# Patient Record
Sex: FEMALE | Race: WHITE | NOT HISPANIC OR LATINO | Employment: OTHER | URBAN - METROPOLITAN AREA
[De-identification: names, ages, dates, MRNs, and addresses within clinical notes are randomized per-mention and may not be internally consistent; named-entity substitution may affect disease eponyms.]

---

## 2017-01-17 ENCOUNTER — TELEPHONE (OUTPATIENT)
Dept: FAMILY MEDICINE | Facility: CLINIC | Age: 72
End: 2017-01-17

## 2017-01-17 NOTE — TELEPHONE ENCOUNTER
Pt called back and states that she does not have time to come in for a Clinic RN BP check - She will however call back in a week or 2 with several readings because she has been checking her BP @ home.

## 2017-01-17 NOTE — TELEPHONE ENCOUNTER
Called patient's home phone and left a voicemail. Patient is to schedule a nursing appointment for a blood pressure check only. Will postpone for 1 week.    BP Readings from Last 3 Encounters:   10/24/16 161/66   09/23/15 127/77   08/20/14 158/82     Norma Donato CMA

## 2017-06-26 ENCOUNTER — TELEPHONE (OUTPATIENT)
Dept: FAMILY MEDICINE | Facility: CLINIC | Age: 72
End: 2017-06-26

## 2017-06-26 NOTE — TELEPHONE ENCOUNTER
Panel Management Review      Patient has the following on her problem list:     Depression / Dysthymia review  PHQ-9 SCORE 8/14/2014 9/23/2015 10/24/2016   Total Score 1 - -   Total Score - 1 2      Patient is due for:  PHQ9    Asthma review     ACT Total Scores 10/24/2016   ACT TOTAL SCORE -   ASTHMA ER VISITS -   ASTHMA HOSPITALIZATIONS -   ACT TOTAL SCORE (Goal Greater than or Equal to 20) 25   In the past 12 months, how many times did you visit the emergency room for your asthma without being admitted to the hospital? 0   In the past 12 months, how many times were you hospitalized overnight because of your asthma? 0      1. Is Asthma diagnosis on the Problem List? Yes    2. Is Asthma listed on Health Maintenance? Yes    3. Patient is due for:  ACT and AAP    Hypertension   Last three blood pressure readings:  BP Readings from Last 3 Encounters:   10/24/16 161/66   09/23/15 127/77   08/20/14 158/82     Blood pressure: FAILED    HTN Guidelines:  Age 18-59 BP range:  Less than 140/90  Age 60-85 with Diabetes:  Less than 140/90  Age 60-85 without Diabetes:  less than 150/90      Composite cancer screening  Chart review shows that this patient is due/due soon for the following None  Summary:    Patient is due/failing the following:   BP CHECK    Action needed:   Patient needs to do ACT., Patient needs to do PHQ9. and Patient needs nurse only appointment.    Type of outreach:    Sent letter.    Questions for provider review:    None                                                                                                                                    Jane THOMPSON CMA       Chart routed to Care Team .

## 2017-06-26 NOTE — LETTER
[facility]  [address] [place] [id number] [phone]      June 26, 2017      Nerissa Valentin  [address] [place] [id number]        Dear Nerissa,        During a recent visit to our clinic, your blood pressure was elevated above the target range for your health.   BP Readings from Last 3 Encounters:   10/24/16 161/66   09/23/15 127/77   08/20/14 158/82     Please schedule a free appointment for a blood pressure check with nurse in the near future.  You can call [phone] to do this. We can also complete the depression and asthma questionnaires that you are due for during this call.    Why is high blood pressure a big deal?    If blood pressure is elevated above target levels you have an increased risk of experiencing a heart attack or stroke, developing heart failure, kidney disease or other chronic diseases.    With appropriate early recognition and treatment, these health problems can be avoided in most cases.  Your physician can help you determine the need for treatment and discuss the non-medication and medication routes to treating high blood pressure as well as evaluate you for possible causes and effects of high blood pressure.    The target range for ideal blood pressure control is less than 140/90 for most individuals.  For those who have been diagnosed with heart disease, diabetes, stroke or peripheral vascular disease this target range is less than 130/80.    Sincerely,  Gwendolyn Sadler MD/Jane THOMPSON CMA

## 2017-06-26 NOTE — LETTER
My Depression Action Plan  Name: Nerissa Valentin   Date of Birth 1945  Date: 6/26/2017    My doctor: Gwendolyn Sadler   My clinic: [facility]  [address] [facility] [address]  [phone]          GREEN    ZONE   Good Control    What it looks like:     Things are going generally well. You have normal up s and down s. You may even feel depressed from time to time, but bad moods usually last less than a day.   What you need to do:  1. Continue to care for yourself (see self care plan)  2. Check your depression survival kit and update it as needed  3. Follow your physician s recommendations including any medication.  4. Do not stop taking medication unless you consult with your physician first.           YELLOW         ZONE Getting Worse    What it looks like:     Depression is starting to interfere with your life.     It may be hard to get out of bed; you may be starting to isolate yourself from others.    Symptoms of depression are starting to last most all day and this has happened for several days.     You may have suicidal thoughts but they are not constant.   What you need to do:     1. Call your care team, your response to treatment will improve if you keep your care team informed of your progress. Yellow periods are signs an adjustment may need to be made.     2. Continue your self-care, even if you have to fake it!    3. Talk to someone in your support network    4. Open up your depression survival kit           RED    ZONE Medical Alert - Get Help    What it looks like:     Depression is seriously interfering with your life.     You may experience these or other symptoms: You can t get out of bed most days, can t work or engage in other necessary activities, you have trouble taking care of basic hygiene, or basic responsibilities, thoughts of suicide or death that will not go away, self-injurious behavior.     What you need to do:  1. Call your care  team and request a same-day appointment. If they are not available (weekends or after hours) call your local crisis line, emergency room or 911.      Electronically signed by: Jane Griffiths, June 26, 2017    Depression Self Care Plan / Survival Kit    Self-Care for Depression  Here s the deal. Your body and mind are really not as separate as most people think.  What you do and think affects how you feel and how you feel influences what you do and think. This means if you do things that people who feel good do, it will help you feel better.  Sometimes this is all it takes.  There is also a place for medication and therapy depending on how severe your depression is, so be sure to consult with your medical provider and/ or Behavioral Health Consultant if your symptoms are worsening or not improving.     In order to better manage my stress, I will:    Exercise  Get some form of exercise, every day. This will help reduce pain and release endorphins, the  feel good  chemicals in your brain. This is almost as good as taking antidepressants!  This is not the same as joining a gym and then never going! (they count on that by the way ) It can be as simple as just going for a walk or doing some gardening, anything that will get you moving.      Hygiene   Maintain good hygiene (Get out of bed in the morning, Make your bed, Brush your teeth, Take a shower, and Get dressed like you were going to work, even if you are unemployed).  If your clothes don't fit try to get ones that do.    Diet  I will strive to eat foods that are good for me, drink plenty of water, and avoid excessive sugar, caffeine, alcohol, and other mood-altering substances.  Some foods that are helpful in depression are: complex carbohydrates, B vitamins, flaxseed, fish or fish oil, fresh fruits and vegetables.    Psychotherapy  I agree to participate in Individual Therapy (if recommended).    Medication  If prescribed medications, I agree to take them.  Missing  doses can result in serious side effects.  I understand that drinking alcohol, or other illicit drug use, may cause potential side effects.  I will not stop my medication abruptly without first discussing it with my provider.    Staying Connected With Others  I will stay in touch with my friends, family members, and my primary care provider/team.    Use your imagination  Be creative.  We all have a creative side; it doesn t matter if it s oil painting, sand castles, or mud pies! This will also kick up the endorphins.    Witness Beauty  (AKA stop and smell the roses) Take a look outside, even in mid-winter. Notice colors, textures. Watch the squirrels and birds.     Service to others  Be of service to others.  There is always someone else in need.  By helping others we can  get out of ourselves  and remember the really important things.  This also provides opportunities for practicing all the other parts of the program.    Humor  Laugh and be silly!  Adjust your TV habits for less news and crime-drama and more comedy.    Control your stress  Try breathing deep, massage therapy, biofeedback, and meditation. Find time to relax each day.     My support system    Clinic Contact:  Phone number:    Contact 1:  Phone number:    Contact 2:  Phone number:    Christianity/:  Phone number:    Therapist:  Phone number:    Local crisis center:    Phone number:    Other community support:  Phone number:

## 2017-09-13 ENCOUNTER — OFFICE VISIT (OUTPATIENT)
Dept: FAMILY MEDICINE | Facility: CLINIC | Age: 72
End: 2017-09-13
Payer: COMMERCIAL

## 2017-09-13 VITALS — HEIGHT: 58 IN | HEART RATE: 80 BPM | SYSTOLIC BLOOD PRESSURE: 134 MMHG | DIASTOLIC BLOOD PRESSURE: 80 MMHG

## 2017-09-13 DIAGNOSIS — M17.11 PRIMARY OSTEOARTHRITIS OF RIGHT KNEE: Primary | ICD-10-CM

## 2017-09-13 PROCEDURE — 20610 DRAIN/INJ JOINT/BURSA W/O US: CPT | Mod: RT | Performed by: FAMILY MEDICINE

## 2017-09-13 NOTE — MR AVS SNAPSHOT
"              After Visit Summary   9/13/2017    Nerissa Valentin    MRN: [id number]           Patient Information     Date Of Birth          1945        Visit Information        Provider Department      9/13/2017 9:00 AM Susana Granados MD [facility]        Today's Diagnoses     Primary osteoarthritis of right knee    -  1       Follow-ups after your visit        Who to contact     Normal or non-critical lab and imaging results will be communicated to you by [employer], letter or phone within 4 business days after the clinic has received the results. If you do not hear from us within 7 days, please contact the clinic through [employer] or phone. If you have a critical or abnormal lab result, we will notify you by phone as soon as possible.  Submit refill requests through [employer] or call your pharmacy and they will forward the refill request to us. Please allow 3 business days for your refill to be completed.          If you need to speak with a  for additional information , please call: [phone]           Additional Information About Your Visit        [email or web address] Information     [employer] gives you secure access to your electronic health record. If you see a primary care provider, you can also send messages to your care team and make appointments. If you have questions, please call your primary care clinic.  If you do not have a primary care provider, please call [phone] and they will assist you.        Care EveryWhere ID     This is your Care EveryWhere ID. This could be used by other organizations to access your [facility] medical records  [id number]        Your Vitals Were     Pulse Height                80 4' 10\" (1.473 m)           Blood Pressure from Last 3 Encounters:   09/13/17 134/80   10/24/16 161/66   09/23/15 127/77    Weight from Last 3 Encounters:   10/24/16 164 lb (74.4 kg)   09/23/15 157 lb (71.2 kg)   08/14/14 162 lb (73.5 kg)              We Performed the " Following     DEPO MEDROL 40 MG     DRAIN/INJECT LARGE JOINT/BURSA     VACCINE ADMINISTRATION, INITIAL          Today's Medication Changes          These changes are accurate as of: 9/13/17 11:59 PM.  If you have any questions, ask your nurse or doctor.               Start taking these medicines.        Dose/Directions    methylPREDNISolone acetate 40 MG/ML injection   Commonly known as:  DEPO-MEDROL   Used for:  Primary osteoarthritis of right knee   Started by:  Susana Granados MD        Dose:  40 mg   1 mL (40 mg) by INTRA-ARTICULAR route once for 1 dose   Quantity:  1 mL   Refills:  0            Where to get your medicines      Some of these will need a paper prescription and others can be bought over the counter.  Ask your nurse if you have questions.     You don't need a prescription for these medications     methylPREDNISolone acetate 40 MG/ML injection                Primary Care Provider Office Phone # Fax #    Gwendolyn Brenda Sadler [phone] [address] [facility] [address]        Equal Access to Services     ROHIT LAMB AH: Hadii ole ahumada hadasho Soomaali, waaxda luqadaha, qaybta kaalmada adeegyada, austin hamilton . So [facility] [phone].    ATENCIÓN: Si habla español, tiene a wang disposición servicios gratuitos de asistencia lingüística. Llame al [phone].    We comply with applicable federal civil rights laws and [place] laws. We do not discriminate on the basis of race, color, national origin, age, disability sex, sexual orientation or gender identity.            Thank you!     Thank you for choosing [facility]  for your care. Our goal is always to provide you with excellent care. Hearing back from our patients is one way we can continue to improve our services. Please take a few minutes to complete the written survey that you may receive in the mail after your visit with us. Thank you!             Your Updated Medication List -  Protect others around you: Learn how to safely use, store and throw away your medicines at www.disposemymeds.org.          This list is accurate as of: 9/13/17 11:59 PM.  Always use your most recent med list.                   Brand Name Dispense Instructions for use Diagnosis    ADVIL PO      None Entered        atorvastatin 40 MG tablet    LIPITOR    90 tablet    Take 1 tablet (40 mg) by mouth daily    Hyperlipidemia LDL goal <130       lisinopril 10 MG tablet    PRINIVIL/ZESTRIL    180 tablet    Take 2 tablets (20 mg) by mouth daily    Hyperlipidemia LDL goal <130       methylPREDNISolone acetate 40 MG/ML injection    DEPO-MEDROL    1 mL    1 mL (40 mg) by INTRA-ARTICULAR route once for 1 dose    Primary osteoarthritis of right knee       sertraline 50 MG tablet    ZOLOFT    90 tablet    Take 1 tablet (50 mg) by mouth daily    Dysthymic disorder

## 2017-09-13 NOTE — NURSING NOTE
"Chief Complaint   Patient presents with     Knee Pain       Initial /80  Pulse 80  Ht 4' 10\" (1.473 m) Estimated body mass index is 34.28 kg/(m^2) as calculated from the following:    Height as of 10/24/16: 4' 10\" (1.473 m).    Weight as of 10/24/16: 164 lb (74.4 kg).  Medication Reconciliation: complete  "

## 2017-09-13 NOTE — PROGRESS NOTES
"  SUBJECTIVE:   Nerissa Valentin is a 71 year old female who presents to clinic today for the following health issues:    Patient requesting repeat injection for right knee, last injection was 5/2016 at [facility]    Knee pain    Onset: years    Description:   Location: right knee  Character: Dull ache    Intensity: severe    Progression of Symptoms: same    Accompanying Signs & Symptoms:  Other symptoms: \"bone on bone\" feeling    History:   Previous similar pain: YES      Precipitating factors:   Trauma or overuse: no     Alleviating factors:  Improved by: injection, orthotics     Therapies Tried and outcome: None          ROS:  Constitutional, HEENT, cardiovascular, pulmonary, gi and gu systems are negative, except as otherwise noted.    This document serves as a record of the services and decisions personally performed and made by Susana Granados MD. It was created on his behalf by Woody Serrano, a trained medical scribe. The creation of this document is based the provider's statements to the medical scribe.  Woody Serrano 9:10 AM September 13, 2017  OBJECTIVE:   /80  Pulse 80  Ht 4' 10\" (1.473 m)  There is no height or weight on file to calculate BMI.       GENERAL: healthy, alert and no distress  EYES: Eyes grossly normal to inspection, conjunctivae and sclerae normal  MS: Right knee: no deformity seen, FROM, patella nontender, no instability, medial and lateral joint line tenderness to palpation, brisk cap refill, sensation intact to light touch below the knee.  SKIN: no suspicious lesions or rashes  NEURO: Normal strength and tone, mentation intact and speech normal  PSYCH: mentation appears normal, affect normal/bright      ASSESSMENT/PLAN:     (M17.11) Primary osteoarthritis of right knee  (primary encounter diagnosis)  Comment: With verbal consent, 40mg of Depo Medrol mixed with 3 mls 1% lidocaine, 25 ga, 1 and 1/2 inch needle, with the knee in a flexed position, the joint space was " idenified just below the patella, using a lateral approach and aseptic technique, the needle was passed easily into the knee joint space, injection done.  No immediate complications.  Watch for bleeding, or signs of infection.  Plan: Follow up PRN      Patient will follow up if symptoms worsen or do not improve. Patient instructed to call with any questions or concerns.      There are no Patient Instructions on file for this visit.      The information in this document, created by a scribe for me, accurately reflects the services I personally performed and the decisions made by me. I have reviewed and approved this document for accuracy. 9:11 AM 9/13/2017    Susana Granados MD  [facility]

## 2017-09-14 RX ORDER — METHYLPREDNISOLONE ACETATE 40 MG/ML
40 INJECTION, SUSPENSION INTRA-ARTICULAR; INTRALESIONAL; INTRAMUSCULAR; SOFT TISSUE ONCE
Qty: 1 ML | Refills: 0 | OUTPATIENT
Start: 2017-09-14 | End: 2017-09-14

## 2017-10-30 ENCOUNTER — MYC REFILL (OUTPATIENT)
Dept: FAMILY MEDICINE | Facility: CLINIC | Age: 72
End: 2017-10-30

## 2017-10-30 DIAGNOSIS — E78.5 HYPERLIPIDEMIA LDL GOAL <130: ICD-10-CM

## 2017-10-30 DIAGNOSIS — E78.5 HYPERLIPEMIA: Primary | ICD-10-CM

## 2017-10-30 RX ORDER — ATORVASTATIN CALCIUM 40 MG/1
40 TABLET, FILM COATED ORAL DAILY
Qty: 30 TABLET | Refills: 0 | Status: SHIPPED | OUTPATIENT
Start: 2017-10-30 | End: 2017-11-07

## 2017-10-30 NOTE — TELEPHONE ENCOUNTER
Message from [employer]:  Original authorizing provider: MD Nerissa Eckert would like a refill of the following medications:  atorvastatin (LIPITOR) 40 MG tablet [Gwendolyn Sadler MD]    Preferred pharmacy: [facility], [place] - [address] [facility]    Comment:

## 2017-10-30 NOTE — TELEPHONE ENCOUNTER
Medication is being filled for 1 time refill only due to:  Patient needs labs lipids. Future labs ordered yes. Patient needs to be seen because it has been more than one year since last visit.      atorvastatin     Last Written Prescription Date: 10-26-16  Last Fill Quantity: 90, # refills: 3  Last Office Visit with [facility] or [facility] prescribing provider: 9-13-17 - Dr. Granados  Next 5 appointments (look out 90 days)     Nov 07, 2017  9:20 AM CST   MyChart Long with Gwendolyn Sadler MD   [facility]    [address] [facility] [facility] [address]   [phone]                   Lab Results   Component Value Date    CHOL 98 10/24/2016     Lab Results   Component Value Date    HDL 29 10/24/2016     Lab Results   Component Value Date    LDL 45 10/24/2016     Lab Results   Component Value Date    TRIG 122 10/24/2016     Lab Results   Component Value Date    CHOLHDLRATIO 3.6 09/23/2015

## 2017-11-07 ENCOUNTER — OFFICE VISIT (OUTPATIENT)
Dept: FAMILY MEDICINE | Facility: CLINIC | Age: 72
End: 2017-11-07
Payer: COMMERCIAL

## 2017-11-07 VITALS
SYSTOLIC BLOOD PRESSURE: 151 MMHG | WEIGHT: 151.4 LBS | HEART RATE: 64 BPM | BODY MASS INDEX: 31.78 KG/M2 | DIASTOLIC BLOOD PRESSURE: 78 MMHG | TEMPERATURE: 98.3 F | HEIGHT: 58 IN

## 2017-11-07 DIAGNOSIS — I10 ESSENTIAL HYPERTENSION: ICD-10-CM

## 2017-11-07 DIAGNOSIS — Z00.00 ROUTINE GENERAL MEDICAL EXAMINATION AT A HEALTH CARE FACILITY: Primary | ICD-10-CM

## 2017-11-07 DIAGNOSIS — Z23 NEED FOR PROPHYLACTIC VACCINATION AND INOCULATION AGAINST INFLUENZA: ICD-10-CM

## 2017-11-07 DIAGNOSIS — Z12.11 SPECIAL SCREENING FOR MALIGNANT NEOPLASMS, COLON: ICD-10-CM

## 2017-11-07 DIAGNOSIS — F34.1 DYSTHYMIC DISORDER: ICD-10-CM

## 2017-11-07 DIAGNOSIS — E78.5 HYPERLIPIDEMIA LDL GOAL <130: ICD-10-CM

## 2017-11-07 DIAGNOSIS — J45.30 MILD PERSISTENT ASTHMA WITHOUT COMPLICATION: ICD-10-CM

## 2017-11-07 LAB
ANION GAP SERPL CALCULATED.3IONS-SCNC: 5 MMOL/L (ref 3–14)
BUN SERPL-MCNC: 16 MG/DL (ref 7–30)
CALCIUM SERPL-MCNC: 8.5 MG/DL (ref 8.5–10.1)
CHLORIDE SERPL-SCNC: 106 MMOL/L (ref 94–109)
CHOLEST SERPL-MCNC: 98 MG/DL
CO2 SERPL-SCNC: 27 MMOL/L (ref 20–32)
CREAT SERPL-MCNC: 0.84 MG/DL (ref 0.52–1.04)
GFR SERPL CREATININE-BSD FRML MDRD: 67 ML/MIN/1.7M2
GLUCOSE SERPL-MCNC: 90 MG/DL (ref 70–99)
HDLC SERPL-MCNC: 32 MG/DL
LDLC SERPL CALC-MCNC: 46 MG/DL
NONHDLC SERPL-MCNC: 66 MG/DL
POTASSIUM SERPL-SCNC: 3.4 MMOL/L (ref 3.4–5.3)
SODIUM SERPL-SCNC: 138 MMOL/L (ref 133–144)
TRIGL SERPL-MCNC: 98 MG/DL

## 2017-11-07 PROCEDURE — 36415 COLL VENOUS BLD VENIPUNCTURE: CPT | Performed by: FAMILY MEDICINE

## 2017-11-07 PROCEDURE — 90732 PPSV23 VACC 2 YRS+ SUBQ/IM: CPT | Performed by: FAMILY MEDICINE

## 2017-11-07 PROCEDURE — 80061 LIPID PANEL: CPT | Performed by: FAMILY MEDICINE

## 2017-11-07 PROCEDURE — 99397 PER PM REEVAL EST PAT 65+ YR: CPT | Mod: 25 | Performed by: FAMILY MEDICINE

## 2017-11-07 PROCEDURE — 80048 BASIC METABOLIC PNL TOTAL CA: CPT | Performed by: FAMILY MEDICINE

## 2017-11-07 PROCEDURE — G0008 ADMIN INFLUENZA VIRUS VAC: HCPCS | Performed by: FAMILY MEDICINE

## 2017-11-07 PROCEDURE — G0009 ADMIN PNEUMOCOCCAL VACCINE: HCPCS | Performed by: FAMILY MEDICINE

## 2017-11-07 PROCEDURE — 90662 IIV NO PRSV INCREASED AG IM: CPT | Performed by: FAMILY MEDICINE

## 2017-11-07 RX ORDER — LISINOPRIL 10 MG/1
20 TABLET ORAL DAILY
Qty: 180 TABLET | Refills: 3 | Status: CANCELLED | OUTPATIENT
Start: 2017-11-07

## 2017-11-07 RX ORDER — ATORVASTATIN CALCIUM 40 MG/1
40 TABLET, FILM COATED ORAL DAILY
Qty: 90 TABLET | Refills: 3 | Status: SHIPPED | OUTPATIENT
Start: 2017-11-07 | End: 2018-11-15

## 2017-11-07 RX ORDER — LISINOPRIL 40 MG/1
40 TABLET ORAL DAILY
Qty: 90 TABLET | Refills: 3 | Status: SHIPPED | OUTPATIENT
Start: 2017-11-07 | End: 2018-11-15

## 2017-11-07 ASSESSMENT — ANXIETY QUESTIONNAIRES
3. WORRYING TOO MUCH ABOUT DIFFERENT THINGS: NOT AT ALL
IF YOU CHECKED OFF ANY PROBLEMS ON THIS QUESTIONNAIRE, HOW DIFFICULT HAVE THESE PROBLEMS MADE IT FOR YOU TO DO YOUR WORK, TAKE CARE OF THINGS AT HOME, OR GET ALONG WITH OTHER PEOPLE: NOT DIFFICULT AT ALL
6. BECOMING EASILY ANNOYED OR IRRITABLE: NOT AT ALL
GAD7 TOTAL SCORE: 0
2. NOT BEING ABLE TO STOP OR CONTROL WORRYING: NOT AT ALL
5. BEING SO RESTLESS THAT IT IS HARD TO SIT STILL: NOT AT ALL
1. FEELING NERVOUS, ANXIOUS, OR ON EDGE: NOT AT ALL
7. FEELING AFRAID AS IF SOMETHING AWFUL MIGHT HAPPEN: NOT AT ALL

## 2017-11-07 ASSESSMENT — PATIENT HEALTH QUESTIONNAIRE - PHQ9
SUM OF ALL RESPONSES TO PHQ QUESTIONS 1-9: 2
5. POOR APPETITE OR OVEREATING: NOT AT ALL

## 2017-11-07 NOTE — PROGRESS NOTES
SUBJECTIVE:   Nerissa Valentin is a 71 year old female who presents for Preventive Visit.      Healthy Habits:    Do you get at least three servings of calcium containing foods daily (dairy, green leafy vegetables, etc.)? 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 no    Do you see a dentist twice per year? no    Do you have sleep apnea, excessive snoring or daytime drowsiness?no    COGNITIVE SCREEN  1) Repeat 3 items (Banana, Sunrise, Chair)   2) Clock draw: NORMAL  3) 3 item recall: Recalls 3 objects  Results: 3 items recalled: COGNITIVE IMPAIRMENT LESS LIKELY    Mini-CogTM Copyright S Jesus. Licensed by the author for use in [facility]; reprinted with permission (nilo@[facility]). All rights reserved.          Reviewed and updated as needed this visit by clinical staffTobacco  Allergies  Meds  Med Hx  Surg Hx  Fam Hx  Soc Hx        Reviewed and updated as needed this visit by Provider        Social History   Substance Use Topics     Smoking status: Never Smoker     Smokeless tobacco: Never Used     Alcohol use Yes      Comment: wine 2-3 a year, if that       The patient does not drink >3 drinks per day nor >7 drinks per week.    Today's PHQ-2 Score:   PHQ-2 ( 1999 Pfizer) 11/7/2017 10/24/2016   Q1: Little interest or pleasure in doing things 0 0   Q2: Feeling down, depressed or hopeless 0 0   PHQ-2 Score 0 0   Q1: Little interest or pleasure in doing things - -   Q2: Feeling down, depressed or hopeless - -   PHQ-2 Score - -         Do you feel safe in your environment - Yes    Do you have a Health Care Directive?: Yes: Advance Directive has been received and scanned.    Current providers sharing in care for this patient include: Patient Care Team:  Gwendolyn Sadler MD as PCP - General      [facility]  impairment: No    Ability to successfully perform activities of daily living: Yes, no assistance needed     Fall risk:  Fallen 2 or more times in the past year?: No  Any fall with injury in the past year?: No      Home safety:  none identified      The following health maintenance items are reviewed in Epic and correct as of today:  Health Maintenance   Topic Date Due     ASTHMA ACTION PLAN Q1 YR  09/23/2016     DEPRESSION ACTION PLAN Q1 YR  09/23/2016     ADVANCE DIRECTIVE PLANNING Q5 YRS  02/06/2017     ASTHMA CONTROL TEST Q6 MOS  04/24/2017     PHQ-9 Q6 MONTHS  04/24/2017     INFLUENZA VACCINE (SYSTEM ASSIGNED)  09/01/2017     PNEUMOCOCCAL (2 of 2 - PPSV23) 10/24/2017     FALL RISK ASSESSMENT  10/24/2017     FIT Q1 YR  11/30/2017     MAMMO SCREEN Q2 YR (SYSTEM ASSIGNED)  12/29/2018     TETANUS IMMUNIZATION (SYSTEM ASSIGNED)  02/01/2021     LIPID SCREEN Q5 YR FEMALE (SYSTEM ASSIGNED)  10/24/2021     DEXA SCAN SCREENING (SYSTEM ASSIGNED)  Completed     HEPATITIS C SCREENING  Completed     BP Readings from Last 3 Encounters:   11/07/17 151/78   09/13/17 134/80   10/24/16 161/66    Wt Readings from Last 3 Encounters:   11/07/17 151 lb 6.4 oz (68.7 kg)   10/24/16 164 lb (74.4 kg)   09/23/15 157 lb (71.2 kg)                  Patient Active Problem List   Diagnosis     Leucocytosis     HTN (hypertension)     Hyperlipidemia with target LDL less than 130     Asthma, mild persistent     Dysthymic disorder     Osteoarthritis     Vitamin B12 deficiency anemia     Postmenopausal     Advanced directives, counseling/discussion     Health Care Home     Family history of diabetes mellitus     Hypokalemia     Obesity     BMI 31.0-31.9,adult     Fall due to ice or snow     Past Surgical History:   Procedure Laterality Date     BREAST BIOPSY, RT/LT  1999    Breat Biopsy RT     C REMOVAL OF KIDNEY STONE  1971     FLEXIBLE SIGMOIDOSCOPY  2000 ?    [place]. [place] at [place]     HYSTERECTOMY, PAP NO LONGER INDICATED       HYSTERECTOMY,  VAGINAL  1971     LITHOTRIPSY  2005     ROTATOR CUFF REPAIR RT/LT  2007    left     ROTATOR CUFF REPAIR RT/LT  2009    right twice?     SALPINGO OOPHORECTOMY,R/L/HARLEY  1969 and 1970    Salpingo Oophorectomy, RT/LT/HARLEY     SURGICAL HISTORY OF -   1969, 1970    ovary cystectomy       Social History   Substance Use Topics     Smoking status: Never Smoker     Smokeless tobacco: Never Used     Alcohol use Yes      Comment: wine 2-3 a year, if that     Family History   Problem Relation Age of Onset     Arthritis Mother      C.A.D. Mother      CEREBROVASCULAR DISEASE Maternal Grandfather      Hypertension Father      DIABETES Father      C.A.D. Father      Cancer - colorectal Paternal Grandfather      Breast Cancer Sister      Breast Cancer Sister          Current Outpatient Prescriptions   Medication Sig Dispense Refill     atorvastatin (LIPITOR) 40 MG tablet Take 1 tablet (40 mg) by mouth daily 90 tablet 3     sertraline (ZOLOFT) 50 MG tablet Take 1 tablet (50 mg) by mouth daily 90 tablet 3     lisinopril (PRINIVIL/ZESTRIL) 40 MG tablet Take 1 tablet (40 mg) by mouth daily 90 tablet 3     lisinopril (PRINIVIL,ZESTRIL) 10 MG tablet Take 2 tablets (20 mg) by mouth daily 180 tablet 3     ADVIL OR None Entered       [DISCONTINUED] atorvastatin (LIPITOR) 40 MG tablet Take 1 tablet (40 mg) by mouth daily 30 tablet 0     [DISCONTINUED] sertraline (ZOLOFT) 50 MG tablet Take 1 tablet (50 mg) by mouth daily 90 tablet 3     Allergies   Allergen Reactions     Chromic Sulfate      Chromic SUTURES, not sulfate     Penicillins Hives     Recent Labs   Lab Test  10/24/16   1047  09/23/15   1146  02/26/14   1105   02/06/12   1137   LDL  45  58  62   < >  147*   HDL  29*  33*  45*   < >  39*   TRIG  122  139  124   < >  203*   ALT   --    --    --    --   12   CR   --   0.86  0.87   < >  0.93   GFRESTIMATED   --   66  65   < >  61   GFRESTBLACK   --   80  79   < >  73   POTASSIUM   --   3.6  3.9   < >  3.2*    < > = values in this interval  "not displayed.              Pneumonia Vaccine:Adults age 65+ who received Pneumovax (PPSV23) at 65 years or older: Should be given PCV13 > 1 year after their most recent PPSV23  Mammogram Screening: Patient over age 50, mutual decision to screen reflected in health maintenance.    History of abnormal Pap smear: Last 3 Pap Results: No results found for: PAP  ROS:  Constitutional, HEENT, cardiovascular, pulmonary, gi and gu systems are negative, except as otherwise noted.      OBJECTIVE:   /78  Pulse 64  Temp 98.3  F (36.8  C) (Tympanic)  Ht 4' 10\" (1.473 m)  Wt 151 lb 6.4 oz (68.7 kg)  BMI 31.64 kg/m2 Estimated body mass index is 31.64 kg/(m^2) as calculated from the following:    Height as of this encounter: 4' 10\" (1.473 m).    Weight as of this encounter: 151 lb 6.4 oz (68.7 kg).  EXAM:   GENERAL APPEARANCE: healthy, alert and no distress  EYES: Eyes grossly normal to inspection, PERRL and conjunctivae and sclerae normal  HENT: ear canals and TM's normal, nose and mouth without ulcers or lesions, oropharynx clear and oral mucous membranes moist  NECK: no adenopathy, no asymmetry, masses, or scars and thyroid normal to palpation  RESP: lungs clear to auscultation - no rales, rhonchi or wheezes  BREAST: normal without masses, tenderness or nipple discharge and no palpable axillary masses or adenopathy  CV: regular rate and rhythm, normal S1 S2, no S3 or S4, no murmur, click or rub, no peripheral edema and peripheral pulses strong  ABDOMEN: soft, nontender, no hepatosplenomegaly, no masses and bowel sounds normal  MS: no musculoskeletal defects are noted and gait is age appropriate without ataxia  SKIN: no suspicious lesions or rashes  NEURO: Normal strength and tone, sensory exam grossly normal, mentation intact and speech normal  PSYCH: mentation appears normal and affect normal/bright    ASSESSMENT / PLAN:   1. Routine general medical examination at a health care facility  Low risk cervical cancer, low " "risk breast cancer.    2. Hyperlipidemia LDL goal <130  due for labs and refill, taking medication without difficulty    - atorvastatin (LIPITOR) 40 MG tablet; Take 1 tablet (40 mg) by mouth daily  Dispense: 90 tablet; Refill: 3  - Lipid panel reflex to direct LDL Fasting    3. Dysthymic disorder  due for review and refill, taking medication without difficulty    - sertraline (ZOLOFT) 50 MG tablet; Take 1 tablet (50 mg) by mouth daily  Dispense: 90 tablet; Refill: 3  - DEPRESSION ACTION PLAN (DAP)    4. Need for prophylactic vaccination and inoculation against influenza  - FLU VACCINE, INCREASED ANTIGEN, PRESV FREE, AGE 65+ [33614]  - Pneumococcal vaccine 23 valent PPSV23  (Pneumovax) [87718]  - Vaccine Administration, Initial [07419]  - Vaccine Administration, Each Additional [15184]    5. Special screening for malignant neoplasms, colon  - Fecal colorectal cancer screen FIT; Future    6. Mild persistent asthma without complication  - Asthma Action Plan (AAP)    7. Essential hypertension  due for labs and refill, taking medication without difficulty  - lisinopril (PRINIVIL/ZESTRIL) 40 MG tablet; Take 1 tablet (40 mg) by mouth daily  Dispense: 90 tablet; Refill: 3  - Basic metabolic panel    End of Life Planning:  Patient currently has an advanced directive: Yes.  Practitioner is supportive of decision.    COUNSELING:  Reviewed preventive health counseling, as reflected in patient instructions       Regular exercise       Healthy diet/nutrition       Vision screening       Hearing screening       Dental care        Estimated body mass index is 31.64 kg/(m^2) as calculated from the following:    Height as of this encounter: 4' 10\" (1.473 m).    Weight as of this encounter: 151 lb 6.4 oz (68.7 kg).  Weight management plan: Discussed healthy diet and exercise guidelines and patient will follow up in 3 months in clinic to re-evaluate.   reports that she has never smoked. She has never used smokeless " tobacco.        Appropriate preventive services were discussed with this patient, including applicable screening as appropriate for cardiovascular disease, diabetes, osteopenia/osteoporosis, and glaucoma.  As appropriate for age/gender, discussed screening for colorectal cancer, prostate cancer, breast cancer, and cervical cancer. Checklist reviewing preventive services available has been given to the patient.    Reviewed patients plan of care and provided an AVS. The Intermediate Care Plan ( asthma action plan, low back pain action plan, and migraine action plan) for Nerissa meets the Care Plan requirement. This Care Plan has been established and reviewed with the Patient.    Counseling Resources:  ATP IV Guidelines  Pooled Cohorts Equation Calculator  Breast Cancer Risk Calculator  FRAX Risk Assessment  ICSI Preventive Guidelines  Dietary Guidelines for Americans, 2010  [employer] MyPlate  ASA Prophylaxis  Lung CA Screening    Gwendolyn Sadler MD  [facility]  Injectable Influenza Immunization Documentation    1.  Is the person to be vaccinated sick today?   No    2. Does the person to be vaccinated have an allergy to a component   of the vaccine?   No  Egg Allergy Algorithm Link    3. Has the person to be vaccinated ever had a serious reaction   to influenza vaccine in the past?   No    4. Has the person to be vaccinated ever had Guillain-Barré syndrome?   No    Form completed by Jane THOMPSON CMA

## 2017-11-07 NOTE — MR AVS SNAPSHOT
After Visit Summary   11/7/2017    Nerissa Valentin    MRN: [id number]           Patient Information     Date Of Birth          1945        Visit Information        Provider Department      11/7/2017 9:20 AM Gwendolyn Sadler MD [facility]        Today's Diagnoses     Routine general medical examination at a health care facility    -  1    Hyperlipidemia LDL goal <130        Dysthymic disorder        Need for prophylactic vaccination and inoculation against influenza        Special screening for malignant neoplasms, colon        Mild persistent asthma without complication        Essential hypertension          Care Instructions      Preventive Health Recommendations    Female Ages 65 +    Yearly exam:     See your health care provider every year in order to  o Review health changes.   o Discuss preventive care.    o Review your medicines if your doctor has prescribed any.      You no longer need a yearly Pap test unless you've had an abnormal Pap test in the past 10 years. If you have vaginal symptoms, such as bleeding or discharge, be sure to talk with your provider about a Pap test.      Every 1 to 2 years, have a mammogram.  If you are over 69, talk with your health care provider about whether or not you want to continue having screening mammograms.      Every 10 years, have a colonoscopy. Or, have a yearly FIT test (stool test). These exams will check for colon cancer.       Have a cholesterol test every 5 years, or more often if your doctor advises it.       Have a diabetes test (fasting glucose) every three years. If you are at risk for diabetes, you should have this test more often.       At age 65, have a bone density scan (DEXA) to check for osteoporosis (brittle bone disease).    Shots:    Get a flu shot each year.    Get a tetanus shot every 10 years.    Talk to your doctor about your pneumonia vaccines. There are now two you should receive - Pneumovax (PPSV 23) and  Prevnar (PCV 13).    Talk to your doctor about the shingles vaccine.    Talk to your doctor about the hepatitis B vaccine.    Nutrition:     Eat at least 5 servings of fruits and vegetables each day.      Eat whole-grain bread, whole-wheat pasta and brown rice instead of white grains and rice.      Talk to your provider about Calcium and Vitamin D.     Lifestyle    Exercise at least 150 minutes a week (30 minutes a day, 5 days a week). This will help you control your weight and prevent disease.      Limit alcohol to one drink per day.      No smoking.       Wear sunscreen to prevent skin cancer.       See your dentist twice a year for an exam and cleaning.      See your eye doctor every 1 to 2 years to screen for conditions such as glaucoma, macular degeneration and cataracts.          Follow-ups after your visit        Future tests that were ordered for you today     Open Future Orders        Priority Expected Expires Ordered    Fecal colorectal cancer screen FIT Routine 11/28/2017 1/30/2018 11/7/2017            Who to contact     If you have questions or need follow up information about today's clinic visit or your schedule please contact [facility] directly at [phone].  Normal or non-critical lab and imaging results will be communicated to you by Faveeohart, letter or phone within 4 business days after the clinic has received the results. If you do not hear from us within 7 days, please contact the clinic through B[employer] Gam[employer] or phone. If you have a critical or abnormal lab result, we will notify you by phone as soon as possible.  Submit refill requests through Geomagic or call your pharmacy and they will forward the refill request to us. Please allow 3 business days for your refill to be completed.          Additional Information About Your Visit        Geomagic Information     Geom[employer] gives you secure access to your electronic health record. If you see a primary care provider, you can also send  "messages to your care team and make appointments. If you have questions, please call your primary care clinic.  If you do not have a primary care provider, please call [phone] and they will assist you.        Care EveryWhere ID     This is your Care EveryWhere ID. This could be used by other organizations to access your [facility] medical records  [id number]        Your Vitals Were     Pulse Temperature Height BMI (Body Mass Index)          64 98.3  F (36.8  C) (Tympanic) 4' 10\" (1.473 m) 31.64 kg/m2         Blood Pressure from Last 3 Encounters:   11/07/17 151/78   09/13/17 134/80   10/24/16 161/66    Weight from Last 3 Encounters:   11/07/17 151 lb 6.4 oz (68.7 kg)   10/24/16 164 lb (74.4 kg)   09/23/15 157 lb (71.2 kg)              We Performed the Following     Asthma Action Plan (AAP)     DEPRESSION ACTION PLAN (DAP)     FLU VACCINE, INCREASED ANTIGEN, PRESV FREE, AGE 65+ [40985]     Pneumococcal vaccine 23 valent PPSV23  (Pneumovax) [22076]     Vaccine Administration, Each Additional [07499]     Vaccine Administration, Initial [91306]          Today's Medication Changes          These changes are accurate as of: 11/7/17 10:00 AM.  If you have any questions, ask your nurse or doctor.               These medicines have changed or have updated prescriptions.        Dose/Directions    * lisinopril 10 MG tablet   Commonly known as:  PRINIVIL/ZESTRIL   This may have changed:  Another medication with the same name was added. Make sure you understand how and when to take each.   Used for:  Hyperlipidemia LDL goal <130   Changed by:  Gwendolyn Sadler MD        Dose:  20 mg   Take 2 tablets (20 mg) by mouth daily   Quantity:  180 tablet   Refills:  3       * lisinopril 40 MG tablet   Commonly known as:  PRINIVIL/ZESTRIL   This may have changed:  You were already taking a medication with the same name, and this prescription was added. Make sure you understand how and when to take each.   Used for:  Essential " hypertension   Changed by:  Gwendolyn Sadler MD        Dose:  40 mg   Take 1 tablet (40 mg) by mouth daily   Quantity:  90 tablet   Refills:  3       * Notice:  This list has 2 medication(s) that are the same as other medications prescribed for you. Read the directions carefully, and ask your doctor or other care provider to review them with you.         Where to get your medicines      These medications were sent to [facility] [address]  [address] [address]     Phone:  [phone]     atorvastatin 40 MG tablet    lisinopril 40 MG tablet    sertraline 50 MG tablet                Primary Care Provider Office Phone # Fax #    Gwendolyn Sadler [phone]       [address] [address]        Equal Access to Services     [facility] : Hadii ole ahumada hadasho Soomaali, waaxda luqadaha, qaybta kaalmada adeegyada, [place] . So [facility] [phone].    ATENCIÓN: Si habla español, tiene a wang disposición servicios gratuitos de asistencia lingüística. [facility] [phone].    We comply with applicable federal civil rights laws and [place] laws. We do not discriminate on the basis of race, color, national origin, age, disability, sex, sexual orientation, or gender identity.            Thank you!     Thank you for choosing [facility]  for your care. Our goal is always to provide you with excellent care. Hearing back from our patients is one way we can continue to improve our services. Please take a few minutes to complete the written survey that you may receive in the mail after your visit with us. Thank you!             Your Updated Medication List - Protect others around you: Learn how to safely use, store and throw away your medicines at www.disposemymeds.org.          This list is accurate as of: 11/7/17 10:00 AM.  Always use your most recent med list.                   Brand Name  Dispense Instructions for use Diagnosis    ADVIL PO      None Entered        atorvastatin 40 MG tablet    LIPITOR    90 tablet    Take 1 tablet (40 mg) by mouth daily    Hyperlipidemia LDL goal <130       * lisinopril 10 MG tablet    PRINIVIL/ZESTRIL    180 tablet    Take 2 tablets (20 mg) by mouth daily    Hyperlipidemia LDL goal <130       * lisinopril 40 MG tablet    PRINIVIL/ZESTRIL    90 tablet    Take 1 tablet (40 mg) by mouth daily    Essential hypertension       sertraline 50 MG tablet    ZOLOFT    90 tablet    Take 1 tablet (50 mg) by mouth daily    Dysthymic disorder       * Notice:  This list has 2 medication(s) that are the same as other medications prescribed for you. Read the directions carefully, and ask your doctor or other care provider to review them with you.

## 2017-11-07 NOTE — NURSING NOTE
"Initial /78  Pulse 64  Temp 98.3  F (36.8  C) (Tympanic)  Ht 4' 10\" (1.473 m)  Wt 151 lb 6.4 oz (68.7 kg)  BMI 31.64 kg/m2 Estimated body mass index is 31.64 kg/(m^2) as calculated from the following:    Height as of this encounter: 4' 10\" (1.473 m).    Weight as of this encounter: 151 lb 6.4 oz (68.7 kg). .      "

## 2017-11-08 ASSESSMENT — ANXIETY QUESTIONNAIRES: GAD7 TOTAL SCORE: 0

## 2017-11-08 ASSESSMENT — ASTHMA QUESTIONNAIRES: ACT_TOTALSCORE: 25

## 2017-11-09 ENCOUNTER — OFFICE VISIT (OUTPATIENT)
Dept: FAMILY MEDICINE | Facility: CLINIC | Age: 72
End: 2017-11-09
Payer: COMMERCIAL

## 2017-11-09 VITALS
BODY MASS INDEX: 32.32 KG/M2 | HEART RATE: 88 BPM | TEMPERATURE: 101.1 F | WEIGHT: 154 LBS | HEIGHT: 58 IN | SYSTOLIC BLOOD PRESSURE: 106 MMHG | DIASTOLIC BLOOD PRESSURE: 62 MMHG

## 2017-11-09 DIAGNOSIS — L03.114 CELLULITIS OF LEFT UPPER EXTREMITY: Primary | ICD-10-CM

## 2017-11-09 PROCEDURE — 99213 OFFICE O/P EST LOW 20 MIN: CPT | Performed by: NURSE PRACTITIONER

## 2017-11-09 RX ORDER — CEPHALEXIN 500 MG/1
500 CAPSULE ORAL 3 TIMES DAILY
Qty: 30 CAPSULE | Refills: 0 | Status: SHIPPED | OUTPATIENT
Start: 2017-11-09 | End: 2018-04-06

## 2017-11-09 NOTE — PROGRESS NOTES
SUBJECTIVE:   Nerissa Valentin is a 71 year old female who presents to clinic today for the following health issues:      Chief Complaint   Patient presents with     Cellulitis     Upper left arm hot, red, sore, 101-103 temp since tuesday. Receieved the Flu shot and pneumoccoccal last thrusday 11/7/17.            Problem list and histories reviewed & adjusted, as indicated.  Additional history: as documented    Patient Active Problem List   Diagnosis     Leucocytosis     HTN (hypertension)     Hyperlipidemia with target LDL less than 130     Asthma, mild persistent     Dysthymic disorder     Osteoarthritis     Vitamin B12 deficiency anemia     Postmenopausal     Advanced directives, counseling/discussion     Health Care Home     Family history of diabetes mellitus     Hypokalemia     Obesity     BMI 31.0-31.9,adult     Fall due to ice or snow     Past Surgical History:   Procedure Laterality Date     ABDOMEN SURGERY  1969, 1970, 1971    ovary removed x2 and abd. hyst.     APPENDECTOMY  1971     BREAST BIOPSY, RT/LT  1999    Breat Biopsy RT     C REMOVAL OF KIDNEY STONE  1971     FLEXIBLE SIGMOIDOSCOPY  2000 ?    Dr. Cline [facility]     HYSTERECTOMY, PAP NO LONGER INDICATED       HYSTERECTOMY, VAGINAL  1971     LITHOTRIPSY  2005     ROTATOR CUFF REPAIR RT/LT  2007    left     ROTATOR CUFF REPAIR RT/LT  2009    right twice?     SALPINGO OOPHORECTOMY,R/L/HARLEY  1969 and 1970    Salpingo Oophorectomy, RT/LT/HARLEY     SURGICAL HISTORY OF -   1969, 1970    ovary cystectomy       Social History   Substance Use Topics     Smoking status: Never Smoker     Smokeless tobacco: Never Used     Alcohol use Yes      Comment: rare     Family History   Problem Relation Age of Onset     Arthritis Mother      C.A.D. Mother      Hypertension Father      DIABETES Father      C.A.D. Father      CEREBROVASCULAR DISEASE Maternal Grandfather      Cancer - colorectal Paternal Grandfather      Breast Cancer Sister      Breast Cancer Sister   "        Current Outpatient Prescriptions   Medication Sig Dispense Refill     atorvastatin (LIPITOR) 40 MG tablet Take 1 tablet (40 mg) by mouth daily 90 tablet 3     sertraline (ZOLOFT) 50 MG tablet Take 1 tablet (50 mg) by mouth daily 90 tablet 3     lisinopril (PRINIVIL/ZESTRIL) 40 MG tablet Take 1 tablet (40 mg) by mouth daily 90 tablet 3     ADVIL OR None Entered       [DISCONTINUED] lisinopril (PRINIVIL,ZESTRIL) 10 MG tablet Take 2 tablets (20 mg) by mouth daily 180 tablet 3     Allergies   Allergen Reactions     Chromic Sulfate      Chromic SUTURES, not sulfate     Penicillins Hives     BP Readings from Last 3 Encounters:   11/09/17 106/62   11/07/17 151/78   09/13/17 134/80    Wt Readings from Last 3 Encounters:   11/09/17 154 lb (69.9 kg)   11/07/17 151 lb 6.4 oz (68.7 kg)   10/24/16 164 lb (74.4 kg)                        Reviewed and updated as needed this visit by clinical staffTobacco  Allergies  Meds  Med Hx  Surg Hx  Fam Hx  Soc Hx      Reviewed and updated as needed this visit by Provider         ROS:  C: NEGATIVE for fever, chills, change in weight  INTEGUMENTARY/SKIN: POSITIVE for left upper arm did get the flu shot and the pneumovax in the left upper are.  That night she did get a fever 103 ,  Felt achy and the left upper arm became sore , and then the area around ;the injection became red and hot.  Now the  Area of redness has gotten bigger   E/M: NEGATIVE for ear, mouth and throat problems  R: NEGATIVE for significant cough or SOB  CV: NEGATIVE for chest pain, palpitations or peripheral edema    OBJECTIVE:     /62 (BP Location: Left arm, Patient Position: Chair, Cuff Size: Adult Regular)  Pulse 88  Temp 101.1  F (38.4  C) (Tympanic)  Ht 4' 10\" (1.473 m)  Wt 154 lb (69.9 kg)  BMI 32.19 kg/m2  Body mass index is 32.19 kg/(m^2).   GENERAL: healthy, alert and no distress  RESP: lungs clear to auscultation - no rales, rhonchi or wheezes  CV: regular rate and rhythm, normal S1 S2, no " S3 or S4, no murmur, click or rub, no peripheral edema and peripheral pulses strong  SKIN: ecchymoses -left upper arm , is erythemic , anterior left arm  10 cm x 5 cm and the  Lateral/posterior left arm  13 cm x 8 cm .   Tender and warm to the touch.   Is able to move the left arm but it is sore  With movement.      Diagnostic Test Results:  none     ASSESSMENT/PLAN:     ASSESSMENT/PLAN:      ICD-10-CM    1. Cellulitis of left upper extremity L03.114 cephALEXin (KEFLEX) 500 MG capsule       Patient Instructions   Apply a cool pack to the left upper arm ,   Start the Keflex,   Take 3 times per day for 7-10 days.     Treat they symptoms.  With  Advil / Tylenol     For the allergies get started on a non-sedating anti-histamine such as Claritin, Allegra or Zyrtec, ( get the generic - it is a lot cheaper)  Stay well hydrated -                    MEDICATIONS:        - Start taking Keflex        - Continue other medications without change  See Patient Instructions    DELMY CANELA NP, APRN [facility]

## 2017-11-09 NOTE — PATIENT INSTRUCTIONS
Apply a cool pack to the left upper arm ,   Start the Keflex,   Take 3 times per day for 7-10 days.     Treat they symptoms.  With  Advil / Tylenol     For the allergies get started on a non-sedating anti-histamine such as Claritin, Allegra or Zyrtec, ( get the generic - it is a lot cheaper)  Stay well hydrated -

## 2017-11-09 NOTE — NURSING NOTE
"Chief Complaint   Patient presents with     Cellulitis     Upper left arm hot, red, sore, 101-103 temp since tuesday. Receieved the Flu shot and pneumoccoccal last thrusday 11/7/17.        Initial /62 (BP Location: Left arm, Patient Position: Chair, Cuff Size: Adult Regular)  Pulse 88  Temp 101.1  F (38.4  C) (Tympanic)  Ht 4' 10\" (1.473 m)  Wt 154 lb (69.9 kg)  BMI 32.19 kg/m2 Estimated body mass index is 32.19 kg/(m^2) as calculated from the following:    Height as of this encounter: 4' 10\" (1.473 m).    Weight as of this encounter: 154 lb (69.9 kg).  Medication Reconciliation: complete   Emy Rice CMA    "

## 2017-11-09 NOTE — MR AVS SNAPSHOT
After Visit Summary   11/9/2017    Nerissa Valentin    MRN: [id number]           Patient Information     Date Of Birth          1945        Visit Information        Provider Department      11/9/2017 12:40 PM Reny Morales APRN [facility]        Today's Diagnoses     Cellulitis of left upper extremity    -  1      Care Instructions    Apply a cool pack to the left upper arm ,   Start the Keflex,   Take 3 times per day for 7-10 days.     Treat they symptoms.  With  Advil / Tylenol     For the allergies get started on a non-sedating anti-histamine such as Claritin, Allegra or Zyrtec, ( get the generic - it is a lot cheaper)  Stay well hydrated -             Follow-ups after your visit        Who to contact     Normal or non-critical lab and imaging results will be communicated to you by [employer], letter or phone within 4 business days after the clinic has received the results. If you do not hear from us within 7 days, please contact the clinic through [employer] or phone. If you have a critical or abnormal lab result, we will notify you by phone as soon as possible.  Submit refill requests through [employer] or call your pharmacy and they will forward the refill request to us. Please allow 3 business days for your refill to be completed.          If you need to speak with a  for additional information , please call: [phone]           Additional Information About Your Visit        [employer] [employer] Information     [employer] gives you secure access to your electronic health record. If you see a primary care provider, you can also send messages to your care team and make appointments. If you have questions, please call your primary care clinic.  If you do not have a primary care provider, please call [phone] and they will assist you.        Care EveryWhere ID     This is your Care EveryWhere ID. This could be used by other organizations to access your [place]  "medical records  [id number]        Your Vitals Were     Pulse Temperature Height BMI (Body Mass Index)          88 101.1  F (38.4  C) (Tympanic) 4' 10\" (1.473 m) 32.19 kg/m2         Blood Pressure from Last 3 Encounters:   11/09/17 106/62   11/07/17 151/78   09/13/17 134/80    Weight from Last 3 Encounters:   11/09/17 154 lb (69.9 kg)   11/07/17 151 lb 6.4 oz (68.7 kg)   10/24/16 164 lb (74.4 kg)              Today, you had the following     No orders found for display         Today's Medication Changes          These changes are accurate as of: 11/9/17  1:09 PM.  If you have any questions, ask your nurse or doctor.               Start taking these medicines.        Dose/Directions    cephALEXin 500 MG capsule   Commonly known as:  KEFLEX   Used for:  Cellulitis of left upper extremity   Started by:  Reny Morales APRN CNP        Dose:  500 mg   Take 1 capsule (500 mg) by mouth 3 times daily   Quantity:  30 capsule   Refills:  0            Where to get your medicines      These medications were sent to [facility] [address]  [address] [address]     Phone:  [phone]     cephALEXin 500 MG capsule                Primary Care Provider Office Phone # Fax #    Gwendolyn Brenda Sadler [phone] [address] [address]        Equal Access to Services     [facility] ZAINAB AH: Hadii ole ahumada hadasho Soomaali, waaxda luqadaha, qaybta kaalmada adeegyada, waxay sana hamilton . So [facility] [phone].    ATENCIÓN: Si habla español, tiene a wang disposición servicios gratuitos de asistencia lingüística. Llame al [phone].    We comply with applicable federal civil rights laws and [place] laws. We do not discriminate on the basis of race, color, national origin, age, disability, sex, sexual orientation, or gender identity.            Thank you!     Thank you for choosing [facility]  for your " care. Our goal is always to provide you with excellent care. Hearing back from our patients is one way we can continue to improve our services. Please take a few minutes to complete the written survey that you may receive in the mail after your visit with us. Thank you!             Your Updated Medication List - Protect others around you: Learn how to safely use, store and throw away your medicines at www.disposemymeds.org.          This list is accurate as of: 11/9/17  1:09 PM.  Always use your most recent med list.                   Brand Name Dispense Instructions for use Diagnosis    ADVIL PO      None Entered        atorvastatin 40 MG tablet    LIPITOR    90 tablet    Take 1 tablet (40 mg) by mouth daily    Hyperlipidemia LDL goal <130       cephALEXin 500 MG capsule    KEFLEX    30 capsule    Take 1 capsule (500 mg) by mouth 3 times daily    Cellulitis of left upper extremity       lisinopril 40 MG tablet    PRINIVIL/ZESTRIL    90 tablet    Take 1 tablet (40 mg) by mouth daily    Essential hypertension       sertraline 50 MG tablet    ZOLOFT    90 tablet    Take 1 tablet (50 mg) by mouth daily    Dysthymic disorder

## 2017-11-27 PROCEDURE — 82274 ASSAY TEST FOR BLOOD FECAL: CPT | Performed by: FAMILY MEDICINE

## 2017-12-01 DIAGNOSIS — Z12.11 SPECIAL SCREENING FOR MALIGNANT NEOPLASMS, COLON: ICD-10-CM

## 2017-12-01 LAB — HEMOCCULT STL QL IA: NEGATIVE

## 2018-04-06 ENCOUNTER — RADIANT APPOINTMENT (OUTPATIENT)
Dept: GENERAL RADIOLOGY | Facility: CLINIC | Age: 73
End: 2018-04-06
Attending: INTERNAL MEDICINE
Payer: COMMERCIAL

## 2018-04-06 ENCOUNTER — OFFICE VISIT (OUTPATIENT)
Dept: FAMILY MEDICINE | Facility: CLINIC | Age: 73
End: 2018-04-06
Payer: COMMERCIAL

## 2018-04-06 VITALS
BODY MASS INDEX: 30.86 KG/M2 | HEART RATE: 85 BPM | TEMPERATURE: 99.6 F | WEIGHT: 147 LBS | SYSTOLIC BLOOD PRESSURE: 128 MMHG | OXYGEN SATURATION: 98 % | HEIGHT: 58 IN | DIASTOLIC BLOOD PRESSURE: 68 MMHG

## 2018-04-06 DIAGNOSIS — J10.1 INFLUENZA A: Primary | ICD-10-CM

## 2018-04-06 LAB
FLUAV+FLUBV AG SPEC QL: NEGATIVE
FLUAV+FLUBV AG SPEC QL: POSITIVE
SPECIMEN SOURCE: ABNORMAL

## 2018-04-06 PROCEDURE — 99214 OFFICE O/P EST MOD 30 MIN: CPT | Performed by: INTERNAL MEDICINE

## 2018-04-06 PROCEDURE — 71046 X-RAY EXAM CHEST 2 VIEWS: CPT | Mod: FY

## 2018-04-06 PROCEDURE — 87804 INFLUENZA ASSAY W/OPTIC: CPT | Performed by: INTERNAL MEDICINE

## 2018-04-06 RX ORDER — CODEINE PHOSPHATE AND GUAIFENESIN 10; 100 MG/5ML; MG/5ML
1 SOLUTION ORAL EVERY 4 HOURS PRN
Qty: 120 ML | Refills: 0 | Status: SHIPPED | OUTPATIENT
Start: 2018-04-06 | End: 2018-04-12

## 2018-04-06 RX ORDER — ALBUTEROL SULFATE 90 UG/1
2 AEROSOL, METERED RESPIRATORY (INHALATION) EVERY 6 HOURS PRN
Qty: 1 INHALER | Refills: 0 | Status: SHIPPED | OUTPATIENT
Start: 2018-04-06 | End: 2018-11-15

## 2018-04-06 NOTE — PATIENT INSTRUCTIONS
1. Chest xray today  2. Flu swab   3. Albuterol inhaler given  4. Robitussin with Codeine, no refills.        You have bronchitis which is a virus. Antibiotics treat bacterial infections and not viral infections.  They will not cure or shorten a viral illness.  The main way to feel better is rest, hydration, good nutrition    What is bronchitis? -- Bronchitis is an infection that causes a cough. It happens when the tubes that carry air into the lungs, called the  bronchi,  get infected.  Usually, bronchitis happens when a person gets a cold or the flu. The viruses that cause the cold or flu infect the bronchi and irritate them.   What are the symptoms of bronchitis? -- The most common symptoms of bronchitis are:  ?A nagging cough that can last up to a few weeks. Average duration of cough with bronchitis is 24 days  ?Coughing up mucus that is clear, yellow, or green  ?People with bronchitis do not usually get a fever or may have a low grade fever.  Is there a test for bronchitis? -- People do not usually need a test. But we might do a test, such as a chest X-ray, if the cause of your cough isn t clear.   How is bronchitis treated? -- Doctors do not usually treat bronchitis with antibiotic medicines. That s because bronchitis is usually caused by a virus, and antibiotics kill bacteria--not viruses.   To feel better, you can treat your cold and flu symptoms. Different treatments you can try include:  ?Taking a pain-relieving medicine  ?Taking over-the-counter cough and cold medicines  --For cough - try Robitussin DM or Mucinex DM (Acitve ingredients Guaifenesin and dextromethorphan)  --For nasal congestion, try saline nasal spray (Ocean brand or similar.  NOT Afrin)  --For sore throat and cough, try Chloraseptic spray, cough drops, warm salt water gargles or tea with honey.  ?Breathing in warm, moist air, such as in the shower, over a kettle, or from a humidifier  How can I keep from getting bronchitis again? -- You  can reduce your chance of getting bronchitis again by keeping the germs that cause bronchitis out of your body. One of the best ways to do this is to wash your hands often with soap and water. If there is no sink nearby, you can use a hand gel with alcohol in it to clean your hands.   How can I keep from spreading my germs? -- In addition to washing your hands often, you should cover your mouth with your elbow when you sneeze or cough. Using your elbow keeps you from getting germs on your hands. If you use a tissue, throw the tissue away and wash your hands.   Return to clinic if:  ?A fever higher than 100.4 F (38 C)  ?Chest pain when you cough, trouble breathing, or coughing up blood  ?A barking cough that makes it hard to talk  ?A cough and weight loss that you cannot explain

## 2018-04-06 NOTE — PROGRESS NOTES
SUBJECTIVE:   Nerissa Valentin is a 72 year old female who presents to clinic today for the following health issues:      Acute Illness   Acute illness concerns: Cough  Onset: a week    Fever: YES- 102.3 F (oral) - 2 days ago    Chills/Sweats: YES    Headache (location?): YES    Sinus Pressure:YES    Conjunctivitis:  no    Ear Pain: no    Rhinorrhea: YES    Congestion: YES    Sore Throat: no      Cough: YES    Wheeze: YES    Decreased Appetite: no    Nausea: no    Vomiting: no    Diarrhea:  no    Dysuria/Freq.: no    Fatigue/Achiness: YES- aches and fatigue    Sick/Strep Exposure: no     Therapies Tried and outcome: na  --reports she gets bronchitis once per year  --up all night with coughing.  Cough was productive of gray/green sputum this AM  --reports shortness of breath with coughing, otherwise normal  --lifelong non-smoker,  smokes in house  --got the flu shot this year  --multiple sick contacts -family  --she has been given albuterol in the past for her 'annual bronchitis'  No formal diagnosis of COPD or asthma.  Also reports she has gotten steroids in the past      Current Outpatient Prescriptions   Medication Sig Dispense Refill     atorvastatin (LIPITOR) 40 MG tablet Take 1 tablet (40 mg) by mouth daily 90 tablet 3     sertraline (ZOLOFT) 50 MG tablet Take 1 tablet (50 mg) by mouth daily 90 tablet 3     lisinopril (PRINIVIL/ZESTRIL) 40 MG tablet Take 1 tablet (40 mg) by mouth daily 90 tablet 3     cephALEXin (KEFLEX) 500 MG capsule Take 1 capsule (500 mg) by mouth 3 times daily (Patient not taking: Reported on 4/6/2018) 30 capsule 0     ADVIL OR None Entered         Reviewed and updated as needed this visit by clinical staff  Tobacco  Allergies  Meds  Problems  Med Hx  Surg Hx  Fam Hx  Soc Hx        Reviewed and updated as needed this visit by Provider  Tobacco  Allergies  Meds  Problems  Surg Hx  Fam Hx  Soc Hx        ROS:  Constitutional, HEENT, cardiovascular, pulmonary, GI, ,  "musculoskeletal, neuro, skin, endocrine and psych systems are negative, except as otherwise noted.    OBJECTIVE:     /68 (BP Location: Right arm, Patient Position: Chair, Cuff Size: Adult Regular)  Pulse 85  Temp 99.6  F (37.6  C) (Tympanic)  Ht 4' 9.87\" (1.47 m)  Wt 147 lb (66.7 kg)  SpO2 98%  Breastfeeding? No  BMI 30.86 kg/m2  Body mass index is 30.86 kg/(m^2).  GENERAL APPEARANCE: alert, no distress, fatigued and mildlyill appearing  EYES: Eyes grossly normal to inspection, PERRL and conjunctivae and sclerae normal  HENT: ear canals and TM's normal and nose and mouth without ulcers or lesions  NECK: no adenopathy, no asymmetry, masses, or scars and thyroid normal to palpation  RESP: severe difficulty taking deep breathing w/out severe hacking cough.  Frequent throat clearing.  Lungs are clear  CV: regular rates and rhythm, normal S1 S2, no S3 or S4 and no murmur, click or rub    Diagnostic Test Results:  Results for orders placed or performed in visit on 04/06/18 (from the past 24 hour(s))   Influenza A/B antigen   Result Value Ref Range    Influenza A/B Agn Specimen Nasal     Influenza A Positive (A) NEG^Negative    Influenza B Negative NEG^Negative   CXR - clear by my read    ASSESSMENT/PLAN:       ICD-10-CM    1. Influenza A J10.1 XR Chest 2 Views     guaiFENesin-codeine (ROBITUSSIN AC) 100-10 MG/5ML SOLN solution     albuterol (PROAIR HFA/PROVENTIL HFA/VENTOLIN HFA) 108 (90 BASE) MCG/ACT Inhaler     Influenza A/B antigen     Lungs appear clear on exam but her cough made it difficult to say this w/certainty      Zoila Mcclure, DO  [facility]    "

## 2018-04-12 ENCOUNTER — OFFICE VISIT (OUTPATIENT)
Dept: FAMILY MEDICINE | Facility: CLINIC | Age: 73
End: 2018-04-12
Payer: COMMERCIAL

## 2018-04-12 ENCOUNTER — RADIANT APPOINTMENT (OUTPATIENT)
Dept: GENERAL RADIOLOGY | Facility: CLINIC | Age: 73
End: 2018-04-12
Attending: FAMILY MEDICINE
Payer: COMMERCIAL

## 2018-04-12 VITALS
HEIGHT: 58 IN | TEMPERATURE: 99.7 F | HEART RATE: 80 BPM | DIASTOLIC BLOOD PRESSURE: 70 MMHG | SYSTOLIC BLOOD PRESSURE: 136 MMHG | WEIGHT: 145.4 LBS | BODY MASS INDEX: 30.52 KG/M2 | OXYGEN SATURATION: 96 %

## 2018-04-12 DIAGNOSIS — N39.0 URINARY TRACT INFECTION WITH HEMATURIA, SITE UNSPECIFIED: Primary | ICD-10-CM

## 2018-04-12 DIAGNOSIS — J10.1 INFLUENZA A: ICD-10-CM

## 2018-04-12 DIAGNOSIS — R31.9 URINARY TRACT INFECTION WITH HEMATURIA, SITE UNSPECIFIED: Primary | ICD-10-CM

## 2018-04-12 LAB
ALBUMIN UR-MCNC: 100 MG/DL
APPEARANCE UR: CLEAR
BACTERIA #/AREA URNS HPF: ABNORMAL /HPF
BILIRUB UR QL STRIP: NEGATIVE
COLOR UR AUTO: YELLOW
GLUCOSE UR STRIP-MCNC: NEGATIVE MG/DL
HGB UR QL STRIP: ABNORMAL
KETONES UR STRIP-MCNC: NEGATIVE MG/DL
LEUKOCYTE ESTERASE UR QL STRIP: ABNORMAL
NITRATE UR QL: NEGATIVE
PH UR STRIP: 7 PH (ref 5–7)
RBC #/AREA URNS AUTO: ABNORMAL /HPF
SOURCE: ABNORMAL
SP GR UR STRIP: 1.02 (ref 1–1.03)
UROBILINOGEN UR STRIP-ACNC: 2 EU/DL (ref 0.2–1)
WBC #/AREA URNS AUTO: ABNORMAL /HPF

## 2018-04-12 PROCEDURE — 71046 X-RAY EXAM CHEST 2 VIEWS: CPT | Mod: FY

## 2018-04-12 PROCEDURE — 99214 OFFICE O/P EST MOD 30 MIN: CPT | Performed by: FAMILY MEDICINE

## 2018-04-12 PROCEDURE — 81001 URINALYSIS AUTO W/SCOPE: CPT | Performed by: FAMILY MEDICINE

## 2018-04-12 RX ORDER — SULFAMETHOXAZOLE/TRIMETHOPRIM 800-160 MG
1 TABLET ORAL 2 TIMES DAILY
Qty: 10 TABLET | Refills: 0 | Status: SHIPPED | OUTPATIENT
Start: 2018-04-12 | End: 2018-04-17

## 2018-04-12 RX ORDER — CODEINE PHOSPHATE AND GUAIFENESIN 10; 100 MG/5ML; MG/5ML
1 SOLUTION ORAL EVERY 4 HOURS PRN
Qty: 120 ML | Refills: 0 | Status: SHIPPED | OUTPATIENT
Start: 2018-04-12 | End: 2018-11-15

## 2018-04-12 NOTE — NURSING NOTE
"Initial /70  Pulse 80  Temp 99.7  F (37.6  C) (Tympanic)  Ht 4' 9.87\" (1.47 m)  Wt 145 lb 6.4 oz (66 kg)  SpO2 96%  BMI 30.52 kg/m2 Estimated body mass index is 30.52 kg/(m^2) as calculated from the following:    Height as of this encounter: 4' 9.87\" (1.47 m).    Weight as of this encounter: 145 lb 6.4 oz (66 kg). .      "

## 2018-04-12 NOTE — PROGRESS NOTES
SUBJECTIVE:                                                    Nerissa Valentin is 72 year old female   Chief Complaint   Patient presents with     Ent Problem     Was evaluated 4/6/18 and tested positive for influenza A. Treated with Robitussin and abluterol inhaler. States there has been no decrease in syptoms, continued cough. States she had a temp of 100.8 last night, right sided flank pain (possible due to cough, has history of kidney stones. States blood in urine and frequency). Has been using Tylenol and IBU on top of the Robitussin and Inhaler prescribed.         Problem list and histories reviewed & adjusted, as indicated.  Additional history: tested positive for influenza A last week, not taking it easy, has 6 panels coming up (Moms against drunk drivers) and family gathering this weekend.    Patient Active Problem List   Diagnosis     Leucocytosis     HTN (hypertension)     Hyperlipidemia with target LDL less than 130     Asthma, mild persistent     Dysthymic disorder     Osteoarthritis     Vitamin B12 deficiency anemia     Postmenopausal     Advanced directives, counseling/discussion     Health Care Home     Family history of diabetes mellitus     Hypokalemia     Obesity     BMI 31.0-31.9,adult     Fall due to ice or snow     Past Surgical History:   Procedure Laterality Date     ABDOMEN SURGERY  1969, 1970, 1971    ovary removed x2 and abd. hyst.     APPENDECTOMY  1971     BREAST BIOPSY, RT/LT  1999    Breat Biopsy RT     C REMOVAL OF KIDNEY STONE  1971     FLEXIBLE SIGMOIDOSCOPY  2000 ?    Dr. Cline at [facility]     HYSTERECTOMY, PAP NO LONGER INDICATED       HYSTERECTOMY, VAGINAL  1971     LITHOTRIPSY  2005     ROTATOR CUFF REPAIR RT/LT  2007    left     ROTATOR CUFF REPAIR RT/LT  2009    right twice?     SALPINGO OOPHORECTOMY,R/L/HARLEY  1969 and 1970    Salpingo Oophorectomy, RT/LT/HARLEY     SURGICAL HISTORY OF -   1969, 1970    ovary cystectomy       Social History   Substance Use Topics     Smoking  status: Never Smoker     Smokeless tobacco: Never Used     Alcohol use Yes      Comment: rare     Family History   Problem Relation Age of Onset     Arthritis Mother      C.A.D. Mother      Hypertension Father      DIABETES Father      C.A.D. Father      CEREBROVASCULAR DISEASE Maternal Grandfather      Cancer - colorectal Paternal Grandfather      Breast Cancer Sister      Breast Cancer Sister          Current Outpatient Prescriptions   Medication Sig Dispense Refill     sulfamethoxazole-trimethoprim (BACTRIM DS) 800-160 MG per tablet Take 1 tablet by mouth 2 times daily for 5 days 10 tablet 0     guaiFENesin-codeine (ROBITUSSIN AC) 100-10 MG/5ML SOLN solution Take 5 mLs by mouth every 4 hours as needed for cough 120 mL 0     albuterol (PROAIR HFA/PROVENTIL HFA/VENTOLIN HFA) 108 (90 BASE) MCG/ACT Inhaler Inhale 2 puffs into the lungs every 6 hours as needed for shortness of breath / dyspnea or wheezing 1 Inhaler 0     atorvastatin (LIPITOR) 40 MG tablet Take 1 tablet (40 mg) by mouth daily 90 tablet 3     sertraline (ZOLOFT) 50 MG tablet Take 1 tablet (50 mg) by mouth daily 90 tablet 3     lisinopril (PRINIVIL/ZESTRIL) 40 MG tablet Take 1 tablet (40 mg) by mouth daily 90 tablet 3     ADVIL OR None Entered       Allergies   Allergen Reactions     Chromic Sulfate      Chromic SUTURES, not sulfate     Flu Virus Vaccine Dermatitis and Other (See Comments)     2 years in a row after the flu shot did get  High fever,  Localized reaction .      Penicillins Hives     Recent Labs   Lab Test  11/07/17   1005  10/24/16   1047  09/23/15   1146   02/06/12   1137   LDL  46  45  58   < >  147*   HDL  32*  29*  33*   < >  39*   TRIG  98  122  139   < >  203*   ALT   --    --    --    --   12   CR  0.84   --   0.86   < >  0.93   GFRESTIMATED  67   --   66   < >  61   GFRESTBLACK  81   --   80   < >  73   POTASSIUM  3.4   --   3.6   < >  3.2*    < > = values in this interval not displayed.      BP Readings from Last 3 Encounters:  "  04/12/18 136/70   04/06/18 128/68   11/09/17 106/62    Wt Readings from Last 3 Encounters:   04/12/18 145 lb 6.4 oz (66 kg)   04/06/18 147 lb (66.7 kg)   11/09/17 154 lb (69.9 kg)         ROS:  Constitutional, HEENT, cardiovascular, pulmonary, gi and gu systems are negative, except as otherwise noted.    OBJECTIVE:                                                    /70  Pulse 80  Temp 99.7  F (37.6  C) (Tympanic)  Ht 4' 9.87\" (1.47 m)  Wt 145 lb 6.4 oz (66 kg)  SpO2 96%  BMI 30.52 kg/m2  GENERAL APPEARANCE ADULT: alert, appears older than stated age, overweight, in distress, cooperative, tired appearing, coughing uncontrolably  HENT: right TM abnormal, dull, left TM abnormal, dull, throat/mouth:mild erythema, mucous membranes moist  RESP: rhonchi bilaterally, expiratory wheezes, inspiratory wheezes  CV: normal rate, regular rhythm, no murmur or gallop  Diagnostic Test Results:  Results for orders placed or performed in visit on 04/12/18   UA reflex to Microscopic and Culture   Result Value Ref Range    Color Urine Yellow     Appearance Urine Clear     Glucose Urine Negative NEG^Negative mg/dL    Bilirubin Urine Negative NEG^Negative    Ketones Urine Negative NEG^Negative mg/dL    Specific Gravity Urine 1.020 1.003 - 1.035    Blood Urine Small (A) NEG^Negative    pH Urine 7.0 5.0 - 7.0 pH    Protein Albumin Urine 100 (A) NEG^Negative mg/dL    Urobilinogen Urine 2.0 (H) 0.2 - 1.0 EU/dL    Nitrite Urine Negative NEG^Negative    Leukocyte Esterase Urine Small (A) NEG^Negative    Source Midstream Urine    Urine Microscopic   Result Value Ref Range    WBC Urine 5-10 (A) OTO5^0 - 5 /HPF    RBC Urine 2-5 (A) OTO2^O - 2 /HPF    Bacteria Urine Few (A) NEG^Negative /HPF          ASSESSMENT/PLAN:                                                    1. Urinary tract infection with hematuria, site unspecified  - UA reflex to Microscopic and Culture  - Urine Microscopic  - sulfamethoxazole-trimethoprim (BACTRIM DS) " 800-160 MG per tablet; Take 1 tablet by mouth 2 times daily for 5 days  Dispense: 10 tablet; Refill: 0    2. Influenza A  Cough means of spreading, recommend no events for a week at least, recheck in 2 weeks.  - XR Chest 2 Views; Future  - guaiFENesin-codeine (ROBITUSSIN AC) 100-10 MG/5ML SOLN solution; Take 5 mLs by mouth every 4 hours as needed for cough  Dispense: 120 mL; Refill: 0    Gwendolyn Sadler MD  [facility]

## 2018-04-12 NOTE — MR AVS SNAPSHOT
"              After Visit Summary   4/12/2018    Nerissa Valentin    MRN: [id number]           Patient Information     Date Of Birth          1945        Visit Information        Provider Department      4/12/2018 9:40 AM Gwendolyn Sadler MD [facility]        Today's Diagnoses     Urinary tract infection with hematuria, site unspecified    -  1    Influenza A           Follow-ups after your visit        Who to contact     If you have questions or need follow up information about today's clinic visit or your schedule please contact [facility] directly at [phone].  Normal or non-critical lab and imaging results will be communicated to you by [employer], letter or phone within 4 business days after the clinic has received the results. If you do not hear from us within 7 days, please contact the clinic through [employer] or phone. If you have a critical or abnormal lab result, we will notify you by phone as soon as possible.  Submit refill requests through [employer] or call your pharmacy and they will forward the refill request to us. Please allow 3 business days for your refill to be completed.          Additional Information About Your Visit        MyChart Information     [employer] gives you secure access to your electronic health record. If you see a primary care provider, you can also send messages to your care team and make appointments. If you have questions, please call your primary care clinic.  If you do not have a primary care provider, please call [phone] and they will assist you.        Care EveryWhere ID     This is your Care EveryWhere ID. This could be used by other organizations to access your [facility] medical records  [id number]        Your Vitals Were     Pulse Temperature Height Pulse Oximetry BMI (Body Mass Index)       80 99.7  F (37.6  C) (Tympanic) 4' 9.87\" (1.47 m) 96% 30.52 kg/m2        Blood Pressure from Last 3 Encounters:   04/12/18 136/70   04/06/18 " 128/68   11/09/17 106/62    Weight from Last 3 Encounters:   04/12/18 145 lb 6.4 oz (66 kg)   04/06/18 147 lb (66.7 kg)   11/09/17 154 lb (69.9 kg)              We Performed the Following     UA reflex to Microscopic and Culture     Urine Microscopic          Today's Medication Changes          These changes are accurate as of 4/12/18 10:32 AM.  If you have any questions, ask your nurse or doctor.               Start taking these medicines.        Dose/Directions    sulfamethoxazole-trimethoprim 800-160 MG per tablet   Commonly known as:  BACTRIM DS   Used for:  Urinary tract infection with hematuria, site unspecified   Started by:  Gwendolyn Sadler MD        Dose:  1 tablet   Take 1 tablet by mouth 2 times daily for 5 days   Quantity:  10 tablet   Refills:  0            Where to get your medicines      These medications were sent to [facility] [address]  [address] [address]     Phone:  [phone]     sulfamethoxazole-trimethoprim 800-160 MG per tablet                Primary Care Provider Office Phone # Fax #    Gwendolyn Sadler [phone]       [address] [address]        Equal Access to Services     NOEMI LAMB AH: Zaki joshio Soomaali, waaxda luqadaha, qaybta kaalmada adeegyada, austin ramos. So [facility] [phone].    ATENCIÓN: Si habla español, tiene a wang disposición servicios gratuitos de asistencia lingüística. Llame al [phone].    We comply with applicable federal civil rights laws and [place] laws. We do not discriminate on the basis of race, color, national origin, age, disability, sex, sexual orientation, or gender identity.            Thank you!     Thank you for choosing [facility]  for your care. Our goal is always to provide you with excellent care. Hearing back from our patients is one way we can continue to improve our services. Please take a few  minutes to complete the written survey that you may receive in the mail after your visit with us. Thank you!             Your Updated Medication List - Protect others around you: Learn how to safely use, store and throw away your medicines at www.disposemymeds.org.          This list is accurate as of 4/12/18 10:32 AM.  Always use your most recent med list.                   Brand Name Dispense Instructions for use Diagnosis    ADVIL PO      None Entered        albuterol 108 (90 Base) MCG/ACT Inhaler    PROAIR HFA/PROVENTIL HFA/VENTOLIN HFA    1 Inhaler    Inhale 2 puffs into the lungs every 6 hours as needed for shortness of breath / dyspnea or wheezing    Influenza A       atorvastatin 40 MG tablet    LIPITOR    90 tablet    Take 1 tablet (40 mg) by mouth daily    Hyperlipidemia LDL goal <130       guaiFENesin-codeine 100-10 MG/5ML Soln solution    ROBITUSSIN AC    120 mL    Take 5 mLs by mouth every 4 hours as needed for cough    Influenza A       lisinopril 40 MG tablet    PRINIVIL/ZESTRIL    90 tablet    Take 1 tablet (40 mg) by mouth daily    Essential hypertension       sertraline 50 MG tablet    ZOLOFT    90 tablet    Take 1 tablet (50 mg) by mouth daily    Dysthymic disorder       sulfamethoxazole-trimethoprim 800-160 MG per tablet    BACTRIM DS    10 tablet    Take 1 tablet by mouth 2 times daily for 5 days    Urinary tract infection with hematuria, site unspecified

## 2018-08-24 ENCOUNTER — APPOINTMENT (OUTPATIENT)
Dept: GENERAL RADIOLOGY | Facility: CLINIC | Age: 73
End: 2018-08-24
Attending: PHYSICIAN ASSISTANT
Payer: COMMERCIAL

## 2018-08-24 ENCOUNTER — HOSPITAL ENCOUNTER (EMERGENCY)
Facility: CLINIC | Age: 73
Discharge: HOME | End: 2018-08-24
Attending: PHYSICIAN ASSISTANT | Admitting: PHYSICIAN ASSISTANT
Payer: COMMERCIAL

## 2018-08-24 VITALS
BODY MASS INDEX: 28.07 KG/M2 | OXYGEN SATURATION: 98 % | TEMPERATURE: 98.1 F | WEIGHT: 143 LBS | RESPIRATION RATE: 16 BRPM | HEIGHT: 60 IN | HEART RATE: 95 BPM | DIASTOLIC BLOOD PRESSURE: 73 MMHG | SYSTOLIC BLOOD PRESSURE: 133 MMHG

## 2018-08-24 DIAGNOSIS — J20.9 ACUTE BRONCHITIS, UNSPECIFIED ORGANISM: ICD-10-CM

## 2018-08-24 PROCEDURE — 94640 AIRWAY INHALATION TREATMENT: CPT

## 2018-08-24 PROCEDURE — G0463 HOSPITAL OUTPT CLINIC VISIT: HCPCS | Mod: 25 | Performed by: PHYSICIAN ASSISTANT

## 2018-08-24 PROCEDURE — 71046 X-RAY EXAM CHEST 2 VIEWS: CPT

## 2018-08-24 PROCEDURE — 25000125 ZZHC RX 250: Performed by: PHYSICIAN ASSISTANT

## 2018-08-24 PROCEDURE — 99213 OFFICE O/P EST LOW 20 MIN: CPT | Mod: Z6 | Performed by: PHYSICIAN ASSISTANT

## 2018-08-24 RX ORDER — CODEINE PHOSPHATE AND GUAIFENESIN 10; 100 MG/5ML; MG/5ML
1-2 SOLUTION ORAL EVERY 4 HOURS PRN
Qty: 180 ML | Refills: 0 | Status: SHIPPED | OUTPATIENT
Start: 2018-08-24 | End: 2018-11-15

## 2018-08-24 RX ORDER — ALBUTEROL SULFATE 0.83 MG/ML
2.5 SOLUTION RESPIRATORY (INHALATION) ONCE
Status: COMPLETED | OUTPATIENT
Start: 2018-08-24 | End: 2018-08-24

## 2018-08-24 RX ORDER — PREDNISONE 20 MG/1
TABLET ORAL
Qty: 10 TABLET | Refills: 0 | Status: SHIPPED | OUTPATIENT
Start: 2018-08-24 | End: 2018-11-15

## 2018-08-24 RX ADMIN — ALBUTEROL SULFATE 2.5 MG: 2.5 SOLUTION RESPIRATORY (INHALATION) at 15:13

## 2018-08-24 NOTE — ED TRIAGE NOTES
Patient here for URI, symptoms started 7 days ago.  Patient presents ambulatory to the urgent care.

## 2018-08-24 NOTE — ED AVS SNAPSHOT
[facility] Emergency Department    [address] [address]    Phone:  [phone]    Fax:  [phone]                                       Nerissa Valentin   MRN: [id number]    Department:  [facility] Emergency Department   Date of Visit:  8/24/2018           After Visit Summary Signature Page     I have received my discharge instructions, and my questions have been answered. I have discussed any challenges I see with this plan with the nurse or doctor.    ..........................................................................................................................................  Patient/Patient Representative Signature      ..........................................................................................................................................  Patient Representative Print Name and Relationship to Patient    ..................................................               ................................................  Date                                            Time    ..........................................................................................................................................  Reviewed by Signature/Title    ...................................................              ..............................................  Date                                                            Time          22EPIC Rev 08/18

## 2018-08-24 NOTE — ED AVS SNAPSHOT
[facility] Emergency Department    [address] [facility] [address]    Phone:  [phone]    Fax:  [phone]                                       Nerissa Valentin   MRN: [id number]    Department:  [facility] Emergency Department   Date of Visit:  8/24/2018           Patient Information     Date Of Birth          1945        Your diagnoses for this visit were:     Acute bronchitis, unspecified organism        You were seen by Cynthia Green PA-C.      Follow-up Information     Follow up with Gwendolyn Sadler MD In 1 week.    Specialty:  Family Practice    Why:  if no improvement or sooner if new or worsening symptoms     Contact information:    [address] [id number] [phone]        24 Hour Appointment Hotline       To make an appointment at any [facility] clinic, call [phone] ([phone]). If you don't have a family doctor or clinic, we will help you find one. [facility] clinics are conveniently located to serve the needs of you and your family.             Review of your medicines      START taking        Dose / Directions Last dose taken    predniSONE 20 MG tablet   Commonly known as:  DELTASONE   Quantity:  10 tablet        Take two tablets (= 40mg) each day for 5 (five) days   Refills:  0          CONTINUE these medicines which may have CHANGED, or have new prescriptions. If we are uncertain of the size of tablets/capsules you have at home, strength may be listed as something that might have changed.        Dose / Directions Last dose taken    * guaiFENesin-codeine 100-10 MG/5ML Soln solution   Commonly known as:  ROBITUSSIN AC   Dose:  1 tsp.   What changed:  Another medication with the same name was added. Make sure you understand how and when to take each.   Quantity:  120 mL        Take 5 mLs by mouth every 4 hours as needed for cough   Refills:  0        * guaiFENesin-codeine 100-10 MG/5ML Soln solution   Commonly known as:  ROBITUSSIN AC    Dose:  1-2 tsp.   What changed:  You were already taking a medication with the same name, and this prescription was added. Make sure you understand how and when to take each.   Quantity:  180 mL        Take 5-10 mLs by mouth every 4 hours as needed for cough   Refills:  0        * Notice:  This list has 2 medication(s) that are the same as other medications prescribed for you. Read the directions carefully, and ask your doctor or other care provider to review them with you.      Our records show that you are taking the medicines listed below. If these are incorrect, please call your family doctor or clinic.        Dose / Directions Last dose taken    ADVIL PO        None Entered   Refills:  0        albuterol 108 (90 Base) MCG/ACT inhaler   Commonly known as:  PROAIR HFA/PROVENTIL HFA/VENTOLIN HFA   Dose:  2 puff   Quantity:  1 Inhaler        Inhale 2 puffs into the lungs every 6 hours as needed for shortness of breath / dyspnea or wheezing   Refills:  0        atorvastatin 40 MG tablet   Commonly known as:  LIPITOR   Dose:  40 mg   Quantity:  90 tablet        Take 1 tablet (40 mg) by mouth daily   Refills:  3        lisinopril 40 MG tablet   Commonly known as:  PRINIVIL/ZESTRIL   Dose:  40 mg   Quantity:  90 tablet        Take 1 tablet (40 mg) by mouth daily   Refills:  3        sertraline 50 MG tablet   Commonly known as:  ZOLOFT   Dose:  50 mg   Quantity:  90 tablet        Take 1 tablet (50 mg) by mouth daily   Refills:  3                Prescriptions were sent or printed at these locations (2 Prescriptions)                   [address] [address]    Telephone:  [phone]   Fax:  [phone]   Hours:                  E-Prescribed (1 of 2)         predniSONE (DELTASONE) 20 MG tablet                 Printed at Department/Unit printer (1 of 2)         guaiFENesin-codeine (ROBITUSSIN AC) 100-10 MG/5ML SOLN solution                 Procedures and tests performed during your visit     Chest XR,  PA & LAT      Orders Needing Specimen Collection     None      Pending Results     No orders found from 8/22/2018 to 8/25/2018.            Pending Culture Results     No orders found from 8/22/2018 to 8/25/2018.            Pending Results Instructions     If you had any lab results that were not finalized at the time of your Discharge, you can call the ED Lab Result RN at [phone]. You will be contacted by this team for any positive Lab results or changes in treatment. The nurses are available 7 days a week from 10A to 6:30P.  You can leave a message 24 hours per day and they will return your call.        Test Results From Your Hospital Stay        8/24/2018  3:03 PM      Narrative     XR CHEST 2 VW 8/24/2018 2:54 PM    HISTORY: Cough. Fever.    COMPARISON: 4/12/2018.        Impression     IMPRESSION: 2 views of the chest show no acute cardiopulmonary disease  and no significant change.     VITALIY LANG MD                Thank you for choosing [facility]       Thank you for choosing [facility] for your care. Our goal is always to provide you with excellent care. Hearing back from our patients is one way we can continue to improve our services. Please take a few minutes to complete the written survey that you may receive in the mail after you visit with us. Thank you!        [employer] Information     [employer] gives you secure access to your electronic health record. If you see a primary care provider, you can also send messages to your care team and make appointments. If you have questions, please call your primary care clinic.  If you do not have a primary care provider, please call [phone] and they will assist you.        Care EveryWhere ID     This is your Care EveryWhere ID. This could be used by other organizations to access your [facility] medical records  [id number]        Equal Access to Services     NOEMI LAMB AH: Zaki scott  yousif Simmons, tres pruettmaaustin lepe. So [facility] [phone].    ATENCIÓN: Si habla español, tiene a wang disposición servicios gratuitos de asistencia lingüística. Llame al [phone].    We comply with applicable federal civil rights laws and [place] laws. We do not discriminate on the basis of race, color, national origin, age, disability, sex, sexual orientation, or gender identity.            After Visit Summary       This is your record. Keep this with you and show to your community pharmacist(s) and doctor(s) at your next visit.

## 2018-11-12 ASSESSMENT — ENCOUNTER SYMPTOMS
PALPITATIONS: 0
SHORTNESS OF BREATH: 0
DIZZINESS: 0
ARTHRALGIAS: 0
DIARRHEA: 0
EYE PAIN: 0
MYALGIAS: 0
CONSTIPATION: 0
SORE THROAT: 0
HEADACHES: 0
ABDOMINAL PAIN: 0
NAUSEA: 0
HEMATOCHEZIA: 0
HEARTBURN: 0
COUGH: 1
FEVER: 0
CHILLS: 0
WEAKNESS: 0
PARESTHESIAS: 0
BREAST MASS: 0
JOINT SWELLING: 0
HEMATURIA: 1
FREQUENCY: 0
NERVOUS/ANXIOUS: 0
DYSURIA: 0

## 2018-11-12 ASSESSMENT — ACTIVITIES OF DAILY LIVING (ADL): CURRENT_FUNCTION: NO ASSISTANCE NEEDED

## 2018-11-15 ENCOUNTER — OFFICE VISIT (OUTPATIENT)
Dept: FAMILY MEDICINE | Facility: CLINIC | Age: 73
End: 2018-11-15
Payer: COMMERCIAL

## 2018-11-15 VITALS
WEIGHT: 144 LBS | HEIGHT: 58 IN | HEART RATE: 77 BPM | RESPIRATION RATE: 12 BRPM | TEMPERATURE: 99.1 F | DIASTOLIC BLOOD PRESSURE: 66 MMHG | BODY MASS INDEX: 30.23 KG/M2 | OXYGEN SATURATION: 98 % | SYSTOLIC BLOOD PRESSURE: 138 MMHG

## 2018-11-15 DIAGNOSIS — R31.9 HEMATURIA, UNSPECIFIED TYPE: ICD-10-CM

## 2018-11-15 DIAGNOSIS — Z12.11 SPECIAL SCREENING FOR MALIGNANT NEOPLASMS, COLON: ICD-10-CM

## 2018-11-15 DIAGNOSIS — R82.90 NONSPECIFIC FINDING ON EXAMINATION OF URINE: ICD-10-CM

## 2018-11-15 DIAGNOSIS — R10.9 FLANK PAIN: ICD-10-CM

## 2018-11-15 DIAGNOSIS — I10 ESSENTIAL HYPERTENSION: ICD-10-CM

## 2018-11-15 DIAGNOSIS — Z00.00 ROUTINE GENERAL MEDICAL EXAMINATION AT A HEALTH CARE FACILITY: Primary | ICD-10-CM

## 2018-11-15 DIAGNOSIS — J01.90 ACUTE SINUSITIS WITH SYMPTOMS > 10 DAYS: ICD-10-CM

## 2018-11-15 DIAGNOSIS — F34.1 DYSTHYMIC DISORDER: ICD-10-CM

## 2018-11-15 DIAGNOSIS — J45.30 MILD PERSISTENT ASTHMA WITHOUT COMPLICATION: ICD-10-CM

## 2018-11-15 DIAGNOSIS — E78.5 HYPERLIPIDEMIA LDL GOAL <130: ICD-10-CM

## 2018-11-15 LAB
ALBUMIN UR-MCNC: 100 MG/DL
ANION GAP SERPL CALCULATED.3IONS-SCNC: 7 MMOL/L (ref 3–14)
APPEARANCE UR: CLEAR
BACTERIA #/AREA URNS HPF: ABNORMAL /HPF
BILIRUB UR QL STRIP: NEGATIVE
BUN SERPL-MCNC: 13 MG/DL (ref 7–30)
CALCIUM SERPL-MCNC: 8.2 MG/DL (ref 8.5–10.1)
CHLORIDE SERPL-SCNC: 108 MMOL/L (ref 94–109)
CHOLEST SERPL-MCNC: 91 MG/DL
CO2 SERPL-SCNC: 26 MMOL/L (ref 20–32)
COLOR UR AUTO: YELLOW
CREAT SERPL-MCNC: 0.74 MG/DL (ref 0.52–1.04)
GFR SERPL CREATININE-BSD FRML MDRD: 77 ML/MIN/1.7M2
GLUCOSE SERPL-MCNC: 96 MG/DL (ref 70–99)
GLUCOSE UR STRIP-MCNC: NEGATIVE MG/DL
HDLC SERPL-MCNC: 30 MG/DL
HGB UR QL STRIP: ABNORMAL
KETONES UR STRIP-MCNC: NEGATIVE MG/DL
LDLC SERPL CALC-MCNC: 38 MG/DL
LEUKOCYTE ESTERASE UR QL STRIP: ABNORMAL
MUCOUS THREADS #/AREA URNS LPF: PRESENT /LPF
NITRATE UR QL: NEGATIVE
NONHDLC SERPL-MCNC: 61 MG/DL
PH UR STRIP: 6 PH (ref 5–7)
POTASSIUM SERPL-SCNC: 3.6 MMOL/L (ref 3.4–5.3)
RBC #/AREA URNS AUTO: ABNORMAL /HPF
SODIUM SERPL-SCNC: 141 MMOL/L (ref 133–144)
SOURCE: ABNORMAL
SP GR UR STRIP: >1.03 (ref 1–1.03)
TRIGL SERPL-MCNC: 117 MG/DL
UROBILINOGEN UR STRIP-ACNC: 0.2 EU/DL (ref 0.2–1)
WBC #/AREA URNS AUTO: ABNORMAL /HPF

## 2018-11-15 PROCEDURE — 80061 LIPID PANEL: CPT | Performed by: FAMILY MEDICINE

## 2018-11-15 PROCEDURE — 81001 URINALYSIS AUTO W/SCOPE: CPT | Performed by: FAMILY MEDICINE

## 2018-11-15 PROCEDURE — 99397 PER PM REEVAL EST PAT 65+ YR: CPT | Performed by: FAMILY MEDICINE

## 2018-11-15 PROCEDURE — 36415 COLL VENOUS BLD VENIPUNCTURE: CPT | Performed by: FAMILY MEDICINE

## 2018-11-15 PROCEDURE — 87086 URINE CULTURE/COLONY COUNT: CPT | Performed by: FAMILY MEDICINE

## 2018-11-15 PROCEDURE — 80048 BASIC METABOLIC PNL TOTAL CA: CPT | Performed by: FAMILY MEDICINE

## 2018-11-15 PROCEDURE — 99213 OFFICE O/P EST LOW 20 MIN: CPT | Mod: 25 | Performed by: FAMILY MEDICINE

## 2018-11-15 RX ORDER — AZITHROMYCIN 250 MG/1
TABLET, FILM COATED ORAL
Qty: 6 TABLET | Refills: 1 | Status: SHIPPED | OUTPATIENT
Start: 2018-11-15 | End: 2019-11-07

## 2018-11-15 RX ORDER — LISINOPRIL 40 MG/1
40 TABLET ORAL DAILY
Qty: 90 TABLET | Refills: 3 | Status: SHIPPED | OUTPATIENT
Start: 2018-11-15 | End: 2019-11-17

## 2018-11-15 RX ORDER — ALBUTEROL SULFATE 90 UG/1
2 AEROSOL, METERED RESPIRATORY (INHALATION) EVERY 6 HOURS PRN
Qty: 1 INHALER | Refills: 11 | Status: SHIPPED | OUTPATIENT
Start: 2018-11-15 | End: 2019-11-29

## 2018-11-15 RX ORDER — ATORVASTATIN CALCIUM 40 MG/1
40 TABLET, FILM COATED ORAL DAILY
Qty: 90 TABLET | Refills: 3 | Status: SHIPPED | OUTPATIENT
Start: 2018-11-15 | End: 2019-11-29

## 2018-11-15 ASSESSMENT — ENCOUNTER SYMPTOMS
HEMATURIA: 1
FREQUENCY: 0
NAUSEA: 0
CHILLS: 0
SHORTNESS OF BREATH: 0
JOINT SWELLING: 0
COUGH: 1
WEAKNESS: 0
EYE PAIN: 0
ARTHRALGIAS: 0
DYSURIA: 0
PALPITATIONS: 0
DIZZINESS: 0
BREAST MASS: 0
FEVER: 0
DIARRHEA: 0
HEMATOCHEZIA: 0
SORE THROAT: 0
NERVOUS/ANXIOUS: 0
HEARTBURN: 0
PARESTHESIAS: 0
ABDOMINAL PAIN: 0
MYALGIAS: 0
CONSTIPATION: 0
HEADACHES: 0

## 2018-11-15 ASSESSMENT — ANXIETY QUESTIONNAIRES
GAD7 TOTAL SCORE: 1
7. FEELING AFRAID AS IF SOMETHING AWFUL MIGHT HAPPEN: NOT AT ALL
5. BEING SO RESTLESS THAT IT IS HARD TO SIT STILL: NOT AT ALL
6. BECOMING EASILY ANNOYED OR IRRITABLE: SEVERAL DAYS
1. FEELING NERVOUS, ANXIOUS, OR ON EDGE: NOT AT ALL
IF YOU CHECKED OFF ANY PROBLEMS ON THIS QUESTIONNAIRE, HOW DIFFICULT HAVE THESE PROBLEMS MADE IT FOR YOU TO DO YOUR WORK, TAKE CARE OF THINGS AT HOME, OR GET ALONG WITH OTHER PEOPLE: NOT DIFFICULT AT ALL
2. NOT BEING ABLE TO STOP OR CONTROL WORRYING: NOT AT ALL
3. WORRYING TOO MUCH ABOUT DIFFERENT THINGS: NOT AT ALL

## 2018-11-15 ASSESSMENT — ACTIVITIES OF DAILY LIVING (ADL): CURRENT_FUNCTION: NO ASSISTANCE NEEDED

## 2018-11-15 ASSESSMENT — PATIENT HEALTH QUESTIONNAIRE - PHQ9
5. POOR APPETITE OR OVEREATING: NOT AT ALL
SUM OF ALL RESPONSES TO PHQ QUESTIONS 1-9: 2

## 2018-11-15 NOTE — NURSING NOTE
"Initial /66  Pulse 77  Temp 99.1  F (37.3  C) (Tympanic)  Resp 12  Ht 4' 10\" (1.473 m)  Wt 144 lb (65.3 kg)  SpO2 98%  BMI 30.1 kg/m2 Estimated body mass index is 30.1 kg/(m^2) as calculated from the following:    Height as of this encounter: 4' 10\" (1.473 m).    Weight as of this encounter: 144 lb (65.3 kg). .      "

## 2018-11-15 NOTE — PATIENT INSTRUCTIONS
Preventive Health Recommendations    See your health care provider every year to    Review health changes.     Discuss preventive care.      Review your medicines if your doctor has prescribed any.      You no longer need a yearly Pap test unless you've had an abnormal Pap test in the past 10 years. If you have vaginal symptoms, such as bleeding or discharge, be sure to talk with your provider about a Pap test.      Every 1 to 2 years, have a mammogram.  If you are over 69, talk with your health care provider about whether or not you want to continue having screening mammograms.      Every 10 years, have a colonoscopy. Or, have a yearly FIT test (stool test). These exams will check for colon cancer.       Have a cholesterol test every 5 years, or more often if your doctor advises it.       Have a diabetes test (fasting glucose) every three years. If you are at risk for diabetes, you should have this test more often.       At age 65, have a bone density scan (DEXA) to check for osteoporosis (brittle bone disease).    Shots:    Get a flu shot each year.    Get a tetanus shot every 10 years.    Talk to your doctor about your pneumonia vaccines. There are now two you should receive - Pneumovax (PPSV 23) and Prevnar (PCV 13).    Talk to your pharmacist about the shingles vaccine.    Talk to your doctor about the hepatitis B vaccine.    Nutrition:     Eat at least 5 servings of fruits and vegetables each day.      Eat whole-grain bread, whole-wheat pasta and brown rice instead of white grains and rice.      Get adequate Calcium and Vitamin D.     Lifestyle    Exercise at least 150 minutes a week (30 minutes a day, 5 days a week). This will help you control your weight and prevent disease.      Limit alcohol to one drink per day.      No smoking.       Wear sunscreen to prevent skin cancer.       See your dentist twice a year for an exam and cleaning.      See your eye doctor every 1 to 2 years to screen for conditions  such as glaucoma, macular degeneration and cataracts.    Personalized Prevention Plan  You are due for the preventive services outlined below.  Your care team is available to assist you in scheduling these services.  If you have already completed any of these items, please share that information with your care team to update in your medical record.  Health Maintenance Due   Topic Date Due     Discuss Advance Directive Planning  02/06/2017     Asthma Control Test - every 6 months  05/07/2018     Depression Assessment - every 6 months  05/07/2018     Flu Vaccine (1) 09/01/2018     Asthma Action Plan - yearly  11/07/2018     FALL RISK ASSESSMENT  11/07/2018     Depression Action Plan Review - yearly  11/07/2018     Colon Cancer Screening - FIT Test - yearly  11/27/2018

## 2018-11-15 NOTE — MR AVS SNAPSHOT
After Visit Summary   11/15/2018    Nerissa Valentin    MRN: [id number]           Patient Information     Date Of Birth          1945        Visit Information        Provider Department      11/15/2018 8:40 AM Gwendolyn Sadler MD [facility]        Today's Diagnoses     Routine general medical examination at a health care facility    -  1    Hyperlipidemia LDL goal <130        Essential hypertension        Dysthymic disorder        Acute sinusitis with symptoms > 10 days        Mild persistent asthma without complication        Special screening for malignant neoplasms, colon        Flank pain        Hematuria, unspecified type          Care Instructions      Preventive Health Recommendations    See your health care provider every year to    Review health changes.     Discuss preventive care.      Review your medicines if your doctor has prescribed any.      You no longer need a yearly Pap test unless you've had an abnormal Pap test in the past 10 years. If you have vaginal symptoms, such as bleeding or discharge, be sure to talk with your provider about a Pap test.      Every 1 to 2 years, have a mammogram.  If you are over 69, talk with your health care provider about whether or not you want to continue having screening mammograms.      Every 10 years, have a colonoscopy. Or, have a yearly FIT test (stool test). These exams will check for colon cancer.       Have a cholesterol test every 5 years, or more often if your doctor advises it.       Have a diabetes test (fasting glucose) every three years. If you are at risk for diabetes, you should have this test more often.       At age 65, have a bone density scan (DEXA) to check for osteoporosis (brittle bone disease).    Shots:    Get a flu shot each year.    Get a tetanus shot every 10 years.    Talk to your doctor about your pneumonia vaccines. There are now two you should receive - Pneumovax (PPSV 23) and Prevnar (PCV  13).    Talk to your pharmacist about the shingles vaccine.    Talk to your doctor about the hepatitis B vaccine.    Nutrition:     Eat at least 5 servings of fruits and vegetables each day.      Eat whole-grain bread, whole-wheat pasta and brown rice instead of white grains and rice.      Get adequate Calcium and Vitamin D.     Lifestyle    Exercise at least 150 minutes a week (30 minutes a day, 5 days a week). This will help you control your weight and prevent disease.      Limit alcohol to one drink per day.      No smoking.       Wear sunscreen to prevent skin cancer.       See your dentist twice a year for an exam and cleaning.      See your eye doctor every 1 to 2 years to screen for conditions such as glaucoma, macular degeneration and cataracts.    Personalized Prevention Plan  You are due for the preventive services outlined below.  Your care team is available to assist you in scheduling these services.  If you have already completed any of these items, please share that information with your care team to update in your medical record.  Health Maintenance Due   Topic Date Due     Discuss Advance Directive Planning  02/06/2017     Asthma Control Test - every 6 months  05/07/2018     Depression Assessment - every 6 months  05/07/2018     Flu Vaccine (1) 09/01/2018     Asthma Action Plan - yearly  11/07/2018     FALL RISK ASSESSMENT  11/07/2018     Depression Action Plan Review - yearly  11/07/2018     Colon Cancer Screening - FIT Test - yearly  11/27/2018             Follow-ups after your visit        Future tests that were ordered for you today     Open Future Orders        Priority Expected Expires Ordered    US Renal Limited Routine  11/15/2019 11/15/2018    Fecal colorectal cancer screen FIT Routine 12/6/2018 2/7/2019 11/15/2018            Who to contact     If you have questions or need follow up information about today's clinic visit or your schedule please contact [facility] directly at  "[phone].  Normal or non-critical lab and imaging results will be communicated to you by MyChart, letter or phone within 4 business days after the clinic has received the results. If you do not hear from us within 7 days, please contact the clinic through siXishart or phone. If you have a critical or abnormal lab result, we will notify you by phone as soon as possible.  Submit refill requests through [employer]isle[employer]er or call your pharmacy and they will forward the refill request to us. Please allow 3 business days for your refill to be completed.          Additional Information About Your Visit        siXishart Information     [employer] gives you secure access to your electronic health record. If you see a primary care provider, you can also send messages to your care team and make appointments. If you have questions, please call your primary care clinic.  If you do not have a primary care provider, please call [phone] and they will assist you.        Care EveryWhere ID     This is your Care EveryWhere ID. This could be used by other organizations to access your [place] medical records  [id number]        Your Vitals Were     Pulse Temperature Respirations Height Pulse Oximetry BMI (Body Mass Index)    77 99.1  F (37.3  C) (Tympanic) 12 4' 10\" (1.473 m) 98% 30.1 kg/m2       Blood Pressure from Last 3 Encounters:   11/15/18 138/66   08/24/18 133/73   04/12/18 136/70    Weight from Last 3 Encounters:   11/15/18 144 lb (65.3 kg)   08/24/18 143 lb (64.9 kg)   04/12/18 145 lb 6.4 oz (66 kg)              We Performed the Following     Asthma Action Plan (AAP)     Basic metabolic panel     DEPRESSION ACTION PLAN (DAP)     Lipid panel reflex to direct LDL Fasting     UA reflex to Microscopic and Culture          Today's Medication Changes          These changes are accurate as of 11/15/18  9:16 AM.  If you have any questions, ask your nurse or doctor.               Start taking these medicines.        Dose/Directions    " azithromycin 250 MG tablet   Commonly known as:  ZITHROMAX   Used for:  Acute sinusitis with symptoms > 10 days   Started by:  Gwendolyn Sadler MD        Two tablets first day, then one tablet daily for four days.   Quantity:  6 tablet   Refills:  1         Stop taking these medicines if you haven't already. Please contact your care team if you have questions.     guaiFENesin-codeine 100-10 MG/5ML Soln solution   Commonly known as:  ROBITUSSIN AC   Stopped by:  Gwendolyn Sadler MD           predniSONE 20 MG tablet   Commonly known as:  DELTASONE   Stopped by:  Gwendolyn Sadler MD                Where to get your medicines      [place] [facility] [place] [address] [facility]  [address] [place] [address]     Phone:  [phone]     albuterol 108 (90 Base) MCG/ACT inhaler    atorvastatin 40 MG tablet    azithromycin 250 MG tablet    lisinopril 40 MG tablet    sertraline 50 MG tablet                Primary Care Provider Office Phone # Fax #    Gwendolyn Sadler [phone] [address] [facility] [address]        Equal Access to Services     [facility] : Hadii aad ku hadasho Sochristyali, waaxda luqadaha, qaybta kaalmada adeegyada, [place] hood hayfilomenan goyo hamilton . So [facility] [phone].    ATENCIÓN: Si habla español, tiene a wang disposición servicios gratuitos de asistencia lingüística. Llame al [phone].    We comply with applicable federal civil rights laws and [place] laws. We do not discriminate on the basis of race, color, national origin, age, disability, sex, sexual orientation, or gender identity.            Thank you!     Thank you for choosing [facility]  for your care. Our goal is always to provide you with excellent care. Hearing back from our patients is one way we can continue to improve our services. Please take a few minutes to complete the written survey that you may receive in the  mail after your visit with us. Thank you!             Your Updated Medication List - Protect others around you: Learn how to safely use, store and throw away your medicines at www.disposemymeds.org.          This list is accurate as of 11/15/18  9:16 AM.  Always use your most recent med list.                   Brand Name Dispense Instructions for use Diagnosis    ADVIL PO      None Entered        albuterol 108 (90 Base) MCG/ACT inhaler    PROAIR HFA/PROVENTIL HFA/VENTOLIN HFA    1 Inhaler    Inhale 2 puffs into the lungs every 6 hours as needed for shortness of breath / dyspnea or wheezing    Acute sinusitis with symptoms > 10 days, Mild persistent asthma without complication       atorvastatin 40 MG tablet    LIPITOR    90 tablet    Take 1 tablet (40 mg) by mouth daily    Hyperlipidemia LDL goal <130       azithromycin 250 MG tablet    ZITHROMAX    6 tablet    Two tablets first day, then one tablet daily for four days.    Acute sinusitis with symptoms > 10 days       lisinopril 40 MG tablet    PRINIVIL/ZESTRIL    90 tablet    Take 1 tablet (40 mg) by mouth daily    Essential hypertension       sertraline 50 MG tablet    ZOLOFT    90 tablet    Take 1 tablet (50 mg) by mouth daily    Dysthymic disorder

## 2018-11-15 NOTE — PROGRESS NOTES
"SUBJECTIVE:   Nerissa Valentin is a 72 year old female who presents for Preventive Visit.    Are you in the first 12 months of your Medicare coverage?  No    Annual Wellness Visit     In general, how would you rate your overall health?  Good    Frequency of exercise:  1 day/week    Duration of exercise:  Less than 15 minutes    Do you usually eat at least 4 servings of fruit and vegetables a day, include whole grains    & fiber and avoid regularly eating high fat or \"junk\" foods?  Yes    Taking medications regularly:  Yes    Medication side effects:  None    Ability to successfully perform activities of daily living:  No assistance needed    Home Safety:  No safety concerns identified    Hearing Impairment:  No hearing concerns    In the past 6 months, have you been bothered by leaking of urine? Yes    In general, how would you rate your overall mental or emotional health?  Good    PHQ-2 Total Score: 0    Additional concerns today:  No      Fall risk:  Fallen 2 or more times in the past year?: No  Any fall with injury in the past year?: No    COGNITIVE SCREEN  1) Repeat 3 items (Leader, Season, Table)    2) Clock draw: NORMAL  3) 3 item recall: Recalls 1 object   Results: NORMAL clock, 1-2 items recalled: COGNITIVE IMPAIRMENT LESS LIKELY    Mini-CogTM Copyright S Jesus. Licensed by the author for use in [facility]; reprinted with permission (nilo@.[facility]). All rights reserved.        Reviewed and updated as needed this visit by clinical staff  Tobacco  Allergies  Meds  Med Hx  Surg Hx  Fam Hx  Soc Hx           Acute sinusitis with symptoms > 10 days, nasal congestion, loss of taste, intermittent improvement but had since summer  Flank pain and Hematuria, unspecified type will have blood when wipe, tinges of blood in toilet many months, history of kidney stones      Reviewed and updated as needed this visit by Provider        Social History   Substance Use Topics     Smoking status: Never Smoker "     Smokeless tobacco: Never Used     Alcohol use Yes      Comment: rare       Alcohol Use 11/12/2018   If you drink alcohol do you typically have greater than 3 drinks per day OR greater than 7 drinks per week? No           Do you feel safe in your environment - Yes    Do you have a Health Care Directive?: No: Advance care planning reviewed with patient; information given to patient to review.    Current providers sharing in care for this patient include:   Patient Care Team:  Gwendolyn Sadler MD as PCP - General    The following health maintenance items are reviewed in Epic and correct as of today:  Health Maintenance   Topic Date Due     ADVANCE DIRECTIVE PLANNING Q5 YRS  02/06/2017     ASTHMA CONTROL TEST Q6 MOS  05/07/2018     PHQ-9 Q6 MONTHS  05/07/2018     ASTHMA ACTION PLAN Q1 YR  11/07/2018     FALL RISK ASSESSMENT  11/07/2018     DEPRESSION ACTION PLAN Q1 YR  11/07/2018     FIT Q1 YR  11/27/2018     MAMMO SCREEN Q2 YR (SYSTEM ASSIGNED)  12/29/2018     TETANUS IMMUNIZATION (SYSTEM ASSIGNED)  02/01/2021     LIPID SCREEN Q5 YR FEMALE (SYSTEM ASSIGNED)  11/07/2022     DEXA SCAN SCREENING (SYSTEM ASSIGNED)  Completed     PNEUMOCOCCAL  Completed     INFLUENZA VACCINE  Addressed     HEPATITIS C SCREENING  Completed     BP Readings from Last 3 Encounters:   11/15/18 138/66   08/24/18 133/73   04/12/18 136/70    Wt Readings from Last 3 Encounters:   11/15/18 144 lb (65.3 kg)   08/24/18 143 lb (64.9 kg)   04/12/18 145 lb 6.4 oz (66 kg)                  Patient Active Problem List   Diagnosis     Leucocytosis     HTN (hypertension)     Hyperlipidemia with target LDL less than 130     Asthma, mild persistent     Dysthymic disorder     Osteoarthritis     Vitamin B12 deficiency anemia     Postmenopausal     Advanced directives, counseling/discussion     Health Care Home     Family history of diabetes mellitus     Hypokalemia     Obesity     BMI 31.0-31.9,adult     Fall due to ice or snow     Past Surgical History:    Procedure Laterality Date     ABDOMEN SURGERY  1969, 1970, 1971    ovary removed x2 and abd. hyst.     APPENDECTOMY  1971     BREAST BIOPSY, RT/LT  1999    Breat Biopsy RT     C REMOVAL OF KIDNEY STONE  1971     FLEXIBLE SIGMOIDOSCOPY  2000 ?    Dr. Cline at [facility]     HYSTERECTOMY, PAP NO LONGER INDICATED       HYSTERECTOMY, VAGINAL  1971     LITHOTRIPSY  2005     ROTATOR CUFF REPAIR RT/LT  2007    left     ROTATOR CUFF REPAIR RT/LT  2009    right twice?     SALPINGO OOPHORECTOMY,R/L/HARLEY  1969 and 1970    Salpingo Oophorectomy, RT/LT/HARLEY     SURGICAL HISTORY OF -   1969, 1970    ovary cystectomy       Social History   Substance Use Topics     Smoking status: Never Smoker     Smokeless tobacco: Never Used     Alcohol use Yes      Comment: rare     Family History   Problem Relation Age of Onset     Arthritis Mother      C.A.D. Mother      Hypertension Father      Diabetes Father      C.A.D. Father      Cerebrovascular Disease Maternal Grandfather      Cancer - colorectal Paternal Grandfather      Breast Cancer Sister      Breast Cancer Sister          Current Outpatient Prescriptions   Medication Sig Dispense Refill     ADVIL OR None Entered       albuterol (PROAIR HFA/PROVENTIL HFA/VENTOLIN HFA) 108 (90 Base) MCG/ACT inhaler Inhale 2 puffs into the lungs every 6 hours as needed for shortness of breath / dyspnea or wheezing 1 Inhaler 11     atorvastatin (LIPITOR) 40 MG tablet Take 1 tablet (40 mg) by mouth daily 90 tablet 3     azithromycin (ZITHROMAX) 250 MG tablet Two tablets first day, then one tablet daily for four days. 6 tablet 1     lisinopril (PRINIVIL/ZESTRIL) 40 MG tablet Take 1 tablet (40 mg) by mouth daily 90 tablet 3     sertraline (ZOLOFT) 50 MG tablet Take 1 tablet (50 mg) by mouth daily 90 tablet 3     [DISCONTINUED] albuterol (PROAIR HFA/PROVENTIL HFA/VENTOLIN HFA) 108 (90 BASE) MCG/ACT Inhaler Inhale 2 puffs into the lungs every 6 hours as needed for shortness of breath / dyspnea or wheezing 1  "Inhaler 0     [DISCONTINUED] atorvastatin (LIPITOR) 40 MG tablet Take 1 tablet (40 mg) by mouth daily 90 tablet 3     [DISCONTINUED] lisinopril (PRINIVIL/ZESTRIL) 40 MG tablet Take 1 tablet (40 mg) by mouth daily 90 tablet 3     [DISCONTINUED] sertraline (ZOLOFT) 50 MG tablet Take 1 tablet (50 mg) by mouth daily 90 tablet 3     Allergies   Allergen Reactions     Flu Virus Vaccine Dermatitis and Other (See Comments)     2 years in a row after the flu shot did get  High fever,  Localized reaction .      No Clinical Screening - See Comments      CHROMIC GUT SUTURES CAUSE ABCESS     Penicillins Hives       Pneumonia Vaccine:completed  Mammogram Screening: Patient over age 50, mutual decision to screen reflected in health maintenance.  Last 3 Pap and HPV Results:      Review of Systems   Constitutional: Negative for chills and fever.   HENT: Negative for congestion, ear pain, hearing loss and sore throat.    Eyes: Negative for pain and visual disturbance.   Respiratory: Positive for cough. Negative for shortness of breath.    Cardiovascular: Negative for chest pain, palpitations and peripheral edema.   Gastrointestinal: Negative for abdominal pain, constipation, diarrhea, heartburn, hematochezia and nausea.   Breasts:  Negative for tenderness and breast mass.   Genitourinary: Positive for hematuria and urgency. Negative for dysuria, frequency, genital sores, pelvic pain, vaginal bleeding and vaginal discharge.   Musculoskeletal: Negative for arthralgias, joint swelling and myalgias.   Skin: Negative for rash.   Neurological: Negative for dizziness, weakness, headaches and paresthesias.   Psychiatric/Behavioral: Negative for mood changes. The patient is not nervous/anxious.      Constitutional, HEENT, cardiovascular, pulmonary, gi and gu systems are negative, except as otherwise noted.    OBJECTIVE:   /66  Pulse 77  Temp 99.1  F (37.3  C) (Tympanic)  Resp 12  Ht 4' 10\" (1.473 m)  Wt 144 lb (65.3 kg)  SpO2 98% " " BMI 30.1 kg/m2 Estimated body mass index is 30.1 kg/(m^2) as calculated from the following:    Height as of this encounter: 4' 10\" (1.473 m).    Weight as of this encounter: 144 lb (65.3 kg).  Physical Exam  GENERAL APPEARANCE: healthy, alert and no distress  EYES: Eyes grossly normal to inspection, PERRL and conjunctivae and sclerae normal  HENT: ear canals and TM's normal, nose and mouth without ulcers or lesions, oropharynx clear and oral mucous membranes moist  NECK: no adenopathy, no asymmetry, masses, or scars and thyroid normal to palpation  RESP: lungs clear to auscultation - no rales, rhonchi or wheezes  BREAST: normal without masses, tenderness or nipple discharge and no palpable axillary masses or adenopathy  CV: regular rate and rhythm, normal S1 S2, no S3 or S4, no murmur, click or rub, no peripheral edema and peripheral pulses strong  ABDOMEN: soft, nontender, no hepatosplenomegaly, no masses and bowel sounds normal  MS: no musculoskeletal defects are noted and gait is age appropriate without ataxia  SKIN: no suspicious lesions or rashes  NEURO: Normal strength and tone, sensory exam grossly normal, mentation intact and speech normal  PSYCH: mentation appears normal and affect normal/bright    Diagnostic Test Results:  Results for orders placed or performed in visit on 11/15/18   UA reflex to Microscopic and Culture   Result Value Ref Range    Color Urine Yellow     Appearance Urine Clear     Glucose Urine Negative NEG^Negative mg/dL    Bilirubin Urine Negative NEG^Negative    Ketones Urine Negative NEG^Negative mg/dL    Specific Gravity Urine >1.030 1.003 - 1.035    Blood Urine Trace (A) NEG^Negative    pH Urine 6.0 5.0 - 7.0 pH    Protein Albumin Urine 100 (A) NEG^Negative mg/dL    Urobilinogen Urine 0.2 0.2 - 1.0 EU/dL    Nitrite Urine Negative NEG^Negative    Leukocyte Esterase Urine Small (A) NEG^Negative    Source Midstream Urine    Urine Microscopic   Result Value Ref Range    WBC Urine  " (A) OTO5^0 - 5 /HPF    RBC Urine 2-5 (A) OTO2^O - 2 /HPF    Bacteria Urine Many (A) NEG^Negative /HPF    Mucous Urine Present (A) NEG^Negative /LPF         ASSESSMENT / PLAN:   1. Routine general medical examination at a health care facility  Low risk cervical cancer, low risk breast cancer.  - Urine Microscopic    2. Hyperlipidemia LDL goal <130  due for labs and refill, taking medication without difficulty  - atorvastatin (LIPITOR) 40 MG tablet; Take 1 tablet (40 mg) by mouth daily  Dispense: 90 tablet; Refill: 3  - Lipid panel reflex to direct LDL Fasting    3. Essential hypertension  due for labs and refill, taking medication without difficulty  - lisinopril (PRINIVIL/ZESTRIL) 40 MG tablet; Take 1 tablet (40 mg) by mouth daily  Dispense: 90 tablet; Refill: 3  - Basic metabolic panel    4. Dysthymic disorder  due for review and refill, taking medication without difficulty  - sertraline (ZOLOFT) 50 MG tablet; Take 1 tablet (50 mg) by mouth daily  Dispense: 90 tablet; Refill: 3    5. Acute sinusitis with symptoms > 10 days  due for review and refill, taking medication without difficulty  - albuterol (PROAIR HFA/PROVENTIL HFA/VENTOLIN HFA) 108 (90 Base) MCG/ACT inhaler; Inhale 2 puffs into the lungs every 6 hours as needed for shortness of breath / dyspnea or wheezing  Dispense: 1 Inhaler; Refill: 11  - azithromycin (ZITHROMAX) 250 MG tablet; Two tablets first day, then one tablet daily for four days.  Dispense: 6 tablet; Refill: 1    6. Mild persistent asthma without complication  due for review and refill, taking medication without difficulty  - albuterol (PROAIR HFA/PROVENTIL HFA/VENTOLIN HFA) 108 (90 Base) MCG/ACT inhaler; Inhale 2 puffs into the lungs every 6 hours as needed for shortness of breath / dyspnea or wheezing  Dispense: 1 Inhaler; Refill: 11    7. Special screening for malignant neoplasms, colon  - Fecal colorectal cancer screen FIT; Future    8. Flank pain  Long time, needs workup  - US Renal  "Limited; Future  - Urine Culture Aerobic Bacterial    9. Hematuria, unspecified type  - US Renal Limited; Future  - UA reflex to Microscopic and Culture  - Urine Culture Aerobic Bacterial    10. Nonspecific finding on examination of urine  - Urine Culture Aerobic Bacterial    End of Life Planning:  Patient currently has an advanced directive: Yes.  Practitioner is supportive of decision.    COUNSELING:  Reviewed preventive health counseling, as reflected in patient instructions       Regular exercise       Healthy diet/nutrition       Vision screening       Hearing screening       Dental care    BP Readings from Last 1 Encounters:   11/15/18 138/66     Estimated body mass index is 30.1 kg/(m^2) as calculated from the following:    Height as of this encounter: 4' 10\" (1.473 m).    Weight as of this encounter: 144 lb (65.3 kg).      Weight management plan: Discussed healthy diet and exercise guidelines and patient will follow up in 3 months in clinic to re-evaluate.     reports that she has never smoked. She has never used smokeless tobacco.      Appropriate preventive services were discussed with this patient, including applicable screening as appropriate for cardiovascular disease, diabetes, osteopenia/osteoporosis, and glaucoma.  As appropriate for age/gender, discussed screening for colorectal cancer, prostate cancer, breast cancer, and cervical cancer. Checklist reviewing preventive services available has been given to the patient.    Reviewed patients plan of care and provided an AVS. The Basic Care Plan (routine screening as documented in Health Maintenance) for Nerissa meets the Care Plan requirement. This Care Plan has been established and reviewed with the Patient.    Counseling Resources:  ATP IV Guidelines  Pooled Cohorts Equation Calculator  Breast Cancer Risk Calculator  FRAX Risk Assessment  ICSI Preventive Guidelines  Dietary Guidelines for Americans, 2010  USDA's MyPlate  ASA Prophylaxis  Lung CA " Screening    Gwendolyn Sadler MD  [facility]

## 2018-11-16 LAB
BACTERIA SPEC CULT: NO GROWTH
Lab: NORMAL
SPECIMEN SOURCE: NORMAL

## 2018-11-16 ASSESSMENT — ANXIETY QUESTIONNAIRES: GAD7 TOTAL SCORE: 1

## 2018-11-16 ASSESSMENT — ASTHMA QUESTIONNAIRES: ACT_TOTALSCORE: 20

## 2018-11-23 ENCOUNTER — HOSPITAL ENCOUNTER (OUTPATIENT)
Dept: ULTRASOUND IMAGING | Facility: CLINIC | Age: 73
Discharge: HOME | End: 2018-11-23
Attending: FAMILY MEDICINE | Admitting: FAMILY MEDICINE
Payer: COMMERCIAL

## 2018-11-23 DIAGNOSIS — R10.9 FLANK PAIN: ICD-10-CM

## 2018-11-23 DIAGNOSIS — R31.9 HEMATURIA, UNSPECIFIED TYPE: ICD-10-CM

## 2018-11-23 PROCEDURE — 76770 US EXAM ABDO BACK WALL COMP: CPT

## 2018-12-27 PROCEDURE — 82274 ASSAY TEST FOR BLOOD FECAL: CPT | Performed by: FAMILY MEDICINE

## 2018-12-28 DIAGNOSIS — Z12.11 SPECIAL SCREENING FOR MALIGNANT NEOPLASMS, COLON: ICD-10-CM

## 2018-12-28 LAB — HEMOCCULT STL QL IA: NEGATIVE

## 2019-09-14 NOTE — ED PROVIDER NOTES
History     Chief Complaint   Patient presents with     Cough     Pt c/o cough for one week     HPI  Nerissa Valentin is a 72 year old female who sent to the urgent care with concern of a cough which been present for the last week.  She did have sore throat onset which has since resolved.  She does however continue to complain of persistent cough with episodes of posttussive emesis, shortness of breath, wheezing and low grade fever up to 100.3 orally, chills, myalgias.  She has not had any nausea, diarrhea, abdominal pain, significant nasal congestion.  She does have a history of mild persistent asthma and has increased her albuterol used approximately once daily without significant improvement.  She is not a smoker.  She denies any close contacts with similar URI symptoms.      Problem List:    Patient Active Problem List    Diagnosis Date Noted     Fall due to ice or snow 05/16/2013     Priority: Medium     Obesity 04/01/2013     Priority: Medium     ideal weight 100, goal weight to lose 10% or 15 lbs in 15 weeks, 139 by July, 2013       BMI 31.0-31.9,adult 04/01/2013     Priority: Medium     Hypokalemia 03/15/2013     Priority: Medium     Family history of diabetes mellitus 02/11/2013     Priority: Medium     Health Care Home 12/18/2012     Priority: Medium     Uzma Madison RN-PHN  A / [facility]   [phone]    DX V65.8 REPLACED WITH [id number] HEALTH CARE HOME (04/08/2013)       Advanced directives, counseling/discussion 02/06/2012     Priority: Medium     Asthma, mild persistent      Priority: Medium     Dysthymic disorder      Priority: Medium     Osteoarthritis      Priority: Medium     Vitamin B12 deficiency anemia      Priority: Medium     Postmenopausal      Priority: Medium     HRT 10 years       Leucocytosis 01/29/2010     Priority: Medium     white count runs from 13-15       HTN (hypertension) 01/29/2010     Priority: Medium     Hyperlipidemia with target LDL less than  130 01/29/2010     Priority: Medium     Diagnosis updated by automated process. Provider to review and confirm.       Past Medical History:    Past Medical History:   Diagnosis Date     Asthma, mild persistent      Depressive disorder 1992     Dysthymic disorder      Hyperlipidemia LDL goal < 130      Leucocytosis      Leukocytosis, unspecified      Osteoarthritis      Postmenopausal      Unspecified essential hypertension      Past Surgical History:    Past Surgical History:   Procedure Laterality Date     ABDOMEN SURGERY  1969, 1970, 1971    ovary removed x2 and abd. hyst.     APPENDECTOMY  1971     BREAST BIOPSY, RT/LT  1999    Breat Biopsy RT     C REMOVAL OF KIDNEY STONE  1971     FLEXIBLE SIGMOIDOSCOPY  2000 ?    Dr. Cline at [facility]     HYSTERECTOMY, PAP NO LONGER INDICATED       HYSTERECTOMY, VAGINAL  1971     LITHOTRIPSY  2005     ROTATOR CUFF REPAIR RT/LT  2007    left     ROTATOR CUFF REPAIR RT/LT  2009    right twice?     SALPINGO OOPHORECTOMY,R/L/HARLEY  1969 and 1970    Salpingo Oophorectomy, RT/LT/HARLEY     SURGICAL HISTORY OF -   1969, 1970    ovary cystectomy     Family History:    Family History   Problem Relation Age of Onset     Arthritis Mother      C.A.D. Mother      Hypertension Father      Diabetes Father      C.A.D. Father      Cerebrovascular Disease Maternal Grandfather      Cancer - colorectal Paternal Grandfather      Breast Cancer Sister      Breast Cancer Sister      Social History:  Marital Status:   [2]  Social History   Substance Use Topics     Smoking status: Never Smoker     Smokeless tobacco: Never Used     Alcohol use Yes      Comment: rare      Medications:      ADVIL OR   albuterol (PROAIR HFA/PROVENTIL HFA/VENTOLIN HFA) 108 (90 BASE) MCG/ACT Inhaler   atorvastatin (LIPITOR) 40 MG tablet   guaiFENesin-codeine (ROBITUSSIN AC) 100-10 MG/5ML SOLN solution   lisinopril (PRINIVIL/ZESTRIL) 40 MG tablet   sertraline (ZOLOFT) 50 MG tablet     Review of  88 year old female admitted s/p fall episode of bradycardia ; sinemet ; rapid response respiratory failure ; episode of temp     # Respiratory Failure - aspiration  secretions  s/p bronch    - Vancomycin  - continue vent parameters per Pulm / Critical care   - on IV sedation Fentanyl /midazolam neurologic status   -on bronchoscopy: The Right tracheobronchial tree was inspected closely to the level of the subsegmental bronchi. there was significant mucopurulent secretion and was suctioned.  The Left tracheobronchial tree also significant mucopurulent secretion, BAL was performed LLL   -f/u Gram stain and cx from BAL and BA wash  - blood, urine cultures: no growth  -deep tracheal aspirate: Few Squamous epithelial cells per low power field, Few polymorphonuclear leukocytes per low power field, Few Gram variable coccobacilli per oil power field   - wean as tolerated   - SLP evaluation when extubated        # Cardiac / CHF  / HTN      - pulse better   - continue lasix   - reviewed echo  - monitor weight and pulse        # Parkinson's     - continue Sinemet     # Hypothyroidism     - levothyroxine     # GI and DVT prophylaxsis     - pantoprazxole   - enoxaparin    advance care  noted  cpr full code    dispostion - Systems  CONSTITUTIONAL:POSITIVE  for fever up to 100.3, chills, myalgias   INTEGUMENTARY/SKIN: NEGATIVE for worrisome rashes, moles or lesions  EYES: NEGATIVE for vision changes or irritation  ENT/MOUTH: POSITIVE for resolved sore throat and NEGATIVE for nasal congestion, ear pain   RESP:POSITIVE for cough, shortness of breath, wheezing   GI: POSITIVE for NEGATIVE for abdominal pain, diarrhea, nausea  Physical Exam   BP: 133/73  Pulse: 95  Heart Rate: 97  Temp: 98.1  F (36.7  C)  Resp: 16  Height: 152.4 cm (5')  Weight: 64.9 kg (143 lb)  SpO2: 98 %  Physical Exam  GENERAL APPEARANCE: healthy, alert and no distress  EYES: EOMI,  PERRL, conjunctiva clear  HENT: ear canals and TM's normal.  Nose and mouth without ulcers, erythema or lesions  NECK: supple, nontender, no lymphadenopathy  RESP: lungs clear to auscultation - no rales, rhonchi or wheezes, however frequent spastic cough on exhalation   CV: regular rates and rhythm, normal S1 S2, no murmur noted  SKIN: no suspicious lesions or rashes  ED Course     ED Course     Procedures        Critical Care time:  none        Results for orders placed or performed during the hospital encounter of 08/24/18 (from the past 24 hour(s))   Chest XR,  PA & LAT    Narrative    XR CHEST 2 VW 8/24/2018 2:54 PM    HISTORY: Cough. Fever.    COMPARISON: 4/12/2018.      Impression    IMPRESSION: 2 views of the chest show no acute cardiopulmonary disease  and no significant change.     VITALIY LANG MD       Medications   albuterol neb solution 2.5 mg (2.5 mg Nebulization Given 8/24/18 1513)     Administration of albuterol neb without significant change in her symptoms.  She continues to have frequent barky cough, no wheezing    Assessments & Plan (with Medical Decision Making)     I have reviewed the nursing notes.    I have reviewed the findings, diagnosis, plan and need for follow up with the patient.       Discharge Medication List as of 8/24/2018  3:33 PM      START taking these  medications    Details   !! guaiFENesin-codeine (ROBITUSSIN AC) 100-10 MG/5ML SOLN solution Take 5-10 mLs by mouth every 4 hours as needed for cough, Disp-180 mL, R-0, Local Print      predniSONE (DELTASONE) 20 MG tablet Take two tablets (= 40mg) each day for 5 (five) days, Disp-10 tablet, R-0, E-Prescribe       !! - Potential duplicate medications found. Please discuss with provider.        Final diagnoses:   Acute bronchitis, unspecified organism     72-year-old female presents to urgent care with concern of a one-week history of cough.  She had stable vital signs upon arrival.  Physical exam findings as described above.  As part of evaluation she did have chest x-ray which is negative for acute infiltrate, pneumothorax or change in cardiopulmonary vasculature.  She did do a trial of albuterol neb in the department without significant change her symptoms.  Her symptoms are most consistent with acute bronchitis.  Differential would also include viral URI.  I do not suspect pertussis, PE at this time and will defer further evaluation.  She was discharged home stable with prescription for prednisone, Robitussin with codeine to be used as needed for cough.  Follow-up with primary care provider if no improvement within the next 3-5 days.  Worrisome reasons to return to the ER/UC sooner discussed.    Disclaimer: This note consists of symbols derived from keyboarding, dictation, and/or voice recognition software. As a result, there may be errors in the script that have gone undetected.  Please consider this when interpreting information found in the chart.    8/24/2018   [facility] EMERGENCY DEPARTMENT     Cynthia Green PA-C  08/24/18 [id number]

## 2019-11-03 ENCOUNTER — HEALTH MAINTENANCE LETTER (OUTPATIENT)
Age: 74
End: 2019-11-03

## 2019-11-17 DIAGNOSIS — I10 ESSENTIAL HYPERTENSION: ICD-10-CM

## 2019-11-17 NOTE — TELEPHONE ENCOUNTER
"Requested Prescriptions   Pending Prescriptions Disp Refills     lisinopril (PRINIVIL/ZESTRIL) 40 MG tablet [Pharmacy Med Name: LISINOPRIL 40MG TABS]  Last Written Prescription Date:  08/18/19  Last Fill Quantity: 90,  # refills: 3   Last office visit: 11/15/2018 with prescribing provider:  CAREY Sadler   Future Office Visit:     90 tablet 3     Sig: TAKE ONE TABLET BY MOUTH ONCE DAILY       ACE Inhibitors (Including Combos) Protocol Failed - 11/17/2019 10:54 AM        Failed - Blood pressure under 140/90 in past 12 months     BP Readings from Last 3 Encounters:   11/15/18 138/66   08/24/18 133/73   04/12/18 136/70                 Failed - Normal serum creatinine on file in past 12 months     Recent Labs   Lab Test 11/15/18  0928   CR 0.74             Failed - Normal serum potassium on file in past 12 months     Recent Labs   Lab Test 11/15/18  0928   POTASSIUM 3.6             Passed - Recent (12 mo) or future (30 days) visit within the authorizing provider's specialty     Patient has had an office visit with the authorizing provider or a provider within the authorizing providers department within the previous 12 mos or has a future within next 30 days. See \"Patient Info\" tab in inbasket, or \"Choose Columns\" in Meds & Orders section of the refill encounter.              Passed - Medication is active on med list        Passed - Patient is age 18 or older        Passed - No active pregnancy on record        Passed - No positive pregnancy test within past 12 months          "

## 2019-11-20 RX ORDER — LISINOPRIL 40 MG/1
TABLET ORAL
Qty: 14 TABLET | Refills: 0 | Status: SHIPPED | OUTPATIENT
Start: 2019-11-20 | End: 2019-11-29

## 2019-11-20 NOTE — TELEPHONE ENCOUNTER
Patient has upcoming appt with PCP but not enough medication to last until appt. Vivien refill given. Jillian GASTON RN

## 2019-11-26 NOTE — PATIENT INSTRUCTIONS
Patient Education   Personalized Prevention Plan  You are due for the preventive services outlined below.  Your care team is available to assist you in scheduling these services.  If you have already completed any of these items, please share that information with your care team to update in your medical record.  Health Maintenance Due   Topic Date Due     Zoster (Shingles) Vaccine (1 of 2) 12/28/1996     Discuss Advance Care Planning  02/06/2017     Mammogram  12/29/2018     Asthma Control Test  05/15/2019     Depression Assessment  05/15/2019     Flu Vaccine (1) 09/01/2019     FALL RISK ASSESSMENT  11/15/2019     Annual Wellness Visit  11/15/2019     Asthma Action Plan - yearly  11/15/2019       Urinary Incontinence, Female (Adult)  Urinary incontinence means loss of control of the bladder. This problem affects many women, especially as they get older. If you have incontinence, you may be embarrassed to ask for help. But know that this problem can be treated.  Types of Incontinence  There are different types of incontinence. Two of the main types are described here. You can have more than one type.    Stress incontinence. With this type, urine leaks when pressure (stress) is put on the bladder. This may happen when you cough, sneeze, or laugh. Stress incontinence most often occurs because the pelvic floor muscles that support the bladder and urethra are weak. This can happen after pregnancy and vaginal childbirth or a hysterectomy. It can also be due to excess body weight or hormone changes.    Urge incontinence (also called overactive bladder). With this type, a sudden urge to urinate is felt often. This may happen even though there may not be much urine in the bladder. The need to urinate often during the night is common. Urge incontinence most often occurs because of bladder spasms. This may be due to bladder irritation or infection. Damage to bladder nerves or pelvic muscles, constipation, and certain  medicines can also lead to urge incontinence.  Treatment of urinary incontinence depends on the cause. Further evaluation is needed to find the type you have. This will likely include an exam and certain tests. Based on the results, you and your healthcare provider can then plan treatment. Until a diagnosis is made, the home care tips below can help relieve symptoms.  Home care    Do pelvic floor muscle exercises, if they are prescribed. The pelvic floor muscles help support the bladder and urethra. Many women find that their symptoms improve when doing special exercises that strengthen these muscles. To do the exercises contract the muscles you would use to stop your stream of urine, but do this when you re not urinating. Hold for 10 seconds, then relax. Repeat 10 to 20 times in a row, at least 3 times a day. Your provider may give you other instructions for how to do the exercises and how often.    Keep a bladder diary. This helps track how often and how much you urinate over a set period of time. Bring this diary with you to your next visit with the provider. The information can help your provider learn more about your bladder problem.    Lose weight, if advised to by your provider. Excess weight puts pressure on the bladder. Your provider can help you create a weight-loss plan that s right for you. This may include exercising more and making certain diet changes.    Don't consume foods and drinks that may irritate the bladder. These can include alcohol and caffeinated drinks.    Quit smoking. Smoking and other tobacco use can lead to chronic cough that strains the pelvic floor muscles. Smoking may also damage the bladder and urethra. Talk with your provider about treatments or methods you can use to quit smoking.    If drinking large amounts of fluid causes you to have symptoms, you may be advised to limit your fluid intake. You may also be advised to drink most of your fluids during the day and to limit fluids  at night.    If you re worried about urine leakage or accidents, you may wear absorbent pads to catch urine. Change the pads often. This helps reduce discomfort. It may also reduce the risk of skin or bladder infections.  Follow-up care  Follow up with your healthcare provider, or as directed. It may take some to find the right treatment for your problem. Your treatment plan may include special therapies or medicines. Certain procedures or surgery may also be options. Be sure to discuss any questions you have with your provider.  When to seek medical advice  Call the healthcare provider right away if any of these occur:    Fever of 100.4 F (38 C) or higher, or as directed by your provider    Bladder pain or fullness    Abdominal swelling    Nausea or vomiting    Back pain    Weakness, dizziness or fainting  Date Last Reviewed: 10/1/2017    [id number] [employer]. [address] [place] [address], [place] [address]. All rights reserved. This information is not intended as a substitute for professional medical care. Always follow your healthcare professional's instructions.

## 2019-11-26 NOTE — PROGRESS NOTES
"  SUBJECTIVE:   Nerissa Valentin is a 73 year old female who presents for Preventive Visit.    Are you in the first 12 months of your Medicare Part B coverage?  No    Physical Health:    In general, how would you rate your overall physical health? { :022672}    Outside of work, how many days during the week do you exercise? { :733184}    Outside of work, approximately how many minutes a day do you exercise?{ :821139}    If you drink alcohol do you typically have >3 drinks per day or >7 drinks per week? { :819384}    Do you usually eat at least 4 servings of fruit and vegetables a day, include whole grains & fiber and avoid regularly eating high fat or \"junk\" foods? { :914014::\"Yes\"}    Do you have any problems taking medications regularly?  { :592819::\"No\"}    Do you have any side effects from medications? { :294212}    Needs assistance for the following daily activities: { :422781}    Which of the following safety concerns are present in your home?  { :820261::\"none identified\"}     Hearing impairment: { :779245}    In the past 6 months, have you been bothered by leaking of urine? { :290205}    Mental Health:    In general, how would you rate your overall mental or emotional health? { :802886}  PHQ-2 Score:    PHQ-2 Score:     PHQ-2 ( 1999 Pf[employer]) 11/6/2019 11/12/2018   Q1: Little interest or pleasure in doing things 0 0   Q2: Feeling down, depressed or hopeless 0 0   PHQ-2 Score 0 0   Q1: Little interest or pleasure in doing things Not at all Not at all   Q2: Feeling down, depressed or hopeless Not at all Not at all   PHQ-2 Score 0 0       Do you feel safe in your environment? { :980230}    Have you ever done Advance Care Planning? (For example, a Health Directive, POLST, or a discussion with a medical provider or your loved ones about your wishes): { :428822}    Additional concerns to address?  YES  Symptoms . States blood in urine. Has tried  , states not helpful. History of kidney stones.  Joint and foot " "pain.    Fall risk:  { :131275}  {If any of the above assessments are answered yes, consider ordering appropriate referrals (Optional):514854::\"click delete button to remove this line now\"}  Cognitive Screenin) Repeat 3 items (Leader, Season, Table)  {Get patient's attention, then say, \"I am going to say three words that I want you to remember now and later.  The words are Leader, Season, and Table.  Please say them for me now.\"  If pt. unable after 3 tries, go to next item}  2) Clock draw: {Say the following phrases in order: \"Please draw a clock.  Start by drawing a large [place].  Put all the numbers in the [place] and set the hands to show 11:10 (10 past 11).\"  If pt cannot complete in 3 minutes, stop and ask for recall items.  \"NORMAL\" test = all twelve numbers in correct location, and clock hands correctly designating 11:10}{ :70709::\"NORMAL\"}  3) 3 item recall: {Say: \"What were the three words I asked you to remember?\"}{ :720688}  Results: {MINICO RESULTS:878207}    Mini-CogTM Copyright S Jesus. Licensed by the author for use in [employer] [facility]; reprinted with permission (sodu@.[email or web address]). All rights reserved.      {Do you have sleep apnea, excessive snoring or daytime drowsiness? (Optional):891308}        Hyperlipidemia Follow-Up      Are you regularly taking any medication or supplement to lower your cholesterol?   { :512834}    Are you having muscle aches or other side effects that you think could be caused by your cholesterol lowering medication?  { :282694}    Hypertension Follow-up      Do you check your blood pressure regularly outside of the clinic? { :622427}     Are you following a low salt diet? { :555912}    Are your blood pressures ever more than 140 on the top number (systolic) OR more   than 90 on the bottom number (diastolic), for example 140/90? { :676110}    Pt returns to clinic to follow up on depression/anxiety symptoms and medications (Zoloft).    Pt returns to clinic " "requesting refill of Albuterol medication. States doing well on this medication without issue or side effect.      Reviewed and updated as needed this visit by clinical staff         Reviewed and updated as needed this visit by Provider        Social History     Tobacco Use     Smoking status: Never Smoker     Smokeless tobacco: Never Used   Substance Use Topics     Alcohol use: Yes     Comment: rare                           Current providers sharing in care for this patient include:   Patient Care Team:  Gwendolyn Sadler MD as PCP - General  Gwendolyn Sadler MD as Assigned PCP    The following health maintenance items are reviewed in Epic and correct as of today:  Health Maintenance   Topic Date Due     ZOSTER IMMUNIZATION (1 of 2) 1996     ADVANCE CARE PLANNING  2017     MAMMO SCREENING  2018     ASTHMA CONTROL TEST  05/15/2019     PHQ-9  05/15/2019     INFLUENZA VACCINE (1) 2019     FALL RISK ASSESSMENT  11/15/2019     MEDICARE ANNUAL WELLNESS VISIT  11/15/2019     ASTHMA ACTION PLAN  11/15/2019     FIT  2019     DTAP/TDAP/TD IMMUNIZATION (3 - Td) 2021     LIPID  11/15/2023     DEXA  Completed     HEPATITIS C SCREENING  Completed     DEPRESSION ACTION PLAN  Completed     PNEUMOCOCCAL IMMUNIZATION 65+ LOW/MEDIUM RISK  Completed     IPV IMMUNIZATION  Aged Out     MENINGITIS IMMUNIZATION  Aged Out     {Chronicprobdata (Optional):351945}  {Decision Support (Optional):467793}    ROS:  {ROS COMP:304789}    OBJECTIVE:   There were no vitals taken for this visit. Estimated body mass index is 30.1 kg/m  as calculated from the following:    Height as of 11/15/18: 1.473 m (4' 10\").    Weight as of 11/15/18: 65.3 kg (144 lb).  EXAM:   {Exam :396334}    {Diagnostic Test Results (Optional):343409::\"Diagnostic Test Results:\",\"Labs reviewed in Epic\"}    ASSESSMENT / PLAN:   {Diag Picklist:013548}    COUNSELING:  {Medicare Counselin}    Estimated body mass index is 30.1 kg/m  " "as calculated from the following:    Height as of 11/15/18: 1.473 m (4' 10\").    Weight as of 11/15/18: 65.3 kg (144 lb).    {Weight Management Plan (ACO) Complete if BMI is abnormal-  Ages 18-64  BMI >24.9.  Age 65+ with BMI <23 or >30 (Optional):063243}     reports that she has never smoked. She has never used smokeless tobacco.  {Tobacco Cessation -- Complete if patient is a smoker (Optional):265037}    Appropriate preventive services were discussed with this patient, including applicable screening as appropriate for cardiovascular disease, diabetes, osteopenia/osteoporosis, and glaucoma.  As appropriate for age/gender, discussed screening for colorectal cancer, prostate cancer, breast cancer, and cervical cancer. Checklist reviewing preventive services available has been given to the patient.    Reviewed patients plan of care and provided an AVS. The {CarePlan:314505} for eNrissa meets the Care Plan requirement. This Care Plan has been established and reviewed with the {PATIENT, FAMILY MEMBER, CAREGIVER:773755}.    Counseling Resources:  ATP IV Guidelines  Pooled Cohorts Equation Calculator  Breast Cancer Risk Calculator  FRAX Risk Assessment  ICSI Preventive Guidelines  Dietary Guidelines for Americans, 2010  [employer]'s MyPlate  ASA Prophylaxis  Lung CA Screening    Gwendolyn Sadler MD  [facility]    Information on urinary incontinence and treatment options given to patient.  "

## 2019-11-28 ASSESSMENT — ENCOUNTER SYMPTOMS
ARTHRALGIAS: 1
DYSURIA: 0
PALPITATIONS: 0
EYE PAIN: 0
MYALGIAS: 0
CONSTIPATION: 0
NERVOUS/ANXIOUS: 0
HEADACHES: 0
DIARRHEA: 0
FEVER: 0
SORE THROAT: 0
WEAKNESS: 0
HEARTBURN: 0
FREQUENCY: 1
ABDOMINAL PAIN: 0
BREAST MASS: 0
PARESTHESIAS: 0
NAUSEA: 0
CHILLS: 0
DIZZINESS: 0
JOINT SWELLING: 1
HEMATURIA: 1
HEMATOCHEZIA: 0
SHORTNESS OF BREATH: 1
COUGH: 1

## 2019-11-28 ASSESSMENT — ACTIVITIES OF DAILY LIVING (ADL): CURRENT_FUNCTION: NO ASSISTANCE NEEDED

## 2019-11-29 ENCOUNTER — OFFICE VISIT (OUTPATIENT)
Dept: FAMILY MEDICINE | Facility: CLINIC | Age: 74
End: 2019-11-29
Payer: COMMERCIAL

## 2019-11-29 ENCOUNTER — ANCILLARY PROCEDURE (OUTPATIENT)
Dept: GENERAL RADIOLOGY | Facility: CLINIC | Age: 74
End: 2019-11-29
Attending: FAMILY MEDICINE
Payer: COMMERCIAL

## 2019-11-29 VITALS
WEIGHT: 141.2 LBS | DIASTOLIC BLOOD PRESSURE: 80 MMHG | SYSTOLIC BLOOD PRESSURE: 132 MMHG | RESPIRATION RATE: 12 BRPM | HEART RATE: 92 BPM | OXYGEN SATURATION: 99 % | TEMPERATURE: 99.8 F | HEIGHT: 58 IN | BODY MASS INDEX: 29.64 KG/M2

## 2019-11-29 DIAGNOSIS — E78.5 HYPERLIPIDEMIA LDL GOAL <130: ICD-10-CM

## 2019-11-29 DIAGNOSIS — Z00.00 ENCOUNTER FOR MEDICARE ANNUAL WELLNESS EXAM: Primary | ICD-10-CM

## 2019-11-29 DIAGNOSIS — R31.9 HEMATURIA, UNSPECIFIED TYPE: ICD-10-CM

## 2019-11-29 DIAGNOSIS — Z12.31 ENCOUNTER FOR SCREENING MAMMOGRAM FOR MALIGNANT NEOPLASM OF BREAST: ICD-10-CM

## 2019-11-29 DIAGNOSIS — J45.30 MILD PERSISTENT ASTHMA WITHOUT COMPLICATION: ICD-10-CM

## 2019-11-29 DIAGNOSIS — I10 ESSENTIAL HYPERTENSION: ICD-10-CM

## 2019-11-29 DIAGNOSIS — M79.672 LEFT FOOT PAIN: ICD-10-CM

## 2019-11-29 DIAGNOSIS — F34.1 DYSTHYMIC DISORDER: ICD-10-CM

## 2019-11-29 LAB
ALBUMIN UR-MCNC: 100 MG/DL
ANION GAP SERPL CALCULATED.3IONS-SCNC: 3 MMOL/L (ref 3–14)
APPEARANCE UR: ABNORMAL
BACTERIA #/AREA URNS HPF: ABNORMAL /HPF
BILIRUB UR QL STRIP: NEGATIVE
BUN SERPL-MCNC: 11 MG/DL (ref 7–30)
CALCIUM SERPL-MCNC: 8.7 MG/DL (ref 8.5–10.1)
CHLORIDE SERPL-SCNC: 107 MMOL/L (ref 94–109)
CO2 SERPL-SCNC: 28 MMOL/L (ref 20–32)
COLOR UR AUTO: YELLOW
CREAT SERPL-MCNC: 0.74 MG/DL (ref 0.52–1.04)
GFR SERPL CREATININE-BSD FRML MDRD: 81 ML/MIN/{1.73_M2}
GLUCOSE SERPL-MCNC: 93 MG/DL (ref 70–99)
GLUCOSE UR STRIP-MCNC: NEGATIVE MG/DL
HGB UR QL STRIP: ABNORMAL
KETONES UR STRIP-MCNC: NEGATIVE MG/DL
LEUKOCYTE ESTERASE UR QL STRIP: ABNORMAL
NITRATE UR QL: NEGATIVE
NON-SQ EPI CELLS #/AREA URNS LPF: ABNORMAL /LPF
PH UR STRIP: 6.5 PH (ref 5–7)
POTASSIUM SERPL-SCNC: 3.5 MMOL/L (ref 3.4–5.3)
RBC #/AREA URNS AUTO: ABNORMAL /HPF
SODIUM SERPL-SCNC: 138 MMOL/L (ref 133–144)
SOURCE: ABNORMAL
SP GR UR STRIP: 1.02 (ref 1–1.03)
UROBILINOGEN UR STRIP-ACNC: 0.2 EU/DL (ref 0.2–1)
WBC #/AREA URNS AUTO: ABNORMAL /HPF

## 2019-11-29 PROCEDURE — 80048 BASIC METABOLIC PNL TOTAL CA: CPT | Performed by: FAMILY MEDICINE

## 2019-11-29 PROCEDURE — 81001 URINALYSIS AUTO W/SCOPE: CPT | Performed by: FAMILY MEDICINE

## 2019-11-29 PROCEDURE — 99397 PER PM REEVAL EST PAT 65+ YR: CPT | Performed by: FAMILY MEDICINE

## 2019-11-29 PROCEDURE — 73630 X-RAY EXAM OF FOOT: CPT | Mod: LT

## 2019-11-29 PROCEDURE — 99214 OFFICE O/P EST MOD 30 MIN: CPT | Mod: 25 | Performed by: FAMILY MEDICINE

## 2019-11-29 PROCEDURE — 36415 COLL VENOUS BLD VENIPUNCTURE: CPT | Performed by: FAMILY MEDICINE

## 2019-11-29 RX ORDER — ATORVASTATIN CALCIUM 40 MG/1
40 TABLET, FILM COATED ORAL DAILY
Qty: 90 TABLET | Refills: 3 | Status: SHIPPED | OUTPATIENT
Start: 2019-11-29 | End: 2020-12-10

## 2019-11-29 RX ORDER — LISINOPRIL 40 MG/1
40 TABLET ORAL DAILY
Qty: 90 TABLET | Refills: 3 | Status: SHIPPED | OUTPATIENT
Start: 2019-11-29 | End: 2020-12-10

## 2019-11-29 RX ORDER — ALBUTEROL SULFATE 90 UG/1
2 AEROSOL, METERED RESPIRATORY (INHALATION) EVERY 6 HOURS PRN
Qty: 1 INHALER | Refills: 11 | Status: SHIPPED | OUTPATIENT
Start: 2019-11-29 | End: 2020-12-10

## 2019-11-29 ASSESSMENT — ENCOUNTER SYMPTOMS
CHILLS: 0
BREAST MASS: 0
COUGH: 1
HEMATOCHEZIA: 0
DYSURIA: 0
NAUSEA: 0
FEVER: 0
JOINT SWELLING: 1
FREQUENCY: 1
PARESTHESIAS: 0
SORE THROAT: 0
NERVOUS/ANXIOUS: 0
ABDOMINAL PAIN: 0
EYE PAIN: 0
PALPITATIONS: 0
HEMATURIA: 1
DIZZINESS: 0
WEAKNESS: 0
ARTHRALGIAS: 1
HEADACHES: 0
HEARTBURN: 0
MYALGIAS: 0
SHORTNESS OF BREATH: 1
DIARRHEA: 0
CONSTIPATION: 0

## 2019-11-29 ASSESSMENT — ANXIETY QUESTIONNAIRES
7. FEELING AFRAID AS IF SOMETHING AWFUL MIGHT HAPPEN: NOT AT ALL
5. BEING SO RESTLESS THAT IT IS HARD TO SIT STILL: NOT AT ALL
6. BECOMING EASILY ANNOYED OR IRRITABLE: SEVERAL DAYS
1. FEELING NERVOUS, ANXIOUS, OR ON EDGE: NOT AT ALL
3. WORRYING TOO MUCH ABOUT DIFFERENT THINGS: NOT AT ALL
IF YOU CHECKED OFF ANY PROBLEMS ON THIS QUESTIONNAIRE, HOW DIFFICULT HAVE THESE PROBLEMS MADE IT FOR YOU TO DO YOUR WORK, TAKE CARE OF THINGS AT HOME, OR GET ALONG WITH OTHER PEOPLE: NOT DIFFICULT AT ALL

## 2019-11-29 ASSESSMENT — PATIENT HEALTH QUESTIONNAIRE - PHQ9
5. POOR APPETITE OR OVEREATING: NOT AT ALL
SUM OF ALL RESPONSES TO PHQ QUESTIONS 1-9: 2

## 2019-11-29 ASSESSMENT — ACTIVITIES OF DAILY LIVING (ADL): CURRENT_FUNCTION: NO ASSISTANCE NEEDED

## 2019-11-29 ASSESSMENT — MIFFLIN-ST. JEOR: SCORE: 1036.26

## 2019-11-29 NOTE — PROGRESS NOTES
"SUBJECTIVE:   Nerissa Valentin is a 73 year old female who presents for Preventive Visit.    Are you in the first 12 months of your Medicare coverage?  No    Healthy Habits:     In general, how would you rate your overall health?  Good    Frequency of exercise:  2-3 days/week    Duration of exercise:  Other    Do you usually eat at least 4 servings of fruit and vegetables a day, include whole grains    & fiber and avoid regularly eating high fat or \"junk\" foods?  Yes    Taking medications regularly:  Yes    Medication side effects:  Not applicable    Ability to successfully perform activities of daily living:  No assistance needed    Home Safety:  No safety concerns identified    Hearing Impairment:  No hearing concerns    In the past 6 months, have you been bothered by leaking of urine? Yes    In general, how would you rate your overall mental or emotional health?  Good      PHQ-2 Total Score: 0    Additional concerns today:  No    Do you feel safe in your environment? Yes    Have you ever done Advance Care Planning? (For example, a Health Directive, POLST, or a discussion with a medical provider or your loved ones about your wishes): No, advance care planning information given to patient to review.  Patient declined advance care planning discussion at this time.    Fall risk  Fallen 2 or more times in the past year?: No  Any fall with injury in the past year?: No    Cognitive Screening   1) Repeat 3 items (Leader, Season, Table)  2) Clock draw: NORMAL  3) 3 item recall: Recalls 3 objects  Results: 3 items recalled: COGNITIVE IMPAIRMENT LESS LIKELY    Mini-CogTM Copyright ELI Lee. Licensed by the author for use in [facility]; reprinted with permission (nilo@.[facility]). All rights reserved.       Do you have sleep apnea, excessive snoring or daytime drowsiness?: no    Reviewed and updated as needed this visit by clinical staff  Tobacco  Meds  Med Hx  Surg Hx  Fam Hx  Soc Hx        Reviewed and " updated as needed this visit by Provider        Social History     Tobacco Use     Smoking status: Never Smoker     Smokeless tobacco: Never Used   Substance Use Topics     Alcohol use: Yes     Comment: rare         Alcohol Use 11/28/2019   Prescreen: >3 drinks/day or >7 drinks/week? No   Prescreen: >3 drinks/day or >7 drinks/week? -     Hyperlipidemia Follow-Up      Are you regularly taking any medication or supplement to lower your cholesterol?   No    Are you having muscle aches or other side effects that you think could be caused by your cholesterol lowering medication?  No    Hypertension Follow-up      Do you check your blood pressure regularly outside of the clinic? Not often. But states that she was having heart palpitations last week. States that bp was 190/80 when checked.    Are you following a low salt diet? No    Are your blood pressures ever more than 140 on the top number (systolic) OR more   than 90 on the bottom number (diastolic), for example 140/90? Yes    Pt returns to clinic to follow up on depression/anxiety symptoms and medications (Zoloft).    Pt returns to clinic requesting refill of Albuterol medication. States doing well on this medication without issue or side effect.      Symptoms ongoing according to Pt. States blood in urine. States history of kidney stones.    States joint and foot pain (left foot is worst).States tylenol/IBU not helpful.            Current providers sharing in care for this patient include:   Patient Care Team:  Gwendolyn Sadler MD as PCP - General  Gwendolyn Sadler MD as Assigned PCP    The following health maintenance items are reviewed in Epic and correct as of today:  Health Maintenance   Topic Date Due     ZOSTER IMMUNIZATION (1 of 2) 12/28/1996     ADVANCE CARE PLANNING  02/06/2017     MAMMO SCREENING  12/29/2018     ASTHMA CONTROL TEST  05/15/2019     PHQ-9  05/15/2019     INFLUENZA VACCINE (1) 09/01/2019     FALL RISK ASSESSMENT  11/15/2019      MEDICARE ANNUAL WELLNESS VISIT  11/15/2019     ASTHMA ACTION PLAN  11/15/2019     FIT  12/27/2019     DTAP/TDAP/TD IMMUNIZATION (3 - Td) 02/01/2021     LIPID  11/15/2023     DEXA  Completed     HEPATITIS C SCREENING  Completed     DEPRESSION ACTION PLAN  Completed     PNEUMOCOCCAL IMMUNIZATION 65+ LOW/MEDIUM RISK  Completed     IPV IMMUNIZATION  Aged Out     MENINGITIS IMMUNIZATION  Aged Out     BP Readings from Last 3 Encounters:   11/29/19 132/80   11/15/18 138/66   08/24/18 133/73    Wt Readings from Last 3 Encounters:   11/29/19 64 kg (141 lb 3.2 oz)   11/15/18 65.3 kg (144 lb)   08/24/18 64.9 kg (143 lb)                  Patient Active Problem List   Diagnosis     Leucocytosis     HTN (hypertension)     Hyperlipidemia with target LDL less than 130     Asthma, mild persistent     Dysthymic disorder     Osteoarthritis     Vitamin B12 deficiency anemia     Postmenopausal     Advanced directives, counseling/discussion     Health Care Home     Family history of diabetes mellitus     Hypokalemia     Obesity     BMI 31.0-31.9,adult     Fall due to ice or snow     Past Surgical History:   Procedure Laterality Date     ABDOMEN SURGERY  1969, 1970, 1971    ovary removed x2 and abd. hyst.     APPENDECTOMY  1971     BREAST BIOPSY, RT/LT  1999    Breat Biopsy RT     C REMOVAL OF KIDNEY STONE  1971     FLEXIBLE SIGMOIDOSCOPY  2000 ?    Dr. Cline at [facility]     HYSTERECTOMY, PAP NO LONGER INDICATED       HYSTERECTOMY, VAGINAL  1971     LITHOTRIPSY  2005     ROTATOR CUFF REPAIR RT/LT  2007    left     ROTATOR CUFF REPAIR RT/LT  2009    right twice?     SALPINGO OOPHORECTOMY,R/L/HARLEY  1969 and 1970    Salpingo Oophorectomy, RT/LT/HARLEY     SURGICAL HISTORY OF -   1969, 1970    ovary cystectomy       Social History     Tobacco Use     Smoking status: Never Smoker     Smokeless tobacco: Never Used   Substance Use Topics     Alcohol use: Yes     Comment: rare     Family History   Problem Relation Age of Onset     Arthritis Mother       C.A.D. Mother      Hypertension Father      Diabetes Father      C.A.D. Father      Cerebrovascular Disease Maternal Grandfather      Cancer - colorectal Paternal Grandfather      Breast Cancer Sister      Breast Cancer Sister      Breast Cancer Sister      Breast Cancer Sister          Current Outpatient Medications   Medication Sig Dispense Refill     ADVIL OR None Entered       albuterol (PROAIR HFA/PROVENTIL HFA/VENTOLIN HFA) 108 (90 Base) MCG/ACT inhaler Inhale 2 puffs into the lungs every 6 hours as needed for shortness of breath / dyspnea or wheezing 1 Inhaler 11     atorvastatin (LIPITOR) 40 MG tablet Take 1 tablet (40 mg) by mouth daily 90 tablet 3     lisinopril (PRINIVIL/ZESTRIL) 40 MG tablet Take 1 tablet (40 mg) by mouth daily 90 tablet 3     sertraline (ZOLOFT) 50 MG tablet Take 1 tablet (50 mg) by mouth daily 90 tablet 3     Allergies   Allergen Reactions     Flu Virus Vaccine Dermatitis and Other (See Comments)     2 years in a row after the flu shot did get  High fever,  Localized reaction .      No Clinical Screening - See Comments      CHROMIC GUT SUTURES CAUSE ABCESS     Penicillins Hives     Pneumonia Vaccine:complete  Mammogram Screening: Mammogram Screening: Patient over age 50, mutual decision to screen reflected in health maintenance.  Last 3 Pap and HPV Results:      Review of Systems   Constitutional: Negative for chills and fever.   HENT: Negative for congestion, ear pain, hearing loss and sore throat.    Eyes: Negative for pain and visual disturbance.   Respiratory: Positive for cough and shortness of breath.    Cardiovascular: Positive for peripheral edema. Negative for chest pain and palpitations.   Gastrointestinal: Negative for abdominal pain, constipation, diarrhea, heartburn, hematochezia and nausea.   Breasts:  Negative for tenderness, breast mass and discharge.   Genitourinary: Positive for frequency, hematuria and urgency. Negative for dysuria, genital sores, pelvic pain,  "vaginal bleeding and vaginal discharge.   Musculoskeletal: Positive for arthralgias and joint swelling. Negative for myalgias.   Skin: Positive for rash.   Neurological: Negative for dizziness, weakness, headaches and paresthesias.   Psychiatric/Behavioral: Negative for mood changes. The patient is not nervous/anxious.          OBJECTIVE:   /80   Pulse 92   Temp 99.8  F (37.7  C) (Tympanic)   Resp 12   Ht 1.467 m (4' 9.75\")   Wt 64 kg (141 lb 3.2 oz)   SpO2 99%   BMI 29.77 kg/m   Estimated body mass index is 29.77 kg/m  as calculated from the following:    Height as of this encounter: 1.467 m (4' 9.75\").    Weight as of this encounter: 64 kg (141 lb 3.2 oz).  Physical Exam  GENERAL APPEARANCE: healthy, alert and no distress  EYES: Eyes grossly normal to inspection, PERRL and conjunctivae and sclerae normal  HENT: ear canals and TM's normal, nose and mouth without ulcers or lesions, oropharynx clear and oral mucous membranes moist  NECK: no adenopathy, no asymmetry, masses, or scars and thyroid normal to palpation  RESP: lungs clear to auscultation - no rales, rhonchi or wheezes  BREAST: normal without masses, tenderness or nipple discharge and no palpable axillary masses or adenopathy  CV: regular rate and rhythm, normal S1 S2, no S3 or S4, no murmur, click or rub, no peripheral edema and peripheral pulses strong  ABDOMEN: soft, nontender, no hepatosplenomegaly, no masses and bowel sounds normal  MS: no musculoskeletal defects are noted and gait is age appropriate without ataxia  SKIN: no suspicious lesions or rashes  NEURO: Normal strength and tone, sensory exam grossly normal, mentation intact and speech normal  PSYCH: mentation appears normal and affect normal/bright    Diagnostic Test Results:  Xray left foot looks normal.    ASSESSMENT / PLAN:   1. Encounter for Medicare annual wellness exam  - UA reflex to Microscopic and Culture  - MA Screening Digital Bilateral; Future  - MA Screening Digital " "Bilateral; Future    2. Hyperlipidemia LDL goal <130  due for review and refill, taking medication without difficulty    - atorvastatin (LIPITOR) 40 MG tablet; Take 1 tablet (40 mg) by mouth daily  Dispense: 90 tablet; Refill: 3    3. Essential hypertension  due for labs and refill, taking medication without difficulty  - lisinopril (PRINIVIL/ZESTRIL) 40 MG tablet; Take 1 tablet (40 mg) by mouth daily  Dispense: 90 tablet; Refill: 3  - Basic metabolic panel    4. Dysthymic disorder  due for review and refill, taking medication without difficulty  - sertraline (ZOLOFT) 50 MG tablet; Take 1 tablet (50 mg) by mouth daily  Dispense: 90 tablet; Refill: 3    5. Encounter for screening mammogram for malignant neoplasm of breast   - MA Screening Digital Bilateral; Future  - MA Screening Digital Bilateral; Future    6. Mild persistent asthma without complication  due for review and refill, taking medication without difficulty  - albuterol (PROAIR HFA/PROVENTIL HFA/VENTOLIN HFA) 108 (90 Base) MCG/ACT inhaler; Inhale 2 puffs into the lungs every 6 hours as needed for shortness of breath / dyspnea or wheezing  Dispense: 1 Inhaler; Refill: 11    7. Hematuria, unspecified type  - UROLOGY ADULT REFERRAL    8. Left foot pain  Left fifth metacarpal pain  - ORTHO  REFERRAL  - XR Foot Left G/E 3 Views; Future    COUNSELING:  Reviewed preventive health counseling, as reflected in patient instructions       Regular exercise       Healthy diet/nutrition       Vision screening       Hearing screening       Dental care       Bladder control       Fall risk prevention    Estimated body mass index is 29.77 kg/m  as calculated from the following:    Height as of this encounter: 1.467 m (4' 9.75\").    Weight as of this encounter: 64 kg (141 lb 3.2 oz).         reports that she has never smoked. She has never used smokeless tobacco.      Appropriate preventive services were discussed with this patient, including applicable screening as " appropriate for cardiovascular disease, diabetes, osteopenia/osteoporosis, and glaucoma.  As appropriate for age/gender, discussed screening for colorectal cancer, prostate cancer, breast cancer, and cervical cancer. Checklist reviewing preventive services available has been given to the patient.    Reviewed patients plan of care and provided an AVS. The Basic Care Plan (routine screening as documented in Health Maintenance) for Nerissa meets the Care Plan requirement. This Care Plan has been established and reviewed with the Patient.    Counseling Resources:  ATP IV Guidelines  Pooled Cohorts Equation Calculator  Breast Cancer Risk Calculator  FRAX Risk Assessment  ICSI Preventive Guidelines  Dietary Guidelines for Americans, 2010  USDA's MyPlate  ASA Prophylaxis  Lung CA Screening    Gwendolyn Sadler MD  [facility]    Identified Health Risks:

## 2019-11-29 NOTE — NURSING NOTE
"Initial /80   Pulse 92   Temp 99.8  F (37.7  C) (Tympanic)   Resp 12   Ht 1.467 m (4' 9.75\")   Wt 64 kg (141 lb 3.2 oz)   SpO2 99%   BMI 29.77 kg/m   Estimated body mass index is 29.77 kg/m  as calculated from the following:    Height as of this encounter: 1.467 m (4' 9.75\").    Weight as of this encounter: 64 kg (141 lb 3.2 oz). .      "

## 2019-11-30 ASSESSMENT — ASTHMA QUESTIONNAIRES: ACT_TOTALSCORE: 21

## 2019-12-02 ENCOUNTER — TELEPHONE (OUTPATIENT)
Dept: UROLOGY | Facility: CLINIC | Age: 74
End: 2019-12-02

## 2019-12-02 DIAGNOSIS — R31.9 HEMATURIA: Primary | ICD-10-CM

## 2019-12-02 NOTE — TELEPHONE ENCOUNTER
Discussed with patient.  WIll keep appt as of now and placed on wait list.  Sadia VELA   [facility]

## 2019-12-02 NOTE — TELEPHONE ENCOUNTER
Per PCP result note- Urine will be cultured.  Left msg stating should await final on Urine test and  if any treatment helps.  Can also be placed on wait list or discuss options for possible appt elsewhere etc.    Needs ext appt because of hematuria and will need cysto. (infection free for procedure)    Left message on answering machine for patient to call back.    Sadia VELA   [facility]

## 2019-12-02 NOTE — TELEPHONE ENCOUNTER
Reason for Call:  Other     Detailed comments: Pt has an appt to be seen on 01/14/2020 for blood in the urine. Has had blood in the urine for the past 6 months. History of kidney stones. Wants to know if she can be seen sooner - Please advise    Phone Number Patient can be reached at: Home number on file [phone] (home)    Best Time: Any    Can we leave a detailed message on this number? YES    Call taken on 12/2/2019 at 11:31 AM by Denise Behrendt

## 2019-12-03 NOTE — TELEPHONE ENCOUNTER
Pt reviewed with Dr. Gallego.  Per Dr. Gallego, pt to have CT Abd/pelvis with and without contrast due to the hematuria and Renal US results from last year that showed probable large stone.  Pt notified and number given for scheduling, no further questions at this time.      Rossi SMALLWOOD, CMA

## 2019-12-06 ENCOUNTER — HOSPITAL ENCOUNTER (OUTPATIENT)
Dept: CT IMAGING | Facility: CLINIC | Age: 74
Discharge: HOME | End: 2019-12-06
Attending: UROLOGY | Admitting: UROLOGY
Payer: COMMERCIAL

## 2019-12-06 DIAGNOSIS — R31.9 HEMATURIA: ICD-10-CM

## 2019-12-06 LAB — RADIOLOGIST FLAGS: ABNORMAL

## 2019-12-06 PROCEDURE — 74178 CT ABD&PLV WO CNTR FLWD CNTR: CPT

## 2019-12-06 PROCEDURE — 25000128 H RX IP 250 OP 636: Performed by: RADIOLOGY

## 2019-12-06 PROCEDURE — 25000125 ZZHC RX 250: Performed by: RADIOLOGY

## 2019-12-06 RX ORDER — IOPAMIDOL 755 MG/ML
100 INJECTION, SOLUTION INTRAVASCULAR ONCE
Status: COMPLETED | OUTPATIENT
Start: 2019-12-06 | End: 2019-12-06

## 2019-12-06 RX ADMIN — IOPAMIDOL 100 ML: 755 INJECTION, SOLUTION INTRAVENOUS at 11:23

## 2019-12-06 RX ADMIN — SODIUM CHLORIDE 80 ML: 9 INJECTION, SOLUTION INTRAVENOUS at 11:23

## 2019-12-09 ENCOUNTER — OFFICE VISIT (OUTPATIENT)
Dept: UROLOGY | Facility: CLINIC | Age: 74
End: 2019-12-09
Payer: COMMERCIAL

## 2019-12-09 VITALS
DIASTOLIC BLOOD PRESSURE: 88 MMHG | RESPIRATION RATE: 16 BRPM | HEART RATE: 90 BPM | TEMPERATURE: 99.6 F | SYSTOLIC BLOOD PRESSURE: 159 MMHG

## 2019-12-09 DIAGNOSIS — R39.15 URGENCY OF URINATION: Primary | ICD-10-CM

## 2019-12-09 DIAGNOSIS — N30.00 ACUTE CYSTITIS WITHOUT HEMATURIA: ICD-10-CM

## 2019-12-09 LAB
ALBUMIN UR-MCNC: ABNORMAL MG/DL
APPEARANCE UR: ABNORMAL
BILIRUB UR QL STRIP: NEGATIVE
COLOR UR AUTO: YELLOW
GLUCOSE UR STRIP-MCNC: NEGATIVE MG/DL
HGB UR QL STRIP: ABNORMAL
KETONES UR STRIP-MCNC: NEGATIVE MG/DL
LEUKOCYTE ESTERASE UR QL STRIP: ABNORMAL
NITRATE UR QL: NEGATIVE
PH UR STRIP: 8 PH (ref 5–7)
RBC #/AREA URNS AUTO: ABNORMAL /HPF
SOURCE: ABNORMAL
SP GR UR STRIP: 1.01 (ref 1–1.03)
UROBILINOGEN UR STRIP-ACNC: 0.2 EU/DL (ref 0.2–1)
WBC #/AREA URNS AUTO: ABNORMAL /HPF

## 2019-12-09 PROCEDURE — 81001 URINALYSIS AUTO W/SCOPE: CPT | Performed by: UROLOGY

## 2019-12-09 PROCEDURE — 87086 URINE CULTURE/COLONY COUNT: CPT | Performed by: UROLOGY

## 2019-12-09 PROCEDURE — 99202 OFFICE O/P NEW SF 15 MIN: CPT | Performed by: UROLOGY

## 2019-12-09 NOTE — PROGRESS NOTES
Appointment source: New Patient  Patient name: Nerissa Campoverde Valentin  Urology Staff: Bonifacio Gallego MD    Subjective: This is a 73 year old year old female with a history of recurrent urinary tract stones.    First stone removed from the right kidney by open lithotomy in late 70s.    Also had what I interpreted to be a holmium laser lithotripsy in [place] several years ago.    Current stone is only mildly uncomfortable and noted on recent CT ordered for both hematuria and flank pain.    Assessment:  Left renal stone.    Plan:  She will be contacted by Dr. Cisneros's office for management of this stone.    Total time 20 minutes, counseling 15 minutes discussing stone management.

## 2019-12-09 NOTE — PATIENT INSTRUCTIONS
Per physician instructions.    If you have questions or concerns on any instructions given to you by your provider today or if you need to schedule an appointment, you can reach us at [phone].    Thank you!

## 2019-12-09 NOTE — NURSING NOTE
"Chief Complaint   Patient presents with     Consult     Kidney Stones       Initial BP (!) 159/88 (BP Location: Left arm, Patient Position: Chair, Cuff Size: Adult Regular)   Pulse 90   Temp 99.6  F (37.6  C) (Tympanic)   Resp 16  Estimated body mass index is 29.77 kg/m  as calculated from the following:    Height as of 11/29/19: 1.467 m (4' 9.75\").    Weight as of 11/29/19: 64 kg (141 lb 3.2 oz).  BP completed using cuff size: regular   Medications and allergies reviewed.      Rossi SMALLWOOD CMA     "

## 2019-12-10 LAB
BACTERIA SPEC CULT: NO GROWTH
Lab: NORMAL
SPECIMEN SOURCE: NORMAL

## 2019-12-17 ENCOUNTER — AMBULATORY - HEALTHEAST (OUTPATIENT)
Dept: UROLOGY | Facility: CLINIC | Age: 74
End: 2019-12-17

## 2019-12-18 ENCOUNTER — OFFICE VISIT - HEALTHEAST (OUTPATIENT)
Dept: UROLOGY | Facility: CLINIC | Age: 74
End: 2019-12-18

## 2019-12-18 DIAGNOSIS — Z87.442 HISTORY OF KIDNEY STONES: ICD-10-CM

## 2019-12-18 DIAGNOSIS — N20.0 CALCULUS OF KIDNEY: ICD-10-CM

## 2019-12-18 LAB
ALBUMIN UR-MCNC: ABNORMAL MG/DL
APPEARANCE UR: ABNORMAL
BILIRUB UR QL STRIP: NEGATIVE
COLOR UR AUTO: YELLOW
GLUCOSE UR STRIP-MCNC: NEGATIVE MG/DL
HGB UR QL STRIP: ABNORMAL
KETONES UR STRIP-MCNC: NEGATIVE MG/DL
LEUKOCYTE ESTERASE UR QL STRIP: ABNORMAL
NITRATE UR QL: NEGATIVE
PH UR STRIP: 6.5 [PH] (ref 5–8)
SP GR UR STRIP: 1.02 (ref 1–1.03)
UROBILINOGEN UR STRIP-ACNC: ABNORMAL

## 2019-12-18 ASSESSMENT — MIFFLIN-ST. JEOR: SCORE: 1029.89

## 2019-12-23 ENCOUNTER — HOSPITAL ENCOUNTER (OUTPATIENT)
Dept: MAMMOGRAPHY | Facility: CLINIC | Age: 74
Discharge: HOME | End: 2019-12-23
Attending: FAMILY MEDICINE | Admitting: FAMILY MEDICINE
Payer: COMMERCIAL

## 2019-12-23 DIAGNOSIS — Z00.00 ENCOUNTER FOR MEDICARE ANNUAL WELLNESS EXAM: ICD-10-CM

## 2019-12-23 DIAGNOSIS — Z12.31 ENCOUNTER FOR SCREENING MAMMOGRAM FOR MALIGNANT NEOPLASM OF BREAST: ICD-10-CM

## 2019-12-23 PROCEDURE — 77067 SCR MAMMO BI INCL CAD: CPT

## 2019-12-26 ENCOUNTER — OFFICE VISIT (OUTPATIENT)
Dept: FAMILY MEDICINE | Facility: CLINIC | Age: 74
End: 2019-12-26
Payer: COMMERCIAL

## 2019-12-26 VITALS
TEMPERATURE: 97.5 F | HEART RATE: 78 BPM | SYSTOLIC BLOOD PRESSURE: 158 MMHG | WEIGHT: 141.8 LBS | BODY MASS INDEX: 29.89 KG/M2 | DIASTOLIC BLOOD PRESSURE: 80 MMHG | OXYGEN SATURATION: 99 %

## 2019-12-26 DIAGNOSIS — R31.9 HEMATURIA, UNSPECIFIED TYPE: ICD-10-CM

## 2019-12-26 DIAGNOSIS — Z01.818 PREOP GENERAL PHYSICAL EXAM: Primary | ICD-10-CM

## 2019-12-26 DIAGNOSIS — N20.0 CALCULUS OF KIDNEY: ICD-10-CM

## 2019-12-26 PROCEDURE — 93000 ELECTROCARDIOGRAM COMPLETE: CPT | Performed by: FAMILY MEDICINE

## 2019-12-26 PROCEDURE — 99214 OFFICE O/P EST MOD 30 MIN: CPT | Performed by: FAMILY MEDICINE

## 2019-12-26 RX ORDER — TAMSULOSIN HYDROCHLORIDE 0.4 MG/1
0.4 CAPSULE ORAL
COMMUNITY
Start: 2019-12-18 | End: 2020-12-10

## 2019-12-26 NOTE — PROGRESS NOTES
[facility]  [address] [address]  [phone]  Dept: [phone]    PRE-OP EVALUATION:  Today's date: 2019    Nerissa Valentin (: 1945) presents for pre-operative evaluation assessment as requested by Dr. Cisneros.  She requires evaluation and anesthesia risk assessment prior to undergoing surgery/procedure for treatment of kidney stones .    Fax number for surgical facility: [phone]  Primary Physician: Gwendolyn Sadler  Type of Anesthesia Anticipated:     Patient has a Health Care Directive or Living Will:  NO    Preop Questions 2019   Who is doing your surgery? Dr Israel Cisneros   What are you having done? Ureterscopy for large stone with placement of stent   Date of Surgery/Procedure: 1/3/2020   Facility or Hospital where procedure/surgery will be performed: [place], [place]   1.  Do you have a history of Heart attack, stroke, stent, coronary bypass surgery, or other heart surgery? No   2.  Do you ever have any pain or discomfort in your chest? No   3.  Do you have a history of  Heart Failure? No   4.   Are you troubled by shortness of breath when:  walking on a level surface, or up a slight hill, or at night? No   5.  Do you currently have a cold, bronchitis or other respiratory infection? No   6.  Do you have a cough, shortness of breath, or wheezing? YES - some sinus/cough issues currently, mostly in the morning   7.  Do you sometimes get pains in the calves of your legs when you walk? No   8. Do you or anyone in your family have previous history of blood clots? No   9.  Do you or does anyone in your family have a serious bleeding problem such as prolonged bleeding following surgeries or cuts? No   10. Have you ever had problems with anemia or been told to take iron pills? No   11. Have you had any abnormal blood loss such as black, tarry or bloody stools, or abnormal vaginal bleeding? No   12. Have you ever had a  blood transfusion? No   13. Have you or any of your relatives ever had problems with anesthesia? No   14. Do you have sleep apnea, excessive snoring or daytime drowsiness? No   15. Do you have any prosthetic heart valves? No   16. Do you have prosthetic joints? No   17. Is there any chance that you may be pregnant? No         HPI:     HPI related to upcoming procedure: Nerissa Valentin is 73 year old white female with asthma, hypertension and kidney stones who is here to get clearance to have general anesthesia.        See problem list for active medical problems.  Problems all longstanding and stable, except as noted/documented.  See ROS for pertinent symptoms related to these conditions.      MEDICAL HISTORY:     Patient Active Problem List    Diagnosis Date Noted     Calculus of kidney 12/19/2019     Priority: Medium     Added automatically from request for surgery [id number]       Fall due to ice or snow 05/16/2013     Priority: Medium     Obesity 04/01/2013     Priority: Medium     ideal weight 100, goal weight to lose 10% or 15 lbs in 15 weeks, 139 by July, 2013       BMI 31.0-31.9,adult 04/01/2013     Priority: Medium     Hypokalemia 03/15/2013     Priority: Medium     Family history of diabetes mellitus 02/11/2013     Priority: Medium     Health Care Home 12/18/2012     Priority: Medium     Uzma Madison RN-PHN  A / [facility]   [phone]    DX V65.8 REPLACED WITH 46152 HEALTH CARE HOME (04/08/2013)       Advanced directives, counseling/discussion 02/06/2012     Priority: Medium     Asthma, mild persistent      Priority: Medium     Dysthymic disorder      Priority: Medium     Osteoarthritis      Priority: Medium     Vitamin B12 deficiency anemia      Priority: Medium     Postmenopausal      Priority: Medium     HRT 10 years       Leucocytosis 01/29/2010     Priority: Medium     white count runs from 13-15       HTN (hypertension) 01/29/2010     Priority: Medium     Hyperlipidemia  with target LDL less than 130 01/29/2010     Priority: Medium     Diagnosis updated by automated process. Provider to review and confirm.        Past Medical History:   Diagnosis Date     Asthma, mild persistent      Depressive disorder 1992    only child killed by drunk      Dysthymic disorder      Hyperlipidemia LDL goal < 130      Leucocytosis      Leucocytosis      Leukocytosis, unspecified     white count runs from 13-15     Osteoarthritis      Postmenopausal     HRT 10 years     Unspecified essential hypertension      Past Surgical History:   Procedure Laterality Date     ABDOMEN SURGERY  1969, 1970, 1971    ovary removed x2 and abd. hyst.     APPENDECTOMY  1971     BREAST BIOPSY, RT/LT  1999    Breat Biopsy RT     C REMOVAL OF KIDNEY STONE  1971     FLEXIBLE SIGMOIDOSCOPY  2000 ?    Dr. Cline at [facility]     HYSTERECTOMY, PAP NO LONGER INDICATED       HYSTERECTOMY, VAGINAL  1971     LITHOTRIPSY  2005     ROTATOR CUFF REPAIR RT/LT  2007    left     ROTATOR CUFF REPAIR RT/LT  2009    right twice?     SALPINGO OOPHORECTOMY,R/L/HARLEY  1969 and 1970    Salpingo Oophorectomy, RT/LT/HARLEY     SURGICAL HISTORY OF -   1969, 1970    ovary cystectomy     Current Outpatient Medications   Medication Sig Dispense Refill     albuterol (PROAIR HFA/PROVENTIL HFA/VENTOLIN HFA) 108 (90 Base) MCG/ACT inhaler Inhale 2 puffs into the lungs every 6 hours as needed for shortness of breath / dyspnea or wheezing 1 Inhaler 11     atorvastatin (LIPITOR) 40 MG tablet Take 1 tablet (40 mg) by mouth daily 90 tablet 3     lisinopril (PRINIVIL/ZESTRIL) 40 MG tablet Take 1 tablet (40 mg) by mouth daily 90 tablet 3     sertraline (ZOLOFT) 50 MG tablet Take 1 tablet (50 mg) by mouth daily 90 tablet 3     tamsulosin (FLOMAX) 0.4 MG capsule Take 0.4 mg by mouth       ADVIL OR None Entered       OTC products: None, except as noted above    Allergies   Allergen Reactions     Flu Virus Vaccine Dermatitis and Other (See Comments)     2 years in a  row after the flu shot did get  High fever,  Localized reaction .      No Clinical Screening - See Comments      CHROMIC GUT SUTURES CAUSE ABCESS     Penicillins Hives      Latex Allergy: NO    Social History     Tobacco Use     Smoking status: Never Smoker     Smokeless tobacco: Never Used   Substance Use Topics     Alcohol use: Yes     Comment: rare     History   Drug Use No       REVIEW OF SYSTEMS:   Constitutional, HEENT, cardiovascular, pulmonary, gi and gu systems are negative, except as otherwise noted.    EXAM:   BP (!) 158/80 (BP Location: Left arm, Patient Position: Sitting, Cuff Size: Adult Regular)   Pulse 78   Temp 97.5  F (36.4  C) (Tympanic)   Wt 64.3 kg (141 lb 12.8 oz)   SpO2 99%   BMI 29.89 kg/m      GENERAL APPEARANCE: healthy, alert and no distress     EYES: EOMI, PERRL     HENT: ear canals and TM's normal and nose and mouth without ulcers or lesions     NECK: no adenopathy, no asymmetry, masses, or scars and thyroid normal to palpation     RESP: lungs clear to auscultation - no rales, rhonchi or wheezes     CV: regular rates and rhythm, normal S1 S2, no S3 or S4 and no murmur, click or rub     ABDOMEN:  soft, nontender, no HSM or masses and bowel sounds normal     MS: extremities normal- no gross deformities noted, no evidence of inflammation in joints, FROM in all extremities.     SKIN: no suspicious lesions or rashes     NEURO: Normal strength and tone, sensory exam grossly normal, mentation intact and speech normal     PSYCH: mentation appears normal. and affect normal/bright     LYMPHATICS: No cervical adenopathy    DIAGNOSTICS:   EKG:  Sinus  Rhythm  -First degree A-V block  - occasional ectopic ventricular beat   rate 73, there are no prior tracings available  Component      Latest Ref Rng & Units 11/29/2019 12/9/2019   Color Urine       Yellow Yellow   Appearance Urine       Slightly Cloudy Slightly Cloudy   Glucose Urine      NEG:Negative mg/dL Negative Negative   Bilirubin Urine       NEG:Negative Negative Negative   Ketones Urine      NEG:Negative mg/dL Negative Negative   Specific Gravity Urine      1.003 - 1.035 1.020 1.010   pH Urine      5.0 - 7.0 pH 6.5 8.0 (H)   Protein Albumin Urine      NEG:Negative mg/dL 100 (A) Trace (A)   Urobilinogen Urine      0.2 - 1.0 EU/dL 0.2 0.2   Nitrite Urine      NEG:Negative Negative Negative   Blood Urine      NEG:Negative Large (A) Trace (A)   Leukocyte Esterase Urine      NEG:Negative Trace (A) Small (A)   Source       Midstream Urine Midstream Urine   WBC Urine      OTO5:0 - 5 /HPF 5-10 (A) 5-10 (A)   RBC Urine      OTO2:O - 2 /HPF 25-50 (A) 2-5 (A)   Sodium      133 - 144 mmol/L 138    Potassium      3.4 - 5.3 mmol/L 3.5    Chloride      94 - 109 mmol/L 107    Carbon Dioxide      20 - 32 mmol/L 28    Anion Gap      3 - 14 mmol/L 3    Glucose      70 - 99 mg/dL 93    Urea Nitrogen      7 - 30 mg/dL 11    Creatinine      0.52 - 1.04 mg/dL 0.74    GFR Estimate      >60 mL/min/1.73:m2 81    GFR Estimate If Black      >60 mL/min/1.73:m2 >90    Calcium      8.5 - 10.1 mg/dL 8.7    Squamous Epithelial /LPF Urine      FEW:Few /LPF Few    Bacteria Urine      NEG:Negative /HPF Few (A)    Specimen Description        Midstream Urine   Special Requests        Specimen received in preservative   Culture Micro        No growth     Recent Labs   Lab Test 11/29/19  1055 11/15/18  0928  02/06/12  1137   HGB  --   --   --  13.3   PLT  --   --   --  257    141   < > 140   POTASSIUM 3.5 3.6   < > 3.2*   CR 0.74 0.74   < > 0.93    < > = values in this interval not displayed.        IMPRESSION:   Reason for surgery/procedure:   1. Preop general physical exam  - EKG 12-lead complete w/read - Clinics    2. Hematuria, unspecified type  3. Calculus of kidney   cleared for general anesthesia      The proposed surgical procedure is considered LOW risk.    REVISED CARDIAC RISK INDEX  The patient has the following serious cardiovascular risks for perioperative complications  such as (MI, PE, VFib and 3  AV Block):  No serious cardiac risks  INTERPRETATION: The ASCVD Risk score (Leitchfieldbhavani GONG Jr., et al., 2013) failed to calculate for the following reasons:    The valid total cholesterol range is 130 to 320 mg/dL      The patient has the following additional risks for perioperative complications:  No identified additional risks      ICD-10-CM    1. Preop general physical exam Z01.818 EKG 12-lead complete w/read - Clinics   2. Hematuria, unspecified type R31.9    3. Calculus of kidney N20.0        RECOMMENDATIONS:     --Consult hospital rounder / IM to assist post-op medical management    --Patient is to take all scheduled medications on the day of surgery EXCEPT for modifications listed below.    APPROVAL GIVEN to proceed with proposed procedure, without further diagnostic evaluation       Signed Electronically by: Gwendolyn Sadler MD    Copy of this evaluation report is provided to requesting physician.    [facility] Preop Guidelines    Revised Cardiac Risk Index

## 2020-01-02 ENCOUNTER — ANESTHESIA - HEALTHEAST (OUTPATIENT)
Dept: SURGERY | Facility: CLINIC | Age: 75
End: 2020-01-02

## 2020-01-03 ENCOUNTER — TRANSFERRED RECORDS (OUTPATIENT)
Dept: HEALTH INFORMATION MANAGEMENT | Facility: CLINIC | Age: 75
End: 2020-01-03

## 2020-01-03 ENCOUNTER — SURGERY - HEALTHEAST (OUTPATIENT)
Dept: SURGERY | Facility: CLINIC | Age: 75
End: 2020-01-03

## 2020-01-03 ASSESSMENT — MIFFLIN-ST. JEOR: SCORE: 1014.29

## 2020-01-10 ENCOUNTER — AMBULATORY - HEALTHEAST (OUTPATIENT)
Dept: UROLOGY | Facility: CLINIC | Age: 75
End: 2020-01-10

## 2020-01-10 DIAGNOSIS — N20.0 CALCULUS OF KIDNEY: ICD-10-CM

## 2020-01-10 DIAGNOSIS — N20.1 CALCULUS OF URETER: ICD-10-CM

## 2020-01-10 LAB
ALBUMIN UR-MCNC: ABNORMAL MG/DL
APPEARANCE UR: ABNORMAL
BILIRUB UR QL STRIP: NEGATIVE
COLOR UR AUTO: YELLOW
GLUCOSE UR STRIP-MCNC: NEGATIVE MG/DL
HGB UR QL STRIP: ABNORMAL
KETONES UR STRIP-MCNC: NEGATIVE MG/DL
LEUKOCYTE ESTERASE UR QL STRIP: ABNORMAL
NITRATE UR QL: NEGATIVE
PH UR STRIP: 7 [PH] (ref 5–8)
SP GR UR STRIP: 1.02 (ref 1–1.03)
UROBILINOGEN UR STRIP-ACNC: ABNORMAL

## 2020-01-11 LAB — BACTERIA SPEC CULT: NO GROWTH

## 2020-02-04 ENCOUNTER — OFFICE VISIT (OUTPATIENT)
Dept: PODIATRY | Facility: CLINIC | Age: 75
End: 2020-02-04
Payer: COMMERCIAL

## 2020-02-04 VITALS
HEART RATE: 82 BPM | BODY MASS INDEX: 29.64 KG/M2 | DIASTOLIC BLOOD PRESSURE: 72 MMHG | HEIGHT: 58 IN | SYSTOLIC BLOOD PRESSURE: 158 MMHG

## 2020-02-04 DIAGNOSIS — M77.8 CAPSULITIS OF FOOT, LEFT: Primary | ICD-10-CM

## 2020-02-04 PROCEDURE — 99203 OFFICE O/P NEW LOW 30 MIN: CPT | Performed by: PODIATRIST

## 2020-02-04 NOTE — PATIENT INSTRUCTIONS
Weight management plan: Patient was referred to their PCP to discuss a diet and exercise plan.  We wish you continued good healing. If you have any questions or concerns, please do not hesitate to contact us at [phone]    Please remember to call and schedule a follow up appointment if one was recommended at your earliest convenience.   PODIATRY CLINIC HOURS  TELEPHONE NUMBER    Dr. Dante Alberto D.P.M [facility]    Clinics:  [facility]    [facility]as[facility] Morrissey [facility]   Tuesday 1PM-6PM  [place]  Wednesday 7AM-2PM  [facility]  Thursday 10AM-6PM  [place]  Friday 7AM-3PM  [place]  Specialty schedulers:   [phone] to make an appointment with any Specialty Provider.        Urgent Care locations:    [facility] Monday-Friday 5 pm - 9 pm. Saturday-Sunday 9 am -5pm    Monday-Friday 11 am - 9 pm Saturday 9 am - 5 pm     Monday-Sunday 12 noon-8PM [phone]     If you need a medication refill, please contact us you may need lab work and/or a follow up visit prior to your refill (i.e. Antifungal medications).    [facility] (secure e-mail communication and access to your chart) to send a message or to make an appointment.    If MRI needed please call [facility] at [phone]        Nerissa to follow up with Primary Care provider regarding elevated blood pressure.

## 2020-02-04 NOTE — PROGRESS NOTES
S:  Patient seen today in consult from Dr. Sadler and complains of left foot pain.  Points to plantar 1/2/3 metatarsal heads.  Describes as a burning pain.  aggravated by activity and relieved by rest.  Has had this for 6 months.  No pain anywhere else on feet.   Denies erythema, edema, weakness, numbness.  Denies arthralgias anywhere else.  Retired.  Slippers around house.   Volunteers and on her feet at times.  Wears orthotics for flat feet.         ROS:  A 10-point review of systems was performed and is positive for that noted in the HPI and as seen above.  All other areas are negative.        Allergies   Allergen Reactions     Flu Virus Vaccine Dermatitis and Other (See Comments)     2 years in a row after the flu shot did get  High fever,  Localized reaction .      No Clinical Screening - See Comments      CHROMIC GUT SUTURES CAUSE ABCESS     Penicillins Hives       Current Outpatient Medications   Medication Sig Dispense Refill     ADVIL OR None Entered       albuterol (PROAIR HFA/PROVENTIL HFA/VENTOLIN HFA) 108 (90 Base) MCG/ACT inhaler Inhale 2 puffs into the lungs every 6 hours as needed for shortness of breath / dyspnea or wheezing 1 Inhaler 11     atorvastatin (LIPITOR) 40 MG tablet Take 1 tablet (40 mg) by mouth daily 90 tablet 3     lisinopril (PRINIVIL/ZESTRIL) 40 MG tablet Take 1 tablet (40 mg) by mouth daily 90 tablet 3     sertraline (ZOLOFT) 50 MG tablet Take 1 tablet (50 mg) by mouth daily 90 tablet 3     tamsulosin (FLOMAX) 0.4 MG capsule Take 0.4 mg by mouth         Patient Active Problem List   Diagnosis     Leucocytosis     HTN (hypertension)     Hyperlipidemia with target LDL less than 130     Asthma, mild persistent     Dysthymic disorder     Osteoarthritis     Vitamin B12 deficiency anemia     Postmenopausal     Advanced directives, counseling/discussion     Health Care Home     Family history of diabetes mellitus     Hypokalemia     Obesity     BMI 31.0-31.9,adult     Fall due to ice or  "snow     Calculus of kidney       Past Medical History:   Diagnosis Date     Asthma, mild persistent      Depressive disorder 1992    only child killed by drunk      Dysthymic disorder      Hyperlipidemia LDL goal < 130      Leucocytosis      Leucocytosis      Leukocytosis, unspecified     white count runs from 13-15     Osteoarthritis      Postmenopausal     HRT 10 years     Unspecified essential hypertension        Past Surgical History:   Procedure Laterality Date     ABDOMEN SURGERY  1969, 1970, 1971    ovary removed x2 and abd. hyst.     APPENDECTOMY  1971     BREAST BIOPSY, RT/LT  1999    Breat Biopsy RT     C REMOVAL OF KIDNEY STONE  1971     FLEXIBLE SIGMOIDOSCOPY  2000 ?    Dr. Cline at [facility]     HYSTERECTOMY, PAP NO LONGER INDICATED       HYSTERECTOMY, VAGINAL  1971     LITHOTRIPSY  2005     ROTATOR CUFF REPAIR RT/LT  2007    left     ROTATOR CUFF REPAIR RT/LT  2009    right twice?     SALPINGO OOPHORECTOMY,R/L/HARLEY  1969 and 1970    Salpingo Oophorectomy, RT/LT/HARLEY     SURGICAL HISTORY OF -   1969, 1970    ovary cystectomy       Family History   Problem Relation Age of Onset     Arthritis Mother      C.A.D. Mother      Hypertension Father      Diabetes Father      C.A.D. Father      Cerebrovascular Disease Maternal Grandfather      Cancer - colorectal Paternal Grandfather      Breast Cancer Sister      Breast Cancer Sister      Breast Cancer Sister      Breast Cancer Sister        Social History     Tobacco Use     Smoking status: Never Smoker     Smokeless tobacco: Never Used   Substance Use Topics     Alcohol use: Yes     Comment: rare         O:   BP (!) 158/72 (BP Location: Right arm)   Pulse 82   Ht 1.473 m (4' 10\")   BMI 29.64 kg/m  .      Constitutional/ general:  Pt is in no apparent distress, appears well-nourished.  Cooperative with history and physical exam.     Psych:  The patient answered questions appropriately.  Normal affect.  Seems to have reasonable expectations, in terms of " treatment.     Eyes:  Visual scanning/ tracking without deficit.     Ears:  Response to auditory stimuli is normal.  No hearing aid devices.  Auricles in proper alignment.     Lymphatic:  Popliteal lymph nodes not enlarged.     Lungs:  Non labored breathing, non labored speech. No cough.  No audible wheezing. Even, quiet breathing.       Vascular:  Pedal pulses are palpable bilaterally for both the DP and PT arteries.  CFT < 3 sec.  Bilateral ankle edema.  Pedal hair growth noted.     Neuro:  Alert and oriented x 3. Coordinated gait.  Light touch sensation is intact to the L4, L5, S1 distributions. No obvious deficits.  No evidence of neurological-based weakness, spasticity, or contracture in the lower extremities.     Derm: Normal texture and turgor.  No erythema, ecchymosis, or cyanosis.  No open lesions.     Musculoskeletal:    Lower extremity muscle strength is normal.  Patient is ambulatory without an assistive device or brace.  Pronated arch with weightbearing.  No forefoot or rear foot deformities noted.  MS 5/5 all compartments.  Normal ROM all fore foot and rearfoot joints.  No equinus.  Pain under left 2/3/4 metatarsal heads plantar with fourth being most painful.  Atrophic fat pad.  Some pain in third interspace.  Negative Lachmans test.  Negative Mulders click.  No pain anywhere else.  No erythema, edema, ecchymosis, or subcutaneous masses noted.      Radiographic Exam:   Long 2/3/4 metatarsals, diffuse osteopenia    Wearing shoes with heel and very malleable    A:  Sub 2/3/4 capsulitis left foot     P:  X rays personally reviewed.  Encouraged patient to see primary care regarding osteopenia on x-ray to be tested for osteoporosis.  Discussed cause of capsulitis with patient.  Ice bid.  Instructed to wear stiff soles shoes at all times and I made suggestions for both inside and outside.  Continue orthotics.  Avoid activities that bother this and discussed which activities will aggravate.  RETURN TO CLINIC  SENA[facility].  Thank you for allowing me participate in the care of this patient.        Dante Alberto, KVNG CALDERON, FACFAS

## 2020-02-04 NOTE — LETTER
2/4/2020         RE: Nerissa Valentin  [address] [place] [id number]        Dear Colleague,    Thank you for referring your patient, Nerissa Valentin, to the [facility]. Please see a copy of my visit note below.    S:  Patient seen today in consult from Dr. Sadler and complains of left foot pain.  Points to plantar 1/2/3 metatarsal heads.  Describes as a burning pain.  aggravated by activity and relieved by rest.  Has had this for 6 months.  No pain anywhere else on feet.   Denies erythema, edema, weakness, numbness.  Denies arthralgias anywhere else.  Retired.  Slippers around house.   Volunteers and on her feet at times.  Wears orthotics for flat feet.         ROS:  A 10-point review of systems was performed and is positive for that noted in the HPI and as seen above.  All other areas are negative.        Allergies   Allergen Reactions     Flu Virus Vaccine Dermatitis and Other (See Comments)     2 years in a row after the flu shot did get  High fever,  Localized reaction .      No Clinical Screening - See Comments      CHROMIC GUT SUTURES CAUSE ABCESS     Penicillins Hives       Current Outpatient Medications   Medication Sig Dispense Refill     ADVIL OR None Entered       albuterol (PROAIR HFA/PROVENTIL HFA/VENTOLIN HFA) 108 (90 Base) MCG/ACT inhaler Inhale 2 puffs into the lungs every 6 hours as needed for shortness of breath / dyspnea or wheezing 1 Inhaler 11     atorvastatin (LIPITOR) 40 MG tablet Take 1 tablet (40 mg) by mouth daily 90 tablet 3     lisinopril (PRINIVIL/ZESTRIL) 40 MG tablet Take 1 tablet (40 mg) by mouth daily 90 tablet 3     sertraline (ZOLOFT) 50 MG tablet Take 1 tablet (50 mg) by mouth daily 90 tablet 3     tamsulosin (FLOMAX) 0.4 MG capsule Take 0.4 mg by mouth         Patient Active Problem List   Diagnosis     Leucocytosis     HTN (hypertension)     Hyperlipidemia with target LDL less than 130     Asthma, mild persistent      "Dysthymic disorder     Osteoarthritis     Vitamin B12 deficiency anemia     Postmenopausal     Advanced directives, counseling/discussion     Health Care Home     Family history of diabetes mellitus     Hypokalemia     Obesity     BMI 31.0-31.9,adult     Fall due to ice or snow     Calculus of kidney       Past Medical History:   Diagnosis Date     Asthma, mild persistent      Depressive disorder 1992    only child killed by drunk      Dysthymic disorder      Hyperlipidemia LDL goal < 130      Leucocytosis      Leucocytosis      Leukocytosis, unspecified     white count runs from 13-15     Osteoarthritis      Postmenopausal     HRT 10 years     Unspecified essential hypertension        Past Surgical History:   Procedure Laterality Date     ABDOMEN SURGERY  1969, 1970, 1971    ovary removed x2 and abd. hyst.     APPENDECTOMY  1971     BREAST BIOPSY, RT/LT  1999    Breat Biopsy RT     C REMOVAL OF KIDNEY STONE  1971     FLEXIBLE SIGMOIDOSCOPY  2000 ?    Dr. Cline at [facility]     HYSTERECTOMY, PAP NO LONGER INDICATED       HYSTERECTOMY, VAGINAL  1971     LITHOTRIPSY  2005     ROTATOR CUFF REPAIR RT/LT  2007    left     ROTATOR CUFF REPAIR RT/LT  2009    right twice?     SALPINGO OOPHORECTOMY,R/L/HARLEY  1969 and 1970    Salpingo Oophorectomy, RT/LT/HARLEY     SURGICAL HISTORY OF -   1969, 1970    ovary cystectomy       Family History   Problem Relation Age of Onset     Arthritis Mother      C.A.D. Mother      Hypertension Father      Diabetes Father      C.A.D. Father      Cerebrovascular Disease Maternal Grandfather      Cancer - colorectal Paternal Grandfather      Breast Cancer Sister      Breast Cancer Sister      Breast Cancer Sister      Breast Cancer Sister        Social History     Tobacco Use     Smoking status: Never Smoker     Smokeless tobacco: Never Used   Substance Use Topics     Alcohol use: Yes     Comment: rare         O:   BP (!) 158/72 (BP Location: Right arm)   Pulse 82   Ht 1.473 m (4' 10\")   " BMI 29.64 kg/m   .      Constitutional/ general:  Pt is in no apparent distress, appears well-nourished.  Cooperative with history and physical exam.     Psych:  The patient answered questions appropriately.  Normal affect.  Seems to have reasonable expectations, in terms of treatment.     Eyes:  Visual scanning/ tracking without deficit.     Ears:  Response to auditory stimuli is normal.  No hearing aid devices.  Auricles in proper alignment.     Lymphatic:  Popliteal lymph nodes not enlarged.     Lungs:  Non labored breathing, non labored speech. No cough.  No audible wheezing. Even, quiet breathing.       Vascular:  Pedal pulses are palpable bilaterally for both the DP and PT arteries.  CFT < 3 sec.  Bilateral ankle edema.  Pedal hair growth noted.     Neuro:  Alert and oriented x 3. Coordinated gait.  Light touch sensation is intact to the L4, L5, S1 distributions. No obvious deficits.  No evidence of neurological-based weakness, spasticity, or contracture in the lower extremities.     Derm: Normal texture and turgor.  No erythema, ecchymosis, or cyanosis.  No open lesions.     Musculoskeletal:    Lower extremity muscle strength is normal.  Patient is ambulatory without an assistive device or brace.  Pronated arch with weightbearing.  No forefoot or rear foot deformities noted.  MS 5/5 all compartments.  Normal ROM all fore foot and rearfoot joints.  No equinus.  Pain under left 2/3/4 metatarsal heads plantar with fourth being most painful.  Atrophic fat pad.  Some pain in third interspace.  Negative Lachmans test.  Negative Mulders click.  No pain anywhere else.  No erythema, edema, ecchymosis, or subcutaneous masses noted.      Radiographic Exam:   Long 2/3/4 metatarsals, diffuse osteopenia    Wearing shoes with heel and very malleable    A:  Sub 2/3/4 capsulitis left foot     P:  X rays personally reviewed.  Encouraged patient to see primary care regarding osteopenia on x-ray to be tested for osteoporosis.   Discussed cause of capsulitis with patient.  Ice bid.  Instructed to wear stiff soles shoes at all times and I made suggestions for both inside and outside.  Continue orthotics.  Avoid activities that bother this and discussed which activities will aggravate.  RETURN TO CLINIC PRN.  Thank you for allowing me participate in the care of this patient.        Dante Alberto DPM DPM, FACFAS      Again, thank you for allowing me to participate in the care of your patient.        Sincerely,        Dante Alberto DPM

## 2020-02-05 ENCOUNTER — OFFICE VISIT - HEALTHEAST (OUTPATIENT)
Dept: UROLOGY | Facility: CLINIC | Age: 75
End: 2020-02-05

## 2020-02-05 ENCOUNTER — AMBULATORY - HEALTHEAST (OUTPATIENT)
Dept: LAB | Facility: CLINIC | Age: 75
End: 2020-02-05

## 2020-02-05 ENCOUNTER — HOSPITAL ENCOUNTER (OUTPATIENT)
Dept: CT IMAGING | Facility: CLINIC | Age: 75
Discharge: HOME | End: 2020-02-05
Attending: SPECIALIST

## 2020-02-05 DIAGNOSIS — N20.1 CALCULUS OF URETER: ICD-10-CM

## 2020-02-05 DIAGNOSIS — N20.0 CALCULUS OF KIDNEY: ICD-10-CM

## 2020-02-05 LAB
CALCIUM SERPL-MCNC: 8.9 MG/DL (ref 8.5–10.5)
CALCIUM, IONIZED MEASURED: 1.16 MMOL/L (ref 1.11–1.3)
CREAT SERPL-MCNC: 0.71 MG/DL (ref 0.6–1.1)
GFR SERPL CREATININE-BSD FRML MDRD: >60 ML/MIN/1.73M2
ION CA PH 7.4: 1.14 MMOL/L (ref 1.11–1.3)
PH: 7.36 (ref 7.35–7.45)
PHOSPHATE SERPL-MCNC: 3 MG/DL (ref 2.5–4.5)
PTH-INTACT SERPL-MCNC: 96 PG/ML (ref 10–86)

## 2020-11-16 ENCOUNTER — HEALTH MAINTENANCE LETTER (OUTPATIENT)
Age: 75
End: 2020-11-16

## 2020-12-10 ENCOUNTER — OFFICE VISIT (OUTPATIENT)
Dept: FAMILY MEDICINE | Facility: CLINIC | Age: 75
End: 2020-12-10
Payer: COMMERCIAL

## 2020-12-10 VITALS
OXYGEN SATURATION: 98 % | RESPIRATION RATE: 14 BRPM | WEIGHT: 158.4 LBS | SYSTOLIC BLOOD PRESSURE: 136 MMHG | HEIGHT: 58 IN | HEART RATE: 83 BPM | DIASTOLIC BLOOD PRESSURE: 66 MMHG | BODY MASS INDEX: 33.25 KG/M2 | TEMPERATURE: 99.1 F

## 2020-12-10 DIAGNOSIS — E78.5 DYSLIPIDEMIA: ICD-10-CM

## 2020-12-10 DIAGNOSIS — J45.30 MILD PERSISTENT ASTHMA WITHOUT COMPLICATION: ICD-10-CM

## 2020-12-10 DIAGNOSIS — D72.829 LEUKOCYTOSIS, UNSPECIFIED TYPE: ICD-10-CM

## 2020-12-10 DIAGNOSIS — I10 ESSENTIAL HYPERTENSION: ICD-10-CM

## 2020-12-10 DIAGNOSIS — Z00.00 MEDICARE ANNUAL WELLNESS VISIT, SUBSEQUENT: Primary | ICD-10-CM

## 2020-12-10 DIAGNOSIS — D64.9 ANEMIA, UNSPECIFIED TYPE: ICD-10-CM

## 2020-12-10 DIAGNOSIS — F34.1 DYSTHYMIC DISORDER: ICD-10-CM

## 2020-12-10 DIAGNOSIS — E53.8 VITAMIN B12 DEFICIENCY (NON ANEMIC): ICD-10-CM

## 2020-12-10 LAB
ANION GAP SERPL CALCULATED.3IONS-SCNC: 3 MMOL/L (ref 3–14)
BUN SERPL-MCNC: 11 MG/DL (ref 7–30)
CALCIUM SERPL-MCNC: 9.1 MG/DL (ref 8.5–10.1)
CHLORIDE SERPL-SCNC: 107 MMOL/L (ref 94–109)
CHOLEST SERPL-MCNC: 102 MG/DL
CO2 SERPL-SCNC: 29 MMOL/L (ref 20–32)
CREAT SERPL-MCNC: 0.73 MG/DL (ref 0.52–1.04)
ERYTHROCYTE [DISTWIDTH] IN BLOOD BY AUTOMATED COUNT: 16.3 % (ref 10–15)
GFR SERPL CREATININE-BSD FRML MDRD: 82 ML/MIN/{1.73_M2}
GLUCOSE SERPL-MCNC: 101 MG/DL (ref 70–99)
HCT VFR BLD AUTO: 33.4 % (ref 35–47)
HDLC SERPL-MCNC: 35 MG/DL
HGB BLD-MCNC: 9.4 G/DL (ref 11.7–15.7)
LDLC SERPL CALC-MCNC: 43 MG/DL
MCH RBC QN AUTO: 25.4 PG (ref 26.5–33)
MCHC RBC AUTO-ENTMCNC: 28.1 G/DL (ref 31.5–36.5)
MCV RBC AUTO: 90 FL (ref 78–100)
NONHDLC SERPL-MCNC: 67 MG/DL
PLATELET # BLD AUTO: 229 10E9/L (ref 150–450)
POTASSIUM SERPL-SCNC: 3.5 MMOL/L (ref 3.4–5.3)
RBC # BLD AUTO: 3.7 10E12/L (ref 3.8–5.2)
SODIUM SERPL-SCNC: 139 MMOL/L (ref 133–144)
TRIGL SERPL-MCNC: 119 MG/DL
VIT B12 SERPL-MCNC: 94 PG/ML (ref 193–986)
WBC # BLD AUTO: 8.7 10E9/L (ref 4–11)

## 2020-12-10 PROCEDURE — 85027 COMPLETE CBC AUTOMATED: CPT | Performed by: PHYSICIAN ASSISTANT

## 2020-12-10 PROCEDURE — 80048 BASIC METABOLIC PNL TOTAL CA: CPT | Performed by: PHYSICIAN ASSISTANT

## 2020-12-10 PROCEDURE — 99214 OFFICE O/P EST MOD 30 MIN: CPT | Mod: 25 | Performed by: PHYSICIAN ASSISTANT

## 2020-12-10 PROCEDURE — 82607 VITAMIN B-12: CPT | Performed by: PHYSICIAN ASSISTANT

## 2020-12-10 PROCEDURE — 99397 PER PM REEVAL EST PAT 65+ YR: CPT | Performed by: PHYSICIAN ASSISTANT

## 2020-12-10 PROCEDURE — 36415 COLL VENOUS BLD VENIPUNCTURE: CPT | Performed by: PHYSICIAN ASSISTANT

## 2020-12-10 PROCEDURE — 80061 LIPID PANEL: CPT | Performed by: PHYSICIAN ASSISTANT

## 2020-12-10 RX ORDER — ATORVASTATIN CALCIUM 40 MG/1
40 TABLET, FILM COATED ORAL DAILY
Qty: 90 TABLET | Refills: 3 | Status: SHIPPED | OUTPATIENT
Start: 2020-12-10 | End: 2021-12-21

## 2020-12-10 RX ORDER — LISINOPRIL 40 MG/1
40 TABLET ORAL DAILY
Qty: 90 TABLET | Refills: 3 | Status: SHIPPED | OUTPATIENT
Start: 2020-12-10 | End: 2021-12-13

## 2020-12-10 RX ORDER — AMLODIPINE BESYLATE 5 MG/1
5 TABLET ORAL DAILY
Qty: 90 TABLET | Refills: 3 | Status: SHIPPED | OUTPATIENT
Start: 2020-12-10 | End: 2021-12-13

## 2020-12-10 RX ORDER — ALBUTEROL SULFATE 90 UG/1
2 AEROSOL, METERED RESPIRATORY (INHALATION) EVERY 6 HOURS PRN
Qty: 1 INHALER | Refills: 11 | Status: SHIPPED | OUTPATIENT
Start: 2020-12-10 | End: 2023-02-02

## 2020-12-10 ASSESSMENT — ENCOUNTER SYMPTOMS
CHILLS: 0
FEVER: 0
EYE PAIN: 0
COUGH: 0
DIARRHEA: 0
HEMATURIA: 0
HEMATOCHEZIA: 0
HEADACHES: 0
BREAST MASS: 0
ABDOMINAL PAIN: 0
NERVOUS/ANXIOUS: 0
DIZZINESS: 0
FREQUENCY: 0
CONSTIPATION: 0

## 2020-12-10 ASSESSMENT — PATIENT HEALTH QUESTIONNAIRE - PHQ9
SUM OF ALL RESPONSES TO PHQ QUESTIONS 1-9: 2
SUM OF ALL RESPONSES TO PHQ QUESTIONS 1-9: 2

## 2020-12-10 ASSESSMENT — ANXIETY QUESTIONNAIRES
4. TROUBLE RELAXING: NOT AT ALL
3. WORRYING TOO MUCH ABOUT DIFFERENT THINGS: NOT AT ALL
6. BECOMING EASILY ANNOYED OR IRRITABLE: NOT AT ALL
GAD7 TOTAL SCORE: 0
2. NOT BEING ABLE TO STOP OR CONTROL WORRYING: NOT AT ALL
5. BEING SO RESTLESS THAT IT IS HARD TO SIT STILL: NOT AT ALL
GAD7 TOTAL SCORE: 0
7. FEELING AFRAID AS IF SOMETHING AWFUL MIGHT HAPPEN: NOT AT ALL
1. FEELING NERVOUS, ANXIOUS, OR ON EDGE: NOT AT ALL
GAD7 TOTAL SCORE: 0
7. FEELING AFRAID AS IF SOMETHING AWFUL MIGHT HAPPEN: NOT AT ALL

## 2020-12-10 ASSESSMENT — MIFFLIN-ST. JEOR: SCORE: 1113.25

## 2020-12-10 ASSESSMENT — ACTIVITIES OF DAILY LIVING (ADL): CURRENT_FUNCTION: NO ASSISTANCE NEEDED

## 2020-12-10 NOTE — NURSING NOTE
"Chief Complaint   Patient presents with     Wellness Visit       Initial BP (!) 160/74 (BP Location: Right arm, Patient Position: Chair, Cuff Size: Adult Large)   Pulse 83   Temp 99.1  F (37.3  C) (Tympanic)   Resp 14   Ht 1.473 m (4' 10\")   Wt 71.8 kg (158 lb 6.4 oz)   SpO2 98%   BMI 33.11 kg/m   Estimated body mass index is 33.11 kg/m  as calculated from the following:    Height as of this encounter: 1.473 m (4' 10\").    Weight as of this encounter: 71.8 kg (158 lb 6.4 oz).    Patient presents to the clinic using No DME    Health Maintenance that is potentially due pending provider review:  Colonoscopy/FIT    Has one at home.    Is there anyone who you would like to be able to receive your results? No  If yes have patient fill out CACHORRO      "

## 2020-12-10 NOTE — PATIENT INSTRUCTIONS
Try decreasing atorvastatin to half tab - if not making difference in aches, resume current dose - contact me     Adding amlodipine   Goal for BP is at least under 140/90  If not at goal I can increase dose     Do advanced care directive    Decided against shingles vaccine     Do the mail the poop test you already have     Reconsider bone density test next yr    Work on undoing the 2020 wt gain     See you next yr

## 2020-12-10 NOTE — PROGRESS NOTES
"SUBJECTIVE:   Nerissa Valentin is a 74 year old female who presents for Preventive Visit.    Patient has been advised of split billing requirements and indicates understanding: Yes   Are you in the first 12 months of your Medicare coverage?  No    Healthy Habits:     In general, how would you rate your overall health?  Good    Frequency of exercise:  1 day/week    Duration of exercise:  15-30 minutes    Do you usually eat at least 4 servings of fruit and vegetables a day, include whole grains    & fiber and avoid regularly eating high fat or \"junk\" foods?  Yes    Taking medications regularly:  Yes    Medication side effects:  None    Ability to successfully perform activities of daily living:  No assistance needed    Home Safety:  No safety concerns identified    Hearing Impairment:  No hearing concerns    In the past 6 months, have you been bothered by leaking of urine? Yes    In general, how would you rate your overall mental or emotional health?  Good      PHQ-2 Total Score: 0    Additional concerns today:  No    Retired nurse.  Establish care.    Asthma - does well - just bothers with URI.  ACT 25.    Dyslipidemia.  LDL in 140s and fam history.  Atorvastatin 40.  Possible aches.    HTN.  Occasionally checks - 140s/150s/80s.  Lisinopril 20.    B 12 def - used to be on supplements, is no longer.  Last checked in .      Dysthymia.  Used to be  for [employer] until pandemic.  Really misses this, feels she is letting down her daughter who  in drink driving accident.      History leukocytosis.    Up 17 pounds this yr.    Colon cancer screen - has FIT at home.    Intolerant of flu shot.    Do you feel safe in your environment? Yes    Have you ever done Advance Care Planning? (For example, a Health Directive, POLST, or a discussion with a medical provider or your loved ones about your wishes): Yes, advance care planning is on file.      Fall risk  Fallen 2 or more times in the past year?: No  Any fall " with injury in the past year?: No    Cognitive Screening   1) Repeat 3 items (Leader, Season, Table)    2) Clock draw: NORMAL  3) 3 item recall: Recalls 1 object   Results: NORMAL clock, 1-2 items recalled: COGNITIVE IMPAIRMENT LESS LIKELY    Mini-CogTM Copyright ELI Lee. Licensed by the author for use in [facility]; reprinted with permission (nilo@[facility]). All rights reserved.      Reviewed and updated as needed this visit by clinical staff  Tobacco  Allergies  Meds  Problems  Med Hx  Surg Hx  Fam Hx  Soc Hx          Reviewed and updated as needed this visit by Provider  Tobacco  Allergies  Meds  Problems  Med Hx  Surg Hx  Fam Hx         Social History     Tobacco Use     Smoking status: Never Smoker     Smokeless tobacco: Never Used   Substance Use Topics     Alcohol use: Yes     Comment: rare         Alcohol Use 12/10/2020   Prescreen: >3 drinks/day or >7 drinks/week? No   Prescreen: >3 drinks/day or >7 drinks/week? -     Current providers sharing in care for this patient include:   Patient Care Team:  Gwendolyn Sadler MD as PCP - General  Gwendolyn Sadler MD as Assigned PCP  Dante Alberto DPM as Assigned Musculoskeletal Provider  ANDRES Gallego MD as Assigned Surgical Provider    The following health maintenance items are reviewed in Epic and correct as of today:  Health Maintenance   Topic Date Due     ZOSTER IMMUNIZATION (1 of 2) 12/28/1996     COLORECTAL CANCER SCREENING  12/27/2019     ASTHMA CONTROL TEST  05/29/2020     PHQ-9  05/29/2020     INFLUENZA VACCINE (1) 09/01/2020     FALL RISK ASSESSMENT  11/29/2020     ASTHMA ACTION PLAN  11/29/2020     DTAP/TDAP/TD IMMUNIZATION (3 - Td) 02/01/2021     MEDICARE ANNUAL WELLNESS VISIT  12/10/2021     MAMMO SCREENING  12/23/2021     DEXA  03/09/2025     LIPID  12/10/2025     ADVANCE CARE PLANNING  12/10/2025     HEPATITIS C SCREENING  Completed     DEPRESSION ACTION PLAN  Completed     Pneumococcal Vaccine: 65+ Years   "Completed     Pneumococcal Vaccine: Pediatrics (0 to 5 Years) and At-Risk Patients (6 to 64 Years)  Aged Out     IPV IMMUNIZATION  Aged Out     MENINGITIS IMMUNIZATION  Aged Out     HEPATITIS B IMMUNIZATION  Aged Out     Review of Systems   Constitutional: Negative for chills and fever.   HENT: Negative for congestion, ear pain and hearing loss.    Eyes: Negative for pain.   Respiratory: Negative for cough.    Cardiovascular: Negative for chest pain.   Gastrointestinal: Negative for abdominal pain, constipation, diarrhea and hematochezia.   Breasts:  Negative for tenderness, breast mass and discharge.   Genitourinary: Negative for frequency, genital sores, hematuria, pelvic pain, vaginal bleeding and vaginal discharge.   Neurological: Negative for dizziness and headaches.   Psychiatric/Behavioral: The patient is not nervous/anxious.      OBJECTIVE:   /66 (BP Location: Right arm, Patient Position: Chair, Cuff Size: Adult Large)   Pulse 83   Temp 99.1  F (37.3  C) (Tympanic)   Resp 14   Ht 1.473 m (4' 10\")   Wt 71.8 kg (158 lb 6.4 oz)   SpO2 98%   BMI 33.11 kg/m   Estimated body mass index is 33.11 kg/m  as calculated from the following:    Height as of this encounter: 1.473 m (4' 10\").    Weight as of this encounter: 71.8 kg (158 lb 6.4 oz).  Physical Exam  GENERAL APPEARANCE: healthy, alert and no distress  EYES: Eyes grossly normal to inspection, PERRL and conjunctivae and sclerae normal  HENT: ear canals and TM's normal, nose and mouth without ulcers or lesions, oropharynx clear and oral mucous membranes moist  NECK: no adenopathy, no asymmetry, masses, or scars and thyroid normal to palpation  RESP: lungs clear to auscultation - no rales, rhonchi or wheezes  CV: regular rate and rhythm, normal S1 S2, no S3 or S4, no murmur, click or rub, no peripheral edema and peripheral pulses strong  ABDOMEN: soft, nontender, no hepatosplenomegaly, no masses and bowel sounds normal  MS: no musculoskeletal defects " "are noted and gait is age appropriate without ataxia  SKIN: no suspicious lesions or rashes  NEURO: Normal strength and tone, sensory exam grossly normal, mentation intact and speech normal  PSYCH: mentation appears normal and affect normal/bright    Diagnostic Test Results:  Labs reviewed in Epic    ASSESSMENT / PLAN:       ICD-10-CM    1. Medicare annual wellness visit, subsequent  Z00.00    2. Anemia, unspecified type  D64.9 Ferritin     Folate     CBC with platelets differential     Iron and iron binding capacity     TSH     Vitamin B1 whole blood     Vitamin B12     Blood Morphology Pathologist Review     CBC with platelets differential     Reticulocyte Count     Hepatic panel (Albumin, ALT, AST, Bili, Alk Phos, TP)   3. Essential hypertension  I10 amLODIPine (NORVASC) 5 MG tablet     lisinopril (ZESTRIL) 40 MG tablet     Basic metabolic panel  (Ca, Cl, CO2, Creat, Gluc, K, Na, BUN)   4. Mild persistent asthma without complication  J45.30 albuterol (PROAIR HFA/PROVENTIL HFA/VENTOLIN HFA) 108 (90 Base) MCG/ACT inhaler   5. Dyslipidemia  E78.5 atorvastatin (LIPITOR) 40 MG tablet     Lipid panel reflex to direct LDL Fasting   6. Dysthymic disorder  F34.1 sertraline (ZOLOFT) 50 MG tablet   7. Vitamin B12 deficiency (non anemic)  E53.8 Vitamin B12   8. Leukocytosis, unspecified type  D72.829 CBC with platelets   new anemia found today.      Patient has been advised of split billing requirements and indicates understanding: Yes  COUNSELING:  Reviewed preventive health counseling, as reflected in patient instructions    Estimated body mass index is 33.11 kg/m  as calculated from the following:    Height as of this encounter: 1.473 m (4' 10\").    Weight as of this encounter: 71.8 kg (158 lb 6.4 oz).    Weight management plan: Discussed healthy diet and exercise guidelines    She reports that she has never smoked. She has never used smokeless tobacco.      Appropriate preventive services were discussed with this " patient, including applicable screening as appropriate for cardiovascular disease, diabetes, osteopenia/osteoporosis, and glaucoma.  As appropriate for age/gender, discussed screening for colorectal cancer, prostate cancer, breast cancer, and cervical cancer. Checklist reviewing preventive services available has been given to the patient.    Reviewed patients plan of care and provided an AVS. The Basic Care Plan (routine screening as documented in Health Maintenance) for Nerissa meets the Care Plan requirement. This Care Plan has been established and reviewed with the Patient.    Counseling Resources:  ATP IV Guidelines  Pooled Cohorts Equation Calculator  Breast Cancer Risk Calculator  Breast Cancer: Medication to Reduce Risk  FRAX Risk Assessment  ICSI Preventive Guidelines  Dietary Guidelines for Americans, 2010  [employer]'s MyPlate  ASA Prophylaxis  Lung CA Screening    DESIREE Ahn [facility]    Identified Health Risks:    Patient Instructions   Try decreasing atorvastatin to half tab - if not making difference in aches, resume current dose - contact me     Adding amlodipine   Goal for BP is at least under 140/90  If not at goal I can increase dose     Do advanced care directive    Decided against shingles vaccine     Do the mail the poop test you already have     Reconsider bone density test next yr    Work on undoing the 2020 wt gain     See you next yr

## 2020-12-11 ENCOUNTER — MYC MEDICAL ADVICE (OUTPATIENT)
Dept: FAMILY MEDICINE | Facility: CLINIC | Age: 75
End: 2020-12-11

## 2020-12-11 RX ORDER — CYANOCOBALAMIN 1000 UG/ML
1 INJECTION, SOLUTION INTRAMUSCULAR; SUBCUTANEOUS
Qty: 1 ML | Refills: 6 | Status: SHIPPED | OUTPATIENT
Start: 2020-12-11 | End: 2021-01-14

## 2020-12-11 RX ORDER — NEEDLES, DISPOSABLE 25GX5/8"
NEEDLE, DISPOSABLE MISCELLANEOUS
Qty: 1 EACH | Refills: 6 | Status: SHIPPED | OUTPATIENT
Start: 2020-12-11 | End: 2021-01-14

## 2020-12-11 ASSESSMENT — ASTHMA QUESTIONNAIRES: ACT_TOTALSCORE: 25

## 2020-12-11 ASSESSMENT — ANXIETY QUESTIONNAIRES: GAD7 TOTAL SCORE: 0

## 2020-12-11 ASSESSMENT — PATIENT HEALTH QUESTIONNAIRE - PHQ9: SUM OF ALL RESPONSES TO PHQ QUESTIONS 1-9: 2

## 2020-12-11 NOTE — RESULT ENCOUNTER NOTE
Please CALL patient -  Cholesterol looks ok.  HDL is lower than ideal but LDL is great - overall is fine.  Blood salts and kidney function are fine.    CBC shows that she is fairly anemic.  Safest would be to do a further blood draw now to do further check on other possible causes of anemia, but this definitely could be due to her B12 level being very low.  That said a reasonable alternative to immediately doing further testing would be that we start B12 vitamin as once monthly injection, and repeat lab in 4 weeks (right before next b12 injection).     Please place all future orders discussed above.  If holding off on full testing, needs b12 (note need for possible MMA add on) and cbc.  Make sure these are the 2 labeled for testing in contrast to the other more full testing option - I put those orders in yesterday.  Also order the b12 med for the 1000 mcg subcutaneous.

## 2020-12-28 NOTE — NURSING NOTE
"Chief Complaint   Patient presents with     URI       Initial /68 (BP Location: Right arm, Patient Position: Chair, Cuff Size: Adult Regular)  Pulse 85  Temp 99.6  F (37.6  C) (Tympanic)  Ht 4' 9.87\" (1.47 m)  Wt 147 lb (66.7 kg)  SpO2 98%  Breastfeeding? No  BMI 30.86 kg/m2 Estimated body mass index is 30.86 kg/(m^2) as calculated from the following:    Height as of this encounter: 4' 9.87\" (1.47 m).    Weight as of this encounter: 147 lb (66.7 kg).  Medication Reconciliation: complete   Esme Samaniego CMA (AAMA)   (aka: Inessa Samaniego)      " From: Adelaida Perdomo  To:  Raymundo Jeong PA-C  Sent: 12/27/2020 2:55 PM EST  Subject: Prescription Question    May I get metformin and vitamin refill

## 2021-01-11 DIAGNOSIS — D64.9 ANEMIA, UNSPECIFIED TYPE: ICD-10-CM

## 2021-01-11 LAB
BASOPHILS # BLD AUTO: 0.1 10E9/L (ref 0–0.2)
BASOPHILS NFR BLD AUTO: 0.6 %
DIFFERENTIAL METHOD BLD: ABNORMAL
EOSINOPHIL # BLD AUTO: 0.1 10E9/L (ref 0–0.7)
EOSINOPHIL NFR BLD AUTO: 1.1 %
ERYTHROCYTE [DISTWIDTH] IN BLOOD BY AUTOMATED COUNT: 15.3 % (ref 10–15)
HCT VFR BLD AUTO: 32.3 % (ref 35–47)
HGB BLD-MCNC: 9.2 G/DL (ref 11.7–15.7)
LYMPHOCYTES # BLD AUTO: 2.3 10E9/L (ref 0.8–5.3)
LYMPHOCYTES NFR BLD AUTO: 24.5 %
MCH RBC QN AUTO: 25.5 PG (ref 26.5–33)
MCHC RBC AUTO-ENTMCNC: 28.5 G/DL (ref 31.5–36.5)
MCV RBC AUTO: 90 FL (ref 78–100)
MONOCYTES # BLD AUTO: 0.8 10E9/L (ref 0–1.3)
MONOCYTES NFR BLD AUTO: 8.5 %
NEUTROPHILS # BLD AUTO: 6.1 10E9/L (ref 1.6–8.3)
NEUTROPHILS NFR BLD AUTO: 65.3 %
PLATELET # BLD AUTO: 224 10E9/L (ref 150–450)
RBC # BLD AUTO: 3.61 10E12/L (ref 3.8–5.2)
VIT B12 SERPL-MCNC: 326 PG/ML (ref 193–986)
WBC # BLD AUTO: 9.4 10E9/L (ref 4–11)

## 2021-01-11 PROCEDURE — 84425 ASSAY OF VITAMIN B-1: CPT | Mod: 90 | Performed by: PHYSICIAN ASSISTANT

## 2021-01-11 PROCEDURE — 99000 SPECIMEN HANDLING OFFICE-LAB: CPT | Performed by: PHYSICIAN ASSISTANT

## 2021-01-11 PROCEDURE — 83921 ORGANIC ACID SINGLE QUANT: CPT | Performed by: PHYSICIAN ASSISTANT

## 2021-01-11 PROCEDURE — 85025 COMPLETE CBC W/AUTO DIFF WBC: CPT | Performed by: PHYSICIAN ASSISTANT

## 2021-01-11 PROCEDURE — 36415 COLL VENOUS BLD VENIPUNCTURE: CPT | Performed by: PHYSICIAN ASSISTANT

## 2021-01-11 PROCEDURE — 82607 VITAMIN B-12: CPT | Performed by: PHYSICIAN ASSISTANT

## 2021-01-12 DIAGNOSIS — D51.9 ANEMIA DUE TO VITAMIN B12 DEFICIENCY, UNSPECIFIED B12 DEFICIENCY TYPE: Primary | ICD-10-CM

## 2021-01-14 DIAGNOSIS — D51.9 ANEMIA DUE TO VITAMIN B12 DEFICIENCY, UNSPECIFIED B12 DEFICIENCY TYPE: Primary | ICD-10-CM

## 2021-01-14 DIAGNOSIS — D64.9 ANEMIA, UNSPECIFIED TYPE: ICD-10-CM

## 2021-01-14 LAB — METHYLMALONATE SERPL-SCNC: 1.48 UMOL/L (ref 0–0.4)

## 2021-01-14 RX ORDER — NEEDLES, DISPOSABLE 25GX5/8"
NEEDLE, DISPOSABLE MISCELLANEOUS
Qty: 1 EACH | Refills: 6 | Status: SHIPPED | OUTPATIENT
Start: 2021-01-14 | End: 2021-12-21

## 2021-01-14 RX ORDER — CYANOCOBALAMIN 1000 UG/ML
1 INJECTION, SOLUTION INTRAMUSCULAR; SUBCUTANEOUS WEEKLY
Qty: 10 ML | Refills: 1 | Status: SHIPPED | OUTPATIENT
Start: 2021-01-14 | End: 2021-12-21

## 2021-01-14 NOTE — RESULT ENCOUNTER NOTE
Nerissa,    I left a message on your voicemail.  B12 improved but MMA lab suggests B12 is still low.  Change b12 injection from monthly to weekly until deficiency is resolved, then you can decrease to monthly injection.  Repeat lab in 4-6 wks, again timing before a B12 injection.    Please contact me if you have questions.    Sarah

## 2021-01-15 LAB — VIT B1 BLD-MCNC: 119 NMOL/L (ref 70–180)

## 2021-02-17 ENCOUNTER — DOCUMENTATION ONLY (OUTPATIENT)
Dept: LAB | Facility: CLINIC | Age: 76
End: 2021-02-17

## 2021-02-17 DIAGNOSIS — D51.9 ANEMIA DUE TO VITAMIN B12 DEFICIENCY, UNSPECIFIED B12 DEFICIENCY TYPE: Primary | ICD-10-CM

## 2021-02-21 DIAGNOSIS — D51.9 ANEMIA DUE TO VITAMIN B12 DEFICIENCY, UNSPECIFIED B12 DEFICIENCY TYPE: ICD-10-CM

## 2021-02-21 LAB
HGB BLD-MCNC: 8.7 G/DL (ref 11.7–15.7)
VIT B12 SERPL-MCNC: 1100 PG/ML (ref 193–986)

## 2021-02-21 PROCEDURE — 85018 HEMOGLOBIN: CPT | Performed by: PHYSICIAN ASSISTANT

## 2021-02-21 PROCEDURE — 83921 ORGANIC ACID SINGLE QUANT: CPT | Performed by: PHYSICIAN ASSISTANT

## 2021-02-21 PROCEDURE — 36415 COLL VENOUS BLD VENIPUNCTURE: CPT | Performed by: PHYSICIAN ASSISTANT

## 2021-02-21 PROCEDURE — 82607 VITAMIN B-12: CPT | Performed by: PHYSICIAN ASSISTANT

## 2021-02-23 ENCOUNTER — DOCUMENTATION ONLY (OUTPATIENT)
Dept: LAB | Facility: CLINIC | Age: 76
End: 2021-02-23

## 2021-02-23 DIAGNOSIS — D64.9 ANEMIA, UNSPECIFIED TYPE: Primary | ICD-10-CM

## 2021-02-23 DIAGNOSIS — Z11.59 ENCOUNTER FOR SCREENING FOR OTHER VIRAL DISEASES: ICD-10-CM

## 2021-02-23 NOTE — RESULT ENCOUNTER NOTE
CMA- I misordered labs, please change 2/23 orders to future.    Nerissa,    Anemia is worsening.  B12 level is high enough that we don't need to wait for the methylmalonic acid test to further see if you're B12 deficient.  B12 deficiency is NOT the cause of your anemia.  Further testing is needed to determine cause of anemia - please set up another lab appointment AND set up a scope of stomach and intestines (endoscopy and colonoscopy).  To set up your colonoscopy and endoscopy, call [facility] [phone] - but if they are booked out more than a month, please inquire about other locations.    Sarah

## 2021-02-25 LAB — METHYLMALONATE SERPL-SCNC: 0.33 UMOL/L (ref 0–0.4)

## 2021-02-28 DIAGNOSIS — D64.9 ANEMIA, UNSPECIFIED TYPE: ICD-10-CM

## 2021-02-28 LAB
ALBUMIN SERPL-MCNC: 3.3 G/DL (ref 3.4–5)
ALP SERPL-CCNC: 102 U/L (ref 40–150)
ALT SERPL W P-5'-P-CCNC: 25 U/L (ref 0–50)
ANION GAP SERPL CALCULATED.3IONS-SCNC: 7 MMOL/L (ref 3–14)
AST SERPL W P-5'-P-CCNC: 39 U/L (ref 0–45)
BASOPHILS # BLD AUTO: 0.1 10E9/L (ref 0–0.2)
BASOPHILS NFR BLD AUTO: 0.6 %
BILIRUB SERPL-MCNC: 0.9 MG/DL (ref 0.2–1.3)
BUN SERPL-MCNC: 11 MG/DL (ref 7–30)
CALCIUM SERPL-MCNC: 8.7 MG/DL (ref 8.5–10.1)
CHLORIDE SERPL-SCNC: 108 MMOL/L (ref 94–109)
CO2 SERPL-SCNC: 26 MMOL/L (ref 20–32)
CREAT SERPL-MCNC: 0.69 MG/DL (ref 0.52–1.04)
DIFFERENTIAL METHOD BLD: ABNORMAL
EOSINOPHIL # BLD AUTO: 0.2 10E9/L (ref 0–0.7)
EOSINOPHIL NFR BLD AUTO: 1.9 %
ERYTHROCYTE [DISTWIDTH] IN BLOOD BY AUTOMATED COUNT: 14.8 % (ref 10–15)
FERRITIN SERPL-MCNC: 10 NG/ML (ref 8–252)
FOLATE SERPL-MCNC: 9.5 NG/ML
GFR SERPL CREATININE-BSD FRML MDRD: 85 ML/MIN/{1.73_M2}
GLUCOSE SERPL-MCNC: 92 MG/DL (ref 70–99)
HCT VFR BLD AUTO: 31 % (ref 35–47)
HGB BLD-MCNC: 8.7 G/DL (ref 11.7–15.7)
IRON SATN MFR SERPL: 7 % (ref 15–46)
IRON SERPL-MCNC: 26 UG/DL (ref 35–180)
LYMPHOCYTES # BLD AUTO: 2.1 10E9/L (ref 0.8–5.3)
LYMPHOCYTES NFR BLD AUTO: 26.6 %
MCH RBC QN AUTO: 24.7 PG (ref 26.5–33)
MCHC RBC AUTO-ENTMCNC: 28.1 G/DL (ref 31.5–36.5)
MCV RBC AUTO: 88 FL (ref 78–100)
MONOCYTES # BLD AUTO: 0.6 10E9/L (ref 0–1.3)
MONOCYTES NFR BLD AUTO: 8.1 %
NEUTROPHILS # BLD AUTO: 4.9 10E9/L (ref 1.6–8.3)
NEUTROPHILS NFR BLD AUTO: 62.8 %
PLATELET # BLD AUTO: 225 10E9/L (ref 150–450)
POTASSIUM SERPL-SCNC: 3.6 MMOL/L (ref 3.4–5.3)
PROT SERPL-MCNC: 7.3 G/DL (ref 6.8–8.8)
RBC # BLD AUTO: 3.52 10E12/L (ref 3.8–5.2)
RETICS # AUTO: 67 10E9/L (ref 25–95)
RETICS/RBC NFR AUTO: 1.9 % (ref 0.5–2)
SODIUM SERPL-SCNC: 141 MMOL/L (ref 133–144)
TIBC SERPL-MCNC: 375 UG/DL (ref 240–430)
TSH SERPL DL<=0.005 MIU/L-ACNC: 2.2 MU/L (ref 0.4–4)
WBC # BLD AUTO: 7.8 10E9/L (ref 4–11)

## 2021-02-28 PROCEDURE — 36415 COLL VENOUS BLD VENIPUNCTURE: CPT | Performed by: PHYSICIAN ASSISTANT

## 2021-02-28 PROCEDURE — 82728 ASSAY OF FERRITIN: CPT | Performed by: PHYSICIAN ASSISTANT

## 2021-02-28 PROCEDURE — 83540 ASSAY OF IRON: CPT | Performed by: PHYSICIAN ASSISTANT

## 2021-02-28 PROCEDURE — 84443 ASSAY THYROID STIM HORMONE: CPT | Performed by: PHYSICIAN ASSISTANT

## 2021-02-28 PROCEDURE — 85025 COMPLETE CBC W/AUTO DIFF WBC: CPT | Performed by: PHYSICIAN ASSISTANT

## 2021-02-28 PROCEDURE — 85060 BLOOD SMEAR INTERPRETATION: CPT | Performed by: PATHOLOGY

## 2021-02-28 PROCEDURE — 82746 ASSAY OF FOLIC ACID SERUM: CPT | Performed by: PHYSICIAN ASSISTANT

## 2021-02-28 PROCEDURE — 99N1109 PR STATISTIC MORPHOLOGY W/INTERP HISTOLOGY TC 85060: Performed by: PHYSICIAN ASSISTANT

## 2021-02-28 PROCEDURE — 83550 IRON BINDING TEST: CPT | Performed by: PHYSICIAN ASSISTANT

## 2021-02-28 PROCEDURE — 85045 AUTOMATED RETICULOCYTE COUNT: CPT | Performed by: PHYSICIAN ASSISTANT

## 2021-02-28 PROCEDURE — 80053 COMPREHEN METABOLIC PANEL: CPT | Performed by: PHYSICIAN ASSISTANT

## 2021-03-02 LAB — COPATH REPORT: NORMAL

## 2021-03-05 ENCOUNTER — ANESTHESIA EVENT (OUTPATIENT)
Dept: GASTROENTEROLOGY | Facility: CLINIC | Age: 76
End: 2021-03-05
Payer: COMMERCIAL

## 2021-03-05 DIAGNOSIS — Z11.59 ENCOUNTER FOR SCREENING FOR OTHER VIRAL DISEASES: ICD-10-CM

## 2021-03-05 LAB
LABORATORY COMMENT REPORT: NORMAL
SARS-COV-2 RNA RESP QL NAA+PROBE: NEGATIVE
SARS-COV-2 RNA RESP QL NAA+PROBE: NORMAL
SPECIMEN SOURCE: NORMAL
SPECIMEN SOURCE: NORMAL

## 2021-03-05 PROCEDURE — U0003 INFECTIOUS AGENT DETECTION BY NUCLEIC ACID (DNA OR RNA); SEVERE ACUTE RESPIRATORY SYNDROME CORONAVIRUS 2 (SARS-COV-2) (CORONAVIRUS DISEASE [COVID-19]), AMPLIFIED PROBE TECHNIQUE, MAKING USE OF HIGH THROUGHPUT TECHNOLOGIES AS DESCRIBED BY CMS-2020-01-R: HCPCS | Performed by: SURGERY

## 2021-03-05 PROCEDURE — U0005 INFEC AGEN DETEC AMPLI PROBE: HCPCS | Performed by: SURGERY

## 2021-03-05 NOTE — ANESTHESIA PREPROCEDURE EVALUATION
Anesthesia Pre-Procedure Evaluation    Patient: Nerissa Valentin   MRN: [id number] : 1945        Preoperative Diagnosis: Anemia, unspecified type [D64.9]   Procedure : Procedure(s):  COLONOSCOPY  ESOPHAGOGASTRODUODENOSCOPY (EGD)     Past Medical History:   Diagnosis Date     Asthma, mild persistent      Depressive disorder     only child killed by drunk      Dysthymic disorder      Hyperlipidemia LDL goal < 130      Hypokalemia 03/15/2013    appears when she was on chlorthalidone     Leukocytosis, unspecified     white count runs from 13-15     Osteoarthritis      Postmenopausal     HRT 10 years     Unspecified essential hypertension       Past Surgical History:   Procedure Laterality Date     ABDOMEN SURGERY  , ,     ovary removed x2 and abd. hyst.     APPENDECTOMY       BREAST BIOPSY, RT/LT      Breat Biopsy RT     C REMOVAL OF KIDNEY STONE       FLEXIBLE SIGMOIDOSCOPY   ?    Dr. Cline at [facility]     HYSTERECTOMY, PAP NO LONGER INDICATED       HYSTERECTOMY, VAGINAL  1971     LITHOTRIPSY       ROTATOR CUFF REPAIR RT/LT      left     ROTATOR CUFF REPAIR RT/LT      right twice?     SALPINGO OOPHORECTOMY,R/L/HARLEY   and     Salpingo Oophorectomy, RT/LT/HARLEY     SURGICAL HISTORY OF -   ,     ovary cystectomy      Allergies   Allergen Reactions     Flu Virus Vaccine Dermatitis and Other (See Comments)     2 years in a row after the flu shot did get  High fever,  Localized reaction .      No Clinical Screening - See Comments      CHROMIC GUT SUTURES CAUSE ABCESS     Penicillins Hives      Social History     Tobacco Use     Smoking status: Never Smoker     Smokeless tobacco: Never Used   Substance Use Topics     Alcohol use: Yes     Comment: rare      Wt Readings from Last 1 Encounters:   12/10/20 71.8 kg (158 lb 6.4 oz)        Anesthesia Evaluation   Pt has had prior anesthetic. Type: General, MAC and Regional.        ROS/MED HX  ENT/Pulmonary:      (+) Mild Persistent, asthma     Neurologic:  - neg neurologic ROS     Cardiovascular:     (+) Dyslipidemia hypertension-----Previous cardiac testing   Echo: Date: Results:    Stress Test: Date: Results:    ECG Reviewed: Date: 12/26/19 Results:  Sinus Rhythm -First degree A-V block  - occasional ectopic ventricular beat     Tati = 282  BORDERLINE RHYTHM  Cath: Date: Results:      METS/Exercise Tolerance:     Hematologic:     (+) anemia,     Musculoskeletal:   (+) arthritis,     GI/Hepatic:  - neg GI/hepatic ROS     Renal/Genitourinary:     (+) Nephrolithiasis ,     Endo:     (+) Obesity,     Psychiatric/Substance Use:     (+) psychiatric history depression     Infectious Disease:  - neg infectious disease ROS     Malignancy:  - neg malignancy ROS     Other:  - neg other ROS          Physical Exam    Airway        Mallampati: II   TM distance: > 3 FB   Neck ROM: full   Mouth opening: > 3 cm    Respiratory Devices and Support         Dental       (+) chipped    B=Bridge, C=Chipped, L=Loose, M=Missing    Cardiovascular   cardiovascular exam normal          Pulmonary   pulmonary exam normal                OUTSIDE LABS:  CBC:   Lab Results   Component Value Date    WBC 7.8 02/28/2021    WBC 9.4 01/11/2021    HGB 8.7 (L) 02/28/2021    HGB 8.7 (L) 02/21/2021    HCT 31.0 (L) 02/28/2021    HCT 32.3 (L) 01/11/2021     02/28/2021     01/11/2021     BMP:   Lab Results   Component Value Date     02/28/2021     12/10/2020    POTASSIUM 3.6 02/28/2021    POTASSIUM 3.5 12/10/2020    CHLORIDE 108 02/28/2021    CHLORIDE 107 12/10/2020    CO2 26 02/28/2021    CO2 29 12/10/2020    BUN 11 02/28/2021    BUN 11 12/10/2020    CR 0.69 02/28/2021    CR 0.73 12/10/2020    GLC 92 02/28/2021     (H) 12/10/2020     COAGS: No results found for: PTT, INR, FIBR  POC: No results found for: BGM, HCG, HCGS  HEPATIC:   Lab Results   Component Value Date    ALBUMIN 3.3 (L) 02/28/2021    PROTTOTAL 7.3 02/28/2021    ALT 25  02/28/2021    AST 39 02/28/2021    ALKPHOS 102 02/28/2021    BILITOTAL 0.9 02/28/2021     OTHER:   Lab Results   Component Value Date    DAMIAN 8.7 02/28/2021    TSH 2.20 02/28/2021       Anesthesia Plan    ASA Status:  3      Anesthesia Type: MAC.   Induction: Intravenous.           Consents    Anesthesia Plan(s) and associated risks, benefits, and realistic alternatives discussed. Questions answered and patient/representative(s) expressed understanding.     - Discussed with:  Patient         Postoperative Care            Comments:                RICCI Khan CRNA

## 2021-03-08 ENCOUNTER — ANESTHESIA (OUTPATIENT)
Dept: GASTROENTEROLOGY | Facility: CLINIC | Age: 76
End: 2021-03-08
Payer: COMMERCIAL

## 2021-03-08 ENCOUNTER — HOSPITAL ENCOUNTER (OUTPATIENT)
Facility: CLINIC | Age: 76
Discharge: HOME | End: 2021-03-08
Attending: SURGERY | Admitting: SURGERY
Payer: COMMERCIAL

## 2021-03-08 VITALS
SYSTOLIC BLOOD PRESSURE: 138 MMHG | OXYGEN SATURATION: 98 % | HEIGHT: 60 IN | RESPIRATION RATE: 16 BRPM | DIASTOLIC BLOOD PRESSURE: 68 MMHG | TEMPERATURE: 98.6 F | WEIGHT: 153 LBS | BODY MASS INDEX: 30.04 KG/M2 | HEART RATE: 78 BPM

## 2021-03-08 DIAGNOSIS — K29.51 CHRONIC GASTRITIS WITH BLEEDING, UNSPECIFIED GASTRITIS TYPE: Primary | ICD-10-CM

## 2021-03-08 LAB
COLONOSCOPY: NORMAL
UPPER GI ENDOSCOPY: NORMAL

## 2021-03-08 PROCEDURE — 43239 EGD BIOPSY SINGLE/MULTIPLE: CPT | Performed by: SURGERY

## 2021-03-08 PROCEDURE — 250N000009 HC RX 250: Performed by: NURSE ANESTHETIST, CERTIFIED REGISTERED

## 2021-03-08 PROCEDURE — 43239 EGD BIOPSY SINGLE/MULTIPLE: CPT | Mod: 51 | Performed by: SURGERY

## 2021-03-08 PROCEDURE — 250N000011 HC RX IP 250 OP 636: Performed by: NURSE ANESTHETIST, CERTIFIED REGISTERED

## 2021-03-08 PROCEDURE — 258N000003 HC RX IP 258 OP 636: Performed by: SURGERY

## 2021-03-08 PROCEDURE — 88342 IMHCHEM/IMCYTCHM 1ST ANTB: CPT | Mod: TC | Performed by: SURGERY

## 2021-03-08 PROCEDURE — 45380 COLONOSCOPY AND BIOPSY: CPT | Performed by: SURGERY

## 2021-03-08 PROCEDURE — 88342 IMHCHEM/IMCYTCHM 1ST ANTB: CPT | Mod: 26 | Performed by: PATHOLOGY

## 2021-03-08 PROCEDURE — 88305 TISSUE EXAM BY PATHOLOGIST: CPT | Mod: TC | Performed by: SURGERY

## 2021-03-08 PROCEDURE — 370N000017 HC ANESTHESIA TECHNICAL FEE, PER MIN: Performed by: SURGERY

## 2021-03-08 PROCEDURE — 250N000009 HC RX 250: Performed by: SURGERY

## 2021-03-08 PROCEDURE — 88305 TISSUE EXAM BY PATHOLOGIST: CPT | Mod: 26 | Performed by: PATHOLOGY

## 2021-03-08 RX ORDER — GLYCOPYRROLATE 0.2 MG/ML
INJECTION, SOLUTION INTRAMUSCULAR; INTRAVENOUS PRN
Status: DISCONTINUED | OUTPATIENT
Start: 2021-03-08 | End: 2021-03-08

## 2021-03-08 RX ORDER — PROPOFOL 10 MG/ML
INJECTION, EMULSION INTRAVENOUS CONTINUOUS PRN
Status: DISCONTINUED | OUTPATIENT
Start: 2021-03-08 | End: 2021-03-08

## 2021-03-08 RX ORDER — LIDOCAINE 40 MG/G
CREAM TOPICAL
Status: DISCONTINUED | OUTPATIENT
Start: 2021-03-08 | End: 2021-03-08 | Stop reason: HOSPADM

## 2021-03-08 RX ORDER — SODIUM CHLORIDE, SODIUM LACTATE, POTASSIUM CHLORIDE, CALCIUM CHLORIDE 600; 310; 30; 20 MG/100ML; MG/100ML; MG/100ML; MG/100ML
INJECTION, SOLUTION INTRAVENOUS CONTINUOUS
Status: DISCONTINUED | OUTPATIENT
Start: 2021-03-08 | End: 2021-03-08 | Stop reason: HOSPADM

## 2021-03-08 RX ORDER — PROPOFOL 10 MG/ML
INJECTION, EMULSION INTRAVENOUS PRN
Status: DISCONTINUED | OUTPATIENT
Start: 2021-03-08 | End: 2021-03-08

## 2021-03-08 RX ORDER — LIDOCAINE HYDROCHLORIDE 20 MG/ML
INJECTION, SOLUTION INFILTRATION; PERINEURAL PRN
Status: DISCONTINUED | OUTPATIENT
Start: 2021-03-08 | End: 2021-03-08

## 2021-03-08 RX ORDER — OMEPRAZOLE 20 MG/1
20 TABLET, DELAYED RELEASE ORAL
Qty: 180 TABLET | Refills: 0 | Status: SHIPPED | OUTPATIENT
Start: 2021-03-08 | End: 2021-06-06

## 2021-03-08 RX ADMIN — LIDOCAINE HYDROCHLORIDE 40 MG: 20 INJECTION, SOLUTION INFILTRATION; PERINEURAL at 12:21

## 2021-03-08 RX ADMIN — SODIUM CHLORIDE, POTASSIUM CHLORIDE, SODIUM LACTATE AND CALCIUM CHLORIDE: 600; 310; 30; 20 INJECTION, SOLUTION INTRAVENOUS at 12:05

## 2021-03-08 RX ADMIN — PROPOFOL 200 MCG/KG/MIN: 10 INJECTION, EMULSION INTRAVENOUS at 12:21

## 2021-03-08 RX ADMIN — GLYCOPYRROLATE 0.2 MG: 0.2 INJECTION, SOLUTION INTRAMUSCULAR; INTRAVENOUS at 12:26

## 2021-03-08 RX ADMIN — SODIUM CHLORIDE, POTASSIUM CHLORIDE, SODIUM LACTATE AND CALCIUM CHLORIDE: 600; 310; 30; 20 INJECTION, SOLUTION INTRAVENOUS at 12:08

## 2021-03-08 RX ADMIN — LIDOCAINE HYDROCHLORIDE 0.1 ML: 10 INJECTION, SOLUTION EPIDURAL; INFILTRATION; INTRACAUDAL; PERINEURAL at 12:04

## 2021-03-08 RX ADMIN — BENZOCAINE 1 SPRAY: 200 SOLUTION TOPICAL at 12:17

## 2021-03-08 RX ADMIN — PROPOFOL 50 MG: 10 INJECTION, EMULSION INTRAVENOUS at 12:21

## 2021-03-08 ASSESSMENT — MIFFLIN-ST. JEOR: SCORE: 1115.5

## 2021-03-08 NOTE — LETTER
Nerissa Valentin  [address] [place] [id number]      March 10, 2021    Dear Nerissa,  This letter is written to inform you of the results of your recent colonoscopy.  Your examination showed polyp(s) in your sigmoid colon. All polyps were removed in their entirety and sent for review by a pathologist. As you will see on the pathology report below, the tissue(s) were tubular adenomatous polyps and actually lymphoid aggregates. Your examination was otherwise without abnormality.    D: Large intestine, sigmoid, polyp, biopsy/polypectomy:   - Tubular adenoma negative for high-grade dysplasia.     E: Large intestine, sigmoid, polyp, biopsy/polypectomy:   - Benign mucosal lymphoid aggregate.   - Negative for dysplasia or malignancy.     Lymphoid aggregates are collections of normal immune cells. This is a benign (non-cancerous) finding.    Adenomatous polyps are entirely benign (non-cancerous); however, patients who have developed these polyps are at an increased risk for developing additional polyps in the future. If these are not eventually removed, there is a risk of developing colon cancer. We will advise more frequent examinations with you because of the risk associated with this type of polyp.    Given these findings,  I recommend that you undergo a repeat colonoscopy in 7 year(s) for surveillance. We will enter you into a recall system so you receive a reminder closer to the time that you are due for repeat examination.     Please remember that this recommendation is made with the understanding that you are not experiencing persistent changes in bowel function, bleeding per rectum, and/or significant abdominal pain. If you experience these symptoms, please contact your primary care provider for a further evaluation.     If you have any questions or concerns about the results of your colonoscopy or the appropriate follow-up, please contact my assistant at [phone]    Sincerely,        [facility]  [facility],   [facility]  ___

## 2021-03-08 NOTE — ANESTHESIA POSTPROCEDURE EVALUATION
Patient: Nerissa Valentin    Procedure(s):  COLONOSCOPY, WITH POLYPECTOMY AND BIOPSY  ESOPHAGOGASTRODUODENOSCOPY, WITH BIOPSY    Diagnosis:Anemia, unspecified type [D64.9]  Diagnosis Additional Information: No value filed.    Anesthesia Type:  MAC    Note:     Postop Pain Control: Uneventful            Sign Out: Well controlled pain   PONV: No   Neuro/Psych: Uneventful            Sign Out: Acceptable/Baseline neuro status   Airway/Respiratory: Uneventful            Sign Out: Acceptable/Baseline resp. status   CV/Hemodynamics: Uneventful            Sign Out: Acceptable CV status   Other NRE: NONE   DID A NON-ROUTINE EVENT OCCUR? No         Last vitals:  Vitals:    03/08/21 1135 03/08/21 1300   BP: 134/72 111/53   Pulse: 103    Resp: 20 14   Temp: 37  C (98.6  F)    SpO2:  95%       Last vitals prior to Anesthesia Care Transfer:  CRNA VITALS  3/8/2021 1219 - 3/8/2021 1301      3/8/2021             Pulse:  91    Ht Rate:  91    SpO2:  99 %      CRNA VITALS  3/8/2021 1225 - 3/8/2021 1301      3/8/2021             Pulse:  91    Ht Rate:  91    SpO2:  99 %          Electronically Signed By: RICCI Villa CRNA  March 8, 2021  1:01 PM

## 2021-03-08 NOTE — H&P
[facility]    Pre-Endoscopy History and Physical     Nerissa Valentin MRN# [id number]   YOB: 1945 Age: 75 year old     Date of Procedure: 3/8/2021  Primary care provider: Sarah Carlos  Type of Endoscopy: Colonoscopy with possible biopsy, possible polypectomy and Esophagogastroduodenoscopy with possible biopsy, possible dilation, possible foreign body removal  Reason for Procedure: anemia  Type of Anesthesia Anticipated: MAC    HPI:    Nerissa is a 75 year old female who will be undergoing the above procedure.  1st colonscopy - will get combo for anemia; no anticogulation; famhx of colon cancer in PgFa    A history and physical has been performed. The patient's medications and allergies have been reviewed. The risks and benefits of the procedure and the sedation options and risks were discussed with the patient.  All questions were answered and informed consent was obtained.      She denies a personal or family history of anesthesia complications or bleeding disorders.     Patient Active Problem List   Diagnosis     Leucocytosis     HTN (hypertension)     Dyslipidemia     Asthma, mild persistent     Dysthymic disorder     Osteoarthritis     Vitamin B12 deficiency anemia     Postmenopausal     Advanced directives, counseling/discussion     Health Care Home     Family history of diabetes mellitus     Obesity     BMI 31.0-31.9,adult     Fall due to ice or snow     Calculus of kidney     Anemia        Past Medical History:   Diagnosis Date     Asthma, mild persistent      Depressive disorder 1992    only child killed by drunk      Dysthymic disorder      Hyperlipidemia LDL goal < 130      Hypokalemia 03/15/2013    appears when she was on chlorthalidone     Leukocytosis, unspecified     white count runs from 13-15     Osteoarthritis      Postmenopausal     HRT 10 years     Unspecified essential hypertension         Past Surgical History:   Procedure Laterality Date      ABDOMEN SURGERY  , ,     ovary removed x2 and abd. hyst.     APPENDECTOMY  1971     BREAST BIOPSY, RT/LT      Breat Biopsy RT     C REMOVAL OF KIDNEY STONE       FLEXIBLE SIGMOIDOSCOPY   ?    Dr. Cline at [facility]     HYSTERECTOMY, PAP NO LONGER INDICATED       HYSTERECTOMY, VAGINAL  1971     LITHOTRIPSY       ROTATOR CUFF REPAIR RT/LT      left     ROTATOR CUFF REPAIR RT/LT  2009    right twice?     SALPINGO OOPHORECTOMY,R/L/HARLEY   and     Salpingo Oophorectomy, RT/LT/HARLEY     SURGICAL HISTORY OF -   ,     ovary cystectomy       Social History     Tobacco Use     Smoking status: Never Smoker     Smokeless tobacco: Never Used   Substance Use Topics     Alcohol use: Yes     Comment: rare       Family History   Problem Relation Age of Onset     Arthritis Mother      C.A.D. Mother          of MI age 65     Hypertension Father      Diabetes Father      C.A.D. Father         early 70s     Cerebrovascular Disease Maternal Grandfather      Cancer - colorectal Paternal Grandfather      Breast Cancer Sister         in 30s     Breast Cancer Sister         mid 40s     Breast Cancer Maternal Cousin      Breast Cancer Maternal Cousin        Prior to Admission medications    Medication Sig Start Date End Date Taking? Authorizing Provider   ADVIL OR None Entered 2/20/10  Yes Nico Reardon MD   amLODIPine (NORVASC) 5 MG tablet Take 1 tablet (5 mg) by mouth daily 12/10/20  Yes Sarah Carlos PA-C   atorvastatin (LIPITOR) 40 MG tablet Take 1 tablet (40 mg) by mouth daily 12/10/20  Yes Sarah Carlos PA-C   cyanocobalamin (CYANOCOBALAMIN) 1000 MCG/ML injection Inject 1 mL (1,000 mcg) into the muscle once a week X 4 wks then once per month 21  Yes Sarah Carlos PA-C   lisinopril (ZESTRIL) 40 MG tablet Take 1 tablet (40 mg) by mouth daily 12/10/20  Yes Sarah Carlos PA-C   sertraline (ZOLOFT) 50 MG tablet Take 1 tablet (50 mg) by mouth  "daily 12/10/20  Yes Sarah Carlos PA-C   albuterol (PROAIR HFA/PROVENTIL HFA/VENTOLIN HFA) 108 (90 Base) MCG/ACT inhaler Inhale 2 puffs into the lungs every 6 hours as needed for shortness of breath / dyspnea or wheezing 12/10/20   Sarah Carlos PA-C   Needle, Disp, (BD DISP NEEDLES) 25G X 5/8\" MISC Use with vitamin B12 injection weekly x 1 mo then 1 per month 1/14/21   Sarah Carlos PA-C       Allergies   Allergen Reactions     Flu Virus Vaccine Dermatitis and Other (See Comments)     2 years in a row after the flu shot did get  High fever,  Localized reaction .      No Clinical Screening - See Comments      CHROMIC GUT SUTURES CAUSE ABCESS     Penicillins Hives        REVIEW OF SYSTEMS:   5 point ROS negative except as noted above in HPI, including Gen., Resp., CV, GI &  system review.    PHYSICAL EXAM:   /72 (BP Location: Right arm)   Pulse 103   Temp 98.6  F (37  C) (Oral)   Resp 20   Ht 1.524 m (5')   Wt 69.4 kg (153 lb)   BMI 29.88 kg/m   Estimated body mass index is 29.88 kg/m  as calculated from the following:    Height as of this encounter: 1.524 m (5').    Weight as of this encounter: 69.4 kg (153 lb).   Constitutional: Awake, alert, no acute distress.  Eyes: No scleral icterus.  Conjunctiva are without injection.  ENMT: Mucous membranes moist, dentition and gums are intact.   Neck: Soft, supple, trachea midline.    Endocrine: n/a   Lymphatic: There is no cervical, submandibularadenopathy.  Respiratory: Lungs are clear to auscultation and percussion bilaterally.   Cardiovascular: Regular rate and rhythm. No murmurs, rubs, or gallops.    Abdomen: Non-distended, non-tender, normoactive bowel sounds present, No masses,  Musculoskeletal: No spinal or CVA tenderness. Full range of motion in the upper and lower extremities.    Skin: No skin rashes or lesions to inspection.  No petechia.    Neurologic: Cranial nerves II through XII are grossly intact and " symmetric.  Psychiatric: The patient is alert and oriented times 3.  The patient's affect is not blunted and mood is appropriate.  DIAGNOSTICS:    Not indicated    IMPRESSION   ASA Class 2 - Mild systemic disease    PLAN:   Plan for Colonoscopy with possible biopsy, possible polypectomy and Esophagogastroduodenoscopy with possible biopsy, possible dilation, possible foreign body removal. We discussed the risks, benefits and alternatives and the patient wished to proceed.  Patient is cleared for the above procedure.    The above has been forwarded to the consulting provider.    Critical access hospital [facility], [facility]

## 2021-03-08 NOTE — LETTER
Nerissa Campoverde [facility]  [address] [place] [phone]  March 10, 2021    Dear Nerissa,   This letter is to inform you of the results of your pathology report on your upper endoscopy (EGD).    Your pathology report was:  FINAL DIAGNOSIS:   A: Stomach, antrum, biopsy:   - Reactive gastropathy.   - Negative for dysplasia or malignancy.   - Negative for Helicobacter pylori.     B: Stomach, cardia, biopsy:   - Moderate inactive chronic gastritis.   - Negative for dysplasia or malignancy.   - Negative for Helicobacter pylori.     C: Gastroesophageal junction, 35 cm, biopsy:   - Squamocolumnar junction mucosa without diagnostic abnormality.   - Negative for intestinal metaplasia.   - Negative for dysplasia or malignancy.   Showed findings consistent with a inflamed stomach. If you continue to have symptoms please follow up with your primary care doctor or with myself.  Please take your omeprazole as we discussed and repeat the upper endoscopy in 3 months.     If you have any questions or concerns please do not hesitate to call my office at [phone].      Sincerely,     [facility],   [facility]

## 2021-03-10 LAB — COPATH REPORT: NORMAL

## 2021-03-13 ENCOUNTER — OFFICE VISIT (OUTPATIENT)
Dept: URGENT CARE | Facility: URGENT CARE | Age: 76
End: 2021-03-13
Payer: COMMERCIAL

## 2021-03-13 VITALS
SYSTOLIC BLOOD PRESSURE: 128 MMHG | OXYGEN SATURATION: 98 % | TEMPERATURE: 99.8 F | RESPIRATION RATE: 16 BRPM | DIASTOLIC BLOOD PRESSURE: 60 MMHG | HEART RATE: 76 BPM

## 2021-03-13 DIAGNOSIS — R05.9 COUGH: Primary | ICD-10-CM

## 2021-03-13 LAB
SARS-COV-2 RNA RESP QL NAA+PROBE: NORMAL
SPECIMEN SOURCE: NORMAL

## 2021-03-13 PROCEDURE — U0003 INFECTIOUS AGENT DETECTION BY NUCLEIC ACID (DNA OR RNA); SEVERE ACUTE RESPIRATORY SYNDROME CORONAVIRUS 2 (SARS-COV-2) (CORONAVIRUS DISEASE [COVID-19]), AMPLIFIED PROBE TECHNIQUE, MAKING USE OF HIGH THROUGHPUT TECHNOLOGIES AS DESCRIBED BY CMS-2020-01-R: HCPCS | Performed by: EMERGENCY MEDICINE

## 2021-03-13 PROCEDURE — 99213 OFFICE O/P EST LOW 20 MIN: CPT | Performed by: EMERGENCY MEDICINE

## 2021-03-13 PROCEDURE — U0005 INFEC AGEN DETEC AMPLI PROBE: HCPCS | Performed by: EMERGENCY MEDICINE

## 2021-03-13 RX ORDER — BENZONATATE 200 MG/1
200 CAPSULE ORAL 3 TIMES DAILY PRN
Qty: 20 CAPSULE | Refills: 0 | Status: SHIPPED | OUTPATIENT
Start: 2021-03-13 | End: 2021-12-21

## 2021-03-13 RX ORDER — DOXYCYCLINE HYCLATE 100 MG
100 TABLET ORAL 2 TIMES DAILY
Qty: 20 TABLET | Refills: 0 | Status: SHIPPED | OUTPATIENT
Start: 2021-03-13 | End: 2021-03-23

## 2021-03-13 ASSESSMENT — PAIN SCALES - GENERAL: PAINLEVEL: NO PAIN (0)

## 2021-03-13 NOTE — PROGRESS NOTES
"HPI: Patient is a 75-year-old female who presents with an approximate 1 week history of productive cough.  She describes as thick white phlegm.  She does have a history of asthma with no improvement by utilizing her albuterol inhaler.  No shortness of breath.  No Covid exposure.  No rhinorrhea or sore throat.  No other chronic respiratory disease.  She also mentions she has had chronic right knee pain which is gotten worse over the last week.  There is been no new injury.      ROS: See HPI otherwise normal.    Allergies   Allergen Reactions     Flu Virus Vaccine Dermatitis and Other (See Comments)     2 years in a row after the flu shot did get  High fever,  Localized reaction .      No Clinical Screening - See Comments      CHROMIC GUT SUTURES CAUSE ABCESS     Penicillins Hives      Current Outpatient Medications   Medication Sig Dispense Refill     ADVIL OR None Entered       albuterol (PROAIR HFA/PROVENTIL HFA/VENTOLIN HFA) 108 (90 Base) MCG/ACT inhaler Inhale 2 puffs into the lungs every 6 hours as needed for shortness of breath / dyspnea or wheezing 1 Inhaler 11     amLODIPine (NORVASC) 5 MG tablet Take 1 tablet (5 mg) by mouth daily 90 tablet 3     atorvastatin (LIPITOR) 40 MG tablet Take 1 tablet (40 mg) by mouth daily 90 tablet 3     benzonatate (TESSALON) 200 MG capsule Take 1 capsule (200 mg) by mouth 3 times daily as needed for cough 20 capsule 0     cyanocobalamin (CYANOCOBALAMIN) 1000 MCG/ML injection Inject 1 mL (1,000 mcg) into the muscle once a week X 4 wks then once per month 10 mL 1     doxycycline hyclate (VIBRA-TABS) 100 MG tablet Take 1 tablet (100 mg) by mouth 2 times daily for 10 days 20 tablet 0     lisinopril (ZESTRIL) 40 MG tablet Take 1 tablet (40 mg) by mouth daily 90 tablet 3     Needle, Disp, (BD DISP NEEDLES) 25G X 5/8\" MISC Use with vitamin B12 injection weekly x 1 mo then 1 per month 1 each 6     omeprazole (PRILOSEC OTC) 20 MG EC tablet Take 1 tablet (20 mg) by mouth 2 times daily " (before meals) Take 30-60 mins before any food or calorie intake in the AM.  Evening dose should be very last medication at night and before bed. 180 tablet 0     sertraline (ZOLOFT) 50 MG tablet Take 1 tablet (50 mg) by mouth daily 90 tablet 3         PE: Physical exam reveals a 75-year-old female does not appear in acute distress.  She is alert and oriented.  She is nondyspneic appearing.  Vital signs are reviewed which are normal including a pulse ox of 98%.  HEENT reveals moist mucous membranes.  Lungs reveal a few localized rales at the left base.  She otherwise has good excursion.  No wheezes heard.  Heart is regular.  Gait is somewhat living in nature due to the right knee pain.        TREATMENT: Covid PCR ordered.    ASSESSMENT: Left basilar rales in the face of 1 week history of productive cough, will treat for pneumonia.  Left knee pain appears to be arthritic and that there is been no new injury.      DIAGNOSIS: Lower respiratory tract infection.  Left knee pain.      PLAN: Doxycycline as instructed.  Continue to use albuterol inhaler.  I mentioned her utilizing NSAIDs but should discuss with Sarah orthopedic referral for more definitive evaluation of her chronic knee pain.  She should follow-up 1 week for cough is no improvement.  Patient is on my chart and will receive results of her Covid test if negative and be called if positive.

## 2021-03-13 NOTE — PATIENT INSTRUCTIONS
Quiet activity    Plenty of fluids    Start medications today    Recheck 1 week if no better, sooner if worse   Campylobacter on cultures from transferring facility. C-diff negative here on 4/10/18.  Resolved.

## 2021-03-14 LAB
LABORATORY COMMENT REPORT: NORMAL
SARS-COV-2 RNA RESP QL NAA+PROBE: NEGATIVE
SPECIMEN SOURCE: NORMAL

## 2021-03-19 ENCOUNTER — HOSPITAL ENCOUNTER (OUTPATIENT)
Dept: MAMMOGRAPHY | Facility: CLINIC | Age: 76
Discharge: HOME | End: 2021-03-19
Attending: PHYSICIAN ASSISTANT | Admitting: PHYSICIAN ASSISTANT
Payer: COMMERCIAL

## 2021-03-19 DIAGNOSIS — Z12.31 VISIT FOR SCREENING MAMMOGRAM: ICD-10-CM

## 2021-03-19 PROCEDURE — 77067 SCR MAMMO BI INCL CAD: CPT

## 2021-03-21 DIAGNOSIS — D64.9 ANEMIA, UNSPECIFIED TYPE: ICD-10-CM

## 2021-03-21 LAB
BASOPHILS # BLD AUTO: 0.1 10E9/L (ref 0–0.2)
BASOPHILS NFR BLD AUTO: 0.5 %
DIFFERENTIAL METHOD BLD: ABNORMAL
EOSINOPHIL # BLD AUTO: 0.2 10E9/L (ref 0–0.7)
EOSINOPHIL NFR BLD AUTO: 1.6 %
ERYTHROCYTE [DISTWIDTH] IN BLOOD BY AUTOMATED COUNT: 18.7 % (ref 10–15)
FERRITIN SERPL-MCNC: 25 NG/ML (ref 8–252)
HCT VFR BLD AUTO: 34.1 % (ref 35–47)
HGB BLD-MCNC: 9.5 G/DL (ref 11.7–15.7)
IRON SATN MFR SERPL: 9 % (ref 15–46)
IRON SERPL-MCNC: 28 UG/DL (ref 35–180)
LYMPHOCYTES # BLD AUTO: 2.5 10E9/L (ref 0.8–5.3)
LYMPHOCYTES NFR BLD AUTO: 27 %
MCH RBC QN AUTO: 26 PG (ref 26.5–33)
MCHC RBC AUTO-ENTMCNC: 27.9 G/DL (ref 31.5–36.5)
MCV RBC AUTO: 93 FL (ref 78–100)
MONOCYTES # BLD AUTO: 1 10E9/L (ref 0–1.3)
MONOCYTES NFR BLD AUTO: 10.3 %
NEUTROPHILS # BLD AUTO: 5.6 10E9/L (ref 1.6–8.3)
NEUTROPHILS NFR BLD AUTO: 60.6 %
PLATELET # BLD AUTO: 223 10E9/L (ref 150–450)
RBC # BLD AUTO: 3.66 10E12/L (ref 3.8–5.2)
TIBC SERPL-MCNC: 315 UG/DL (ref 240–430)
WBC # BLD AUTO: 9.3 10E9/L (ref 4–11)

## 2021-03-21 PROCEDURE — 82728 ASSAY OF FERRITIN: CPT | Performed by: PHYSICIAN ASSISTANT

## 2021-03-21 PROCEDURE — 85025 COMPLETE CBC W/AUTO DIFF WBC: CPT | Performed by: PHYSICIAN ASSISTANT

## 2021-03-21 PROCEDURE — 83540 ASSAY OF IRON: CPT | Performed by: PHYSICIAN ASSISTANT

## 2021-03-21 PROCEDURE — 83550 IRON BINDING TEST: CPT | Performed by: PHYSICIAN ASSISTANT

## 2021-03-21 PROCEDURE — 36415 COLL VENOUS BLD VENIPUNCTURE: CPT | Performed by: PHYSICIAN ASSISTANT

## 2021-03-23 NOTE — RESULT ENCOUNTER NOTE
CMA- future iron/iron binding and cbc and ferritin.      Nerissa,    Labs are now improving.  Stay on the omeprazole for the 3 months.  Repeat lab appt towards end of your omeprazole.      Let me know if you have questions.    Sarah

## 2021-05-04 DIAGNOSIS — K29.50 CHRONIC GASTRITIS WITHOUT BLEEDING, UNSPECIFIED GASTRITIS TYPE: Primary | ICD-10-CM

## 2021-05-24 DIAGNOSIS — Z11.59 ENCOUNTER FOR SCREENING FOR OTHER VIRAL DISEASES: ICD-10-CM

## 2021-05-27 VITALS — TEMPERATURE: 98.3 F | SYSTOLIC BLOOD PRESSURE: 181 MMHG | DIASTOLIC BLOOD PRESSURE: 74 MMHG | HEART RATE: 81 BPM

## 2021-05-27 VITALS — HEART RATE: 71 BPM | TEMPERATURE: 98.7 F | DIASTOLIC BLOOD PRESSURE: 76 MMHG | SYSTOLIC BLOOD PRESSURE: 181 MMHG

## 2021-06-04 VITALS
DIASTOLIC BLOOD PRESSURE: 75 MMHG | BODY MASS INDEX: 29.81 KG/M2 | TEMPERATURE: 98.7 F | HEIGHT: 58 IN | SYSTOLIC BLOOD PRESSURE: 170 MMHG | WEIGHT: 142 LBS | HEART RATE: 81 BPM

## 2021-06-04 VITALS — BODY MASS INDEX: 29.77 KG/M2 | HEIGHT: 58 IN

## 2021-06-04 VITALS — HEIGHT: 58 IN | BODY MASS INDEX: 29.27 KG/M2 | WEIGHT: 139.44 LBS

## 2021-06-04 NOTE — ANESTHESIA CARE TRANSFER NOTE
Last vitals:   Vitals:    01/03/20 1119   BP: 162/56   Pulse: 97   Resp: 16   Temp: 37.3  C (99.2  F)   SpO2: 100%     Patient's level of consciousness is awake  Spontaneous respirations: yes  Maintains airway independently: yes  Dentition unchanged: yes  Oropharynx: oropharynx clear of all foreign objects    QCDR Measures:  ASA# 20 - Surgical Safety Checklist: WHO surgical safety checklist completed prior to induction    PQRS# 430 - Adult PONV Prevention: 4558F - Pt received => 2 anti-emetic agents (different classes) preop & intraop  ASA# 8 - Peds PONV Prevention: NA - Not pediatric patient, not GA or 2 or more risk factors NOT present  PQRS# 424 - Chastity-op Temp Management: 4559F - At least one body temp DOCUMENTED => 35.5C or 95.9F within required timeframe  PQRS# 426 - PACU Transfer Protocol: - Transfer of care checklist used  ASA# 14 - Acute Post-op Pain: ASA14B - Patient did NOT experience pain >= 7 out of 10

## 2021-06-04 NOTE — PROGRESS NOTES
Assessment/Plan:        Diagnoses and all orders for this visit:    History of kidney stones  -     Urinalysis Macroscopic    Calculus of kidney  -     tamsulosin (FLOMAX) 0.4 mg cap; Take 1 capsule (0.4 mg total) by mouth daily.  Dispense: 14 capsule; Refill: 0  -     Verify informed consent; Standing  -     Diet NPO; Standing  -     Place sequential compression device; Standing  -     XR Retrograde Pyelogram W or WO KUB Intraoperative; Standing  -     levoFLOXacin 500 mg/100 mL IVPB 500 mg (LEVAQUIN)  -     ketorolac injection 15 mg (TORADOL)  -     acetaminophen tablet 1,000 mg (TYLENOL)  -     Case Request: CYSTOURETEROSCOPY, WITH LITHOTRIPSY USING HOLMIUM LASER AND URETERAL STENT INSERTION; Standing  -     XR Abdomen AP; Standing  -     NaCl 0.9% IR 0.9 % irrigation solution 3,000 mL (NS)  -     Case Request: CYSTOURETEROSCOPY, WITH LITHOTRIPSY USING HOLMIUM LASER AND URETERAL STENT INSERTION    Other orders  -     albuterol (PROAIR HFA;PROVENTIL HFA;VENTOLIN HFA) 90 mcg/actuation inhaler; Inhale 2 puffs.  -     atorvastatin (LIPITOR) 40 MG tablet; Take 40 mg by mouth.  -     lisinopril (PRINIVIL,ZESTRIL) 40 MG tablet; Take 40 mg by mouth.  -     sertraline (ZOLOFT) 50 MG tablet; Take 50 mg by mouth.      Stone Management Plan  [facility] [place] Stone [facility] 12/20/2019   Urinary Tract Infection No suspicion of infection   Renal Colic Asymptomatic at this time   Renal Failure No suspicion of renal failure   Current CT date 12/6/2019   Right sided stones? Yes   R Number of ureteral stones No ureteral stones   R Number of kidney stones  1   R GSD of kidney stones < 2   R Hydronephrosis None   R Stone Event No current event   Left sided stones? Yes   L Number of ureteral stones 1   L GSD of ureteral stones 2   L Location of ureteral stone Distal   L Number of kidney stones  1   L GSD of kidney stones 10 - 15   L Hydronephrosis Mild   L Stone Event New event   Diagnosis date 12/6/2019   Initial location of primary symptomatic  stone Renal   Initial GSD of primary symptomatic stone > 15   L Current Plan Clear   Clear rationale Optimally managed electively             Subjective:      HPI  Ms. Nerissa Valentin is a 73 y.o.  female presenting to the [facility] for a new problem.    She is a recurrent unidentified composition stone former who has required stone clearance procedures. She has not previously participated in stone risk evaluation. She has no identified modifiable stone risk factors. She has no identified non-modifiable stone risk factors.    Surgical hx includes right open stone surgery 40 years ago as well as left ureteroscopy some time over the past several years.    Now referred fr. Dr. Gallego at [facility] for new diagnosis of left renal and ureteral stone. THis occurred after a workup for hematuria.  Cystosopy was negative (She does have a hx of a bladder tumor).    Denies substantial symptoms, occasional minimal left flank ache but no dysuria, UTI, severe pain.    CT scan from 12/6/19 is personally reviewed and notable for a larger 14 mm left renal pelvis stone as well as a 2 mm stone in the ureter with some mild hydro.    We discussed available management options and the shared decision was made to attempt left ureteroscopic stone surgery.  UNderstands risks of procedure and discussed that given how large stone is may require either a second trip to the OR or PCNL if anatomy is unfavorable.    PLAN  Over the counter symptom control medications of ibuprofen, Dramamine and tylenol were recommended.     ROS   Review of Systems  A 12 point comprehensive review of systems is negative except for HPI    Past Medical History:   Diagnosis Date     Asthma      Dysthymic disorder      Hyperlipidemia      Hypertension      Kidney stone      Leucocytosis      Osteoarthritis      Pernicious anemia        Past Surgical History:   Procedure Laterality Date     APPENDECTOMY       BREAST BIOPSY        HYSTERECTOMY       kidney stone removal  1971     LITHOTRIPSY       open surgery - right kidney stone   1971 or 73     ROTATOR CUFF REPAIR Bilateral      URETEROSCOPY      x2       Current Outpatient Medications   Medication Sig Dispense Refill     albuterol (PROAIR HFA;PROVENTIL HFA;VENTOLIN HFA) 90 mcg/actuation inhaler Inhale 2 puffs.       atorvastatin (LIPITOR) 40 MG tablet Take 40 mg by mouth.       lisinopril (PRINIVIL,ZESTRIL) 40 MG tablet Take 40 mg by mouth.       sertraline (ZOLOFT) 50 MG tablet Take 50 mg by mouth.       tamsulosin (FLOMAX) 0.4 mg cap Take 1 capsule (0.4 mg total) by mouth daily. 14 capsule 0     No current facility-administered medications for this visit.        Allergies   Allergen Reactions     Haemophilus Influenzae Type B Dermatitis and Other (See Comments)     2 years in a row after the flu shot did get  High fever,  Localized reaction .      Penicillins Hives       Social History     Socioeconomic History     Marital status:      Spouse name: Not on file     Number of children: Not on file     Years of education: Not on file     Highest education level: Not on file   Occupational History     Not on file   Social Needs     Financial resource strain: Not on file     Food insecurity:     Worry: Not on file     Inability: Not on file     Transportation needs:     Medical: Not on file     Non-medical: Not on file   Tobacco Use     Smoking status: Never Smoker     Smokeless tobacco: Never Used   Substance and Sexual Activity     Alcohol use: Yes     Drug use: Not on file     Sexual activity: Not on file   Lifestyle     Physical activity:     Days per week: Not on file     Minutes per session: Not on file     Stress: Not on file   Relationships     Social connections:     Talks on phone: Not on file     Gets together: Not on file     Attends Adventism service: Not on file     Active member of club or organization: Not on file     Attends meetings of clubs or organizations: Not on  file     Relationship status: Not on file     Intimate partner violence:     Fear of current or ex partner: Not on file     Emotionally abused: Not on file     Physically abused: Not on file     Forced sexual activity: Not on file   Other Topics Concern     Not on file   Social History Narrative     Not on file       Family History   Problem Relation Age of Onset     Heart disease Father      Diabetes Father      Heart disease Mother      Cancer Maternal Grandfather      Cancer Sister      Cancer Sister      Cancer Sister      Cancer Sister        Objective:      Physical Exam  Vitals:    12/18/19 1429   BP: 170/75   Pulse: 81   Temp: 98.7  F (37.1  C)     General - well developed, well nourished, appropriate for age. Appears no distress at this time   Heart - regular rate and rhythm, no murmur  Respiratory - normal effort, clear to auscultation, good air entry without adventitious noises  Abdomen - slender soft, non-tender, no hepatosplenomegaly, no masses.   - no flank tenderness, no suprapubic tenderness, kidney and bladder non-palpable  MSK - normal spinal curvature. no spinal tenderness. normal gait. muscular strength intact.  Neurology - cranial nerves II-XII grossly intact, normal sensation, no unsteadiness  Skin - intact, no bruising, no gouty tophi  Psych - oriented to time, place, and person, normal mood and affect.      Labs  Urinalysis POC (Office):  Nitrite, UA   Date Value Ref Range Status   12/18/2019 Negative Negative Final       Lab Urinalysis:  Blood, UA   Date Value Ref Range Status   12/18/2019 Moderate (!) Negative Final     Nitrite, UA   Date Value Ref Range Status   12/18/2019 Negative Negative Final     Leukocytes, UA   Date Value Ref Range Status   12/18/2019 Small (!) Negative Final     pH, UA   Date Value Ref Range Status   12/18/2019 6.5 5.0 - 8.0 Final

## 2021-06-04 NOTE — ANESTHESIA POSTPROCEDURE EVALUATION
Patient: Nerissa Valentin  CYSTOURETEROSCOPY LEFT WITH LITHOTRIPSY USING HOLMIUM LASER AND URETERAL STENT INSERTION  Anesthesia type: general    Patient location: PACU  Last vitals:   Vitals Value Taken Time   /56 1/3/2020 12:30 PM   Temp 37  C (98.6  F) 1/3/2020 11:58 AM   Pulse 88 1/3/2020 12:41 PM   Resp 18 1/3/2020 12:30 PM   SpO2 94 % 1/3/2020 12:41 PM   Vitals shown include unvalidated device data.  Post vital signs: stable  Level of consciousness: awake and responds to simple questions  Post-anesthesia pain: pain controlled  Post-anesthesia nausea and vomiting: no  Pulmonary: unassisted, return to baseline  Cardiovascular: stable and blood pressure at baseline  Hydration: adequate  Anesthetic events: no    QCDR Measures:  ASA# 11 - Chastity-op Cardiac Arrest: ASA11B - Patient did NOT experience unanticipated cardiac arrest  ASA# 12 - Chastity-op Mortality Rate: ASA12B - Patient did NOT die  ASA# 13 - PACU Re-Intubation Rate: ASA13B - Patient did NOT require a new airway mgmt  ASA# 10 - Composite Anes Safety: ASA10A - No serious adverse event    Additional Notes:

## 2021-06-04 NOTE — ANESTHESIA PREPROCEDURE EVALUATION
Anesthesia Evaluation      Patient summary reviewed   No history of anesthetic complications     Airway   Mallampati: II  Neck ROM: limited   Pulmonary - normal exam   (+) asthma  shortness of breath,                          Cardiovascular - normal exam  (+) hypertension, ,     ECG reviewed        Neuro/Psych      Endo/Other       GI/Hepatic/Renal    (+)   chronic renal disease,           Dental - normal exam                        Anesthesia Plan  Planned anesthetic: general LMA    ASA 2   Induction: intravenous   Anesthetic plan and risks discussed with: patient  Anesthesia plan special considerations: antiemetics,   Post-op plan: routine recovery

## 2021-06-05 NOTE — PATIENT INSTRUCTIONS - HE
Patient Stated Goal: Know what to expect after surgery  Cystoscopy with Stent Removal    Cystoscopy is used to help diagnose urinary problems, or to remove a ureteral stent.    During a cystoscopy, your doctor examines the inside of your bladder with an instrument called a cystoscope. A cystoscope is a long, thin flexible tube with a camera at the end.    Your doctor will insert the scope into your urethra allowing him to visualize and evaluate the inside of the bladder for possible abnormalities. The urethra is the tube that carries urine to the outside of your body.    How is the stent removed?    Your stent will be removed in the [facility] with a small telescope and a grasping tool.  It usually takes less than 1 minute to remove the stent.    What should I expect after the stent is removed?     You should feel normal by the next day.    Some patients find:      An increase in back pain about an hour after the stent is removed as the kidney fills up with urine before it starts to empty.  It can be as uncomfortable as your initial stone episode.  Taking pain medications before stent removal may be helpful, but you would need someone else to drive you to and from your appointment.    Bladder symptoms usually disappear by the next morning.    Small amounts of blood in the urine may be seen occasionally for up to a week.    At Home:      It is important to drink plenty of fluids after your procedure    You may continue to use your pain medications as prescribed    What symptoms should I watch for?    Fever     Chills    Increasing back pain that is not relieved with pain medications    Large amounts of blood in the urine or large clots    Leakage of urine (incontinence)     Are not able to urinate for 8 hours    These symptoms may mean you have a blockage or infection. Call the [facility] 24 hours a day at [phone] immediately.

## 2021-06-05 NOTE — PATIENT INSTRUCTIONS - HE
Patient Stated Goal: Prevent further stones  Steps for collecting a 24 hour urine specimen    Please follow the directions carefully. All urine voided for a 24-hour period needs to be collected into the jug.  DO NOT change any of your  normal  daily habits when doing this test. Continue to follow your regular diet, intake of fluids, and usual activity level. Pick the most convenient day with your schedule, perhaps on a weekend or a day off.    Start your Diet Log the day before collection and continue on the day of urine collection.  You MUST bring Diet Log with you on follow up visit to discuss results.    One 24hr Urine Collection     Two 24hr Urine Collections  (do not collect on consecutive days)    PLEASE COMPLETE THE 2nd JUG WITHIN 1-2 WEEKS FROM THE 1st JUG    STEP 1  Empty your bladder completely into the toilet. This will be your start time. Write your full legal name, start date and time on the jug label.  Collection start and stop times need to match exactly!  For example:  6 am to 6 am.    STEP 2  The next time you urinate, empty your bladder directly into the jug or collection hat and pour urine into the jug.  Screw the lid back onto the jug.  Do not spill!    STEP 3  Place the jug in the refrigerator or a cooler with ice during the collection period.  Failure to keep it cool could cause inaccurate test results. DO NOT Freeze.    STEP 4  Continue collecting all urine into the jug for the rest of the day, for the full 24 hours.  DO NOT stop early or go over 24 hours!    STEP 5  Exactly 24 hours from start of collection, write your full legal name, stop date and time on the jug label.   Collection start and stop times need to match exactly!  For example:  6 am to 6 am.  Failure to label correctly will result in recollection of urine specimen.    STEP 6  Return each jug within 24 hours after final urination.     STEP 7  Drop off jug locations:   [facility] Lab: Mon-Fri 7am-7pm - Closed on  weekends  [facility] Lab: Mon-Fri 7am-5pm - Closed on Sunday  [facility]: Mon-Fri 7am-6:30pm - Closed on weekends    STEP 8  Please call [facility] after return of your final jug to schedule your follow-up visit. [phone]

## 2021-06-05 NOTE — PROGRESS NOTES
Assessment/Plan:        Diagnoses and all orders for this visit:    Calculus of kidney  -     Urinalysis Macroscopic  -     Magnesium, 24 Hour Urine; Future  -     Stone Formation, 24 Hour Urine (does not include Magnesium); Standing  -     Calcium, Ionized, Measured; Future; Expected date: 02/19/2020  -     Parathyroid Hormone Intact with Minerals; Future; Expected date: 02/19/2020  -     Patient Stated Goal: Prevent further stones  -     24 Hour Urine Collection Steps Education    Stone Management Plan  [facility] [place] Stone Management 12/20/2019 1/10/2020   Urinary Tract Infection No suspicion of infection No suspicion of infection   Renal Colic Asymptomatic at this time Well controlled symptoms   Renal Failure No suspicion of renal failure No suspicion of renal failure   Current CT date 12/6/2019 -   Right sided stones? Yes -   R Number of ureteral stones No ureteral stones -   R Number of kidney stones  1 -   R GSD of kidney stones < 2 -   R Hydronephrosis None -   R Stone Event No current event No current event   Left sided stones? Yes -   L Number of ureteral stones 1 -   L GSD of ureteral stones 2 -   L Location of ureteral stone Distal -   L Number of kidney stones  1 -   L GSD of kidney stones 10 - 15 -   L Hydronephrosis Mild -   L Stone Event New event -   Diagnosis date 12/6/2019 -   Initial location of primary symptomatic stone Renal -   Initial GSD of primary symptomatic stone > 15 -   Post-op status - Stent Removal   L Current Plan Clear -   Clear rationale Optimally managed electively -         Subjective:      HPI  Ms. Nerissa Valentin is a 74 y.o.  female returning to the [facility] for late postoperative follow-up.     She returns status post Left ureteroscopic laser lithotripsy for renal and ureteral stones. She has had no unanticipated events.     She is asymptomatic at present. She denies symptoms of fever, chills, flank pain, nausea, vomiting, urinary frequency and  dysuria.    New CT scan was personally reviewed and demonstrates complete clearance of targeted stone with improvement of left hydronephrosis. Tiny right renal stone.    Stone composition was 70 % calcium oxalate and 30 % calcium phosphate (apatite).     PLAN    73 yo F with hx of recurrent calcium based stone disease s/p left ureteroscopy for clearance of ureteral and renal stones with no residual fragments. Mild dilation of left collecting system, asymptomatic.    She is at risk for ongoing active stone disease and will initiate stone risk evaluation. Serum stone risk chemistries including parathyroid hormone and ionized calcium will be obtained before next visit. Two 24 hour urine collections ([facility]) and dietary journal will be obtained at earliest covenience.       ROS   Review of systems is negative except for HPI.    Past Medical History:   Diagnosis Date     Asthma      Dysthymic disorder      Hematuria      Hyperlipidemia      Hypertension      Kidney stone      Leucocytosis      Obesity      Osteoarthritis      Pernicious anemia      Shortness of breath      Vitamin B12 deficiency      Past Surgical History:   Procedure Laterality Date     ABDOMINAL SURGERY       APPENDECTOMY       BREAST BIOPSY Right      HYSTERECTOMY       kidney stone removal  1971     LAPAROSCOPIC OVARIAN CYSTECTOMY       LITHOTRIPSY       open surgery - right kidney stone   1971 or 73     OVARIAN CYST REMOVAL  x2     ROTATOR CUFF REPAIR Bilateral      salpingo-oophorectomy Bilateral      URETEROSCOPY      x2       Current Outpatient Medications   Medication Sig Dispense Refill     acetaminophen (TYLENOL) 500 MG tablet Take 1,000 mg by mouth every 6 (six) hours as needed for pain.       albuterol (PROAIR HFA;PROVENTIL HFA;VENTOLIN HFA) 90 mcg/actuation inhaler Inhale 2 puffs.       atorvastatin (LIPITOR) 40 MG tablet Take 40 mg by mouth.       ibuprofen (ADVIL,MOTRIN) 400 MG tablet Take 400 mg by mouth every 6 (six) hours as needed  for pain.       lisinopril (PRINIVIL,ZESTRIL) 40 MG tablet Take 40 mg by mouth.       sertraline (ZOLOFT) 50 MG tablet Take 50 mg by mouth.       No current facility-administered medications for this visit.        Allergies   Allergen Reactions     Haemophilus Influenzae Type B Dermatitis and Other (See Comments)     2 years in a row after the flu shot did get  High fever,  Localized reaction .      Penicillins Hives       Social History     Socioeconomic History     Marital status:      Spouse name: Not on file     Number of children: Not on file     Years of education: Not on file     Highest education level: Not on file   Occupational History     Not on file   Social Needs     Financial resource strain: Not on file     Food insecurity:     Worry: Not on file     Inability: Not on file     Transportation needs:     Medical: Not on file     Non-medical: Not on file   Tobacco Use     Smoking status: Never Smoker     Smokeless tobacco: Never Used   Substance and Sexual Activity     Alcohol use: Yes     Drug use: Not Currently     Sexual activity: Not on file   Lifestyle     Physical activity:     Days per week: Not on file     Minutes per session: Not on file     Stress: Not on file   Relationships     Social connections:     Talks on phone: Not on file     Gets together: Not on file     Attends Zoroastrianism service: Not on file     Active member of club or organization: Not on file     Attends meetings of clubs or organizations: Not on file     Relationship status: Not on file     Intimate partner violence:     Fear of current or ex partner: Not on file     Emotionally abused: Not on file     Physically abused: Not on file     Forced sexual activity: Not on file   Other Topics Concern     Not on file   Social History Narrative     Not on file       Family History   Problem Relation Age of Onset     Heart disease Father      Diabetes Father      Heart disease Mother      Cancer Maternal Grandfather      Cancer  Sister      Cancer Sister      Cancer Sister      Cancer Sister      Objective:      Physical Exam  Vitals:    02/05/20 1110   BP: (!) 181/74   Pulse: 81   Temp: 98.3  F (36.8  C)     General - well developed, well nourished, appropriate for age. Appears no distress at this time  Abdomen - mildly obese soft, non-tender, no hepatosplenomegaly, no masses.   - no flank tenderness, no suprapubic tenderness, kidney and bladder non-palpable  MSK - normal spinal curvature. no spinal tenderness. normal gait. muscular strength intact.  Psych - oriented to time, place, and person, normal mood and affect.    Labs   Urinalysis POC (Office):  Nitrite, UA   Date Value Ref Range Status   01/10/2020 Negative Negative Final   12/18/2019 Negative Negative Final       Lab Urinalysis:  Blood, UA   Date Value Ref Range Status   01/10/2020 Moderate (!) Negative Final   12/18/2019 Moderate (!) Negative Final     Nitrite, UA   Date Value Ref Range Status   01/10/2020 Negative Negative Final   12/18/2019 Negative Negative Final     Leukocytes, UA   Date Value Ref Range Status   01/10/2020 Small (!) Negative Final   12/18/2019 Small (!) Negative Final     pH, UA   Date Value Ref Range Status   01/10/2020 7.0 5.0 - 8.0 Final   12/18/2019 6.5 5.0 - 8.0 Final    and Stone prevention labs Serum chemistries No results found for: CREATININE, CRCLEARANCE, K, CALCIUM, PHOS, URICACID, Calcium metabolism No results found for: CAIONMEAS   No results found for: PTH  No results found for: [id number]    and 24 hour urine No results found for: [id number], CALCIUMUR, RC86YVT, [id number], BSVYH90H, LABPH, LABURIN

## 2021-06-05 NOTE — PROGRESS NOTES
Assessment/Plan:        Diagnoses and all orders for this visit:    Calculus of kidney  -     Urinalysis Macroscopic  -     Culture, Urine- Future; Future; Expected date: 02/09/2020  -     Culture, Urine- Future  -     Cystoscopy with Stent Removal Education  -     CT Abdomen Pelvis Without Oral Without IV Contrast; Future; Expected date: 02/09/2020  -     Patient Stated Goal: Know what to expect after surgery  -     ciprofloxacin HCl tablet 250 mg (CIPRO)    Calculus of ureter  -     Cystoscopy with Stent Removal Education  -     CT Abdomen Pelvis Without Oral Without IV Contrast; Future; Expected date: 02/09/2020  -     Patient Stated Goal: Know what to expect after surgery  -     ciprofloxacin HCl tablet 250 mg (CIPRO)    Other orders  -     dimenhyDRINATE (DRAMAMINE) 50 MG tablet; Take 50 mg by mouth at bedtime as needed.  -     ciprofloxacin HCl (CIPRO) 250 MG tablet      Stone Management Plan  [facility] 12/20/2019 1/10/2020   Urinary Tract Infection No suspicion of infection No suspicion of infection   Renal Colic Asymptomatic at this time Well controlled symptoms   Renal Failure No suspicion of renal failure No suspicion of renal failure   Current CT date 12/6/2019 -   Right sided stones? Yes -   R Number of ureteral stones No ureteral stones -   R Number of kidney stones  1 -   R GSD of kidney stones < 2 -   R Hydronephrosis None -   R Stone Event No current event No current event   Left sided stones? Yes -   L Number of ureteral stones 1 -   L GSD of ureteral stones 2 -   L Location of ureteral stone Distal -   L Number of kidney stones  1 -   L GSD of kidney stones 10 - 15 -   L Hydronephrosis Mild -   L Stone Event New event -   Diagnosis date 12/6/2019 -   Initial location of primary symptomatic stone Renal -   Initial GSD of primary symptomatic stone > 15 -   Post-op status - Stent Removal   L Current Plan Clear -   Clear rationale Optimally managed electively -             Subjective:       HPI  Ms. Nerissa Valentin is a 74 y.o.  female returning to the [facility] [place] for stent removal postop left ureteroscopy laser lithotripsy per Dr. Cisneros January 3, 2020.  Stone analysis showed 70% calcium oxalate 30% calcium phosphate appetite.    Patient comes at this time with symptoms of urgency frequency and intermittent hematuria some mild pelvic and flank discomfort on occasion.    UA today specific gravity 1.025 pH 7 moderate blood negative nitrate small leukocyte.    Flexible cystourethroscopy accomplished with stent removed without difficulty.    Impression postop left laser lithotripsy 1/3/2020 per Dr. Cisneros   stent now out    Plan follow-up 4 weeks with CT scan and further disposition.           ROS   Review of systems is negative except for HPI.    Past Medical History:   Diagnosis Date     Asthma      Dysthymic disorder      Hematuria      Hyperlipidemia      Hypertension      Kidney stone      Leucocytosis      Obesity      Osteoarthritis      Pernicious anemia      Shortness of breath      Vitamin B12 deficiency        Past Surgical History:   Procedure Laterality Date     ABDOMINAL SURGERY       APPENDECTOMY       BREAST BIOPSY Right      HYSTERECTOMY       kidney stone removal  1971     LAPAROSCOPIC OVARIAN CYSTECTOMY       LITHOTRIPSY       open surgery - right kidney stone   1971 or 72     OVARIAN CYST REMOVAL  x2     ROTATOR CUFF REPAIR Bilateral      salpingo-oophorectomy Bilateral      URETEROSCOPY      x2       Current Outpatient Medications   Medication Sig Dispense Refill     acetaminophen (TYLENOL) 500 MG tablet Take 1,000 mg by mouth every 6 (six) hours as needed for pain.       albuterol (PROAIR HFA;PROVENTIL HFA;VENTOLIN HFA) 90 mcg/actuation inhaler Inhale 2 puffs.       atorvastatin (LIPITOR) 40 MG tablet Take 40 mg by mouth.       ibuprofen (ADVIL,MOTRIN) 400 MG tablet Take 400 mg by mouth every 6 (six) hours as needed for pain.       lisinopril  (PRINIVIL,ZESTRIL) 40 MG tablet Take 40 mg by mouth.       oxyCODONE (ROXICODONE) 5 MG immediate release tablet Take 1 tablet (5 mg total) by mouth every 6 (six) hours as needed for pain. 12 tablet 0     sertraline (ZOLOFT) 50 MG tablet Take 50 mg by mouth.       tamsulosin (FLOMAX) 0.4 mg cap Take 1 capsule (0.4 mg total) by mouth daily. 14 capsule 0     No current facility-administered medications for this visit.        Allergies   Allergen Reactions     Haemophilus Influenzae Type B Dermatitis and Other (See Comments)     2 years in a row after the flu shot did get  High fever,  Localized reaction .      Penicillins Hives       Social History     Socioeconomic History     Marital status:      Spouse name: Not on file     Number of children: Not on file     Years of education: Not on file     Highest education level: Not on file   Occupational History     Not on file   Social Needs     Financial resource strain: Not on file     Food insecurity:     Worry: Not on file     Inability: Not on file     Transportation needs:     Medical: Not on file     Non-medical: Not on file   Tobacco Use     Smoking status: Never Smoker     Smokeless tobacco: Never Used   Substance and Sexual Activity     Alcohol use: Yes     Drug use: Not Currently     Sexual activity: Not on file   Lifestyle     Physical activity:     Days per week: Not on file     Minutes per session: Not on file     Stress: Not on file   Relationships     Social connections:     Talks on phone: Not on file     Gets together: Not on file     Attends L[employer] service: Not on file     Active member of club or organization: Not on file     Attends meetings of clubs or organizations: Not on file     Relationship status: Not on file     Intimate partner violence:     Fear of current or ex partner: Not on file     Emotionally abused: Not on file     Physically abused: Not on file     Forced sexual activity: Not on file   Other Topics Concern     Not on file    Social History Narrative     Not on file       Family History   Problem Relation Age of Onset     Heart disease Father      Diabetes Father      Heart disease Mother      Cancer Maternal Grandfather      Cancer Sister      Cancer Sister      Cancer Sister      Cancer Sister      Objective:      Physical Exam  There were no vitals filed for this visit.  General - well developed, well nourished, appropriate for age. Appears no distress at this time  Abdomen -  soft, non-tender, no hepatosplenomegaly, no masses.   - no flank tenderness, no suprapubic tenderness, kidney and bladder non-palpable  MSK - normal spinal curvature. no spinal tenderness. normal gait. muscular strength intact.  Psych - oriented to time, place, and person, normal mood and affect.      Labs   Urinalysis POC (Office):  Nitrite, UA   Date Value Ref Range Status   01/10/2020 Negative Negative Final   12/18/2019 Negative Negative Final       Lab Urinalysis:  Blood, UA   Date Value Ref Range Status   01/10/2020 Moderate (!) Negative Final   12/18/2019 Moderate (!) Negative Final     Nitrite, UA   Date Value Ref Range Status   01/10/2020 Negative Negative Final   12/18/2019 Negative Negative Final     Leukocytes, UA   Date Value Ref Range Status   01/10/2020 Small (!) Negative Final   12/18/2019 Small (!) Negative Final     pH, UA   Date Value Ref Range Status   01/10/2020 7.0 5.0 - 8.0 Final   12/18/2019 6.5 5.0 - 8.0 Final

## 2021-06-11 ENCOUNTER — ANESTHESIA EVENT (OUTPATIENT)
Dept: GASTROENTEROLOGY | Facility: CLINIC | Age: 76
End: 2021-06-11
Payer: COMMERCIAL

## 2021-06-11 DIAGNOSIS — Z11.59 ENCOUNTER FOR SCREENING FOR OTHER VIRAL DISEASES: ICD-10-CM

## 2021-06-11 LAB
SARS-COV-2 RNA RESP QL NAA+PROBE: NORMAL
SPECIMEN SOURCE: NORMAL

## 2021-06-11 PROCEDURE — U0005 INFEC AGEN DETEC AMPLI PROBE: HCPCS | Performed by: SURGERY

## 2021-06-11 PROCEDURE — U0003 INFECTIOUS AGENT DETECTION BY NUCLEIC ACID (DNA OR RNA); SEVERE ACUTE RESPIRATORY SYNDROME CORONAVIRUS 2 (SARS-COV-2) (CORONAVIRUS DISEASE [COVID-19]), AMPLIFIED PROBE TECHNIQUE, MAKING USE OF HIGH THROUGHPUT TECHNOLOGIES AS DESCRIBED BY CMS-2020-01-R: HCPCS | Performed by: SURGERY

## 2021-06-11 NOTE — ANESTHESIA PREPROCEDURE EVALUATION
Anesthesia Pre-Procedure Evaluation    Patient: Nreissa Valentin   MRN: [id number] : 1945        Preoperative Diagnosis: Chronic gastritis without bleeding, unspecified gastritis type [K29.50]   Procedure : Procedure(s):  ESOPHAGOGASTRODUODENOSCOPY (EGD)     Past Medical History:   Diagnosis Date     Asthma, mild persistent      Depressive disorder     only child killed by drunk      Dysthymic disorder      Hyperlipidemia LDL goal < 130      Hypokalemia 03/15/2013    appears when she was on chlorthalidone     Leukocytosis, unspecified     white count runs from 13-15     Osteoarthritis      Postmenopausal     HRT 10 years     Unspecified essential hypertension       Past Surgical History:   Procedure Laterality Date     ABDOMEN SURGERY  , ,     ovary removed x2 and abd. hyst.     APPENDECTOMY       BREAST BIOPSY, RT/LT      Breat Biopsy RT     C REMOVAL OF KIDNEY STONE       COLONOSCOPY N/A 3/8/2021    Procedure: COLONOSCOPY, WITH POLYPECTOMY AND BIOPSY;  Surgeon: Yadi Vital MD;  Location: [facility]     ESOPHAGOSCOPY, GASTROSCOPY, DUODENOSCOPY (EGD), COMBINED N/A 3/8/2021    Procedure: ESOPHAGOGASTRODUODENOSCOPY, WITH BIOPSY;  Surgeon: Yadi Vital MD;  Location: [facility]     FLEXIBLE SIGMOIDOSCOPY   ?    Dr. Cline at [facility]     HYSTERECTOMY, PAP NO LONGER INDICATED       HYSTERECTOMY, VAGINAL  1971     LITHOTRIPSY       ROTATOR CUFF REPAIR RT/LT      left     ROTATOR CUFF REPAIR RT/LT  2009    right twice?     SALPINGO OOPHORECTOMY,R/L/HARLEY   and     Salpingo Oophorectomy, RT/LT/HARLEY     SURGICAL HISTORY OF -   ,     ovary cystectomy      Allergies   Allergen Reactions     Flu Virus Vaccine Dermatitis and Other (See Comments)     2 years in a row after the flu shot did get  High fever,  Localized reaction .      Other [No Clinical Screening - See Comments]      CHROMIC GUT SUTURES CAUSE ABCESS     Pcn [Penicillins] Hives      Social History      Tobacco Use     Smoking status: Never Smoker     Smokeless tobacco: Never Used   Substance Use Topics     Alcohol use: Yes     Comment: rare      Wt Readings from Last 1 Encounters:   03/08/21 69.4 kg (153 lb)        Anesthesia Evaluation   Pt has had prior anesthetic. Type: MAC, General and Regional.        ROS/MED HX  ENT/Pulmonary:     (+) Mild Persistent, asthma Treatment: Inhaler prn,      Neurologic:       Cardiovascular:     (+) Dyslipidemia hypertension-----Previous cardiac testing   Echo: Date: Results:    Stress Test: Date: Results:    ECG Reviewed: Date:  Results:  Sinus Rhythm -First degree A-V block  - occasional ectopic ventricular beat     Tati = 282  BORDERLINE RHYTHM  Cath: Date: Results:      METS/Exercise Tolerance:     Hematologic:     (+) anemia,     Musculoskeletal:  - neg musculoskeletal ROS     GI/Hepatic:     (+) GERD,     Renal/Genitourinary:     (+) Nephrolithiasis ,     Endo:     (+) Obesity,     Psychiatric/Substance Use:     (+) psychiatric history depression     Infectious Disease:       Malignancy:  - neg malignancy ROS     Other:            Physical Exam    Airway        Mallampati: II   TM distance: > 3 FB   Neck ROM: full   Mouth opening: > 3 cm    Respiratory Devices and Support         Dental  no notable dental history         Cardiovascular   cardiovascular exam normal          Pulmonary   pulmonary exam normal                OUTSIDE LABS:  CBC:   Lab Results   Component Value Date    WBC 9.3 03/21/2021    WBC 7.8 02/28/2021    HGB 9.5 (L) 03/21/2021    HGB 8.7 (L) 02/28/2021    HCT 34.1 (L) 03/21/2021    HCT 31.0 (L) 02/28/2021     03/21/2021     02/28/2021     BMP:   Lab Results   Component Value Date     02/28/2021     12/10/2020    POTASSIUM 3.6 02/28/2021    POTASSIUM 3.5 12/10/2020    CHLORIDE 108 02/28/2021    CHLORIDE 107 12/10/2020    CO2 26 02/28/2021    CO2 29 12/10/2020    BUN 11 02/28/2021    BUN 11 12/10/2020    CR 0.69  02/28/2021    CR 0.73 12/10/2020    GLC 92 02/28/2021     (H) 12/10/2020     COAGS: No results found for: PTT, INR, FIBR  POC: No results found for: BGM, HCG, HCGS  HEPATIC:   Lab Results   Component Value Date    ALBUMIN 3.3 (L) 02/28/2021    PROTTOTAL 7.3 02/28/2021    ALT 25 02/28/2021    AST 39 02/28/2021    ALKPHOS 102 02/28/2021    BILITOTAL 0.9 02/28/2021     OTHER:   Lab Results   Component Value Date    DAMIAN 8.7 02/28/2021    TSH 2.20 02/28/2021       Anesthesia Plan    ASA Status:  3      Anesthesia Type: General.   Induction: Intravenous.           Consents    Anesthesia Plan(s) and associated risks, benefits, and realistic alternatives discussed. Questions answered and patient/representative(s) expressed understanding.     - Discussed with:  Patient         Postoperative Care    Pain management: IV analgesics.   PONV prophylaxis: Ondansetron (or other 5HT-3), Dexamethasone or Solumedrol     Comments:                RICCI Long CRNA

## 2021-06-14 ENCOUNTER — HOSPITAL ENCOUNTER (OUTPATIENT)
Facility: CLINIC | Age: 76
Discharge: HOME | End: 2021-06-14
Attending: SURGERY | Admitting: SURGERY
Payer: COMMERCIAL

## 2021-06-14 ENCOUNTER — ANESTHESIA (OUTPATIENT)
Dept: GASTROENTEROLOGY | Facility: CLINIC | Age: 76
End: 2021-06-14
Payer: COMMERCIAL

## 2021-06-14 VITALS
HEART RATE: 60 BPM | RESPIRATION RATE: 18 BRPM | TEMPERATURE: 98.4 F | WEIGHT: 148 LBS | SYSTOLIC BLOOD PRESSURE: 145 MMHG | BODY MASS INDEX: 29.06 KG/M2 | DIASTOLIC BLOOD PRESSURE: 65 MMHG | OXYGEN SATURATION: 98 % | HEIGHT: 60 IN

## 2021-06-14 DIAGNOSIS — K29.00 ACUTE GASTRITIS WITHOUT HEMORRHAGE, UNSPECIFIED GASTRITIS TYPE: Primary | ICD-10-CM

## 2021-06-14 LAB — UPPER GI ENDOSCOPY: NORMAL

## 2021-06-14 PROCEDURE — 250N000011 HC RX IP 250 OP 636: Performed by: NURSE ANESTHETIST, CERTIFIED REGISTERED

## 2021-06-14 PROCEDURE — 88305 TISSUE EXAM BY PATHOLOGIST: CPT | Mod: 26 | Performed by: PATHOLOGY

## 2021-06-14 PROCEDURE — 250N000009 HC RX 250: Performed by: NURSE ANESTHETIST, CERTIFIED REGISTERED

## 2021-06-14 PROCEDURE — 250N000009 HC RX 250: Performed by: SURGERY

## 2021-06-14 PROCEDURE — 370N000017 HC ANESTHESIA TECHNICAL FEE, PER MIN: Performed by: SURGERY

## 2021-06-14 PROCEDURE — 88305 TISSUE EXAM BY PATHOLOGIST: CPT | Mod: TC | Performed by: SURGERY

## 2021-06-14 PROCEDURE — 43239 EGD BIOPSY SINGLE/MULTIPLE: CPT | Performed by: SURGERY

## 2021-06-14 PROCEDURE — 258N000003 HC RX IP 258 OP 636: Performed by: SURGERY

## 2021-06-14 RX ORDER — LIDOCAINE 40 MG/G
CREAM TOPICAL
Status: DISCONTINUED | OUTPATIENT
Start: 2021-06-14 | End: 2021-06-14 | Stop reason: HOSPADM

## 2021-06-14 RX ORDER — OMEPRAZOLE 40 MG/1
40 CAPSULE, DELAYED RELEASE ORAL DAILY
Qty: 60 CAPSULE | Refills: 1 | Status: SHIPPED | OUTPATIENT
Start: 2021-06-14 | End: 2021-12-21

## 2021-06-14 RX ORDER — ONDANSETRON 2 MG/ML
4 INJECTION INTRAMUSCULAR; INTRAVENOUS
Status: DISCONTINUED | OUTPATIENT
Start: 2021-06-14 | End: 2021-06-14 | Stop reason: HOSPADM

## 2021-06-14 RX ORDER — SODIUM CHLORIDE, SODIUM LACTATE, POTASSIUM CHLORIDE, CALCIUM CHLORIDE 600; 310; 30; 20 MG/100ML; MG/100ML; MG/100ML; MG/100ML
INJECTION, SOLUTION INTRAVENOUS CONTINUOUS
Status: DISCONTINUED | OUTPATIENT
Start: 2021-06-14 | End: 2021-06-14 | Stop reason: HOSPADM

## 2021-06-14 RX ORDER — PROPOFOL 10 MG/ML
INJECTION, EMULSION INTRAVENOUS PRN
Status: DISCONTINUED | OUTPATIENT
Start: 2021-06-14 | End: 2021-06-14

## 2021-06-14 RX ORDER — PROPOFOL 10 MG/ML
INJECTION, EMULSION INTRAVENOUS CONTINUOUS PRN
Status: DISCONTINUED | OUTPATIENT
Start: 2021-06-14 | End: 2021-06-14

## 2021-06-14 RX ADMIN — SODIUM CHLORIDE, POTASSIUM CHLORIDE, SODIUM LACTATE AND CALCIUM CHLORIDE: 600; 310; 30; 20 INJECTION, SOLUTION INTRAVENOUS at 11:15

## 2021-06-14 RX ADMIN — PROPOFOL 50 MG: 10 INJECTION, EMULSION INTRAVENOUS at 11:22

## 2021-06-14 RX ADMIN — PROPOFOL 200 MCG/KG/MIN: 10 INJECTION, EMULSION INTRAVENOUS at 11:21

## 2021-06-14 ASSESSMENT — MIFFLIN-ST. JEOR: SCORE: 1092.82

## 2021-06-14 NOTE — OP NOTE
EGD - mild inflammation or the gastric cardia improved over last procedure biopsied. Gastric antrum with inflammation without ulceration.  Biopsy taken for H Pylori. Duodenum normal.

## 2021-06-14 NOTE — H&P
75 year old year old female here for upper endoscopy for epigastric pain.        Patient Active Problem List   Diagnosis     Leucocytosis     HTN (hypertension)     Dyslipidemia     Asthma, mild persistent     Dysthymic disorder     Osteoarthritis     Vitamin B12 deficiency anemia     Postmenopausal     Advanced directives, counseling/discussion     Health Care Home     Family history of diabetes mellitus     Obesity     BMI 31.0-31.9,adult     Fall due to ice or snow     Calculus of kidney     Anemia       Past Medical History:   Diagnosis Date     Asthma, mild persistent      Depressive disorder 1992    only child killed by drunk      Dysthymic disorder      Hyperlipidemia LDL goal < 130      Hypokalemia 03/15/2013    appears when she was on chlorthalidone     Leukocytosis, unspecified     white count runs from 13-15     Osteoarthritis      Postmenopausal     HRT 10 years     Unspecified essential hypertension        Past Surgical History:   Procedure Laterality Date     ABDOMEN SURGERY  1969, 1970, 1971    ovary removed x2 and abd. hyst.     APPENDECTOMY  1971     BREAST BIOPSY, RT/LT  1999    Breat Biopsy RT     C REMOVAL OF KIDNEY STONE  1971     COLONOSCOPY N/A 3/8/2021    Procedure: COLONOSCOPY, WITH POLYPECTOMY AND BIOPSY;  Surgeon: Yadi Vital MD;  Location: [facility]     ESOPHAGOSCOPY, GASTROSCOPY, DUODENOSCOPY (EGD), COMBINED N/A 3/8/2021    Procedure: ESOPHAGOGASTRODUODENOSCOPY, WITH BIOPSY;  Surgeon: Yadi Vital MD;  Location: [facility]     FLEXIBLE SIGMOIDOSCOPY  2000 ?    Dr. Cline at [facility]     HYSTERECTOMY, PAP NO LONGER INDICATED       HYSTERECTOMY, VAGINAL  1971     LITHOTRIPSY  2005     ROTATOR CUFF REPAIR RT/LT  2007    left     ROTATOR CUFF REPAIR RT/LT  2009    right twice?     SALPINGO OOPHORECTOMY,R/L/HARLEY  1969 and 1970    Salpingo Oophorectomy, RT/LT/HARLEY     SURGICAL HISTORY OF -   1969, 1970    ovary cystectomy       Family History   Problem Relation Age of Onset     Arthritis Mother       C.A.D. Mother          of MI age 65     Hypertension Father      Diabetes Father      C.A.D. Father         early 70s     Cerebrovascular Disease Maternal Grandfather      Cancer - colorectal Paternal Grandfather      Breast Cancer Sister         in 30s     Breast Cancer Sister         mid 40s     Breast Cancer Maternal Cousin      Breast Cancer Maternal Cousin        No current outpatient medications on file.       Allergies   Allergen Reactions     Flu Virus Vaccine Dermatitis and Other (See Comments)     2 years in a row after the flu shot did get  High fever,  Localized reaction .      Other [No Clinical Screening - See Comments]      CHROMIC GUT SUTURES CAUSE ABCESS     Pcn [Penicillins] Hives       Pt reports that she has never smoked. She has never used smokeless tobacco. She reports current alcohol use. She reports that she does not use drugs.    Exam:    Awake, Alert OX3  Lungs - CTA bilaterally  CV - RRR, no murmurs, distal pulses intact  Abd - soft, non-distended, non-tender, +BS  Extr - No cyanosis or edema    A/P 75 year old year old female in need of upper endoscopy for epigastric pain. Risks, benefits, alternatives, and complications were discussed including the possibility of perforation and the patient agreed to proceed.    Dante Jensen MD

## 2021-06-14 NOTE — ANESTHESIA POSTPROCEDURE EVALUATION
Patient: Nerissa Valentin    Procedure(s):  ESOPHAGOGASTRODUODENOSCOPY, WITH BIOPSY    Diagnosis:Chronic gastritis without bleeding, unspecified gastritis type [K29.50]  Diagnosis Additional Information: No value filed.    Anesthesia Type:  General    Note:  Disposition: Outpatient   Postop Pain Control: Uneventful            Sign Out: Well controlled pain   PONV: No   Neuro/Psych: Uneventful            Sign Out: Acceptable/Baseline neuro status   Airway/Respiratory: Uneventful            Sign Out: Acceptable/Baseline resp. status   CV/Hemodynamics: Uneventful            Sign Out: Acceptable CV status; No obvious hypovolemia; No obvious fluid overload   Other NRE: NONE   DID A NON-ROUTINE EVENT OCCUR? No           Last vitals:  Vitals:    06/14/21 1044   BP: 138/70   Pulse: 63   Resp: 18   Temp: 36.9  C (98.4  F)   SpO2: 98%       Last vitals prior to Anesthesia Care Transfer:  CRNA VITALS  6/14/2021 1056 - 6/14/2021 1128      6/14/2021             Pulse:  79    Ht Rate:  79    SpO2:  98 %    Resp Rate (observed):  17          Electronically Signed By: RICCI Brown CRNA  June 14, 2021  11:28 AM

## 2021-06-14 NOTE — ANESTHESIA CARE TRANSFER NOTE
Patient: Nerissa Valentin    Procedure(s):  ESOPHAGOGASTRODUODENOSCOPY, WITH BIOPSY    Diagnosis: Chronic gastritis without bleeding, unspecified gastritis type [K29.50]  Diagnosis Additional Information: No value filed.    Anesthesia Type:   General     Note:      Level of Consciousness: awake      Independent Airway: airway patency satisfactory and stable  Dentition: dentition unchanged  Vital Signs Stable: post-procedure vital signs reviewed and stable  Report to RN Given: handoff report given  Patient transferred to: Phase II    Handoff Report: Identifed the Patient, Identified the Reponsible Provider, Reviewed the pertinent medical history, Discussed the surgical course, Reviewed Intra-OP anesthesia mangement and issues during anesthesia, Set expectations for post-procedure period and Allowed opportunity for questions and acknowledgement of understanding      Vitals: (Last set prior to Anesthesia Care Transfer)  CRNA VITALS  6/14/2021 1056 - 6/14/2021 1127      6/14/2021             Pulse:  79    Ht Rate:  79    SpO2:  98 %    Resp Rate (observed):  17        Electronically Signed By: RICCI Brown CRNA  June 14, 2021  11:27 AM

## 2021-06-15 LAB — COPATH REPORT: NORMAL

## 2021-06-16 ENCOUNTER — DOCUMENTATION ONLY (OUTPATIENT)
Dept: LAB | Facility: CLINIC | Age: 76
End: 2021-06-16

## 2021-06-16 DIAGNOSIS — D64.9 ANEMIA, UNSPECIFIED TYPE: Primary | ICD-10-CM

## 2021-06-20 DIAGNOSIS — D64.9 ANEMIA, UNSPECIFIED TYPE: ICD-10-CM

## 2021-06-20 LAB
ERYTHROCYTE [DISTWIDTH] IN BLOOD BY AUTOMATED COUNT: 13.3 % (ref 10–15)
FERRITIN SERPL-MCNC: 56 NG/ML (ref 8–252)
HCT VFR BLD AUTO: 41.2 % (ref 35–47)
HGB BLD-MCNC: 13.6 G/DL (ref 11.7–15.7)
IRON SATN MFR SERPL: 22 % (ref 15–46)
IRON SERPL-MCNC: 62 UG/DL (ref 35–180)
MCH RBC QN AUTO: 31.4 PG (ref 26.5–33)
MCHC RBC AUTO-ENTMCNC: 33 G/DL (ref 31.5–36.5)
MCV RBC AUTO: 95 FL (ref 78–100)
PLATELET # BLD AUTO: 156 10E9/L (ref 150–450)
RBC # BLD AUTO: 4.33 10E12/L (ref 3.8–5.2)
TIBC SERPL-MCNC: 286 UG/DL (ref 240–430)
WBC # BLD AUTO: 6.6 10E9/L (ref 4–11)

## 2021-06-20 PROCEDURE — 83540 ASSAY OF IRON: CPT | Performed by: PHYSICIAN ASSISTANT

## 2021-06-20 PROCEDURE — 85027 COMPLETE CBC AUTOMATED: CPT | Performed by: PHYSICIAN ASSISTANT

## 2021-06-20 PROCEDURE — 82728 ASSAY OF FERRITIN: CPT | Performed by: PHYSICIAN ASSISTANT

## 2021-06-20 PROCEDURE — 83550 IRON BINDING TEST: CPT | Performed by: PHYSICIAN ASSISTANT

## 2021-06-20 PROCEDURE — 36415 COLL VENOUS BLD VENIPUNCTURE: CPT | Performed by: PHYSICIAN ASSISTANT

## 2021-06-30 NOTE — RESULT ENCOUNTER NOTE
Nerissa,    Good news!  Iron levels and hemoglobin have now normalized.  You can try stopping any iron supplement.  Do stay on the higher dose of omeprazole prescribed by Dr Jensen.  Then see me in 2-3 months to consider dose decrease and repeat lab to see if maintaining normal hemoglobin.    Sarah

## 2021-09-18 ENCOUNTER — HEALTH MAINTENANCE LETTER (OUTPATIENT)
Age: 76
End: 2021-09-18

## 2021-12-08 DIAGNOSIS — I10 ESSENTIAL HYPERTENSION: ICD-10-CM

## 2021-12-13 RX ORDER — AMLODIPINE BESYLATE 5 MG/1
5 TABLET ORAL DAILY
Qty: 90 TABLET | Refills: 0 | Status: SHIPPED | OUTPATIENT
Start: 2021-12-13 | End: 2021-12-21

## 2021-12-13 RX ORDER — LISINOPRIL 40 MG/1
40 TABLET ORAL DAILY
Qty: 90 TABLET | Refills: 0 | Status: SHIPPED | OUTPATIENT
Start: 2021-12-13 | End: 2021-12-21

## 2021-12-21 ENCOUNTER — OFFICE VISIT (OUTPATIENT)
Dept: FAMILY MEDICINE | Facility: CLINIC | Age: 76
End: 2021-12-21
Payer: COMMERCIAL

## 2021-12-21 VITALS
TEMPERATURE: 99 F | DIASTOLIC BLOOD PRESSURE: 72 MMHG | HEIGHT: 58 IN | BODY MASS INDEX: 30.23 KG/M2 | WEIGHT: 144 LBS | SYSTOLIC BLOOD PRESSURE: 110 MMHG | RESPIRATION RATE: 16 BRPM | HEART RATE: 64 BPM

## 2021-12-21 DIAGNOSIS — E78.5 DYSLIPIDEMIA: ICD-10-CM

## 2021-12-21 DIAGNOSIS — I10 ESSENTIAL HYPERTENSION: ICD-10-CM

## 2021-12-21 DIAGNOSIS — D64.9 ANEMIA, UNSPECIFIED TYPE: ICD-10-CM

## 2021-12-21 DIAGNOSIS — F34.1 DYSTHYMIC DISORDER: ICD-10-CM

## 2021-12-21 DIAGNOSIS — Z00.00 ENCOUNTER FOR MEDICARE ANNUAL WELLNESS EXAM: Primary | ICD-10-CM

## 2021-12-21 LAB
ALBUMIN SERPL-MCNC: 3.1 G/DL (ref 3.4–5)
ALP SERPL-CCNC: 120 U/L (ref 40–150)
ALT SERPL W P-5'-P-CCNC: 47 U/L (ref 0–50)
ANION GAP SERPL CALCULATED.3IONS-SCNC: 5 MMOL/L (ref 3–14)
AST SERPL W P-5'-P-CCNC: 86 U/L (ref 0–45)
BILIRUB SERPL-MCNC: 1.1 MG/DL (ref 0.2–1.3)
BUN SERPL-MCNC: 13 MG/DL (ref 7–30)
CALCIUM SERPL-MCNC: 8.7 MG/DL (ref 8.5–10.1)
CHLORIDE BLD-SCNC: 108 MMOL/L (ref 94–109)
CHOLEST SERPL-MCNC: 98 MG/DL
CO2 SERPL-SCNC: 28 MMOL/L (ref 20–32)
CREAT SERPL-MCNC: 0.74 MG/DL (ref 0.52–1.04)
ERYTHROCYTE [DISTWIDTH] IN BLOOD BY AUTOMATED COUNT: 12.8 % (ref 10–15)
FASTING STATUS PATIENT QL REPORTED: YES
FERRITIN SERPL-MCNC: 26 NG/ML (ref 8–252)
GFR SERPL CREATININE-BSD FRML MDRD: 84 ML/MIN/1.73M2
GLUCOSE BLD-MCNC: 97 MG/DL (ref 70–99)
HCT VFR BLD AUTO: 39.1 % (ref 35–47)
HDLC SERPL-MCNC: 30 MG/DL
HGB BLD-MCNC: 12.5 G/DL (ref 11.7–15.7)
IRON SATN MFR SERPL: 13 % (ref 15–46)
IRON SERPL-MCNC: 42 UG/DL (ref 35–180)
LDLC SERPL CALC-MCNC: 48 MG/DL
MCH RBC QN AUTO: 31.3 PG (ref 26.5–33)
MCHC RBC AUTO-ENTMCNC: 32 G/DL (ref 31.5–36.5)
MCV RBC AUTO: 98 FL (ref 78–100)
NONHDLC SERPL-MCNC: 68 MG/DL
PLATELET # BLD AUTO: 169 10E3/UL (ref 150–450)
POTASSIUM BLD-SCNC: 3.7 MMOL/L (ref 3.4–5.3)
PROT SERPL-MCNC: 7.6 G/DL (ref 6.8–8.8)
RBC # BLD AUTO: 4 10E6/UL (ref 3.8–5.2)
SODIUM SERPL-SCNC: 141 MMOL/L (ref 133–144)
TIBC SERPL-MCNC: 336 UG/DL (ref 240–430)
TRIGL SERPL-MCNC: 102 MG/DL
VIT B12 SERPL-MCNC: 894 PG/ML (ref 193–986)
WBC # BLD AUTO: 7.1 10E3/UL (ref 4–11)

## 2021-12-21 PROCEDURE — 99397 PER PM REEVAL EST PAT 65+ YR: CPT | Performed by: NURSE PRACTITIONER

## 2021-12-21 PROCEDURE — 36415 COLL VENOUS BLD VENIPUNCTURE: CPT | Performed by: NURSE PRACTITIONER

## 2021-12-21 PROCEDURE — 83550 IRON BINDING TEST: CPT | Performed by: NURSE PRACTITIONER

## 2021-12-21 PROCEDURE — 99214 OFFICE O/P EST MOD 30 MIN: CPT | Mod: 25 | Performed by: NURSE PRACTITIONER

## 2021-12-21 PROCEDURE — 80061 LIPID PANEL: CPT | Performed by: NURSE PRACTITIONER

## 2021-12-21 PROCEDURE — 82728 ASSAY OF FERRITIN: CPT | Performed by: NURSE PRACTITIONER

## 2021-12-21 PROCEDURE — 85027 COMPLETE CBC AUTOMATED: CPT | Performed by: NURSE PRACTITIONER

## 2021-12-21 PROCEDURE — 80053 COMPREHEN METABOLIC PANEL: CPT | Performed by: NURSE PRACTITIONER

## 2021-12-21 PROCEDURE — 82607 VITAMIN B-12: CPT | Performed by: NURSE PRACTITIONER

## 2021-12-21 RX ORDER — NEEDLES, DISPOSABLE 25GX5/8"
NEEDLE, DISPOSABLE MISCELLANEOUS
Qty: 1 EACH | Refills: 6 | Status: SHIPPED | OUTPATIENT
Start: 2021-12-21 | End: 2023-02-02

## 2021-12-21 RX ORDER — ATORVASTATIN CALCIUM 40 MG/1
40 TABLET, FILM COATED ORAL DAILY
Qty: 90 TABLET | Refills: 3 | Status: SHIPPED | OUTPATIENT
Start: 2021-12-21 | End: 2022-09-12

## 2021-12-21 RX ORDER — CYANOCOBALAMIN 1000 UG/ML
1 INJECTION, SOLUTION INTRAMUSCULAR; SUBCUTANEOUS WEEKLY
Qty: 10 ML | Refills: 1 | Status: SHIPPED | OUTPATIENT
Start: 2021-12-21 | End: 2023-01-13

## 2021-12-21 RX ORDER — AMLODIPINE BESYLATE 5 MG/1
5 TABLET ORAL DAILY
Qty: 90 TABLET | Refills: 3 | Status: SHIPPED | OUTPATIENT
Start: 2021-12-21 | End: 2023-02-02

## 2021-12-21 RX ORDER — LISINOPRIL 40 MG/1
40 TABLET ORAL DAILY
Qty: 90 TABLET | Refills: 3 | Status: SHIPPED | OUTPATIENT
Start: 2021-12-21 | End: 2023-03-13

## 2021-12-21 ASSESSMENT — ANXIETY QUESTIONNAIRES
1. FEELING NERVOUS, ANXIOUS, OR ON EDGE: NOT AT ALL
5. BEING SO RESTLESS THAT IT IS HARD TO SIT STILL: NOT AT ALL
2. NOT BEING ABLE TO STOP OR CONTROL WORRYING: NOT AT ALL
6. BECOMING EASILY ANNOYED OR IRRITABLE: SEVERAL DAYS
GAD7 TOTAL SCORE: 1
3. WORRYING TOO MUCH ABOUT DIFFERENT THINGS: NOT AT ALL
7. FEELING AFRAID AS IF SOMETHING AWFUL MIGHT HAPPEN: NOT AT ALL

## 2021-12-21 ASSESSMENT — ACTIVITIES OF DAILY LIVING (ADL): CURRENT_FUNCTION: NO ASSISTANCE NEEDED

## 2021-12-21 ASSESSMENT — MIFFLIN-ST. JEOR: SCORE: 1038.96

## 2021-12-21 ASSESSMENT — PATIENT HEALTH QUESTIONNAIRE - PHQ9
5. POOR APPETITE OR OVEREATING: NOT AT ALL
SUM OF ALL RESPONSES TO PHQ QUESTIONS 1-9: 2

## 2021-12-21 NOTE — PATIENT INSTRUCTIONS
Patient Education   Personalized Prevention Plan  You are due for the preventive services outlined below.  Your care team is available to assist you in scheduling these services.  If you have already completed any of these items, please share that information with your care team to update in your medical record.  Health Maintenance Due   Topic Date Due     ANNUAL REVIEW OF HM ORDERS  Never done     Zoster (Shingles) Vaccine (1 of 2) Never done     Asthma Action Plan - yearly  11/29/2020     Diptheria Tetanus Pertussis (DTAP/TDAP/TD) Vaccine (3 - Td or Tdap) 02/01/2021     Asthma Control Test  06/10/2021     Depression Assessment  06/10/2021     Flu Vaccine (1) 09/01/2021     FALL RISK ASSESSMENT  12/10/2021     COVID-19 Vaccine (3 - Booster for Moderna series) 12/25/2021       Urinary Incontinence, Female (Adult)   Urinary incontinence means loss of bladder control. This problem affects many women, especially as they get older. If you have incontinence, you may be embarrassed to ask for help. But know that this problem can be treated.   Types of Incontinence  There are different types of incontinence. Two of the main types are described here. You can have more than one type.     Stress incontinence. With this type, urine leaks when pressure (stress) is put on the bladder. This may happen when you cough, sneeze, or laugh. Stress incontinence most often occurs because the pelvic floor muscles that support the bladder and urethra are weak. This can happen after pregnancy and vaginal childbirth or a hysterectomy. It can also be due to excess body weight or hormone changes.    Urge incontinence (also called overactive bladder). With this type, a sudden urge to urinate is felt often. This may happen even though there may not be much urine in the bladder. The need to urinate often during the night is common. Urge incontinence most often occurs because of bladder spasms. This may be due to bladder irritation or infection.  Damage to bladder nerves or pelvic muscles, constipation, and certain medicines can also lead to urge incontinence.  Treatment depends on the cause. Further evaluation is needed to find the type you have. This will likely include an exam and certain tests. Based on the results, you and your healthcare provider can then plan treatment. Until a diagnosis is made, the home care tips below can help ease symptoms.   Home care    Do pelvic floor muscle exercises, if they are prescribed. The pelvic floor muscles help support the bladder and urethra. Many women find that their symptoms improve when doing special exercises that strengthen these muscles. To do the exercises, contract the muscles you would use to stop your stream of urine. But do this when you re not urinating. Hold for 10 seconds, then relax. Repeat 10 to 20 times in a row, at least 3 times a day. Your healthcare provider may give you other instructions for how to do the exercises and how often.    Keep a bladder diary. This helps track how often and how much you urinate over a set period of time. Bring this diary with you to your next visit with the provider. The information can help your provider learn more about your bladder problem.    Lose weight, if advised to by your provider. Extra weight puts pressure on the bladder. Your provider can help you create a weight-loss plan that s right for you. This may include exercising more and making certain diet changes.    Don't have foods and drinks that may irritate the bladder. These can include alcohol and caffeinated drinks.    Quit smoking. Smoking and other tobacco use can lead to a long-term (chronic) cough that strains the pelvic floor muscles. Smoking may also damage the bladder and urethra. Talk with your provider about treatments or methods you can use to quit smoking.    If drinking large amounts of fluid makes you have symptoms, you may be advised to limit your fluid intake. You may also be advised to  drink most of your fluids during the day and to limit fluids at night.    If you re worried about urine leakage or accidents, you may wear absorbent pads to catch urine. Change the pads often. This helps reduce discomfort. It may also reduce the risk of skin or bladder infections.    Follow-up care  Follow up with your healthcare provider, or as directed. It may take some to find the right treatment for your problem. But healthy lifestyle changes can be made right away. These include such things as exercising on a regular basis, eating a healthy diet, losing weight (if needed), and quitting smoking. Your treatment plan may include special therapies or medicines. Certain procedures or surgery may also be options. Talk about any questions you have with your provider.   When to seek medical advice  Call the healthcare provider right away if any of these occur:    Fever of 100.4 F (38 C) or higher, or as directed by your provider    Bladder pain or fullness    Belly swelling    Nausea or vomiting    Back pain    Weakness, dizziness, or fainting  Will last reviewed this educational content on 1/1/2020 2000-2021 [employer]. All rights reserved. This information is not intended as a substitute for professional medical care. Always follow your healthcare professional's instructions.

## 2021-12-21 NOTE — PROGRESS NOTES
"SUBJECTIVE:   Nerissa Valentin is a 75 year old female who presents for Preventive Visit.    Patient has been advised of split billing requirements and indicates understanding: Yes   Are you in the first 12 months of your Medicare coverage?  No    Healthy Habits:     In general, how would you rate your overall health?  Good    Frequency of exercise:  6-7 days/week    Duration of exercise:  30-45 minutes    Do you usually eat at least 4 servings of fruit and vegetables a day, include whole grains    & fiber and avoid regularly eating high fat or \"junk\" foods?  Yes    Taking medications regularly:  Yes    Barriers to taking medications:  Not applicable    Medication side effects:  Not applicable    Ability to successfully perform activities of daily living:  No assistance needed    Home Safety:  No safety concerns identified    Hearing Impairment:  No hearing concerns    In the past 6 months, have you been bothered by leaking of urine? Yes    In general, how would you rate your overall mental or emotional health?  Good      PHQ-2 Total Score: 0    Additional concerns today:  No    Do you feel safe in your environment? Yes    Have you ever done Advance Care Planning? (For example, a Health Directive, POLST, or a discussion with a medical provider or your loved ones about your wishes): No, advance care planning information given to patient to review.  Patient declined advance care planning discussion at this time.       Fall risk  Fallen 2 or more times in the past year?: No  Any fall with injury in the past year?: No    Cognitive Screening   1) Repeat 3 items (Leader, Season, Table)    2) Clock draw: NORMAL  3) 3 item recall: Recalls 3 objects  Results: 3 items recalled: COGNITIVE IMPAIRMENT LESS LIKELY      Reviewed and updated as needed this visit by clinical staff  Tobacco  Allergies  Meds  Problems  Med Hx  Surg Hx  Fam Hx  Soc Hx         Reviewed and updated as needed this visit by Provider  Tobacco  " Allergies  Meds  Problems  Med Hx  Surg Hx  Fam Hx        Social History     Tobacco Use     Smoking status: Never Smoker     Smokeless tobacco: Never Used   Substance Use Topics     Alcohol use: Yes     Comment: rare         Alcohol Use 12/10/2020   Prescreen: >3 drinks/day or >7 drinks/week? No   Prescreen: >3 drinks/day or >7 drinks/week? -       Anemia - pt administers b-12 injections monthly     Hyperlipidemia Follow-Up      Are you regularly taking any medication or supplement to lower your cholesterol?   Yes- atorvastatin     Are you having muscle aches or other side effects that you think could be caused by your cholesterol lowering medication?  No    Hypertension Follow-up      Do you check your blood pressure regularly outside of the clinic? Yes     Are you following a low salt diet? No    Are your blood pressures ever more than 140 on the top number (systolic) OR more   than 90 on the bottom number (diastolic), for example 140/90? No    Depression Followup    How are you doing with your depression since your last visit? No change    Are you having other symptoms that might be associated with depression? No    Have you had a significant life event?  No     Are you feeling anxious or having panic attacks?   No    Do you have any concerns with your use of alcohol or other drugs? No    Social History     Tobacco Use     Smoking status: Never Smoker     Smokeless tobacco: Never Used   Substance Use Topics     Alcohol use: Yes     Comment: rare     Drug use: No     PHQ 11/15/2018 11/29/2019 12/10/2020   PHQ-9 Total Score 2 2 2   Q9: Thoughts of better off dead/self-harm past 2 weeks Not at all Not at all Not at all     JOSE CARLOS-7 SCORE 11/7/2017 11/15/2018 12/10/2020   Total Score - - -   Total Score - - 0 (minimal anxiety)   Total Score 0 1 0       Current providers sharing in care for this patient include:   Patient Care Team:  Sarah Carlos PA-C as PCP - General (Family Medicine)  [place]  "Sarah MASON PA-C as Assigned PCP    The following health maintenance items are reviewed in Epic and correct as of today:  Health Maintenance Due   Topic Date Due     ANNUAL REVIEW OF HM ORDERS  Never done     ZOSTER IMMUNIZATION (1 of 2) Never done     ASTHMA ACTION PLAN  11/29/2020     DTAP/TDAP/TD IMMUNIZATION (3 - Td or Tdap) 02/01/2021     ASTHMA CONTROL TEST  06/10/2021     PHQ-9  06/10/2021     INFLUENZA VACCINE (1) 09/01/2021     FALL RISK ASSESSMENT  12/10/2021     COVID-19 Vaccine (3 - Booster for Moderna series) 12/25/2021     Lab work is in process      Review of Systems  Constitutional, HEENT, cardiovascular, pulmonary, GI, , musculoskeletal, neuro, skin, endocrine and psych systems are negative, except as otherwise noted.    OBJECTIVE:   /72 (Cuff Size: Adult Regular)   Pulse 64   Temp 99  F (37.2  C) (Tympanic)   Resp 16   Ht 1.467 m (4' 9.75\")   Wt 65.3 kg (144 lb)   BMI 30.36 kg/m   Estimated body mass index is 30.36 kg/m  as calculated from the following:    Height as of this encounter: 1.467 m (4' 9.75\").    Weight as of this encounter: 65.3 kg (144 lb).  Physical Exam  GENERAL APPEARANCE: healthy, alert and no distress  EYES: Eyes grossly normal to inspection, PERRL and conjunctivae and sclerae normal  HENT: ear canals and TM's normal, nose and mouth without ulcers or lesions, oropharynx clear and oral mucous membranes moist  NECK: no adenopathy, no asymmetry, masses, or scars and thyroid normal to palpation  RESP: lungs clear to auscultation - no rales, rhonchi or wheezes  BREAST: deferred-declined by Nerissa  CV: regular rate and rhythm, normal S1 S2, no S3 or S4, no murmur, click or rub, no peripheral edema and peripheral pulses strong  ABDOMEN: soft, nontender, no hepatosplenomegaly, no masses and bowel sounds normal  MS: no musculoskeletal defects are noted and gait is age appropriate without ataxia  SKIN: no suspicious lesions or rashes  NEURO: Normal strength and tone, sensory " exam grossly normal, mentation intact and speech normal  PSYCH: mentation appears normal and affect normal/bright    Diagnostic Test Results:  Results for orders placed or performed in visit on 12/21/21   Iron and iron binding capacity     Status: Abnormal   Result Value Ref Range    Iron 42 35 - 180 ug/dL    Iron Binding Capacity 336 240 - 430 ug/dL    Iron Sat Index 13 (L) 15 - 46 %   Ferritin     Status: Normal   Result Value Ref Range    Ferritin 26 8 - 252 ng/mL   CBC with platelets     Status: Normal   Result Value Ref Range    WBC Count 7.1 4.0 - 11.0 10e3/uL    RBC Count 4.00 3.80 - 5.20 10e6/uL    Hemoglobin 12.5 11.7 - 15.7 g/dL    Hematocrit 39.1 35.0 - 47.0 %    MCV 98 78 - 100 fL    MCH 31.3 26.5 - 33.0 pg    MCHC 32.0 31.5 - 36.5 g/dL    RDW 12.8 10.0 - 15.0 %    Platelet Count 169 150 - 450 10e3/uL   Vitamin B12     Status: Normal   Result Value Ref Range    Vitamin B12 894 193 - 986 pg/mL   Lipid panel reflex to direct LDL Fasting     Status: Abnormal   Result Value Ref Range    Cholesterol 98 <200 mg/dL    Triglycerides 102 <150 mg/dL    Direct Measure HDL 30 (L) >=50 mg/dL    LDL Cholesterol Calculated 48 <=100 mg/dL    Non HDL Cholesterol 68 <130 mg/dL    Patient Fasting > 8hrs? Yes     Narrative    Cholesterol  Desirable:  <200 mg/dL    Triglycerides  Normal:  Less than 150 mg/dL  Borderline High:  150-199 mg/dL  High:  200-499 mg/dL  Very High:  Greater than or equal to 500 mg/dL    Direct Measure HDL  Female:  Greater than or equal to 50 mg/dL   Male:  Greater than or equal to 40 mg/dL    LDL Cholesterol  Desirable:  <100mg/dL  Above Desirable:  100-129 mg/dL   Borderline High:  130-159 mg/dL   High:  160-189 mg/dL   Very High:  >= 190 mg/dL    Non HDL Cholesterol  Desirable:  130 mg/dL  Above Desirable:  130-159 mg/dL  Borderline High:  160-189 mg/dL  High:  190-219 mg/dL  Very High:  Greater than or equal to 220 mg/dL   Comprehensive metabolic panel (BMP + Alb, Alk Phos, ALT, AST, Total.  "Bili, TP)     Status: Abnormal   Result Value Ref Range    Sodium 141 133 - 144 mmol/L    Potassium 3.7 3.4 - 5.3 mmol/L    Chloride 108 94 - 109 mmol/L    Carbon Dioxide (CO2) 28 20 - 32 mmol/L    Anion Gap 5 3 - 14 mmol/L    Urea Nitrogen 13 7 - 30 mg/dL    Creatinine 0.74 0.52 - 1.04 mg/dL    Calcium 8.7 8.5 - 10.1 mg/dL    Glucose 97 70 - 99 mg/dL    Alkaline Phosphatase 120 40 - 150 U/L    AST 86 (H) 0 - 45 U/L    ALT 47 0 - 50 U/L    Protein Total 7.6 6.8 - 8.8 g/dL    Albumin 3.1 (L) 3.4 - 5.0 g/dL    Bilirubin Total 1.1 0.2 - 1.3 mg/dL    GFR Estimate 84 >60 mL/min/1.73m2       ASSESSMENT / PLAN:   (Z00.00) Encounter for Medicare annual wellness exam  (primary encounter diagnosis)  Comment: plan to follow up for immunizations after the holidays    (I10) Essential hypertension  Comment: stable with current medication.   Plan: amLODIPine (NORVASC) 5 MG tablet, lisinopril         (ZESTRIL) 40 MG tablet, Comprehensive metabolic        panel (BMP + Alb, Alk Phos, ALT, AST, Total.         Bili, TP)          (E78.5) Dyslipidemia  Comment: stable with current medication.  Lipitor refills sent to the pharmacy.   Plan: atorvastatin (LIPITOR) 40 MG tablet, Lipid         panel reflex to direct LDL Fasting          (D64.9) Anemia, unspecified type  Comment: Hemoglobin stable, iron trending down again. Consider supplement with ferrous sulfate daily for 4 weeks.   Plan: cyanocobalamin (CYANOCOBALAMIN) 1000 MCG/ML         injection, Needle, Disp, (BD DISP NEEDLES) 25G         X 5/8\" MISC, Vitamin B12, CBC with platelets,         Ferritin, Iron and iron binding capacity          (F34.1) Dysthymic disorder  Comment: stable.  Sertraline refills sent to the pharmacy.   Plan: sertraline (ZOLOFT) 50 MG tablet           Patient has been advised of split billing requirements and indicates understanding: Yes  COUNSELING:  Reviewed preventive health counseling, as reflected in patient instructions    Estimated body mass index is " "30.36 kg/m  as calculated from the following:    Height as of this encounter: 1.467 m (4' 9.75\").    Weight as of this encounter: 65.3 kg (144 lb).        She reports that she has never smoked. She has never used smokeless tobacco.      Appropriate preventive services were discussed with this patient, including applicable screening as appropriate for cardiovascular disease, diabetes, osteopenia/osteoporosis, and glaucoma.  As appropriate for age/gender, discussed screening for colorectal cancer, prostate cancer, breast cancer, and cervical cancer. Checklist reviewing preventive services available has been given to the patient.    Reviewed patients plan of care and provided an AVS. The Basic Care Plan (routine screening as documented in Health Maintenance) for Nerissa meets the Care Plan requirement. This Care Plan has been established and reviewed with the Patient.    Counseling Resources:  ATP IV Guidelines  Pooled Cohorts Equation Calculator  Breast Cancer Risk Calculator  Breast Cancer: Medication to Reduce Risk  FRAX Risk Assessment  ICSI Preventive Guidelines  Dietary Guidelines for Americans, 2010  USDA's MyPlate  ASA Prophylaxis  Lung CA Screening    [facility]    Identified Health Risks:    Information on urinary incontinence and treatment options given to patient.  "

## 2021-12-22 ASSESSMENT — ASTHMA QUESTIONNAIRES: ACT_TOTALSCORE: 25

## 2021-12-22 ASSESSMENT — ANXIETY QUESTIONNAIRES: GAD7 TOTAL SCORE: 1

## 2022-02-26 ENCOUNTER — LAB (OUTPATIENT)
Dept: LAB | Facility: CLINIC | Age: 77
End: 2022-02-26
Payer: COMMERCIAL

## 2022-02-26 DIAGNOSIS — E78.5 DYSLIPIDEMIA: ICD-10-CM

## 2022-02-26 LAB
ALBUMIN SERPL-MCNC: 3.2 G/DL (ref 3.4–5)
ALP SERPL-CCNC: 132 U/L (ref 40–150)
ALT SERPL W P-5'-P-CCNC: 64 U/L (ref 0–50)
AST SERPL W P-5'-P-CCNC: 112 U/L (ref 0–45)
BILIRUB DIRECT SERPL-MCNC: 0.3 MG/DL (ref 0–0.2)
BILIRUB SERPL-MCNC: 1.3 MG/DL (ref 0.2–1.3)
PROT SERPL-MCNC: 7.5 G/DL (ref 6.8–8.8)

## 2022-02-26 PROCEDURE — 80076 HEPATIC FUNCTION PANEL: CPT

## 2022-02-26 PROCEDURE — 36415 COLL VENOUS BLD VENIPUNCTURE: CPT

## 2022-03-03 DIAGNOSIS — R74.8 ELEVATED LIVER ENZYMES: Primary | ICD-10-CM

## 2022-03-12 DIAGNOSIS — D51.9 ANEMIA DUE TO VITAMIN B12 DEFICIENCY, UNSPECIFIED B12 DEFICIENCY TYPE: Primary | ICD-10-CM

## 2022-03-16 RX ORDER — SYRINGE WITH NEEDLE, 1 ML 25GX5/8"
SYRINGE, EMPTY DISPOSABLE MISCELLANEOUS
Qty: 4 EACH | Refills: 0 | Status: SHIPPED | OUTPATIENT
Start: 2022-03-16

## 2022-03-19 ENCOUNTER — LAB (OUTPATIENT)
Dept: LAB | Facility: CLINIC | Age: 77
End: 2022-03-19
Payer: COMMERCIAL

## 2022-03-19 DIAGNOSIS — R74.8 ELEVATED LIVER ENZYMES: ICD-10-CM

## 2022-03-19 LAB
ALBUMIN SERPL-MCNC: 2.9 G/DL (ref 3.4–5)
ALP SERPL-CCNC: 99 U/L (ref 40–150)
ALT SERPL W P-5'-P-CCNC: 24 U/L (ref 0–50)
AST SERPL W P-5'-P-CCNC: 40 U/L (ref 0–45)
BILIRUB DIRECT SERPL-MCNC: 0.2 MG/DL (ref 0–0.2)
BILIRUB SERPL-MCNC: 0.8 MG/DL (ref 0.2–1.3)
PROT SERPL-MCNC: 6.8 G/DL (ref 6.8–8.8)

## 2022-03-19 PROCEDURE — 80076 HEPATIC FUNCTION PANEL: CPT

## 2022-03-19 PROCEDURE — 36415 COLL VENOUS BLD VENIPUNCTURE: CPT

## 2022-03-22 ENCOUNTER — MYC MEDICAL ADVICE (OUTPATIENT)
Dept: FAMILY MEDICINE | Facility: CLINIC | Age: 77
End: 2022-03-22
Payer: COMMERCIAL

## 2022-03-22 DIAGNOSIS — E78.5 DYSLIPIDEMIA: Primary | ICD-10-CM

## 2022-03-23 RX ORDER — ATORVASTATIN CALCIUM 20 MG/1
20 TABLET, FILM COATED ORAL DAILY
Qty: 90 TABLET | Refills: 0 | Status: SHIPPED | OUTPATIENT
Start: 2022-03-23 | End: 2022-07-19

## 2022-06-04 ENCOUNTER — LAB (OUTPATIENT)
Dept: LAB | Facility: CLINIC | Age: 77
End: 2022-06-04
Payer: COMMERCIAL

## 2022-06-04 DIAGNOSIS — E78.5 DYSLIPIDEMIA: ICD-10-CM

## 2022-06-04 LAB
ALBUMIN SERPL-MCNC: 3.2 G/DL (ref 3.4–5)
ALP SERPL-CCNC: 101 U/L (ref 40–150)
ALT SERPL W P-5'-P-CCNC: 27 U/L (ref 0–50)
AST SERPL W P-5'-P-CCNC: 49 U/L (ref 0–45)
BILIRUB DIRECT SERPL-MCNC: 0.3 MG/DL (ref 0–0.2)
BILIRUB SERPL-MCNC: 1.2 MG/DL (ref 0.2–1.3)
PROT SERPL-MCNC: 7.3 G/DL (ref 6.8–8.8)

## 2022-06-04 PROCEDURE — 80076 HEPATIC FUNCTION PANEL: CPT

## 2022-06-04 PROCEDURE — 36415 COLL VENOUS BLD VENIPUNCTURE: CPT

## 2022-07-16 DIAGNOSIS — E78.5 DYSLIPIDEMIA: ICD-10-CM

## 2022-07-19 RX ORDER — ATORVASTATIN CALCIUM 20 MG/1
20 TABLET, FILM COATED ORAL DAILY
Qty: 90 TABLET | Refills: 3 | Status: SHIPPED | OUTPATIENT
Start: 2022-07-19 | End: 2023-07-20

## 2022-09-12 ENCOUNTER — OFFICE VISIT (OUTPATIENT)
Dept: URGENT CARE | Facility: URGENT CARE | Age: 77
End: 2022-09-12
Payer: COMMERCIAL

## 2022-09-12 ENCOUNTER — ANCILLARY PROCEDURE (OUTPATIENT)
Dept: GENERAL RADIOLOGY | Facility: CLINIC | Age: 77
End: 2022-09-12
Attending: PHYSICIAN ASSISTANT
Payer: COMMERCIAL

## 2022-09-12 VITALS
SYSTOLIC BLOOD PRESSURE: 134 MMHG | BODY MASS INDEX: 30.57 KG/M2 | HEART RATE: 67 BPM | DIASTOLIC BLOOD PRESSURE: 59 MMHG | WEIGHT: 145 LBS | TEMPERATURE: 99.3 F | OXYGEN SATURATION: 100 %

## 2022-09-12 DIAGNOSIS — S89.91XA KNEE INJURY, RIGHT, INITIAL ENCOUNTER: ICD-10-CM

## 2022-09-12 DIAGNOSIS — S89.91XA KNEE INJURY, RIGHT, INITIAL ENCOUNTER: Primary | ICD-10-CM

## 2022-09-12 PROCEDURE — 99213 OFFICE O/P EST LOW 20 MIN: CPT | Performed by: PHYSICIAN ASSISTANT

## 2022-09-12 PROCEDURE — 73562 X-RAY EXAM OF KNEE 3: CPT | Mod: TC | Performed by: RADIOLOGY

## 2022-09-12 RX ORDER — CX-024414 0.2 MG/ML
INJECTION, SUSPENSION INTRAMUSCULAR
COMMUNITY
Start: 2022-01-27 | End: 2023-02-02

## 2022-09-13 NOTE — PROGRESS NOTES
"  Assessment & Plan     Knee injury, right, initial encounter  Fitted with knee immobilizer and would recommend follow up with ortho. Patient agrees with plan     - XR Knee Right 3 Views; Future  - Knee Supplies Order for DME - ONLY FOR DME  - Orthopedic  Referral; Future             BMI:   Estimated body mass index is 30.57 kg/m  as calculated from the following:    Height as of 12/21/21: 1.467 m (4' 9.75\").    Weight as of this encounter: 65.8 kg (145 lb).           Return in about 1 week (around 9/19/2022), or if symptoms worsen or fail to improve.    Malgorzata Spicer PA-C  [facility] [place]            Subjective   Chief Complaint   Patient presents with     Musculoskeletal Problem     Fell in a parking lot on her right knee 3 days ago.  Went down to the ground with grandson, hit her right knee.  Fell again on same knee today after tripping on a toy.  Largely bruised, wondering if her knee-cap is ok.         HPI       MS Injury/Pain    Onset of symptoms was 3 day(s) ago.  Location: right knee  Context:       The injury happened while in parking lot      Mechanism: fall       Patient experienced immediate pain  Course of symptoms is same.    Severity moderate  Current and Associated symptoms: Pain, swelling, bruising   Aggravating Factors: weight-bearing and movement  Therapies to improve symptoms include: none          Review of Systems   Constitutional, HEENT, cardiovascular, pulmonary, gi and gu systems are negative, except as otherwise noted.      Objective    /59   Pulse 67   Temp 99.3  F (37.4  C) (Tympanic)   Wt 65.8 kg (145 lb)   SpO2 100%   BMI 30.57 kg/m    Body mass index is 30.57 kg/m .  Physical Exam  Constitutional:       General: She is not in acute distress.  Musculoskeletal:      Comments: Right knee has swelling, bruising, and moderate diffuse tenderness with palpation. Limited ROM due to pain.    Neurological:      Mental Status: She is alert.      "

## 2022-09-19 ENCOUNTER — OFFICE VISIT (OUTPATIENT)
Dept: ORTHOPEDICS | Facility: CLINIC | Age: 77
End: 2022-09-19
Payer: COMMERCIAL

## 2022-09-19 VITALS
WEIGHT: 145.3 LBS | SYSTOLIC BLOOD PRESSURE: 132 MMHG | DIASTOLIC BLOOD PRESSURE: 57 MMHG | HEIGHT: 58 IN | BODY MASS INDEX: 30.5 KG/M2 | HEART RATE: 82 BPM

## 2022-09-19 DIAGNOSIS — S80.01XA CONTUSION OF RIGHT KNEE, INITIAL ENCOUNTER: Primary | ICD-10-CM

## 2022-09-19 DIAGNOSIS — M17.11 PRIMARY OSTEOARTHRITIS OF RIGHT KNEE: ICD-10-CM

## 2022-09-19 DIAGNOSIS — S89.91XA KNEE INJURY, RIGHT, INITIAL ENCOUNTER: ICD-10-CM

## 2022-09-19 PROCEDURE — 99203 OFFICE O/P NEW LOW 30 MIN: CPT | Performed by: ORTHOPAEDIC SURGERY

## 2022-09-19 ASSESSMENT — PAIN SCALES - GENERAL: PAINLEVEL: MILD PAIN (2)

## 2022-09-19 NOTE — PROGRESS NOTES
CHIEF COMPLAINT:   Chief Complaint   Patient presents with     Right Knee - Pain     DOS 6/28/22, 12 wk s/p. Patient notes she fell on 9/10/22 and again on 9/12/22. Patient notes her grandson ran to give her a hug and they both went down then on 9/12/22 she stepped on a toy and came down on her knee in the same spot. Pain is anterior. It felt like originally the knee cap got pushed up. She was seen at  and was given an immobilizer that she wears periodically, not in the last couple of days. She has been using an ACE wrap. Her knee feels a little better unless she is up on it a lot.        Nerissa Valentin is seen today in the [facility] for evaluation of right knee pain at the request of Malgorzata Spicer PA-C          HISTORY OF PRESENT ILLNESS    Nerissa Valentin is a 76 year old female seen for evaluation of ongoing right knee pain following an injury on 9/10/2022. She notes she fell in a parking lot, her grandson ran to her to give her a hug and they both went down hitting her right knee straight on the ground forward Fell onto the same knee on 9/12/2022, tripping on a today. Onset of pain, swelling, bruising. Was seen in Urgent Care with xrays negative for fracture. Given a knee immobilizer and referred to ortho. Presents today with continued pain in the front of the knee. She thinks the kneecap got pushed up. Has some numbness across the front of the knee.    She's been wearing a knee immobilizer periodically, but not the past couple of days. She's been using a compression wrap as well. Occasional ibuprofen.    She feels like the knee is a little better than it was unless she's up quite a bit.    Has had chronic bilateral knee pain, states she has bad osteoarthritis. Reports injections in the past, most recently maybe 4 years, seem to help.    Present symptoms: pain anteriorly, pain burning , mild pain.    Pain severity: 2/10  Frequency of symptoms:  occasionally  Exacerbating Factors: [id number], prolonged standing  Relieving Factors: rest, sitting, compression wrapping.  Night Pain: Yes  Pain while at rest: Yes   Numbness or tingling: Yes   Patient has tried:     NSAIDS: occasional      Physical Therapy: No      Activity modification: Yes      Bracing: compression wrapping , intermittent immobilizer.     Injections: Yes at least 4 years ago.     Ice: Yes      Assistive device:  cane for a couple of days.    Orthopaedic PMH: bilateral rotator cuff repairs (right failed, left is ok). Bilateral knee osteoarthritis.    Other PMH:  has a past medical history of Asthma, Asthma, mild persistent, Depressive disorder (1992), Dysthymic disorder, Dysthymic disorder, Hematuria, Hyperlipidemia, Hyperlipidemia LDL goal < 130, Hypertension, Hypokalemia (03/15/2013), Kidney stone, Leucocytosis, Leukocytosis, unspecified, Obesity, Osteoarthritis, Osteoarthritis, Pernicious anemia, Postmenopausal, Shortness of breath, Unspecified essential hypertension, and Vitamin B12 deficiency.    She has no past medical history of Cancer (H), Cerebral infarction (H), Complication of anesthesia, Congestive heart failure (H), COPD (chronic obstructive pulmonary disease) (H), Diabetes (H), Heart disease, History of blood transfusion, PONV (postoperative nausea and vomiting), or Thyroid disease.  Patient Active Problem List   Diagnosis     Leucocytosis     HTN (hypertension)     Dyslipidemia     Asthma, mild persistent     Dysthymic disorder     Osteoarthritis     Vitamin B12 deficiency anemia     Postmenopausal     Advanced directives, counseling/discussion     Health Care Home     Family history of diabetes mellitus     Obesity     BMI 31.0-31.9,adult     Fall due to ice or snow     Calculus of kidney     Anemia       Surgical Hx:  has a past surgical history that includes flexible sigmoidoscopy (2000 ?); REMOVAL OF KIDNEY STONE (1971); breast biopsy, rt/lt (1999); surgical history of -   "(1969, 1970); lithotripsy (2005); rotator cuff repair rt/lt (2007); rotator cuff repair rt/lt (2009); salpingo oophorectomy,r/l/kalen (1969 and 1970); hysterectomy, vaginal (1971); hysterectomy, pap no longer indicated; Abdomen surgery (1969, 1970, 1971); appendectomy (1971); Colonoscopy (N/A, 3/8/2021); Esophagoscopy, gastroscopy, duodenoscopy (EGD), combined (N/A, 3/8/2021); Esophagoscopy, gastroscopy, duodenoscopy (EGD), combined (N/A, 6/14/2021); lithotripsy; other surgical history (1971); Hysterectomy; Arthroscopy shoulder rotator cuff repair (Bilateral); Biopsy breast (Right); appendectomy; other surgical history (1971 or 72); Ureteroscopy; Abdomen surgery; other surgical history (Bilateral); Laparoscopic cystectomy ovarian (oncology); and Ovarian Cyst Removal (x2).    Medications:   Current Outpatient Medications:      albuterol (PROAIR HFA/PROVENTIL HFA/VENTOLIN HFA) 108 (90 Base) MCG/ACT inhaler, Inhale 2 puffs into the lungs every 6 hours as needed for shortness of breath / dyspnea or wheezing, Disp: 1 Inhaler, Rfl: 11     amLODIPine (NORVASC) 5 MG tablet, Take 1 tablet (5 mg) by mouth daily, Disp: 90 tablet, Rfl: 3     atorvastatin (LIPITOR) 20 MG tablet, TAKE 1 TABLET (20 MG) BY MOUTH DAILY, Disp: 90 tablet, Rfl: 3     B-D LUER-MINH SYRINGE 25G X 5/8\" 3 ML MISC, USE WITH VITAMIN B12 INJECTION WEEKLY X 1 MO THEN 1 PER MONTH, Disp: 4 each, Rfl: 0     cyanocobalamin (CYANOCOBALAMIN) 1000 MCG/ML injection, Inject 1 mL (1,000 mcg) into the muscle once a week X 4 wks then once per month, Disp: 10 mL, Rfl: 1     lisinopril (ZESTRIL) 40 MG tablet, Take 1 tablet (40 mg) by mouth daily, Disp: 90 tablet, Rfl: 3     MODERNA COVID-19 VACCINE 100 MCG/0.5ML injection, , Disp: , Rfl:      Needle, Disp, (BD DISP NEEDLES) 25G X 5/8\" MISC, Use with vitamin B12 injection weekly x 1 mo then 1 per month, Disp: 1 each, Rfl: 6     sertraline (ZOLOFT) 50 MG tablet, Take 1 tablet (50 mg) by mouth daily, Disp: 90 tablet, Rfl: " "3    Allergies:   Allergies   Allergen Reactions     Flu Virus Vaccine Dermatitis and Other (See Comments)     2 years in a row after the flu shot did get  High fever,  Localized reaction .      Other [No Clinical Screening - See Comments]      CHROMIC GUT SUTURES CAUSE ABCESS     Pcn [Penicillins] Hives       Social Hx: retired.   reports that she has never smoked. She has never used smokeless tobacco. She reports current alcohol use. She reports that she does not use drugs.    Family Hx: family history includes Arthritis in her mother; Breast Cancer in her maternal cousin, maternal cousin, sister, and sister; C.A.D. in her father and mother; Cancer in her maternal grandfather, sister, sister, sister, and sister; Cancer - colorectal in her paternal grandfather; Cerebrovascular Disease in her maternal grandfather; Diabetes in her father; Heart Disease in her father and mother; Hypertension in her father.    REVIEW OF SYSTEMS: 10 point ROS neg other than the symptoms noted above in the HPI and PMH. Notables include  CONSTITUTIONAL:NEGATIVE for fever, chills, change in weight  INTEGUMENTARY/SKIN: NEGATIVE for worrisome rashes, moles or lesions  MUSCULOSKELETAL:See HPI above  NEURO: NEGATIVE for weakness, dizziness or paresthesias    PHYSICAL EXAM:  /57   Pulse 82   Ht 1.467 m (4' 9.75\")   Wt 65.9 kg (145 lb 4.8 oz)   BMI 30.63 kg/m     GENERAL APPEARANCE: healthy, alert, no distress  SKIN: no suspicious lesions or rashes  NEURO: Normal strength and tone, mentation intact and speech normal  PSYCH:  mentation appears normal and affect normal, not anxious  RESPIRATORY: No increased work of breathing.  HANDS: no clubbing, nail pitting  LYMPH: no palpable popliteal lymphadenopathy.    BILATERAL LOWER EXTREMITIES:  Gait: slight favors the right.  No gross deformities or masses.  Bilateral  Quad atrophy, strength weak  Intact sensation deep peroneal nerve, superficial peroneal nerve, med/lat tibial nerve, sural " nerve, saphenous nerve  Intact EHL, EDL, TA, FHL, GS, quadriceps hamstrings and hip flexors  Bilateral calf soft and nttp or squeeze.  DTRs: achilles 2+, patella 2+.  Edema: trace    LEFT KNEE EXAM:    Skin: intact, subtle resolving residual anterior knee/prepatellar ecchymosis.  Squat:  limited by pain.     ROM: full extension to 125+ flexion, anterior discomfort.  Tight hamstrings on straight leg raise.  Effusion: none  Tender: severe at medial joint line, pes, anterior knee/preptellar bursa  Nontender to palpation posterior knee  McMurrays: negative    MCL: stable, and non-painful at both 0 and 30 degrees knee flexion  Varus stress: stable, and non-painful at both 0 and 30 degrees knee flexion  Lachmans: neg, firm endpoint  Posterior Drawer stable  Patellofemoral joint:                Apprehension: negative              Crepitations: severe   Grind: positive.    RIGHT KNEE EXAM:    Skin: intact, no ecchymosis or erythema  ROM: full extension to 130 flexion  Tight hamstrings on straight leg raise.  Effusion: none  Tender: pes, medial joint line, hamstrings   McMurrays: negative    MCL: stable, and non-painful at both 0 and 30 degrees knee flexion  Varus stress: stable, and non-painful at both 0 and 30 degrees knee flexion  Lachmans: neg, firm endpoint  Posterior Drawer stable  Patellofemoral joint:                Apprehension: negative              Crepitations: moderate   Grind: positive.    X-RAY:  3 views right knee from 9/12/2022 were reviewed in clinic today. On my review, no obvious fractures or dislocations or malalignment. Severe patellofemoral compartment degenerative changes with bone-on-bone articulation and mild lateral patellar tilt and subluxation. Moderate medial and mild lateral compartment degenerative changes. Chondrocalcinosis. No significant knee joint effusion. Osteopenia.       ASSESSMENT/PLAN: Nerissa Valentin is a 76 year old female with acute on chronic right knee pain, anteiror knee  "contusion, advanced primary osteoarthritis.    * suspect acute on chronic knee pain with recent falls onto the front of the knee, causing contusion and post-traumatic prepatellar bursitis.   * appears to be improving slowly and expect gradual, continued improvements over time, avoiding falling or bumping/kneeling the front of the knee    Also noted significant osteoarthritis, especially bone on bone under the knee cap.     * reviewed imaging studies with patient, showing arthritic changes, or wearing of the cartilage in the knee. This can be caused by normal \"wear and tear\" over the years or following prior injury to the knee.    Treatment typically starts nonsurgically. Surgical indication for total knee arthroplasty  when nonsurgical management is no longer effective.    Non-surgical treatment for knee arthritis includes:    * rest, sitting  * Activity modification - avoid impact activities or activities that aggravate symptoms.  * NSAIDS (non-steroidal anti-inflammatory medications; e.g. Aleve, advil, motrin, ibuprofen) - regular use for inflammation ( twice daily or three times daily), with food, as long as no contra-indications Please discuss with primary care doctor if needed  * ice, 15-20 minutes at a time several times a day or as needed.  * Strengthening of quadriceps muscles  * Physical Therapy for strengthening, stretching and range of motion exercises of legs  * Tylenol as needed for pain, consider Tylenol arthritis or similar  * Weight loss: Weight loss:  Body mass index is 30.63 kg/m .. weight loss benefits, not only for the current pain symptoms, but also overall health. Recommend a good diet plan that works for the patient, with the assistance of a dietician or primary care doctor as needed. Also, a good, low-impact exercise program for at least 20 minutes per day, 3 times per week, such as exercise bike, elliptical , or pool.  * Exercise: low impact such as stationary bike, elliptical, " "pool.  * Injections: cortisone versus viscosupplementation (hyaluronic acid, \"rooster comb\", \"gel shots\"); risks and perceived benefits discussed today. Consider repeat in future for knee osteoarthritis as needed.  * Bracing: bracing the knee may offer some relief of symptoms when worn and provide some stability.  * over the counter supplements such as glucosamine and chondroitin sulfate may help with joint pain.  * topical ointments may help as well    * return to clinic as needed.          Azael Muñoz M.D., M.S.  Dept. of Orthopaedic Surgery  [facility]        "

## 2022-09-19 NOTE — LETTER
9/19/2022         RE: Nerissa Valentin  [address] [place] [id number]        Dear Colleague,    Thank you for referring your patient, Nerissa Valentin, to the [facility]. Please see a copy of my visit note below.    CHIEF COMPLAINT:   Chief Complaint   Patient presents with     Right Knee - Pain     DOS 6/28/22, 12 wk s/p. Patient notes she fell on 9/10/22 and again on 9/12/22. Patient notes her grandson ran to give her a hug and they both went down then on 9/12/22 she stepped on a toy and came down on her knee in the same spot. Pain is anterior. It felt like originally the knee cap got pushed up. She was seen at  and was given an immobilizer that she wears periodically, not in the last couple of days. She has been using an ACE wrap. Her knee feels a little better unless she is up on it a lot.        Nerissa Valentin is seen today in the [facility] for evaluation of right knee pain at the request of Malgorzata Spicer PA-C          HISTORY OF PRESENT ILLNESS    Nerissa Valentin is a 76 year old female seen for evaluation of ongoing right knee pain following an injury on 9/10/2022. She notes she fell in a parking lot, her grandson ran to her to give her a hug and they both went down hitting her right knee straight on the ground forward Fell onto the same knee on 9/12/2022, tripping on a today. Onset of pain, swelling, bruising. Was seen in Urgent Care with xrays negative for fracture. Given a knee immobilizer and referred to ortho. Presents today with continued pain in the front of the knee. She thinks the kneecap got pushed up. Has some numbness across the front of the knee.    She's been wearing a knee immobilizer periodically, but not the past couple of days. She's been using a compression wrap as well. Occasional ibuprofen.    She feels like the knee is a little better than it was unless she's up quite a bit.    Has had  chronic bilateral knee pain, states she has bad osteoarthritis. Reports injections in the past, most recently maybe 4 years, seem to help.    Present symptoms: pain anteriorly, pain burning , mild pain.    Pain severity: 2/10  Frequency of symptoms: occasionally  Exacerbating Factors: [id number], prolonged standing  Relieving Factors: rest, sitting, compression wrapping.  Night Pain: Yes  Pain while at rest: Yes   Numbness or tingling: Yes   Patient has tried:     NSAIDS: occasional      Physical Therapy: No      Activity modification: Yes      Bracing: compression wrapping , intermittent immobilizer.     Injections: Yes at least 4 years ago.     Ice: Yes      Assistive device:  cane for a couple of days.    Orthopaedic PMH: bilateral rotator cuff repairs (right failed, left is ok). Bilateral knee osteoarthritis.    Other PMH:  has a past medical history of Asthma, Asthma, mild persistent, Depressive disorder (1992), Dysthymic disorder, Dysthymic disorder, Hematuria, Hyperlipidemia, Hyperlipidemia LDL goal < 130, Hypertension, Hypokalemia (03/15/2013), Kidney stone, Leucocytosis, Leukocytosis, unspecified, Obesity, Osteoarthritis, Osteoarthritis, Pernicious anemia, Postmenopausal, Shortness of breath, Unspecified essential hypertension, and Vitamin B12 deficiency.    She has no past medical history of Cancer (H), Cerebral infarction (H), Complication of anesthesia, Congestive heart failure (H), COPD (chronic obstructive pulmonary disease) (H), Diabetes (H), Heart disease, History of blood transfusion, PONV (postoperative nausea and vomiting), or Thyroid disease.  Patient Active Problem List   Diagnosis     Leucocytosis     HTN (hypertension)     Dyslipidemia     Asthma, mild persistent     Dysthymic disorder     Osteoarthritis     Vitamin B12 deficiency anemia     Postmenopausal     Advanced directives, counseling/discussion     Health Care Home     Family history of diabetes mellitus     Obesity     BMI  "31.0-31.9,adult     Fall due to ice or snow     Calculus of kidney     Anemia       Surgical Hx:  has a past surgical history that includes flexible sigmoidoscopy (2000 ?); REMOVAL OF KIDNEY STONE (1971); breast biopsy, rt/lt (1999); surgical history of -  (1969, 1970); lithotripsy (2005); rotator cuff repair rt/lt (2007); rotator cuff repair rt/lt (2009); salpingo oophorectomy,r/l/kalen (1969 and 1970); hysterectomy, vaginal (1971); hysterectomy, pap no longer indicated; Abdomen surgery (1969, 1970, 1971); appendectomy (1971); Colonoscopy (N/A, 3/8/2021); Esophagoscopy, gastroscopy, duodenoscopy (EGD), combined (N/A, 3/8/2021); Esophagoscopy, gastroscopy, duodenoscopy (EGD), combined (N/A, 6/14/2021); lithotripsy; other surgical history (1971); Hysterectomy; Arthroscopy shoulder rotator cuff repair (Bilateral); Biopsy breast (Right); appendectomy; other surgical history (1971 or 72); Ureteroscopy; Abdomen surgery; other surgical history (Bilateral); Laparoscopic cystectomy ovarian (oncology); and Ovarian Cyst Removal (x2).    Medications:   Current Outpatient Medications:      albuterol (PROAIR HFA/PROVENTIL HFA/VENTOLIN HFA) 108 (90 Base) MCG/ACT inhaler, Inhale 2 puffs into the lungs every 6 hours as needed for shortness of breath / dyspnea or wheezing, Disp: 1 Inhaler, Rfl: 11     amLODIPine (NORVASC) 5 MG tablet, Take 1 tablet (5 mg) by mouth daily, Disp: 90 tablet, Rfl: 3     atorvastatin (LIPITOR) 20 MG tablet, TAKE 1 TABLET (20 MG) BY MOUTH DAILY, Disp: 90 tablet, Rfl: 3     B-D LUER-MINH SYRINGE 25G X 5/8\" 3 ML MISC, USE WITH VITAMIN B12 INJECTION WEEKLY X 1 MO THEN 1 PER MONTH, Disp: 4 each, Rfl: 0     cyanocobalamin (CYANOCOBALAMIN) 1000 MCG/ML injection, Inject 1 mL (1,000 mcg) into the muscle once a week X 4 wks then once per month, Disp: 10 mL, Rfl: 1     lisinopril (ZESTRIL) 40 MG tablet, Take 1 tablet (40 mg) by mouth daily, Disp: 90 tablet, Rfl: 3     MODERNA COVID-19 VACCINE 100 MCG/0.5ML injection, , " "Disp: , Rfl:      Needle, Disp, (BD DISP NEEDLES) 25G X 5/8\" MISC, Use with vitamin B12 injection weekly x 1 mo then 1 per month, Disp: 1 each, Rfl: 6     sertraline (ZOLOFT) 50 MG tablet, Take 1 tablet (50 mg) by mouth daily, Disp: 90 tablet, Rfl: 3    Allergies:   Allergies   Allergen Reactions     Flu Virus Vaccine Dermatitis and Other (See Comments)     2 years in a row after the flu shot did get  High fever,  Localized reaction .      Other [No Clinical Screening - See Comments]      CHROMIC GUT SUTURES CAUSE ABCESS     Pcn [Penicillins] Hives       Social Hx: retired.   reports that she has never smoked. She has never used smokeless tobacco. She reports current alcohol use. She reports that she does not use drugs.    Family Hx: family history includes Arthritis in her mother; Breast Cancer in her maternal cousin, maternal cousin, sister, and sister; C.A.D. in her father and mother; Cancer in her maternal grandfather, sister, sister, sister, and sister; Cancer - colorectal in her paternal grandfather; Cerebrovascular Disease in her maternal grandfather; Diabetes in her father; Heart Disease in her father and mother; Hypertension in her father.    REVIEW OF SYSTEMS: 10 point ROS neg other than the symptoms noted above in the HPI and PMH. Notables include  CONSTITUTIONAL:NEGATIVE for fever, chills, change in weight  INTEGUMENTARY/SKIN: NEGATIVE for worrisome rashes, moles or lesions  MUSCULOSKELETAL:See HPI above  NEURO: NEGATIVE for weakness, dizziness or paresthesias    PHYSICAL EXAM:  /57   Pulse 82   Ht 1.467 m (4' 9.75\")   Wt 65.9 kg (145 lb 4.8 oz)   BMI 30.63 kg/m     GENERAL APPEARANCE: healthy, alert, no distress  SKIN: no suspicious lesions or rashes  NEURO: Normal strength and tone, mentation intact and speech normal  PSYCH:  mentation appears normal and affect normal, not anxious  RESPIRATORY: No increased work of breathing.  HANDS: no clubbing, nail pitting  LYMPH: no palpable popliteal " lymphadenopathy.    BILATERAL LOWER EXTREMITIES:  Gait: slight favors the right.  No gross deformities or masses.  Bilateral  Quad atrophy, strength weak  Intact sensation deep peroneal nerve, superficial peroneal nerve, med/lat tibial nerve, sural nerve, saphenous nerve  Intact EHL, EDL, TA, FHL, GS, quadriceps hamstrings and hip flexors  Bilateral calf soft and nttp or squeeze.  DTRs: achilles 2+, patella 2+.  Edema: trace    LEFT KNEE EXAM:    Skin: intact, subtle resolving residual anterior knee/prepatellar ecchymosis.  Squat:  limited by pain.     ROM: full extension to 125+ flexion, anterior discomfort.  Tight hamstrings on straight leg raise.  Effusion: none  Tender: severe at medial joint line, pes, anterior knee/preptellar bursa  Nontender to palpation posterior knee  McMurrays: negative    MCL: stable, and non-painful at both 0 and 30 degrees knee flexion  Varus stress: stable, and non-painful at both 0 and 30 degrees knee flexion  Lachmans: neg, firm endpoint  Posterior Drawer stable  Patellofemoral joint:                Apprehension: negative              Crepitations: severe   Grind: positive.    RIGHT KNEE EXAM:    Skin: intact, no ecchymosis or erythema  ROM: full extension to 130 flexion  Tight hamstrings on straight leg raise.  Effusion: none  Tender: pes, medial joint line, hamstrings   McMurrays: negative    MCL: stable, and non-painful at both 0 and 30 degrees knee flexion  Varus stress: stable, and non-painful at both 0 and 30 degrees knee flexion  Lachmans: neg, firm endpoint  Posterior Drawer stable  Patellofemoral joint:                Apprehension: negative              Crepitations: moderate   Grind: positive.    X-RAY:  3 views right knee from 9/12/2022 were reviewed in clinic today. On my review, no obvious fractures or dislocations or malalignment. Severe patellofemoral compartment degenerative changes with bone-on-bone articulation and mild lateral patellar tilt and subluxation.  "Moderate medial and mild lateral compartment degenerative changes. Chondrocalcinosis. No significant knee joint effusion. Osteopenia.       ASSESSMENT/PLAN: Nerissa Valentin is a 76 year old female with acute on chronic right knee pain, anteiror knee contusion, advanced primary osteoarthritis.    * suspect acute on chronic knee pain with recent falls onto the front of the knee, causing contusion and post-traumatic prepatellar bursitis.   * appears to be improving slowly and expect gradual, continued improvements over time, avoiding falling or bumping/kneeling the front of the knee    Also noted significant osteoarthritis, especially bone on bone under the knee cap.     * reviewed imaging studies with patient, showing arthritic changes, or wearing of the cartilage in the knee. This can be caused by normal \"wear and tear\" over the years or following prior injury to the knee.    Treatment typically starts nonsurgically. Surgical indication for total knee arthroplasty  when nonsurgical management is no longer effective.    Non-surgical treatment for knee arthritis includes:    * rest, sitting  * Activity modification - avoid impact activities or activities that aggravate symptoms.  * NSAIDS (non-steroidal anti-inflammatory medications; e.g. Aleve, advil, motrin, ibuprofen) - regular use for inflammation ( twice daily or three times daily), with food, as long as no contra-indications Please discuss with primary care doctor if needed  * ice, 15-20 minutes at a time several times a day or as needed.  * Strengthening of quadriceps muscles  * Physical Therapy for strengthening, stretching and range of motion exercises of legs  * Tylenol as needed for pain, consider Tylenol arthritis or similar  * Weight loss: Weight loss:  Body mass index is 30.63 kg/m .. weight loss benefits, not only for the current pain symptoms, but also overall health. Recommend a good diet plan that works for the patient, with the assistance of a " "dietician or primary care doctor as needed. Also, a good, low-impact exercise program for at least 20 minutes per day, 3 times per week, such as exercise bike, elliptical , or pool.  * Exercise: low impact such as stationary bike, elliptical, pool.  * Injections: cortisone versus viscosupplementation (hyaluronic acid, \"rooster comb\", \"gel shots\"); risks and perceived benefits discussed today. Consider repeat in future for knee osteoarthritis as needed.  * Bracing: bracing the knee may offer some relief of symptoms when worn and provide some stability.  * over the counter supplements such as glucosamine and chondroitin sulfate may help with joint pain.  * topical ointments may help as well    * return to clinic as needed.          Azael Muñoz M.D., M.S.  Dept. of Orthopaedic Surgery  [facility]            Again, thank you for allowing me to participate in the care of your patient.        Sincerely,        Azael Muñoz MD    "

## 2022-11-19 ENCOUNTER — HEALTH MAINTENANCE LETTER (OUTPATIENT)
Age: 77
End: 2022-11-19

## 2023-01-31 ASSESSMENT — ENCOUNTER SYMPTOMS
CHILLS: 0
NAUSEA: 0
HEADACHES: 1
SHORTNESS OF BREATH: 1
ABDOMINAL PAIN: 0
EYE PAIN: 0
ARTHRALGIAS: 1
MYALGIAS: 0
BREAST MASS: 0
WEAKNESS: 0
HEMATURIA: 0
FREQUENCY: 0
DIARRHEA: 0
JOINT SWELLING: 1
CONSTIPATION: 0
SORE THROAT: 0
HEMATOCHEZIA: 0
DYSURIA: 0
COUGH: 1
NERVOUS/ANXIOUS: 0
PALPITATIONS: 0
FEVER: 0
HEARTBURN: 0
DIZZINESS: 1
PARESTHESIAS: 0

## 2023-01-31 ASSESSMENT — ASTHMA QUESTIONNAIRES
QUESTION_2 LAST FOUR WEEKS HOW OFTEN HAVE YOU HAD SHORTNESS OF BREATH: ONCE OR TWICE A WEEK
QUESTION_1 LAST FOUR WEEKS HOW MUCH OF THE TIME DID YOUR ASTHMA KEEP YOU FROM GETTING AS MUCH DONE AT WORK, SCHOOL OR AT HOME: NONE OF THE TIME
QUESTION_3 LAST FOUR WEEKS HOW OFTEN DID YOUR ASTHMA SYMPTOMS (WHEEZING, COUGHING, SHORTNESS OF BREATH, CHEST TIGHTNESS OR PAIN) WAKE YOU UP AT NIGHT OR EARLIER THAN USUAL IN THE MORNING: ONCE OR TWICE
QUESTION_4 LAST FOUR WEEKS HOW OFTEN HAVE YOU USED YOUR RESCUE INHALER OR NEBULIZER MEDICATION (SUCH AS ALBUTEROL): ONCE A WEEK OR LESS
ACT_TOTALSCORE: 21
ACT_TOTALSCORE: 21
QUESTION_5 LAST FOUR WEEKS HOW WOULD YOU RATE YOUR ASTHMA CONTROL: WELL CONTROLLED

## 2023-01-31 ASSESSMENT — ACTIVITIES OF DAILY LIVING (ADL): CURRENT_FUNCTION: NO ASSISTANCE NEEDED

## 2023-02-02 ENCOUNTER — OFFICE VISIT (OUTPATIENT)
Dept: FAMILY MEDICINE | Facility: CLINIC | Age: 78
End: 2023-02-02
Payer: COMMERCIAL

## 2023-02-02 VITALS
OXYGEN SATURATION: 99 % | WEIGHT: 141 LBS | RESPIRATION RATE: 18 BRPM | HEIGHT: 57 IN | DIASTOLIC BLOOD PRESSURE: 49 MMHG | SYSTOLIC BLOOD PRESSURE: 134 MMHG | BODY MASS INDEX: 30.42 KG/M2 | HEART RATE: 69 BPM | TEMPERATURE: 98.2 F

## 2023-02-02 DIAGNOSIS — R53.83 OTHER FATIGUE: ICD-10-CM

## 2023-02-02 DIAGNOSIS — M85.80 OSTEOPENIA, UNSPECIFIED LOCATION: ICD-10-CM

## 2023-02-02 DIAGNOSIS — Z00.00 ENCOUNTER FOR MEDICARE ANNUAL WELLNESS EXAM: Primary | ICD-10-CM

## 2023-02-02 DIAGNOSIS — Z78.0 POST-MENOPAUSAL: ICD-10-CM

## 2023-02-02 DIAGNOSIS — Z12.31 ENCOUNTER FOR SCREENING MAMMOGRAM FOR BREAST CANCER: ICD-10-CM

## 2023-02-02 DIAGNOSIS — I10 ESSENTIAL HYPERTENSION: ICD-10-CM

## 2023-02-02 DIAGNOSIS — E55.9 VITAMIN D DEFICIENCY: ICD-10-CM

## 2023-02-02 DIAGNOSIS — M17.9 OSTEOARTHRITIS OF KNEE, UNSPECIFIED LATERALITY, UNSPECIFIED OSTEOARTHRITIS TYPE: ICD-10-CM

## 2023-02-02 DIAGNOSIS — F34.1 DYSTHYMIC DISORDER: ICD-10-CM

## 2023-02-02 DIAGNOSIS — Z13.220 LIPID SCREENING: ICD-10-CM

## 2023-02-02 DIAGNOSIS — E78.5 DYSLIPIDEMIA: ICD-10-CM

## 2023-02-02 DIAGNOSIS — J45.30 MILD PERSISTENT ASTHMA WITHOUT COMPLICATION: ICD-10-CM

## 2023-02-02 DIAGNOSIS — E88.09 HYPOALBUMINEMIA: ICD-10-CM

## 2023-02-02 DIAGNOSIS — D50.9 IRON DEFICIENCY ANEMIA, UNSPECIFIED IRON DEFICIENCY ANEMIA TYPE: ICD-10-CM

## 2023-02-02 DIAGNOSIS — R79.89 ABNORMAL LFTS: ICD-10-CM

## 2023-02-02 DIAGNOSIS — D64.9 SEVERE ANEMIA: ICD-10-CM

## 2023-02-02 DIAGNOSIS — D64.9 ANEMIA, UNSPECIFIED TYPE: ICD-10-CM

## 2023-02-02 DIAGNOSIS — E53.8 VITAMIN B12 DEFICIENCY (NON ANEMIC): ICD-10-CM

## 2023-02-02 LAB
ALBUMIN SERPL BCG-MCNC: 3.7 G/DL (ref 3.5–5.2)
ALP SERPL-CCNC: 110 U/L (ref 35–104)
ALT SERPL W P-5'-P-CCNC: 21 U/L (ref 10–35)
ANION GAP SERPL CALCULATED.3IONS-SCNC: 11 MMOL/L (ref 7–15)
AST SERPL W P-5'-P-CCNC: 55 U/L (ref 10–35)
BILIRUB SERPL-MCNC: 1 MG/DL
BUN SERPL-MCNC: 12.1 MG/DL (ref 8–23)
CALCIUM SERPL-MCNC: 9.1 MG/DL (ref 8.8–10.2)
CHLORIDE SERPL-SCNC: 105 MMOL/L (ref 98–107)
CHOLEST SERPL-MCNC: 84 MG/DL
CREAT SERPL-MCNC: 0.73 MG/DL (ref 0.51–0.95)
DEPRECATED HCO3 PLAS-SCNC: 24 MMOL/L (ref 22–29)
ERYTHROCYTE [DISTWIDTH] IN BLOOD BY AUTOMATED COUNT: 17.8 % (ref 10–15)
FERRITIN SERPL-MCNC: 15 NG/ML (ref 11–328)
GFR SERPL CREATININE-BSD FRML MDRD: 85 ML/MIN/1.73M2
GLUCOSE SERPL-MCNC: 92 MG/DL (ref 70–99)
HCT VFR BLD AUTO: 27.2 % (ref 35–47)
HDLC SERPL-MCNC: 32 MG/DL
HGB BLD-MCNC: 7.5 G/DL (ref 11.7–15.7)
IRON BINDING CAPACITY (ROCHE): 374 UG/DL (ref 240–430)
IRON SATN MFR SERPL: 4 % (ref 15–46)
IRON SERPL-MCNC: 16 UG/DL (ref 37–145)
LDLC SERPL CALC-MCNC: 33 MG/DL
MCH RBC QN AUTO: 22 PG (ref 26.5–33)
MCHC RBC AUTO-ENTMCNC: 27.6 G/DL (ref 31.5–36.5)
MCV RBC AUTO: 80 FL (ref 78–100)
NONHDLC SERPL-MCNC: 52 MG/DL
PLATELET # BLD AUTO: 196 10E3/UL (ref 150–450)
POTASSIUM SERPL-SCNC: 4.3 MMOL/L (ref 3.4–5.3)
PROT SERPL-MCNC: 7.7 G/DL (ref 6.4–8.3)
RBC # BLD AUTO: 3.41 10E6/UL (ref 3.8–5.2)
SODIUM SERPL-SCNC: 140 MMOL/L (ref 136–145)
TRIGL SERPL-MCNC: 95 MG/DL
TSH SERPL DL<=0.005 MIU/L-ACNC: 1.59 UIU/ML (ref 0.3–4.2)
VIT B12 SERPL-MCNC: 1138 PG/ML (ref 232–1245)
WBC # BLD AUTO: 7.1 10E3/UL (ref 4–11)

## 2023-02-02 PROCEDURE — 80061 LIPID PANEL: CPT | Performed by: PHYSICIAN ASSISTANT

## 2023-02-02 PROCEDURE — 84443 ASSAY THYROID STIM HORMONE: CPT | Performed by: PHYSICIAN ASSISTANT

## 2023-02-02 PROCEDURE — 99214 OFFICE O/P EST MOD 30 MIN: CPT | Mod: 25 | Performed by: PHYSICIAN ASSISTANT

## 2023-02-02 PROCEDURE — 83540 ASSAY OF IRON: CPT | Performed by: PHYSICIAN ASSISTANT

## 2023-02-02 PROCEDURE — 82306 VITAMIN D 25 HYDROXY: CPT | Performed by: PHYSICIAN ASSISTANT

## 2023-02-02 PROCEDURE — 82728 ASSAY OF FERRITIN: CPT | Performed by: PHYSICIAN ASSISTANT

## 2023-02-02 PROCEDURE — 80053 COMPREHEN METABOLIC PANEL: CPT | Performed by: PHYSICIAN ASSISTANT

## 2023-02-02 PROCEDURE — 83550 IRON BINDING TEST: CPT | Performed by: PHYSICIAN ASSISTANT

## 2023-02-02 PROCEDURE — 85027 COMPLETE CBC AUTOMATED: CPT | Performed by: PHYSICIAN ASSISTANT

## 2023-02-02 PROCEDURE — 36415 COLL VENOUS BLD VENIPUNCTURE: CPT | Performed by: PHYSICIAN ASSISTANT

## 2023-02-02 PROCEDURE — G0439 PPPS, SUBSEQ VISIT: HCPCS | Performed by: PHYSICIAN ASSISTANT

## 2023-02-02 PROCEDURE — 82607 VITAMIN B-12: CPT | Performed by: PHYSICIAN ASSISTANT

## 2023-02-02 RX ORDER — SERTRALINE HYDROCHLORIDE 100 MG/1
100 TABLET, FILM COATED ORAL DAILY
Qty: 90 TABLET | Refills: 3 | Status: SHIPPED | OUTPATIENT
Start: 2023-02-02 | End: 2024-01-04

## 2023-02-02 RX ORDER — ALBUTEROL SULFATE 90 UG/1
2 AEROSOL, METERED RESPIRATORY (INHALATION) EVERY 4 HOURS PRN
Qty: 18 G | Refills: 1 | Status: SHIPPED | OUTPATIENT
Start: 2023-02-02 | End: 2024-01-04

## 2023-02-02 RX ORDER — MELOXICAM 7.5 MG/1
7.5 TABLET ORAL DAILY
Qty: 30 TABLET | Refills: 0 | Status: SHIPPED | OUTPATIENT
Start: 2023-02-02 | End: 2023-05-23

## 2023-02-02 RX ORDER — AMLODIPINE BESYLATE 5 MG/1
5 TABLET ORAL DAILY
Qty: 90 TABLET | Refills: 3 | Status: CANCELLED | OUTPATIENT
Start: 2023-02-02

## 2023-02-02 RX ORDER — OMEPRAZOLE 40 MG/1
40 CAPSULE, DELAYED RELEASE ORAL DAILY
Qty: 90 CAPSULE | Refills: 0 | Status: SHIPPED | OUTPATIENT
Start: 2023-02-02 | End: 2023-05-02

## 2023-02-02 ASSESSMENT — ENCOUNTER SYMPTOMS
ABDOMINAL PAIN: 0
EYE PAIN: 0
DIZZINESS: 1
SHORTNESS OF BREATH: 1
PARESTHESIAS: 0
JOINT SWELLING: 1
FREQUENCY: 0
CHILLS: 0
HEADACHES: 1
DYSURIA: 0
NAUSEA: 0
FEVER: 0
CONSTIPATION: 0
DIARRHEA: 0
NERVOUS/ANXIOUS: 0
MYALGIAS: 0
WEAKNESS: 0
BREAST MASS: 0
HEARTBURN: 0
ARTHRALGIAS: 1
HEMATURIA: 0
COUGH: 1
PALPITATIONS: 0
HEMATOCHEZIA: 0
SORE THROAT: 0

## 2023-02-02 ASSESSMENT — ACTIVITIES OF DAILY LIVING (ADL): CURRENT_FUNCTION: NO ASSISTANCE NEEDED

## 2023-02-02 ASSESSMENT — ANXIETY QUESTIONNAIRES
7. FEELING AFRAID AS IF SOMETHING AWFUL MIGHT HAPPEN: NOT AT ALL
3. WORRYING TOO MUCH ABOUT DIFFERENT THINGS: SEVERAL DAYS
6. BECOMING EASILY ANNOYED OR IRRITABLE: NOT AT ALL
5. BEING SO RESTLESS THAT IT IS HARD TO SIT STILL: NOT AT ALL
GAD7 TOTAL SCORE: 2
GAD7 TOTAL SCORE: 2
IF YOU CHECKED OFF ANY PROBLEMS ON THIS QUESTIONNAIRE, HOW DIFFICULT HAVE THESE PROBLEMS MADE IT FOR YOU TO DO YOUR WORK, TAKE CARE OF THINGS AT HOME, OR GET ALONG WITH OTHER PEOPLE: NOT DIFFICULT AT ALL
1. FEELING NERVOUS, ANXIOUS, OR ON EDGE: NOT AT ALL
2. NOT BEING ABLE TO STOP OR CONTROL WORRYING: SEVERAL DAYS

## 2023-02-02 ASSESSMENT — PATIENT HEALTH QUESTIONNAIRE - PHQ9
5. POOR APPETITE OR OVEREATING: NOT AT ALL
SUM OF ALL RESPONSES TO PHQ QUESTIONS 1-9: 3

## 2023-02-02 ASSESSMENT — PAIN SCALES - GENERAL: PAINLEVEL: NO PAIN (0)

## 2023-02-02 NOTE — PROGRESS NOTES
"SUBJECTIVE:   Nerissa is a 77 year old who presents for Preventive Visit.  Patient has been advised of split billing requirements and indicates understanding: Yes  Are you in the first 12 months of your Medicare coverage?  No    Healthy Habits:     In general, how would you rate your overall health?  Good    Frequency of exercise:  2-3 days/week    Duration of exercise:  Other    Do you usually eat at least 4 servings of fruit and vegetables a day, include whole grains    & fiber and avoid regularly eating high fat or \"junk\" foods?  No    Taking medications regularly:  Yes    Medication side effects:  None    Ability to successfully perform activities of daily living:  No assistance needed    Home Safety:  No safety concerns identified    Hearing Impairment:  No hearing concerns    In the past 6 months, have you been bothered by leaking of urine?  No    In general, how would you rate your overall mental or emotional health?  Good      PHQ-2 Total Score: 0    Additional concerns today:  Yes    Carbon monoxide exposure - unknown duration.  Furnace went out a week ago and CO levels found to be almost 4 times allowable unit.  Feels she was having some dizziness, hard to sleep or slept all the time, some nausea.  All symptoms have significantly improved - still some tired and some dizziness.  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  Total sleep 5-6 hrs per 24 hr period but with a lot of tossing and turning, waking up and only sometimes falling back to sleep.  Waking with noises, 1-2 times to use bathroom, and occasionally with knee pain.  Used to feel good with 8-9 hrs of sleep.  Does feel there is some depression ever since daughter Sarah  years ago, with resulting 2.5 yrs yr custody candelaria to get Sarah's daughter back.  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    Fell on R knee in Sept.  Known bone on bone OA.  No strength to get back up from floor due to pain and strength.  Cut back on tylenol due to LFTs - taking 2 tabs at night.  Occasional ibuprofen.  Had seen ortho - offered injection.     Hyperlipidemia - atorvastatin.  No myalgia or cramps.    Mild LFT abn.      History iron def anemia.  History b12 def.  Home injections.    Mild osteopenia, dexa 2010.    Have you ever done Advance Care Planning? (For example, a Health Directive, POLST, or a discussion with a medical provider or your loved ones about your wishes): Yes, advance care planning is on file.    Fall risk  Fallen 2 or more times in the past year?: Yes  Any fall with injury in the past year?: Yes  click delete button to remove this line now  Cognitive Screening   1) Repeat 3 items (Leader, Season, Table)    2) Clock draw: NORMAL  3) 3 item recall: Recalls 3 objects  Results: 3 items recalled: COGNITIVE IMPAIRMENT LESS LIKELY    Mini-CogTM Copyright S Jesus. Licensed by the author for use in [facility] [email or web address]; reprinted with permission ([email or web address]). All rights reserved.      Do you have sleep apnea, excessive snoring or daytime drowsiness?: no    Reviewed and updated as needed this visit by clinical staff   Tobacco  Allergies  Meds  Problems  Med Hx  Surg Hx  Fam Hx          Reviewed and updated as needed this visit by Provider   Tobacco  Allergies  Meds  Problems  Med Hx  Surg Hx  Fam Hx         Social History     Tobacco Use     Smoking status: Never     Smokeless tobacco: Never   Substance Use Topics     Alcohol use: Yes     Comment: rare     If you drink alcohol do you typically have >3 drinks per day or >7 drinks per week? No    Alcohol Use 2/2/2023   Prescreen: >3 drinks/day or >7 drinks/week? -   Prescreen: >3 drinks/day or >7 drinks/week? No     Current providers sharing in care for this patient include:   Patient Care Team:  Sarah Carlos PA-C " as PCP - General (Family Medicine)  Maria Guadalupe Gale APRN CNP as Assigned PCP  Azael Muñoz MD as Assigned Musculoskeletal Provider    The following health maintenance items are reviewed in Epic and correct as of today:  Health Maintenance   Topic Date Due     ZOSTER IMMUNIZATION (1 of 2) Never done     ASTHMA ACTION PLAN  11/29/2020     DTAP/TDAP/TD IMMUNIZATION (3 - Td or Tdap) 02/01/2021     COVID-19 Vaccine (4 - Booster for Moderna series) 03/24/2022     INFLUENZA VACCINE (1) 09/01/2022     LIPID  12/21/2022     VITAMIN B12  02/02/2023     ASTHMA CONTROL TEST  08/02/2023     PHQ-9  08/02/2023     MEDICARE ANNUAL WELLNESS VISIT  02/02/2024     ANNUAL REVIEW OF HM ORDERS  02/02/2024     FALL RISK ASSESSMENT  02/02/2024     DEXA  03/09/2025     ADVANCE CARE PLANNING  02/02/2028     HEPATITIS C SCREENING  Completed     DEPRESSION ACTION PLAN  Completed     Pneumococcal Vaccine: 65+ Years  Completed     IPV IMMUNIZATION  Aged Out     MENINGITIS IMMUNIZATION  Aged Out     MAMMO SCREENING  Discontinued     COLORECTAL CANCER SCREENING  Discontinued     Mammogram Screening - Patient over age 75, has elected to continue with screening.  Pertinent mammograms are reviewed under the imaging tab.    Review of Systems   Constitutional: Negative for chills and fever.   HENT: Negative for congestion, ear pain, hearing loss and sore throat.    Eyes: Negative for pain and visual disturbance.   Respiratory: Positive for cough and shortness of breath.    Cardiovascular: Negative for chest pain, palpitations and peripheral edema.   Gastrointestinal: Negative for abdominal pain, constipation, diarrhea, heartburn, hematochezia and nausea.   Breasts:  Negative for tenderness, breast mass and discharge.   Genitourinary: Positive for urgency. Negative for dysuria, frequency, genital sores, hematuria, pelvic pain, vaginal bleeding and vaginal discharge.   Musculoskeletal: Positive for arthralgias and joint swelling. Negative for  "myalgias.   Skin: Negative for rash.   Neurological: Positive for dizziness and headaches. Negative for weakness and paresthesias.   Psychiatric/Behavioral: Negative for mood changes. The patient is not nervous/anxious.      OBJECTIVE:   /49 (BP Location: Right arm, Patient Position: Sitting, Cuff Size: Adult Regular)   Pulse 69   Temp 98.2  F (36.8  C) (Tympanic)   Resp 18   Ht 1.45 m (4' 9.09\")   Wt 64 kg (141 lb)   SpO2 99%   BMI 30.42 kg/m   Estimated body mass index is 30.42 kg/m  as calculated from the following:    Height as of this encounter: 1.45 m (4' 9.09\").    Weight as of this encounter: 64 kg (141 lb).  Physical Exam  GENERAL: healthy, alert and no distress  EYES: Eyes grossly normal to inspection, PERRL and conjunctivae and sclerae normal  HENT: ear canals and TM's normal  NECK: no adenopathy, no asymmetry, masses, or scars and thyroid normal to palpation  RESP: lungs clear to auscultation - no rales, rhonchi or wheezes  CV: regular rate and rhythm, normal S1 S2, no S3 or S4, no murmur, click or rub, no peripheral edema   ABDOMEN: soft, nontender, no hepatosplenomegaly, no masses and bowel sounds normal  MS: no gross musculoskeletal defects noted, no edema  SKIN: no suspicious lesions or rashes  NEURO: Normal strength and tone, mentation intact and speech normal  PSYCH: mentation appears normal, affect normal/bright    Diagnostic Test Results:  Labs reviewed in Epic    ASSESSMENT / PLAN:   (Z00.00) Encounter for Medicare annual wellness exam  (primary encounter diagnosis)  Plan: REVIEW OF HEALTH MAINTENANCE PROTOCOL ORDERS    (R53.83) Other fatigue  Comment: worse, see AVS for further plan  Plan: TSH with free T4 reflex, CBC with platelets,         Vitamin D Deficiency       (F34.1) Dysthymic disorder  Comment: unchanged but could contribute to fatigue, dose increase  Plan: sertraline (ZOLOFT) 100 MG tablet    (M17.9) Osteoarthritis of knee, unspecified laterality, unspecified " osteoarthritis type  Comment: worsened pain, trial meloxicam, see AVS for further plan  Plan: meloxicam (MOBIC) 7.5 MG tablet    (I10) Essential hypertension  Comment: controlled however diastolic in 40s, stop amlodipine today, continue lisinopril  Plan: Comprehensive metabolic panel (BMP + Alb, Alk         Phos, ALT, AST, Total. Bili, TP)    (J45.30) Mild persistent asthma without complication  Comment: stable.  Has yearly cold that causes symptoms.  Plan: albuterol (PROAIR HFA/PROVENTIL HFA/VENTOLIN         HFA) 108 (90 Base) MCG/ACT inhaler    (D50.9) Iron deficiency anemia, unspecified iron deficiency anemia type  Comment: recheck, is off iron supplements  Plan: Ferritin, Iron and iron binding capacity, CBC    ADDENDUM   Severe anemia.  Hemoglobin 7.5.  Mild dizziness and tiredness, both of which have improved some recently.  Discussed in light of acute on chronic errosive gastritis seen on March 2021 endoscopy, need to do stool guiac test before starting any medication for knee pain.  Must also start omeprazole 40 mg daily to protect stomach - best taken on empty stomach 30 min or more before breakfast.  Restart daily iron supplement.  Lab only appt in 1 month to recheck hemoglobin -- but lab sooner or to emergency room if ANY worsening dizziness, tiredness, or new shortness of breath.  Discussed with patient via phone and my chart summary sent.  END ADDENDUM    (E53.8) Vitamin B12 deficiency (non anemic)  Comment: recheck  Plan: Vitamin B12    (E78.5) Dyslipidemia  Comment: recheck  Plan: Lipid panel reflex to direct LDL Fasting, atorvastatin 20 mg    (R79.89) Abnormal LFTs  Comment: monitor bili, alt ast albumin  Plan: Comprehensive metabolic panel (BMP + Alb, Alk         Phos, ALT, AST, Total. Bili, TP)    (M85.80) Osteopenia, unspecified location  Comment: recheck  Plan: DX Hip/Pelvis/Spine    (Z12.31) Encounter for screening mammogram for breast cancer  Comment: 2 sisters and cousins with breast cancer  hx  Plan: mammo    (Z13.220) Lipid screening  Plan: Lipid panel reflex to direct LDL Fasting    Patient Instructions     Fatigue -  Labs  Increasing sertraline to 100 mg.  Consider trying 75 mg first for a few weeks before increasing to 100 mg.  If not doing as well as desired, energy or mood, see me.  See if sleep improving with addressing mood and pain  Grandkids contributing to tiredness    Knee -  Trying meloxicam  If not helping enough contact me to try other NSAID  Monitor strength as knee feels better - suspect will improve  Option of cortisone shots   Tart cherry juice - 2-3 cherries a day    Osteopenia -   Recheck bone density.  Call [phone] to set up your imaging testing - DEXA and MAMMO  1200 mg daily of calcium, 800 units vitamin D    Dizzyiness with standing and low diastolic BP -  Trial off amlodipine -- goal BP under 150/90    See dentist    Think about covid booster     Tetanus shot - pharmacy for cash pay or see us when get a cut  Consider shingles vaccine - also pharmacy, but side effect risk    Patient Education   Personalized Prevention Plan  You are due for the preventive services outlined below.  Your care team is available to assist you in scheduling these services.  If you have already completed any of these items, please share that information with your care team to update in your medical record.  Health Maintenance Due   Topic Date Due     ANNUAL REVIEW OF HM ORDERS  Never done     Zoster (Shingles) Vaccine (1 of 2) Never done     Asthma Action Plan - yearly  11/29/2020     Diptheria Tetanus Pertussis (DTAP/TDAP/TD) Vaccine (3 - Td or Tdap) 02/01/2021     COVID-19 Vaccine (4 - Booster for Moderna series) 03/24/2022     Flu Vaccine (1) 09/01/2022     Cholesterol Lab  12/21/2022       Understanding USDA MyPlate  The USDA has guidelines to help you make healthy food choices. These are called MyPlate. MyPlate shows the food groups that make up healthy meals using the image of a place  setting. Before you eat, think about the healthiest choices for what to put on your plate or in your cup or bowl. To learn more about building a healthy plate, visit www.choosemyplate.gov.    The food groups    Fruits. Any fruit or 100% fruit juice counts as part of the Fruit Group. Fruits may be fresh, canned, frozen, or dried, and may be whole, cut-up, or pureed. Make 1/2 of your plate fruits and vegetables.    Vegetables. Any vegetable or 100% vegetable juice counts as a member of the Vegetable Group. Vegetables may be fresh, frozen, canned, or dried. They can be served raw or cooked and may be whole, cut-up, or mashed. Make 1/2 of your plate fruits and vegetables.    Grains. All foods made from grains are part of the Grains Group. These include wheat, rice, oats, cornmeal, and barley. Grains are often used to make foods such as bread, pasta, oatmeal, cereal, tortillas, and grits. Grains should be no more than 1/4 of your plate. At least half of your grains should be whole grains.    Protein. This group includes meat, poultry, seafood, beans and peas, eggs, processed soy products (such as tofu), nuts (including nut butters), and seeds. Make protein choices no more than 1/4 of your plate. Meat and poultry choices should be lean or low fat.    Dairy. The Dairy Group includes all fluid milk products and foods made from milk that contain calcium, such as yogurt and cheese. (Foods that have little calcium, such as cream, butter, and cream cheese, are not part of this group.) Most dairy choices should be low-fat or fat-free.    Oils. Oils aren't a food group, but they do contain essential nutrients. However it's important to watch your intake of oils. These are fats that are liquid at room temperature. They include canola, corn, olive, soybean, vegetable, and sunflower oil. Foods that are mainly oil include mayonnaise, certain salad dressings, and soft margarines. You likely already get your daily oil allowance from  "the foods you eat.  Things to limit  Eating healthy also means limiting these things in your diet:       Salt (sodium). Many processed foods have a lot of sodium. To keep sodium intake down, eat fresh vegetables, meats, poultry, and seafood when possible. Purchase low-sodium, reduced-sodium, or no-salt-added food products at the store. And don't add salt to your meals at home. Instead, season them with herbs and spices such as dill, oregano, cumin, and paprika. Or try adding flavor with lemon or lime zest and juice.    Saturated fat. Saturated fats are most often found in animal products such as beef, pork, and chicken. They are often solid at room temperature, such as butter. To reduce your saturated fat intake, choose leaner cuts of meat and poultry. And try healthier cooking methods such as grilling, broiling, roasting, or baking. For a simple lower-fat swap, use plain nonfat yogurt instead of mayonnaise when making potato salad or macaroni salad.    Added sugars. These are sugars added to foods. They are in foods such as ice cream, candy, soda, fruit drinks, sports drinks, energy drinks, cookies, pastries, jams, and syrups. Cut down on added sugars by sharing sweet treats with a family member or friend. You can also choose fruit for dessert, and drink water or other unsweetened beverages.     [employer] last reviewed this educational content on 6/1/2020 2000-2021 [employer]. All rights reserved. This information is not intended as a substitute for professional medical care. Always follow your healthcare professional's instructions.                 COUNSELING:  Reviewed preventive health counseling, as reflected in patient instructions      BMI:   Estimated body mass index is 30.42 kg/m  as calculated from the following:    Height as of this encounter: 1.45 m (4' 9.09\").    Weight as of this encounter: 64 kg (141 lb).     She reports that she has never smoked. She has never used smokeless " tobacco.    Appropriate preventive services were discussed with this patient, including applicable screening as appropriate for cardiovascular disease, diabetes, osteopenia/osteoporosis, and glaucoma.  As appropriate for age/gender, discussed screening for colorectal cancer, prostate cancer, breast cancer, and cervical cancer. Checklist reviewing preventive services available has been given to the patient.    Reviewed patients plan of care and provided an AVS. The Basic Care Plan (routine screening as documented in Health Maintenance) for Nerissa meets the Care Plan requirement. This Care Plan has been established and reviewed with the Patient.    Sarah Carlos PA-C  [facility]    Identified Health Risks:    The patient was counseled and encouraged to consider modifying their diet and eating habits. She was provided with information on recommended healthy diet options.

## 2023-02-02 NOTE — NURSING NOTE
"Chief Complaint   Patient presents with     Physical     carbon monxide exposure       Initial There were no vitals taken for this visit. Estimated body mass index is 30.63 kg/m  as calculated from the following:    Height as of 9/19/22: 1.467 m (4' 9.75\").    Weight as of 9/19/22: 65.9 kg (145 lb 4.8 oz).    Patient presents to the clinic using No DME    Is there anyone who you would like to be able to receive your results? No  If yes have patient fill out [facility]ACH[facility]    "

## 2023-02-02 NOTE — PATIENT INSTRUCTIONS
Fatigue -  Labs  Increasing sertraline to 100 mg.  Consider trying 75 mg first for a few weeks before increasing to 100 mg.  If not doing as well as desired, energy or mood, see me.  See if sleep improving with addressing mood and pain  Grandkids contributing to tiredness    Knee -  Trying meloxicam  If not helping enough contact me to try other NSAID  Monitor strength as knee feels better - suspect will improve  Option of cortisone shots   Tart cherry juice - 2-3 cherries a day    Osteopenia -   Recheck bone density.  Call [phone] to set up your imaging testing - DEXA and MAMMO  1200 mg daily of calcium, 800 units vitamin D    Dizzyiness with standing and low diastolic BP -  Trial off amlodipine -- goal BP under 150/90    See dentist    Think about covid booster     Tetanus shot - pharmacy for cash pay or see us when get a cut  Consider shingles vaccine - also pharmacy, but side effect risk    Patient Education   Personalized Prevention Plan  You are due for the preventive services outlined below.  Your care team is available to assist you in scheduling these services.  If you have already completed any of these items, please share that information with your care team to update in your medical record.  Health Maintenance Due   Topic Date Due    ANNUAL REVIEW OF HM ORDERS  Never done    Zoster (Shingles) Vaccine (1 of 2) Never done    Asthma Action Plan - yearly  11/29/2020    Diptheria Tetanus Pertussis (DTAP/TDAP/TD) Vaccine (3 - Td or Tdap) 02/01/2021    COVID-19 Vaccine (4 - Booster for Moderna series) 03/24/2022    Flu Vaccine (1) 09/01/2022    Cholesterol Lab  12/21/2022       Understanding USDA MyPlate  The USDA has guidelines to help you make healthy food choices. These are called MyPlate. MyPlate shows the food groups that make up healthy meals using the image of a place setting. Before you eat, think about the healthiest choices for what to put on your plate or in your cup or bowl. To learn more  about building a healthy plate, visit www.choosemyplate.gov.    The food groups  Fruits. Any fruit or 100% fruit juice counts as part of the Fruit Group. Fruits may be fresh, canned, frozen, or dried, and may be whole, cut-up, or pureed. Make 1/2 of your plate fruits and vegetables.  Vegetables. Any vegetable or 100% vegetable juice counts as a member of the Vegetable Group. Vegetables may be fresh, frozen, canned, or dried. They can be served raw or cooked and may be whole, cut-up, or mashed. Make 1/2 of your plate fruits and vegetables.  Grains. All foods made from grains are part of the Grains Group. These include wheat, rice, oats, cornmeal, and barley. Grains are often used to make foods such as bread, pasta, oatmeal, cereal, tortillas, and grits. Grains should be no more than 1/4 of your plate. At least half of your grains should be whole grains.  Protein. This group includes meat, poultry, seafood, beans and peas, eggs, processed soy products (such as tofu), nuts (including nut butters), and seeds. Make protein choices no more than 1/4 of your plate. Meat and poultry choices should be lean or low fat.  Dairy. The Dairy Group includes all fluid milk products and foods made from milk that contain calcium, such as yogurt and cheese. (Foods that have little calcium, such as cream, butter, and cream cheese, are not part of this group.) Most dairy choices should be low-fat or fat-free.  Oils. Oils aren't a food group, but they do contain essential nutrients. However it's important to watch your intake of oils. These are fats that are liquid at room temperature. They include canola, corn, olive, soybean, vegetable, and sunflower oil. Foods that are mainly oil include mayonnaise, certain salad dressings, and soft margarines. You likely already get your daily oil allowance from the foods you eat.  Things to limit  Eating healthy also means limiting these things in your diet:     Salt (sodium). Many processed foods  have a lot of sodium. To keep sodium intake down, eat fresh vegetables, meats, poultry, and seafood when possible. Purchase low-sodium, reduced-sodium, or no-salt-added food products at the store. And don't add salt to your meals at home. Instead, season them with herbs and spices such as dill, oregano, cumin, and paprika. Or try adding flavor with lemon or lime zest and juice.  Saturated fat. Saturated fats are most often found in animal products such as beef, pork, and chicken. They are often solid at room temperature, such as butter. To reduce your saturated fat intake, choose leaner cuts of meat and poultry. And try healthier cooking methods such as grilling, broiling, roasting, or baking. For a simple lower-fat swap, use plain nonfat yogurt instead of mayonnaise when making potato salad or macaroni salad.  Added sugars. These are sugars added to foods. They are in foods such as ice cream, candy, soda, fruit drinks, sports drinks, energy drinks, cookies, pastries, jams, and syrups. Cut down on added sugars by sharing sweet treats with a family member or friend. You can also choose fruit for dessert, and drink water or other unsweetened beverages.     [employer] last reviewed this educational content on 6/1/2020 2000-2021 [employer]. All rights reserved. This information is not intended as a substitute for professional medical care. Always follow your healthcare professional's instructions.

## 2023-02-03 DIAGNOSIS — D64.9 SEVERE ANEMIA: ICD-10-CM

## 2023-02-03 LAB — DEPRECATED CALCIDIOL+CALCIFEROL SERPL-MC: 10 UG/L (ref 20–75)

## 2023-02-06 PROBLEM — E55.9 VITAMIN D DEFICIENCY: Status: ACTIVE | Noted: 2023-02-06

## 2023-02-06 PROCEDURE — 82270 OCCULT BLOOD FECES: CPT

## 2023-02-06 RX ORDER — CHOLECALCIFEROL (VITAMIN D3) 1250 MCG
1250 CAPSULE ORAL WEEKLY
Qty: 8 CAPSULE | Refills: 0 | Status: SHIPPED | OUTPATIENT
Start: 2023-02-06
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
## 2023-02-07 LAB — HEMOCCULT SP1 STL QL: POSITIVE

## 2023-02-07 NOTE — RESULT ENCOUNTER NOTE
Discussed with Nerissa via phone today.  Summary:  Positive stool test likely means her stomach inflammation is back.  NO meloxicam until we have labs under control in future.  Opted against sulcrafate option as she really doesn't have a lot of GI symptoms, and anemia is likely a slow chronic worsening since she is not short of breath, dizzy etc.  Discussed option of endoscopy again, however for now she is opting to hold off on this given suspected chronic gastritis and resuming omeprazole.  She know to seek medical attention if short of breath, dizzy, newly very tired.  Otherwise repeat blood labs in 2-4 weeks.

## 2023-02-25 ENCOUNTER — LAB (OUTPATIENT)
Dept: LAB | Facility: CLINIC | Age: 78
End: 2023-02-25
Payer: COMMERCIAL

## 2023-02-25 DIAGNOSIS — D64.9 SEVERE ANEMIA: ICD-10-CM

## 2023-02-25 LAB — HGB BLD-MCNC: 8.9 G/DL (ref 11.7–15.7)

## 2023-02-25 PROCEDURE — 36415 COLL VENOUS BLD VENIPUNCTURE: CPT

## 2023-02-25 PROCEDURE — 85018 HEMOGLOBIN: CPT

## 2023-02-27 NOTE — RESULT ENCOUNTER NOTE
[facility] future hemoglobin, ferritin, tibc.    Nerissa  Hemoglobin is improving nicely.  Continue the current plan.  Labs again in 4 wks.  Let me know if you have questions.  Sarah

## 2023-03-09 ENCOUNTER — HOSPITAL ENCOUNTER (OUTPATIENT)
Dept: MAMMOGRAPHY | Facility: CLINIC | Age: 78
Discharge: HOME | End: 2023-03-09
Attending: PHYSICIAN ASSISTANT
Payer: COMMERCIAL

## 2023-03-09 ENCOUNTER — HOSPITAL ENCOUNTER (OUTPATIENT)
Dept: BONE DENSITY | Facility: CLINIC | Age: 78
Discharge: HOME | End: 2023-03-09
Attending: PHYSICIAN ASSISTANT
Payer: COMMERCIAL

## 2023-03-09 DIAGNOSIS — M85.80 OSTEOPENIA, UNSPECIFIED LOCATION: ICD-10-CM

## 2023-03-09 DIAGNOSIS — Z78.0 POST-MENOPAUSAL: ICD-10-CM

## 2023-03-09 DIAGNOSIS — Z12.31 ENCOUNTER FOR SCREENING MAMMOGRAM FOR BREAST CANCER: ICD-10-CM

## 2023-03-09 PROCEDURE — 77067 SCR MAMMO BI INCL CAD: CPT

## 2023-03-09 PROCEDURE — 77080 DXA BONE DENSITY AXIAL: CPT

## 2023-03-11 NOTE — RESULT ENCOUNTER NOTE
Nerissa  Bone density has worsened almost 20% since 2010.  It does not yet need treatment beyond calcium 1200 mg, vitamin D 800 units, exercise with weight bearing.  Recheck 2 yrs or so.  Sarah

## 2023-03-13 DIAGNOSIS — I10 ESSENTIAL HYPERTENSION: ICD-10-CM

## 2023-03-13 RX ORDER — LISINOPRIL 40 MG/1
40 TABLET ORAL DAILY
Qty: 90 TABLET | Refills: 3 | Status: SHIPPED | OUTPATIENT
Start: 2023-03-13 | End: 2024-01-04

## 2023-03-26 ENCOUNTER — DOCUMENTATION ONLY (OUTPATIENT)
Dept: LAB | Facility: CLINIC | Age: 78
End: 2023-03-26
Payer: COMMERCIAL

## 2023-03-26 DIAGNOSIS — D64.9 SEVERE ANEMIA: Primary | ICD-10-CM

## 2023-03-26 NOTE — PROGRESS NOTES
Nerissa Valentin has an upcoming lab appointment:    Future Appointments   Date Time Provider Department Center   3/30/2023  8:15 AM [facility]   4/6/2023  9:00 AM [facility]   4/6/2023  9:30 AM [facility]     Patient is scheduled for the following lab(s): patient states she is suppose to have her HGB rechecked from four weeks ago. Please place orders asap. Thank you    There is no order available. Please review and place either future orders or HMPO (Review of Health Maintenance Protocol Orders), as appropriate.    There are no preventive care reminders to display for this patient.  Deedee Fox

## 2023-04-03 ENCOUNTER — LAB (OUTPATIENT)
Dept: LAB | Facility: CLINIC | Age: 78
End: 2023-04-03
Payer: COMMERCIAL

## 2023-04-03 DIAGNOSIS — E55.9 VITAMIN D DEFICIENCY: ICD-10-CM

## 2023-04-03 DIAGNOSIS — D64.9 SEVERE ANEMIA: ICD-10-CM

## 2023-04-03 DIAGNOSIS — R53.83 OTHER FATIGUE: ICD-10-CM

## 2023-04-03 LAB — HGB BLD-MCNC: 10.9 G/DL (ref 11.7–15.7)

## 2023-04-03 PROCEDURE — 36415 COLL VENOUS BLD VENIPUNCTURE: CPT

## 2023-04-03 PROCEDURE — 85018 HEMOGLOBIN: CPT

## 2023-04-05 NOTE — RESULT ENCOUNTER NOTE
Nerissa  Hemoglobin continues to improve.  We typically expect about a 1 gram/mo increase so you're doing well with this.  Recheck again in 1 month.  Let me know if you have questions.  Sarah

## 2023-04-06 ENCOUNTER — HOSPITAL ENCOUNTER (OUTPATIENT)
Dept: MAMMOGRAPHY | Facility: CLINIC | Age: 78
Discharge: HOME | End: 2023-04-06
Attending: PHYSICIAN ASSISTANT
Payer: COMMERCIAL

## 2023-04-06 ENCOUNTER — HOSPITAL ENCOUNTER (OUTPATIENT)
Dept: ULTRASOUND IMAGING | Facility: CLINIC | Age: 78
Discharge: HOME | End: 2023-04-06
Attending: PHYSICIAN ASSISTANT
Payer: COMMERCIAL

## 2023-04-06 DIAGNOSIS — R92.8 ABNORMAL MAMMOGRAM: ICD-10-CM

## 2023-04-06 PROCEDURE — 77061 BREAST TOMOSYNTHESIS UNI: CPT | Mod: RT

## 2023-04-06 PROCEDURE — 76642 ULTRASOUND BREAST LIMITED: CPT | Mod: RT

## 2023-05-01 ENCOUNTER — MYC MEDICAL ADVICE (OUTPATIENT)
Dept: FAMILY MEDICINE | Facility: CLINIC | Age: 78
End: 2023-05-01
Payer: COMMERCIAL

## 2023-05-01 DIAGNOSIS — D64.9 SEVERE ANEMIA: ICD-10-CM

## 2023-05-02 NOTE — TELEPHONE ENCOUNTER
From 2/2/23:    ADDENDUM   Severe anemia.  Hemoglobin 7.5.  Mild dizziness and tiredness, both of which have improved some recently.  Discussed in light of acute on chronic errosive gastritis seen on March 2021 endoscopy, need to do stool guiac test before starting any medication for knee pain.  Must also start omeprazole 40 mg daily to protect stomach - best taken on empty stomach 30 min or more before breakfast.  Restart daily iron supplement.  Lab only appt in 1 month to recheck hemoglobin -- but lab sooner or to emergency room if ANY worsening dizziness, tiredness, or new shortness of breath.  Discussed with patient via phone and my chart summary sent.

## 2023-05-05 ENCOUNTER — LAB (OUTPATIENT)
Dept: LAB | Facility: CLINIC | Age: 78
End: 2023-05-05
Payer: COMMERCIAL

## 2023-05-05 DIAGNOSIS — D64.9 SEVERE ANEMIA: ICD-10-CM

## 2023-05-05 DIAGNOSIS — D64.9 SEVERE ANEMIA: Primary | ICD-10-CM

## 2023-05-05 DIAGNOSIS — R53.83 OTHER FATIGUE: ICD-10-CM

## 2023-05-05 DIAGNOSIS — E55.9 VITAMIN D DEFICIENCY: ICD-10-CM

## 2023-05-05 LAB — FERRITIN SERPL-MCNC: 93 NG/ML (ref 11–328)

## 2023-05-05 PROCEDURE — 36415 COLL VENOUS BLD VENIPUNCTURE: CPT

## 2023-05-05 PROCEDURE — 82728 ASSAY OF FERRITIN: CPT

## 2023-05-05 PROCEDURE — 82306 VITAMIN D 25 HYDROXY: CPT

## 2023-05-05 NOTE — RESULT ENCOUNTER NOTE
[facility] future hemoglobin, ferritin.  Omeprazole 20 mg 90 day supply.    Nerissa  Ferritin looks much better.  Hemoglobin was not checked due to ordering error on my part.  If you are feeling well, I think we can try backing down to omeprazole 20 mg.  Iron supplement can be decreased.  Recheck 1-3 months.  Vitamin D is still in process.  Let me know if you have questions.  Sarah

## 2023-05-06 LAB — DEPRECATED CALCIDIOL+CALCIFEROL SERPL-MC: 49 UG/L (ref 20–75)

## 2023-05-22 ENCOUNTER — MYC MEDICAL ADVICE (OUTPATIENT)
Dept: FAMILY MEDICINE | Facility: CLINIC | Age: 78
End: 2023-05-22
Payer: COMMERCIAL

## 2023-05-22 DIAGNOSIS — M17.9 OSTEOARTHRITIS OF KNEE, UNSPECIFIED LATERALITY, UNSPECIFIED OSTEOARTHRITIS TYPE: ICD-10-CM

## 2023-05-23 RX ORDER — MELOXICAM 7.5 MG/1
7.5 TABLET ORAL DAILY
Qty: 60 TABLET | Refills: 1 | Status: SHIPPED | OUTPATIENT
Start: 2023-05-23 | End: 2023-11-09

## 2023-07-01 ENCOUNTER — LAB (OUTPATIENT)
Dept: LAB | Facility: CLINIC | Age: 78
End: 2023-07-01
Payer: COMMERCIAL

## 2023-07-01 DIAGNOSIS — D64.9 SEVERE ANEMIA: ICD-10-CM

## 2023-07-01 LAB — HGB BLD-MCNC: 11.6 G/DL (ref 11.7–15.7)

## 2023-07-01 PROCEDURE — 36415 COLL VENOUS BLD VENIPUNCTURE: CPT

## 2023-07-01 PROCEDURE — 85018 HEMOGLOBIN: CPT

## 2023-07-19 DIAGNOSIS — E78.5 DYSLIPIDEMIA: ICD-10-CM

## 2023-07-20 RX ORDER — ATORVASTATIN CALCIUM 20 MG/1
20 TABLET, FILM COATED ORAL DAILY
Qty: 90 TABLET | Refills: 1 | Status: SHIPPED | OUTPATIENT
Start: 2023-07-20 | End: 2024-01-04

## 2023-07-20 NOTE — TELEPHONE ENCOUNTER
Prescription approved per [facility] Refill Protocol     Ilda Hall     RN MSN

## 2023-08-07 ENCOUNTER — LAB (OUTPATIENT)
Dept: LAB | Facility: CLINIC | Age: 78
End: 2023-08-07
Payer: COMMERCIAL

## 2023-08-07 DIAGNOSIS — D64.9 SEVERE ANEMIA: ICD-10-CM

## 2023-08-07 LAB — FERRITIN SERPL-MCNC: 68 NG/ML (ref 11–328)

## 2023-08-07 PROCEDURE — 82728 ASSAY OF FERRITIN: CPT

## 2023-08-07 PROCEDURE — 36415 COLL VENOUS BLD VENIPUNCTURE: CPT

## 2023-08-07 NOTE — RESULT ENCOUNTER NOTE
Nerissa  Ferritin level went down a fair amount.  Are you taking iron supplement?  NSAID?  Any abdominal pain, bloody or black stools?  Let me know so we can come up with further plan.  Will send this as My Chart message so you can easily respond.  Sarah

## 2023-10-04 ENCOUNTER — ANCILLARY PROCEDURE (OUTPATIENT)
Dept: GENERAL RADIOLOGY | Facility: CLINIC | Age: 78
End: 2023-10-04
Attending: NURSE PRACTITIONER
Payer: COMMERCIAL

## 2023-10-04 ENCOUNTER — OFFICE VISIT (OUTPATIENT)
Dept: URGENT CARE | Facility: URGENT CARE | Age: 78
End: 2023-10-04
Payer: COMMERCIAL

## 2023-10-04 VITALS
RESPIRATION RATE: 16 BRPM | WEIGHT: 141 LBS | SYSTOLIC BLOOD PRESSURE: 140 MMHG | BODY MASS INDEX: 30.42 KG/M2 | OXYGEN SATURATION: 99 % | TEMPERATURE: 99.8 F | HEART RATE: 68 BPM | DIASTOLIC BLOOD PRESSURE: 76 MMHG

## 2023-10-04 DIAGNOSIS — R06.2 WHEEZING: ICD-10-CM

## 2023-10-04 DIAGNOSIS — J06.9 BACTERIAL URI: Primary | ICD-10-CM

## 2023-10-04 DIAGNOSIS — J45.31 MILD PERSISTENT ASTHMA WITH EXACERBATION: ICD-10-CM

## 2023-10-04 DIAGNOSIS — J45.30 MILD PERSISTENT ASTHMA WITHOUT COMPLICATION: ICD-10-CM

## 2023-10-04 DIAGNOSIS — B96.89 BACTERIAL URI: Primary | ICD-10-CM

## 2023-10-04 DIAGNOSIS — R05.1 ACUTE COUGH: ICD-10-CM

## 2023-10-04 PROCEDURE — 99213 OFFICE O/P EST LOW 20 MIN: CPT | Performed by: NURSE PRACTITIONER

## 2023-10-04 PROCEDURE — 71046 X-RAY EXAM CHEST 2 VIEWS: CPT | Mod: TC | Performed by: RADIOLOGY

## 2023-10-04 RX ORDER — PREDNISONE 20 MG/1
20 TABLET ORAL 2 TIMES DAILY
Qty: 10 TABLET | Refills: 0 | Status: SHIPPED | OUTPATIENT
Start: 2023-10-04 | End: 2023-10-09

## 2023-10-04 RX ORDER — AZITHROMYCIN 250 MG/1
TABLET, FILM COATED ORAL
Qty: 6 TABLET | Refills: 0 | Status: SHIPPED | OUTPATIENT
Start: 2023-10-04 | End: 2023-10-09

## 2023-10-04 NOTE — PROGRESS NOTES
Assessment & Plan        Diagnosis Comments   1. Bacterial URI  azithromycin (ZITHROMAX) 250 MG tablet Home care reviewed. Patient verbalized understanding; will monitor symptoms closely. Reviewed s/e to medications.   Follow up with primary care in 1 week if symptoms not improving.     Handout given from epic and reviewed.        2. Acute cough  XR Chest 2 Views       3. Wheezing  XR Chest 2 Views       4. Mild persistent asthma with exacerbation  XR Chest 2 Views, predniSONE (DELTASONE) 20 MG tablet       5. Mild persistent asthma without complication          Radiology read chest x-ray due to patient's history and symptoms we will treat with oral antibiotic and prednisone side effects were discussed red flags were also discussed that would warrant emergent or urgent evaluation patient verbalized agreement of plan.    RICCI [facility]    Dimitry Multani is a 77 year old female who presents to clinic today for the following health issues:  Chief Complaint   Patient presents with    Cough     Started out with sore throat first x 1 day. Cough X 6 days, SOB- using inhaler, low grade temp, tested for covid 3 times and has been negative, sinus drainage.      HPI    URI Adult    Onset of symptoms was 1 week(s) ago.  Course of illness is waxing and waning.    Severity moderate  Current and Associated symptoms: fever, runny nose, cough - productive, wheezing, shortness of breath, sore throat, headache, and body aches  Treatment measures tried include Tylenol/Ibuprofen, Inhaler (name: albuterol), Fluids, and Rest.  Predisposing factors include None.      Review of Systems  Constitutional, HEENT, cardiovascular, pulmonary, gi and gu systems are negative, except as otherwise noted.      Objective    BP (!) 190/69   Pulse 59   Temp 99.8  F (37.7  C) (Tympanic)   Resp 16   Wt 64 kg (141 lb)   SpO2 99%   BMI 30.42 kg/m    Physical Exam   GENERAL: alert and no  distress  EYES: Eyes grossly normal to inspection, PERRL and conjunctivae and sclerae normal  HENT: normal cephalic/atraumatic, ear canals and TM's normal, nose and mouth without ulcers or lesions, nasal mucosa edematous , rhinorrhea clear, oropharynx clear, and oral mucous membranes moist  NECK: bilateral anterior cervical adenopathy, no asymmetry, masses, or scars, and thyroid normal to palpation  RESP: rhonchi throughout and expiratory wheezes throughout  CV: regular rate and rhythm, normal S1 S2, no S3 or S4, no murmur, click or rub, no peripheral edema and peripheral pulses strong  ABDOMEN: soft, nontender, no hepatosplenomegaly, no masses and bowel sounds normal  MS: no gross musculoskeletal defects noted, no edema

## 2023-11-03 ENCOUNTER — MYC MEDICAL ADVICE (OUTPATIENT)
Dept: FAMILY MEDICINE | Facility: CLINIC | Age: 78
End: 2023-11-03
Payer: COMMERCIAL

## 2023-11-03 DIAGNOSIS — D51.9 ANEMIA DUE TO VITAMIN B12 DEFICIENCY, UNSPECIFIED B12 DEFICIENCY TYPE: ICD-10-CM

## 2023-11-03 DIAGNOSIS — D50.9 IRON DEFICIENCY ANEMIA, UNSPECIFIED IRON DEFICIENCY ANEMIA TYPE: Primary | ICD-10-CM

## 2023-11-03 DIAGNOSIS — D64.9 SEVERE ANEMIA: ICD-10-CM

## 2023-11-03 DIAGNOSIS — M17.9 OSTEOARTHRITIS OF KNEE, UNSPECIFIED LATERALITY, UNSPECIFIED OSTEOARTHRITIS TYPE: ICD-10-CM

## 2023-11-07 ENCOUNTER — LAB (OUTPATIENT)
Dept: LAB | Facility: CLINIC | Age: 78
End: 2023-11-07
Payer: COMMERCIAL

## 2023-11-07 DIAGNOSIS — D51.9 ANEMIA DUE TO VITAMIN B12 DEFICIENCY, UNSPECIFIED B12 DEFICIENCY TYPE: ICD-10-CM

## 2023-11-07 DIAGNOSIS — D50.9 IRON DEFICIENCY ANEMIA, UNSPECIFIED IRON DEFICIENCY ANEMIA TYPE: ICD-10-CM

## 2023-11-07 LAB
FERRITIN SERPL-MCNC: 94 NG/ML (ref 11–328)
HGB BLD-MCNC: 12.2 G/DL (ref 11.7–15.7)

## 2023-11-07 PROCEDURE — 82728 ASSAY OF FERRITIN: CPT

## 2023-11-07 PROCEDURE — 85018 HEMOGLOBIN: CPT

## 2023-11-07 PROCEDURE — 36415 COLL VENOUS BLD VENIPUNCTURE: CPT

## 2023-11-07 NOTE — RESULT ENCOUNTER NOTE
Nerissa  Labs look great.  Ok to try your meloxicam if staying on omeprazole.  Let me know if you have questions.  Sarah

## 2023-11-09 ENCOUNTER — TELEPHONE (OUTPATIENT)
Dept: FAMILY MEDICINE | Facility: CLINIC | Age: 78
End: 2023-11-09
Payer: COMMERCIAL

## 2023-11-09 RX ORDER — MELOXICAM 7.5 MG/1
7.5 TABLET ORAL DAILY
Qty: 90 TABLET | Refills: 1 | Status: SHIPPED | OUTPATIENT
Start: 2023-11-09 | End: 2024-01-04

## 2023-11-09 NOTE — TELEPHONE ENCOUNTER
Patient Quality Outreach    Patient is due for the following:   Asthma  -  ACT needed  Hypertension -  Hypertension follow-up visit  Depression  -  PHQ-9 needed    Next Steps:   Patient has upcoming appointment, these items will be addressed at that time.    Type of outreach:    Chart review performed, no outreach needed.      Questions for provider review:    None           Lawanda Mendoza CMA

## 2023-12-21 DIAGNOSIS — D64.9 ANEMIA, UNSPECIFIED TYPE: ICD-10-CM

## 2023-12-21 DIAGNOSIS — D51.9 ANEMIA DUE TO VITAMIN B12 DEFICIENCY, UNSPECIFIED B12 DEFICIENCY TYPE: Primary | ICD-10-CM

## 2023-12-22 RX ORDER — CYANOCOBALAMIN 1000 UG/ML
INJECTION, SOLUTION INTRAMUSCULAR; SUBCUTANEOUS
Qty: 3 ML | Refills: 0 | Status: SHIPPED | OUTPATIENT
Start: 2023-12-22 | End: 2024-01-04

## 2023-12-22 NOTE — TELEPHONE ENCOUNTER
Pending Prescriptions:                       Disp   Refills    cyanocobalamin (CYANOCOBALAMIN) 1000 MCG/M*3 mL   0        Sig: INJECT 1 ML (1,000 MCG) INTO THE MUSCLE ONCE PER           MONTH AS DIRECTED    Routing refill request to provider for review/approval because:    Vitamin Supplements Protocol [id number]/21/2023 10:41 PM   Protocol Details   Medication indicated for associated diagnosis    The patient does not have an order for high-dose vitamin D    Medication is active on med list    The patient is 2 years of age or older    Recent (12 mo) or future (90 days) visit within the authorizing provider's specialty        Cynthia Solis RN  [facility]

## 2024-01-03 ASSESSMENT — ENCOUNTER SYMPTOMS
HEMATURIA: 0
HEADACHES: 0
HEMATOCHEZIA: 0
EYE PAIN: 0
PARESTHESIAS: 0
FEVER: 0
JOINT SWELLING: 1
CONSTIPATION: 0
DYSURIA: 0
MYALGIAS: 1
FREQUENCY: 0
CHILLS: 0
SHORTNESS OF BREATH: 1
DIZZINESS: 0
SORE THROAT: 0
NERVOUS/ANXIOUS: 0
DIARRHEA: 0
NAUSEA: 0
COUGH: 1
PALPITATIONS: 0
ABDOMINAL PAIN: 0
ARTHRALGIAS: 1
BREAST MASS: 0
HEARTBURN: 0
WEAKNESS: 0

## 2024-01-03 ASSESSMENT — PATIENT HEALTH QUESTIONNAIRE - PHQ9
SUM OF ALL RESPONSES TO PHQ QUESTIONS 1-9: 3
10. IF YOU CHECKED OFF ANY PROBLEMS, HOW DIFFICULT HAVE THESE PROBLEMS MADE IT FOR YOU TO DO YOUR WORK, TAKE CARE OF THINGS AT HOME, OR GET ALONG WITH OTHER PEOPLE: NOT DIFFICULT AT ALL
SUM OF ALL RESPONSES TO PHQ QUESTIONS 1-9: 3

## 2024-01-03 ASSESSMENT — ASTHMA QUESTIONNAIRES
ACT_TOTALSCORE: 20
QUESTION_1 LAST FOUR WEEKS HOW MUCH OF THE TIME DID YOUR ASTHMA KEEP YOU FROM GETTING AS MUCH DONE AT WORK, SCHOOL OR AT HOME: A LITTLE OF THE TIME
ACT_TOTALSCORE: 20
EMERGENCY_ROOM_LAST_YEAR_TOTAL: ONE
QUESTION_4 LAST FOUR WEEKS HOW OFTEN HAVE YOU USED YOUR RESCUE INHALER OR NEBULIZER MEDICATION (SUCH AS ALBUTEROL): ONCE A WEEK OR LESS
QUESTION_3 LAST FOUR WEEKS HOW OFTEN DID YOUR ASTHMA SYMPTOMS (WHEEZING, COUGHING, SHORTNESS OF BREATH, CHEST TIGHTNESS OR PAIN) WAKE YOU UP AT NIGHT OR EARLIER THAN USUAL IN THE MORNING: ONCE OR TWICE
QUESTION_2 LAST FOUR WEEKS HOW OFTEN HAVE YOU HAD SHORTNESS OF BREATH: ONCE OR TWICE A WEEK
QUESTION_5 LAST FOUR WEEKS HOW WOULD YOU RATE YOUR ASTHMA CONTROL: WELL CONTROLLED

## 2024-01-03 ASSESSMENT — ACTIVITIES OF DAILY LIVING (ADL): CURRENT_FUNCTION: NO ASSISTANCE NEEDED

## 2024-01-04 ENCOUNTER — OFFICE VISIT (OUTPATIENT)
Dept: FAMILY MEDICINE | Facility: CLINIC | Age: 79
End: 2024-01-04
Payer: COMMERCIAL

## 2024-01-04 VITALS
HEIGHT: 57 IN | WEIGHT: 150 LBS | SYSTOLIC BLOOD PRESSURE: 138 MMHG | HEART RATE: 61 BPM | RESPIRATION RATE: 18 BRPM | TEMPERATURE: 97.9 F | OXYGEN SATURATION: 98 % | BODY MASS INDEX: 32.36 KG/M2 | DIASTOLIC BLOOD PRESSURE: 82 MMHG

## 2024-01-04 DIAGNOSIS — Z12.11 COLON CANCER SCREENING: ICD-10-CM

## 2024-01-04 DIAGNOSIS — D64.9 SEVERE ANEMIA: ICD-10-CM

## 2024-01-04 DIAGNOSIS — M17.9 OSTEOARTHRITIS OF KNEE, UNSPECIFIED LATERALITY, UNSPECIFIED OSTEOARTHRITIS TYPE: Primary | ICD-10-CM

## 2024-01-04 DIAGNOSIS — F34.1 DYSTHYMIC DISORDER: ICD-10-CM

## 2024-01-04 DIAGNOSIS — I10 ESSENTIAL HYPERTENSION: ICD-10-CM

## 2024-01-04 DIAGNOSIS — E78.5 DYSLIPIDEMIA: ICD-10-CM

## 2024-01-04 DIAGNOSIS — J45.20 MILD INTERMITTENT ASTHMA WITHOUT COMPLICATION: ICD-10-CM

## 2024-01-04 DIAGNOSIS — E66.89 OTHER OBESITY: ICD-10-CM

## 2024-01-04 DIAGNOSIS — Z00.00 ENCOUNTER FOR MEDICARE ANNUAL WELLNESS EXAM: ICD-10-CM

## 2024-01-04 DIAGNOSIS — D51.9 ANEMIA DUE TO VITAMIN B12 DEFICIENCY, UNSPECIFIED B12 DEFICIENCY TYPE: ICD-10-CM

## 2024-01-04 DIAGNOSIS — K29.50 CHRONIC GASTRITIS, PRESENCE OF BLEEDING UNSPECIFIED, UNSPECIFIED GASTRITIS TYPE: ICD-10-CM

## 2024-01-04 DIAGNOSIS — R53.83 FATIGUE, UNSPECIFIED TYPE: ICD-10-CM

## 2024-01-04 DIAGNOSIS — E66.09 CLASS 1 OBESITY DUE TO EXCESS CALORIES IN ADULT, UNSPECIFIED BMI, UNSPECIFIED WHETHER SERIOUS COMORBIDITY PRESENT: ICD-10-CM

## 2024-01-04 DIAGNOSIS — E66.811 CLASS 1 OBESITY DUE TO EXCESS CALORIES IN ADULT, UNSPECIFIED BMI, UNSPECIFIED WHETHER SERIOUS COMORBIDITY PRESENT: ICD-10-CM

## 2024-01-04 DIAGNOSIS — L65.9 HAIR LOSS: ICD-10-CM

## 2024-01-04 LAB
FERRITIN SERPL-MCNC: 116 NG/ML (ref 11–328)
FOLATE SERPL-MCNC: 11.3 NG/ML (ref 4.6–34.8)
HGB BLD-MCNC: 13.4 G/DL (ref 11.7–15.7)
IRON BINDING CAPACITY (ROCHE): 276 UG/DL (ref 240–430)
IRON SATN MFR SERPL: 37 % (ref 15–46)
IRON SERPL-MCNC: 102 UG/DL (ref 37–145)
TSH SERPL DL<=0.005 MIU/L-ACNC: 2.06 UIU/ML (ref 0.3–4.2)

## 2024-01-04 PROCEDURE — 83550 IRON BINDING TEST: CPT | Performed by: PHYSICIAN ASSISTANT

## 2024-01-04 PROCEDURE — 82607 VITAMIN B-12: CPT | Performed by: PHYSICIAN ASSISTANT

## 2024-01-04 PROCEDURE — 82306 VITAMIN D 25 HYDROXY: CPT | Mod: GZ | Performed by: PHYSICIAN ASSISTANT

## 2024-01-04 PROCEDURE — 85018 HEMOGLOBIN: CPT | Performed by: PHYSICIAN ASSISTANT

## 2024-01-04 PROCEDURE — 82746 ASSAY OF FOLIC ACID SERUM: CPT | Performed by: PHYSICIAN ASSISTANT

## 2024-01-04 PROCEDURE — 36415 COLL VENOUS BLD VENIPUNCTURE: CPT | Performed by: PHYSICIAN ASSISTANT

## 2024-01-04 PROCEDURE — 99397 PER PM REEVAL EST PAT 65+ YR: CPT | Mod: 25 | Performed by: PHYSICIAN ASSISTANT

## 2024-01-04 PROCEDURE — 99214 OFFICE O/P EST MOD 30 MIN: CPT | Mod: 25 | Performed by: PHYSICIAN ASSISTANT

## 2024-01-04 PROCEDURE — 20610 DRAIN/INJ JOINT/BURSA W/O US: CPT | Mod: RT | Performed by: PHYSICIAN ASSISTANT

## 2024-01-04 PROCEDURE — 84443 ASSAY THYROID STIM HORMONE: CPT | Performed by: PHYSICIAN ASSISTANT

## 2024-01-04 PROCEDURE — 82728 ASSAY OF FERRITIN: CPT | Performed by: PHYSICIAN ASSISTANT

## 2024-01-04 PROCEDURE — 83540 ASSAY OF IRON: CPT | Performed by: PHYSICIAN ASSISTANT

## 2024-01-04 RX ORDER — SERTRALINE HYDROCHLORIDE 100 MG/1
100 TABLET, FILM COATED ORAL DAILY
Qty: 90 TABLET | Refills: 3 | Status: SHIPPED | OUTPATIENT
Start: 2024-01-04

## 2024-01-04 RX ORDER — TRIAMCINOLONE ACETONIDE 40 MG/ML
40 INJECTION, SUSPENSION INTRA-ARTICULAR; INTRAMUSCULAR ONCE
Status: COMPLETED | OUTPATIENT
Start: 2024-01-04 | End: 2024-01-04

## 2024-01-04 RX ORDER — LISINOPRIL 40 MG/1
40 TABLET ORAL DAILY
Qty: 90 TABLET | Refills: 3 | Status: SHIPPED | OUTPATIENT
Start: 2024-01-04

## 2024-01-04 RX ORDER — ALBUTEROL SULFATE 90 UG/1
2 AEROSOL, METERED RESPIRATORY (INHALATION) EVERY 4 HOURS PRN
Qty: 18 G | Refills: 1 | Status: SHIPPED | OUTPATIENT
Start: 2024-01-04

## 2024-01-04 RX ORDER — MELOXICAM 7.5 MG/1
7.5 TABLET ORAL DAILY
Qty: 90 TABLET | Refills: 1 | Status: CANCELLED | OUTPATIENT
Start: 2024-01-04

## 2024-01-04 RX ORDER — CYANOCOBALAMIN 1000 UG/ML
INJECTION, SOLUTION INTRAMUSCULAR; SUBCUTANEOUS
Qty: 3 ML | Refills: 3 | Status: SHIPPED | OUTPATIENT
Start: 2024-01-04

## 2024-01-04 RX ORDER — ATORVASTATIN CALCIUM 20 MG/1
20 TABLET, FILM COATED ORAL DAILY
Qty: 90 TABLET | Refills: 3 | Status: SHIPPED | OUTPATIENT
Start: 2024-01-04

## 2024-01-04 RX ORDER — LIDOCAINE HYDROCHLORIDE 20 MG/ML
40 INJECTION, SOLUTION INFILTRATION; PERINEURAL ONCE
Status: COMPLETED | OUTPATIENT
Start: 2024-01-04 | End: 2024-01-04

## 2024-01-04 RX ADMIN — LIDOCAINE HYDROCHLORIDE 40 MG: 20 INJECTION, SOLUTION INFILTRATION; PERINEURAL at 12:09

## 2024-01-04 RX ADMIN — TRIAMCINOLONE ACETONIDE 40 MG: 40 INJECTION, SUSPENSION INTRA-ARTICULAR; INTRAMUSCULAR at 12:07

## 2024-01-04 ASSESSMENT — ENCOUNTER SYMPTOMS
EYE PAIN: 0
MYALGIAS: 1
COUGH: 1
DYSURIA: 0
NERVOUS/ANXIOUS: 0
ABDOMINAL PAIN: 0
ARTHRALGIAS: 1
SORE THROAT: 0
CONSTIPATION: 0
WEAKNESS: 0
HEADACHES: 0
FREQUENCY: 0
JOINT SWELLING: 1
CHILLS: 0
PARESTHESIAS: 0
DIARRHEA: 0
NAUSEA: 0
SHORTNESS OF BREATH: 1
PALPITATIONS: 0
DIZZINESS: 0
HEMATOCHEZIA: 0
BREAST MASS: 0
HEMATURIA: 0
HEARTBURN: 0
FEVER: 0

## 2024-01-04 ASSESSMENT — ACTIVITIES OF DAILY LIVING (ADL): CURRENT_FUNCTION: NO ASSISTANCE NEEDED

## 2024-01-04 ASSESSMENT — PAIN SCALES - GENERAL: PAINLEVEL: EXTREME PAIN (8)

## 2024-01-04 NOTE — NURSING NOTE
"No chief complaint on file.      Initial Pulse 61   Resp 18   Ht 1.45 m (4' 9.09\")   Wt 68 kg (150 lb)   SpO2 98%   BMI 32.36 kg/m   Estimated body mass index is 32.36 kg/m  as calculated from the following:    Height as of this encounter: 1.45 m (4' 9.09\").    Weight as of this encounter: 68 kg (150 lb).    Patient presents to the clinic using No DME    Is there anyone who you would like to be able to receive your results? No  If yes have patient fill out CACHORRO      "

## 2024-01-04 NOTE — PATIENT INSTRUCTIONS
Labs today to check on fatigue, hair loss, recheck meds    If all labs normal, consider treating for depression further if things not improving after knee pain is better    Stop meloxicam since not helping knees.  Trying injection today.  If this is not helpful enough, message me to try celebrex.    Joint injection -  Take it easy next few days  Be seen if fever, sudden increase in bad pain, redness, swelling - possible signs of infection  Call if any question/concern    Stay on omeprazole long term due to stomach bleed history     Decided against RSV vaccine - could consider with asthma history    Space vaccines at least 2 weeks after any joint injection -- due for covid vaccine and tetanus vaccine - at pharmacy     Do at least 1 more poop test since last yr's poop test did show blood - but likely just from the stomach bleed      Patient Education   Personalized Prevention Plan  You are due for the preventive services outlined below.  Your care team is available to assist you in scheduling these services.  If you have already completed any of these items, please share that information with your care team to update in your medical record.  Health Maintenance Due   Topic Date Due    RSV VACCINE (Pregnancy & 60+) (1 - 1-dose 60+ series) Never done    Asthma Action Plan - yearly  11/29/2020    Diptheria Tetanus Pertussis (DTAP/TDAP/TD) Vaccine (3 - Td or Tdap) 02/01/2021    Flu Vaccine (1) 09/01/2023    COVID-19 Vaccine (4 - 2023-24 season) 09/01/2023    Cholesterol Lab  02/02/2024    ANNUAL REVIEW OF HM ORDERS  02/02/2024    Vitamin B12 Level  02/02/2024     Your Health Risk Assessment indicates you feel you are not in good health    A healthy lifestyle helps keep the body fit and the mind alert. It helps protect you from disease, helps you fight disease, and helps prevent chronic disease (disease that doesn't go away) from getting worse. This is important as you get older and begin to notice twinges in muscles and  joints and a decline in the strength and stamina you once took for granted. A healthy lifestyle includes good healthcare, good nutrition, weight control, recreation, and regular exercise. Avoid harmful substances and do what you can to keep safe. Another part of a healthy lifestyle is stay mentally active and socially involved.    Good healthcare   Have a wellness visit every year.   If you have new symptoms, let us know right away. Don't wait until the next checkup.   Take medicines exactly as prescribed and keep your medicines in a safe place. Tell us if your medicine causes problems.   Healthy diet and weight control   Eat 3 or 4 small, nutritious, low-fat, high-fiber meals a day. Include a variety of fruits, vegetables, and whole-grain foods.   Make sure you get enough calcium in your diet. Calcium, vitamin D, and exercise help prevent osteoporosis (bone thinning).   If you live alone, try eating with others when you can. That way you get a good meal and have company while you eat it.   Try to keep a healthy weight. If you eat more calories than your body uses for energy, it will be stored as fat and you will gain weight.     Recreation   Recreation is not limited to sports and team events. It includes any activity that provides relaxation, interest, enjoyment, and exercise. Recreation provides an outlet for physical, mental, and social energy. It can give a sense of worth and achievement. It can help you stay healthy.    Mental Exercise and Social Involvement  Mental and emotional health is as important as physical health. Keep in touch with friends and family. Stay as active as possible. Continue to learn and challenge yourself.   Things you can do to stay mentally active are:  Learn something new, like a foreign language or musical instrument.   Play SCRABBLE or do crossword puzzles. If you cannot find people to play these games with you at home, you can play them with others on your computer through the  "Internet.   Join a games club--anything from card games to chess or checkers or lawn bowling.   Start a new hobby.   Go back to school.   Volunteer.   Read.   Keep up with world events.  Learning About Being Physically Active  What is physical activity?     Being physically active means doing any kind of activity that gets your body moving.  The types of physical activity that can help you get fit and stay healthy include:  Aerobic or \"cardio\" activities. These make your heart beat faster and make you breathe harder, such as brisk walking, riding a bike, or running. They strengthen your heart and lungs and build up your endurance.  Strength training activities. These make your muscles work against, or \"resist,\" something. Examples include lifting weights or doing push-ups. These activities help tone and strengthen your muscles and bones.  Stretches. These let you move your joints and muscles through their full range of motion. Stretching helps you be more flexible.  Reaching a balance between these three types of physical activity is important because each one contributes to your overall fitness.  What are the benefits of being active?  Being active is one of the best things you can do for your health. It helps you to:  Feel stronger and have more energy to do all the things you like to do.  Focus better at school or work.  Feel, think, and sleep better.  Reach and stay at a healthy weight.  Lose fat and build lean muscle.  Lower your risk for serious health problems, including diabetes, heart attack, high blood pressure, and some cancers.  Keep your heart, lungs, bones, muscles, and joints strong and healthy.  How can you make being active part of your life?  Start slowly. Make it your long-term goal to get at least 30 minutes of exercise on most days of the week. Walking is a good choice. You also may want to do other activities, such as running, swimming, cycling, or playing tennis or team sports.  Pick " "activities that you like--ones that make your heart beat faster, your muscles stronger, and your muscles and joints more flexible. If you find more than one thing you like doing, do them all. You don't have to do the same thing every day.  Get your heart pumping every day. Any activity that makes your heart beat faster and keeps it at that rate for a while counts.  Here are some great ways to get your heart beating faster:  Go for a brisk walk, run, or hike.  Go for a swim or bike ride.  Take an online exercise class or dance.  Play a game of touch football, basketball, or soccer.  Play tennis, pickleball, or racquetball.  Climb stairs.  Even some household chores can be aerobic. Just do them at a faster pace. Raking or mowing the lawn, sweeping the garage, and vacuuming and cleaning your home all can help get your heart rate up.  Strengthen your muscles during the week. You don't have to lift heavy weights or grow big, bulky muscles to get stronger. Doing a few simple activities that make your muscles work against, or \"resist,\" something can help you get stronger. Aim for at least twice a week.  For example, you can:  Do push-ups or sit-ups, which use your own body weight as resistance.  Lift weights or dumbbells or use stretch bands at home or in a gym or community center.  Stretch your muscles often. Stretching will help you as you become more active. It can help you stay flexible and loosen tight muscles. It can also help improve your balance and posture and can be a great way to relax.  Be sure to stretch the muscles you'll be using when you work out. It's best to warm your muscles slightly before you stretch them. Walk or do some other light aerobic activity for a few minutes. Then start stretching.  When you stretch your muscles:  Do it slowly. Stretching is not about going fast or making sudden movements.  Don't push or bounce during a stretch.  Hold each stretch for at least 15 to 30 seconds, if you can. " "You should feel a stretch in the muscle, but not pain.  Breathe out as you do the stretch. Then breathe in as you hold the stretch. Don't hold your breath.  If you're worried about how more activity might affect your health, have a checkup before you start. Follow any special advice your doctor gives you for getting a smart start.  Where can you learn more?  Go to https://www.Lettuce Eat.Datahug/patiented  Enter W332 in the search box to learn more about \"Learning About Being Physically Active.\"  Current as of: June 6, 2023               Content Version: 13.8    4970-2360 [employer].   Care instructions adapted under license by your healthcare professional. If you have questions about a medical condition or this instruction, always ask your healthcare professional. [employer] disclaims any warranty or liability for your use of this information.      Learning About Dietary Guidelines  What are the Dietary Guidelines for Americans?     Dietary Guidelines for Americans provide tips for eating well and staying healthy. This helps reduce the risk for long-term (chronic) diseases.  These guidelines recommend that you:  Eat and drink the right amount for you. The U.[place] government's food guide is called MyPlate. It can help you make your own well-balanced eating plan.  Try to balance your eating with your activity. This helps you stay at a healthy weight.  Drink alcohol in moderation, if at all.  Limit foods high in salt, saturated fat, trans fat, and added sugar.  These guidelines are from the U.S. Department of Agriculture and the U.S. Department of Health and Human Services. They are updated every 5 years.  What is MyPlate?  MyPlate is the U.[place]. government's food guide. It can help you make your own well-balanced eating plan. A balanced eating plan means that you eat enough, but not too much, and that your food gives you the nutrients you need to stay healthy.  MyPlate focuses on eating plenty of " "whole grains, fruits, and vegetables, and on limiting fat and sugar. It is available online at www.ChooseMyPlate.gov.  How can you get started?  If you're trying to eat healthier, you can slowly change your eating habits over time. You don't have to make big changes all at once. Start by adding one or two healthy foods to your meals each day.  Grains  Choose whole-grain breads and cereals and whole-wheat pasta and whole-grain crackers.  Vegetables  Eat a variety of vegetables every day. They have lots of nutrients and are part of a heart-healthy diet.  Fruits  Eat a variety of fruits every day. Fruits contain lots of nutrients. Choose fresh fruit instead of fruit juice.  Protein foods  Choose fish and lean poultry more often. Eat red meat and fried meats less often. Dried beans, tofu, and nuts are also good sources of protein.  Dairy  Choose low-fat or fat-free products from this food group. If you have problems digesting milk, try eating cheese or yogurt instead.  Fats and oils  Limit fats and oils if you're trying to cut calories. Choose healthy fats when you cook. These include canola oil and olive oil.  Where can you learn more?  Go to https://www.Thinknum.net/patiented  Enter D676 in the search box to learn more about \"Learning About Dietary Guidelines.\"  Current as of: February 28, 2023               Content Version: 13.8    4158-8209 [employer].   Care instructions adapted under license by your healthcare professional. If you have questions about a medical condition or this instruction, always ask your healthcare professional. [employer] disclaims any warranty or liability for your use of this information.      Bladder Training: Care Instructions  Your Care Instructions     Bladder training is used to treat urge incontinence and stress incontinence. Urge incontinence means that the need to urinate comes on so fast that you can't get to a toilet in time. Stress incontinence means " that you leak urine because of pressure on your bladder. For example, it may happen when you laugh, cough, or lift something heavy.  Bladder training can increase how long you can wait before you have to urinate. It can also help your bladder hold more urine. And it can give you better control over the urge to urinate.  It is important to remember that bladder training takes a few weeks to a few months to make a difference. You may not see results right away, but don't give up.  Follow-up care is a key part of your treatment and safety. Be sure to make and go to all appointments, and call your doctor if you are having problems. It's also a good idea to know your test results and keep a list of the medicines you take.  How can you care for yourself at home?  Work with your doctor to come up with a bladder training program that is right for you. You may use one or more of the following methods.  Delayed urination  In the beginning, try to keep from urinating for 5 minutes after you first feel the need to go.  While you wait, take deep, slow breaths to relax. Kegel exercises can also help you delay the need to go to the bathroom.  After some practice, when you can easily wait 5 minutes to urinate, try to wait 10 minutes before you urinate.  Slowly increase the waiting period until you are able to control when you have to urinate.  Scheduled urination  Empty your bladder when you first wake up in the morning.  Schedule times throughout the day when you will urinate.  Start by going to the bathroom every hour, even if you don't need to go.  Slowly increase the time between trips to the bathroom.  When you have found a schedule that works well for you, keep doing it.  If you wake up during the night and have to urinate, do it. Apply your schedule to waking hours only.  Kegel exercises  These tighten and strengthen pelvic muscles, which can help you control the flow of urine. (If doing these exercises causes pain, stop  "doing them and talk with your doctor.) To do Kegel exercises:  Squeeze your muscles as if you were trying not to pass gas. Or squeeze your muscles as if you were stopping the flow of urine. Your belly, legs, and buttocks shouldn't move.  Hold the squeeze for 3 seconds, then relax for 5 to 10 seconds.  Start with 3 seconds, then add 1 second each week until you are able to squeeze for 10 seconds.  Repeat the exercise 10 times a session. Do 3 to 8 sessions a day.  When should you call for help?  Watch closely for changes in your health, and be sure to contact your doctor if:    Your incontinence is getting worse.     You do not get better as expected.   Where can you learn more?  Go to https://www.Online Prasad.net/patiented  Enter V684 in the search box to learn more about \"Bladder Training: Care Instructions.\"  Current as of: February 28, 2023               Content Version: 13.8    8233-9771 [employer].   Care instructions adapted under license by your healthcare professional. If you have questions about a medical condition or this instruction, always ask your healthcare professional. [employer] disclaims any warranty or liability for your use of this information.         "

## 2024-01-04 NOTE — PROGRESS NOTES
Assessment & Plan     Osteoarthritis of knee, unspecified laterality, unspecified osteoarthritis type  PROCEDURE NOTE: Pros and cons of procedure were reviewed and written informed consent obtained, including signs of adverse reaction/when to seek care. Right knee was palpated.  [place] for injection identified and marked by indentation, then area cleansed with betadine x3, anesthetized with ethyl chloride spray, and a mixture of Kenalog 40 units and 2 cc 1% lidocaine injected in sterile fashion.  There was no resistance to injection of medication and no complications.  EBL 0. Bandaid dressing applied.      - triamcinolone (KENALOG-40) injection 40 mg  - Lidocaine 2 % injection  - DRAIN/INJECT LARGE JOINT/BURSA    Fatigue, unspecified type  New problem, work up to rule out other causes  - Vitamin B12  - Ferritin  - Iron and iron binding capacity  - Hemoglobin  - Vitamin D Deficiency  - TSH with free T4 reflex  - Folate  - Comprehensive metabolic panel (BMP + Alb, Alk Phos, ALT, AST, Total. Bili, TP)  - Cortisol  - CBC with platelets and differential    Hair loss  New problem, r/o other causes  - Ferritin  - Iron and iron binding capacity  - Hemoglobin  - Vitamin D Deficiency  - TSH with free T4 reflex  - Folate  - Comprehensive metabolic panel (BMP + Alb, Alk Phos, ALT, AST, Total. Bili, TP)    hx Severe anemia  Gastritis - continue lifelong  - omeprazole (PRILOSEC) 20 MG DR capsule  Dispense: 90 capsule; Refill: 3    Anemia due to vitamin B12 deficiency, unspecified B12 deficiency type  refill  - cyanocobalamin (CYANOCOBALAMIN) 1000 MCG/ML injection  Dispense: 3 mL; Refill: 3    Essential hypertension  Refill stable  - lisinopril (ZESTRIL) 40 MG tablet  Dispense: 90 tablet; Refill: 3    Dyslipidemia  Refill stable  - atorvastatin (LIPITOR) 20 MG tablet  Dispense: 90 tablet; Refill: 3    Mild intermittent asthma without complication  Refill stable  - albuterol (PROAIR HFA/PROVENTIL HFA/VENTOLIN HFA) 108 (90 Base)  MCG/ACT inhaler  Dispense: 18 g; Refill: 1    Dysthymic disorder  Refill stable   - sertraline (ZOLOFT) 100 MG tablet  Dispense: 90 tablet; Refill: 3    Class 1 obesity due to excess calories in adult, unspecified BMI, unspecified whether serious comorbidity present  Plan lifestyle management, monitoring    Encounter for Medicare annual wellness exam    Colon cancer screening  Does not want further colon screening but had FIT at time of her severe anemia last yr - likely secondary to gastritis (2021 endoscopy ldiffuse moderate inflammation with erythema, triability, granularity and linear erosions) - was increased to omeprazole 40  mg but last yr had been off omeprazole.  Discussed we cannot rule out colon cancer as possible cause.  She agrees to repeat FIT but not to colonoscopy unless FIT is again pos.  - Fecal colorectal cancer screen (FIT)    Other obesity  - Vitamin D Deficiency    Patient Instructions   Labs today to check on fatigue, hair loss, recheck meds    If all labs normal, consider treating for depression further if things not improving after knee pain is better    Stop meloxicam since not helping knees.  Trying injection today.  If this is not helpful enough, message me to try celebrex.    Joint injection -  Take it easy next few days  Be seen if fever, sudden increase in bad pain, redness, swelling - possible signs of infection  Call if any question/concern    Stay on omeprazole long term due to stomach bleed history     Decided against RSV vaccine - could consider with asthma history    Space vaccines at least 2 weeks after any joint injection -- due for covid vaccine and tetanus vaccine - at pharmacy     Do at least 1 more poop test since last yr's poop test did show blood - but likely just from the stomach bleed      Patient Education  Personalized Prevention Plan  You are due for the preventive services outlined below.  Your care team is available to assist you in scheduling these services.  If  you have already completed any of these items, please share that information with your care team to update in your medical record.  Health Maintenance Due   Topic Date Due    RSV VACCINE (Pregnancy & 60+) (1 - 1-dose 60+ series) Never done    Asthma Action Plan - yearly  11/29/2020    Diptheria Tetanus Pertussis (DTAP/TDAP/TD) Vaccine (3 - Td or Tdap) 02/01/2021    Flu Vaccine (1) 09/01/2023    COVID-19 Vaccine (4 - 2023-24 season) 09/01/2023    Cholesterol Lab  02/02/2024    ANNUAL REVIEW OF HM ORDERS  02/02/2024    Vitamin B12 Level  02/02/2024     Your Health Risk Assessment indicates you feel you are not in good health    A healthy lifestyle helps keep the body fit and the mind alert. It helps protect you from disease, helps you fight disease, and helps prevent chronic disease (disease that doesn't go away) from getting worse. This is important as you get older and begin to notice twinges in muscles and joints and a decline in the strength and stamina you once took for granted. A healthy lifestyle includes good healthcare, good nutrition, weight control, recreation, and regular exercise. Avoid harmful substances and do what you can to keep safe. Another part of a healthy lifestyle is stay mentally active and socially involved.    Good healthcare   Have a wellness visit every year.   If you have new symptoms, let us know right away. Don't wait until the next checkup.   Take medicines exactly as prescribed and keep your medicines in a safe place. Tell us if your medicine causes problems.   Healthy diet and weight control   Eat 3 or 4 small, nutritious, low-fat, high-fiber meals a day. Include a variety of fruits, vegetables, and whole-grain foods.   Make sure you get enough calcium in your diet. Calcium, vitamin D, and exercise help prevent osteoporosis (bone thinning).   If you live alone, try eating with others when you can. That way you get a good meal and have company while you eat it.   Try to keep a healthy  "weight. If you eat more calories than your body uses for energy, it will be stored as fat and you will gain weight.     Recreation   Recreation is not limited to sports and team events. It includes any activity that provides relaxation, interest, enjoyment, and exercise. Recreation provides an outlet for physical, mental, and social energy. It can give a sense of worth and achievement. It can help you stay healthy.    Mental Exercise and Social Involvement  Mental and emotional health is as important as physical health. Keep in touch with friends and family. Stay as active as possible. Continue to learn and challenge yourself.   Things you can do to stay mentally active are:  Learn something new, like a foreign language or musical instrument.   Play SCRABBLE or do crossword puzzles. If you cannot find people to play these games with you at home, you can play them with others on your computer through the Internet.   Join a games club--anything from card games to chess or checkers or lawn bowling.   Start a new hobby.   Go back to school.   Volunteer.   Read.   Keep up with world events.  Learning About Being Physically Active  What is physical activity?     Being physically active means doing any kind of activity that gets your body moving.  The types of physical activity that can help you get fit and stay healthy include:  Aerobic or \"cardio\" activities. These make your heart beat faster and make you breathe harder, such as brisk walking, riding a bike, or running. They strengthen your heart and lungs and build up your endurance.  Strength training activities. These make your muscles work against, or \"resist,\" something. Examples include lifting weights or doing push-ups. These activities help tone and strengthen your muscles and bones.  Stretches. These let you move your joints and muscles through their full range of motion. Stretching helps you be more flexible.  Reaching a balance between these three types of " physical activity is important because each one contributes to your overall fitness.  What are the benefits of being active?  Being active is one of the best things you can do for your health. It helps you to:  Feel stronger and have more energy to do all the things you like to do.  Focus better at school or work.  Feel, think, and sleep better.  Reach and stay at a healthy weight.  Lose fat and build lean muscle.  Lower your risk for serious health problems, including diabetes, heart attack, high blood pressure, and some cancers.  Keep your heart, lungs, bones, muscles, and joints strong and healthy.  How can you make being active part of your life?  Start slowly. Make it your long-term goal to get at least 30 minutes of exercise on most days of the week. Walking is a good choice. You also may want to do other activities, such as running, swimming, cycling, or playing tennis or team sports.  Pick activities that you like--ones that make your heart beat faster, your muscles stronger, and your muscles and joints more flexible. If you find more than one thing you like doing, do them all. You don't have to do the same thing every day.  Get your heart pumping every day. Any activity that makes your heart beat faster and keeps it at that rate for a while counts.  Here are some great ways to get your heart beating faster:  Go for a brisk walk, run, or hike.  Go for a swim or bike ride.  Take an online exercise class or dance.  Play a game of touch football, basketball, or soccer.  Play tennis, pickleball, or racquetball.  Climb stairs.  Even some household chores can be aerobic. Just do them at a faster pace. Raking or mowing the lawn, sweeping the garage, and vacuuming and cleaning your home all can help get your heart rate up.  Strengthen your muscles during the week. You don't have to lift heavy weights or grow big, bulky muscles to get stronger. Doing a few simple activities that make your muscles work against, or  "\"resist,\" something can help you get stronger. Aim for at least twice a week.  For example, you can:  Do push-ups or sit-ups, which use your own body weight as resistance.  Lift weights or dumbbells or use stretch bands at home or in a gym or community center.  Stretch your muscles often. Stretching will help you as you become more active. It can help you stay flexible and loosen tight muscles. It can also help improve your balance and posture and can be a great way to relax.  Be sure to stretch the muscles you'll be using when you work out. It's best to warm your muscles slightly before you stretch them. Walk or do some other light aerobic activity for a few minutes. Then start stretching.  When you stretch your muscles:  Do it slowly. Stretching is not about going fast or making sudden movements.  Don't push or bounce during a stretch.  Hold each stretch for at least 15 to 30 seconds, if you can. You should feel a stretch in the muscle, but not pain.  Breathe out as you do the stretch. Then breathe in as you hold the stretch. Don't hold your breath.  If you're worried about how more activity might affect your health, have a checkup before you start. Follow any special advice your doctor gives you for getting a smart start.  Where can you learn more?  Go to https://www.[employer].net/patiented  Enter W332 in the search box to learn more about \"Learning About Being Physically Active.\"  Current as of: June 6, 2023               Content Version: 13.8    5622-0759 [employer].   Care instructions adapted under license by your healthcare professional. If you have questions about a medical condition or this instruction, always ask your healthcare professional. [employer] disclaims any warranty or liability for your use of this information.      Learning About Dietary Guidelines  What are the Dietary Guidelines for Americans?     Dietary Guidelines for Americans provide tips for eating well and " staying healthy. This helps reduce the risk for long-term (chronic) diseases.  These guidelines recommend that you:  Eat and drink the right amount for you. The U.[place] government's food guide is called MyPlate. It can help you make your own well-balanced eating plan.  Try to balance your eating with your activity. This helps you stay at a healthy weight.  Drink alcohol in moderation, if at all.  Limit foods high in salt, saturated fat, trans fat, and added sugar.  These guidelines are from the U.S. Department of Agriculture and the U.S. Department of Health and Human Services. They are updated every 5 years.  What is MyPlate?  MyPlate is the U.[place] government's food guide. It can help you make your own well-balanced eating plan. A balanced eating plan means that you eat enough, but not too much, and that your food gives you the nutrients you need to stay healthy.  MyPlate focuses on eating plenty of whole grains, fruits, and vegetables, and on limiting fat and sugar. It is available online at www.ChooseMyPlate.gov.  How can you get started?  If you're trying to eat healthier, you can slowly change your eating habits over time. You don't have to make big changes all at once. Start by adding one or two healthy foods to your meals each day.  Grains  Choose whole-grain breads and cereals and whole-wheat pasta and whole-grain crackers.  Vegetables  Eat a variety of vegetables every day. They have lots of nutrients and are part of a heart-healthy diet.  Fruits  Eat a variety of fruits every day. Fruits contain lots of nutrients. Choose fresh fruit instead of fruit juice.  Protein foods  Choose fish and lean poultry more often. Eat red meat and fried meats less often. Dried beans, tofu, and nuts are also good sources of protein.  Dairy  Choose low-fat or fat-free products from this food group. If you have problems digesting milk, try eating cheese or yogurt instead.  Fats and oils  Limit fats and oils if you're trying to cut  "calories. Choose healthy fats when you cook. These include canola oil and olive oil.  Where can you learn more?  Go to https://www.[employer].net/patiented  Enter D676 in the search box to learn more about \"Learning About Dietary Guidelines.\"  Current as of: February 28, 2023               Content Version: 13.8    8874-9686 [employer]   Care instructions adapted under license by your healthcare professional. If you have questions about a medical condition or this instruction, always ask your healthcare professional. [employer] disclaims any warranty or liability for your use of this information.      Bladder Training: Care Instructions  Your Care Instructions     Bladder training is used to treat urge incontinence and stress incontinence. Urge incontinence means that the need to urinate comes on so fast that you can't get to a toilet in time. Stress incontinence means that you leak urine because of pressure on your bladder. For example, it may happen when you laugh, cough, or lift something heavy.  Bladder training can increase how long you can wait before you have to urinate. It can also help your bladder hold more urine. And it can give you better control over the urge to urinate.  It is important to remember that bladder training takes a few weeks to a few months to make a difference. You may not see results right away, but don't give up.  Follow-up care is a key part of your treatment and safety. Be sure to make and go to all appointments, and call your doctor if you are having problems. It's also a good idea to know your test results and keep a list of the medicines you take.  How can you care for yourself at home?  Work with your doctor to come up with a bladder training program that is right for you. You may use one or more of the following methods.  Delayed urination  In the beginning, try to keep from urinating for 5 minutes after you first feel the need to go.  While you wait, " "take deep, slow breaths to relax. Kegel exercises can also help you delay the need to go to the bathroom.  After some practice, when you can easily wait 5 minutes to urinate, try to wait 10 minutes before you urinate.  Slowly increase the waiting period until you are able to control when you have to urinate.  Scheduled urination  Empty your bladder when you first wake up in the morning.  Schedule times throughout the day when you will urinate.  Start by going to the bathroom every hour, even if you don't need to go.  Slowly increase the time between trips to the bathroom.  When you have found a schedule that works well for you, keep doing it.  If you wake up during the night and have to urinate, do it. Apply your schedule to waking hours only.  Kegel exercises  These tighten and strengthen pelvic muscles, which can help you control the flow of urine. (If doing these exercises causes pain, stop doing them and talk with your doctor.) To do Kegel exercises:  Squeeze your muscles as if you were trying not to pass gas. Or squeeze your muscles as if you were stopping the flow of urine. Your belly, legs, and buttocks shouldn't move.  Hold the squeeze for 3 seconds, then relax for 5 to 10 seconds.  Start with 3 seconds, then add 1 second each week until you are able to squeeze for 10 seconds.  Repeat the exercise 10 times a session. Do 3 to 8 sessions a day.  When should you call for help?  Watch closely for changes in your health, and be sure to contact your doctor if:    Your incontinence is getting worse.     You do not get better as expected.   Where can you learn more?  Go to https://www.[employer].net/patiented  Enter V684 in the search box to learn more about \"Bladder Training: Care Instructions.\"  Current as of: February 28, 2023               Content Version: 13.8    0853-1593 [employer].   Care instructions adapted under license by your healthcare professional. If you have questions about a medical " "condition or this instruction, always ask your healthcare professional. [employer] disclaims any warranty or liability for your use of this information.             BMI:   Estimated body mass index is 32.36 kg/m  as calculated from the following:    Height as of this encounter: 1.45 m (4' 9.09\").    Weight as of this encounter: 68 kg (150 lb).   Weight management plan: Discussed healthy diet and exercise guidelines      DESIREE Ahn [facility]    Dimitry Multani is a 78 year old, presenting for the following health issues:  No chief complaint on file.        1/4/2024    10:19 AM   Additional Questions   Roomed by rich[facility] cleaning cma       Healthy Habits:     In general, how would you rate your overall health?  Fair    Frequency of exercise:  1 day/week    Duration of exercise:  Less than 15 minutes    Do you usually eat at least 4 servings of fruit and vegetables a day, include whole grains    & fiber and avoid regularly eating high fat or \"junk\" foods?  No    Taking medications regularly:  Yes    Medication side effects:  Other    Ability to successfully perform activities of daily living:  No assistance needed    Home Safety:  No safety concerns identified    Hearing Impairment:  No hearing concerns    In the past 6 months, have you been bothered by leaking of urine? Yes    In general, how would you rate your overall mental or emotional health?  Good    Additional concerns today:  Yes   Urinary - hard to walk initially to get to bathroom in time due to knee pain    Asthma - does well unless gets bronchitis.  Did react to flu shot a few yrs ago.      Hyperlipidemia Follow-Up    Are you regularly taking any medication or supplement to lower your cholesterol?   Yes- lipitor   Are you having muscle aches or other side effects that you think could be caused by your cholesterol lowering medication?  No  No myalgia.    Hypertension Follow-up    Do you check your blood " pressure regularly outside of the clinic? Yes   Are you following a low salt diet? No  Are your blood pressures ever more than 140 on the top number (systolic) OR more   than 90 on the bottom number (diastolic), for example 140/90? No    Occasionally checks.  Usually 130s/80s.  No chest pains, chest pressures.  SOB, when in [place] in June.  Also when had bronchitis in Oct.  No ongoing SOB.    Cough only when had bronchitis.      Depression and Anxiety Follow-Up  How are you doing with your depression since your last visit? No change  How are you doing with your anxiety since your last visit?  No change  Are you having other symptoms that might be associated with depression or anxiety? No  Have you had a significant life event? Grief or Loss   Do you have any concerns with your use of alcohol or other drugs? No    3 funerals in the month of October.  One was a brother in law.   Wants no changes.  Sleeps a lot - couple hours in morning, after 9 hrs at night.  Varies if she wakes a lot but these are brief.  Exhausted in mornings, but nap does not help.  Still caring for her 2 young grandsons for a couple hours in morning.   starting to seem a bit senile.  Feels thoughts tend to be somewhat pessimistic, and sometimes life is just going thru the motions, but other times does fine a lot of enjoyment.  Sometimes irritable.  History b12 deficiency.    Hair falling out.  Diffuse and whole strand.  Not more dry than usual.    Knee pains - bilaterally but right is much worse than left.  X 1 mo. Meloxicam has not Hard to do the split level house.   Can barely move by end of day.  A couple falls this past yr - mostly a lost of balance, usually catches herself.  Once tripped over grandchild's bike.  Sometimes some momentary lightheadedness - couple times a week with walking or when first standing.  Lightheadedness if off and on throughout the past yr, not recently worse.  Did try injections some in past - helpful.      B12  def - regularly doing her injections.  Due again.      No heartburn.  Dark stools somewhat but attributes to the iron supplements - not overly dark or sticky stools.  Trying to not take omeprazole very often - but had presumed recurrent ulcer last yr when not taking NSAIDs and off omeprazole.    Social History     Tobacco Use    Smoking status: Never    Smokeless tobacco: Never   Vaping Use    Vaping Use: Never used   Substance Use Topics    Alcohol use: Yes     Comment: rare    Drug use: No         12/21/2021     9:44 AM 2/2/2023    12:47 PM 1/3/2024     9:05 AM   PHQ   PHQ-9 Total Score 2 3 3   Q9: Thoughts of better off dead/self-harm past 2 weeks Not at all Not at all Not at all         12/10/2020     8:21 AM 12/21/2021     9:44 AM 2/2/2023    12:49 PM   JOSE CARLOS-7 SCORE   Total Score 0 (minimal anxiety)     Total Score 0 1 2         1/3/2024     9:05 AM   Last PHQ-9   1.  Little interest or pleasure in doing things 0   2.  Feeling down, depressed, or hopeless 1   3.  Trouble falling or staying asleep, or sleeping too much 1   4.  Feeling tired or having little energy 1   5.  Poor appetite or overeating 0   6.  Feeling bad about yourself 0   7.  Trouble concentrating 0   8.  Moving slowly or restless 0   Q9: Thoughts of better off dead/self-harm past 2 weeks 0   PHQ-9 Total Score 3         2/2/2023    12:49 PM   JOSE CARLOS-7    1. Feeling nervous, anxious, or on edge 0   2. Not being able to stop or control worrying 1   3. Worrying too much about different things 1   4. Trouble relaxing 0   5. Being so restless that it is hard to sit still 0   6. Becoming easily annoyed or irritable 0   7. Feeling afraid, as if something awful might happen 0   JOSE CARLOS-7 Total Score 2   If you checked any problems, how difficult have they made it for you to do your work, take care of things at home, or get along with other people? Not difficult at all     Review of Systems   Constitutional:  Negative for chills and fever.   HENT:  Negative for  "congestion, ear pain, hearing loss and sore throat.    Eyes:  Positive for visual disturbance. Negative for pain.   Respiratory:  Positive for cough and shortness of breath.    Cardiovascular:  Positive for peripheral edema. Negative for chest pain and palpitations.   Gastrointestinal:  Negative for abdominal pain, constipation, diarrhea, heartburn, hematochezia and nausea.   Breasts:  Negative for tenderness, breast mass and discharge.   Genitourinary:  Positive for urgency. Negative for dysuria, frequency, genital sores, hematuria, pelvic pain, vaginal bleeding and vaginal discharge.   Musculoskeletal:  Positive for arthralgias, joint swelling and myalgias.   Skin:  Negative for rash.   Neurological:  Negative for dizziness, weakness, headaches and paresthesias.   Psychiatric/Behavioral:  Negative for mood changes. The patient is not nervous/anxious.           Objective    /82 (BP Location: Right arm, Patient Position: Sitting, Cuff Size: Adult Regular)   Pulse 61   Temp 97.9  F (36.6  C) (Tympanic)   Resp 18   Ht 1.45 m (4' 9.09\")   Wt 68 kg (150 lb)   SpO2 98%   BMI 32.36 kg/m    Body mass index is 32.36 kg/m .  Physical Exam   GENERAL: healthy, alert and no distress  NECK: no adenopathy, no asymmetry, masses, or scars and thyroid normal to palpation  RESP: lungs clear to auscultation - no rales, rhonchi or wheezes  CV: regular rate and rhythm, normal S1 S2, no S3 or S4, no murmur, click or rub, no peripheral edema and peripheral pulses strong  PSYCH: mentation appears normal, affect normal/bright        The patient was provided with suggestions to help her develop a healthy physical lifestyle.  She is at risk for lack of exercise and has been provided with information to increase physical activity for the benefit of her well-being.  The patient was counseled and encouraged to consider modifying their diet and eating habits. She was provided with information on recommended healthy diet " options.  Information on urinary incontinence and treatment options given to patient.

## 2024-01-05 LAB
VIT B12 SERPL-MCNC: 918 PG/ML (ref 232–1245)
VIT D+METAB SERPL-MCNC: 49 NG/ML (ref 20–50)

## 2024-01-11 ENCOUNTER — LAB (OUTPATIENT)
Dept: LAB | Facility: CLINIC | Age: 79
End: 2024-01-11
Payer: COMMERCIAL

## 2024-01-11 DIAGNOSIS — Z12.11 COLON CANCER SCREENING: ICD-10-CM

## 2024-01-11 LAB — HEMOCCULT STL QL IA: NEGATIVE

## 2024-01-11 PROCEDURE — 82274 ASSAY TEST FOR BLOOD FECAL: CPT

## 2024-01-18 PROBLEM — K29.50 CHRONIC GASTRITIS: Status: ACTIVE | Noted: 2024-01-18

## 2024-01-19 ENCOUNTER — MYC MEDICAL ADVICE (OUTPATIENT)
Dept: FAMILY MEDICINE | Facility: CLINIC | Age: 79
End: 2024-01-19
Payer: COMMERCIAL

## 2024-01-19 DIAGNOSIS — M17.9 OSTEOARTHRITIS OF KNEE, UNSPECIFIED LATERALITY, UNSPECIFIED OSTEOARTHRITIS TYPE: Primary | ICD-10-CM

## 2024-01-19 RX ORDER — CELECOXIB 100 MG/1
100 CAPSULE ORAL 2 TIMES DAILY
Qty: 60 CAPSULE | Refills: 3 | Status: SHIPPED | OUTPATIENT
Start: 2024-01-19

## 2024-01-19 NOTE — TELEPHONE ENCOUNTER
Pls see [employer] message below.     RX pended and message routed to provider for consideration.     Julie Behrendt RN

## 2024-01-25 ENCOUNTER — LAB (OUTPATIENT)
Dept: LAB | Facility: CLINIC | Age: 79
End: 2024-01-25
Payer: COMMERCIAL

## 2024-01-25 DIAGNOSIS — L65.9 HAIR LOSS: ICD-10-CM

## 2024-01-25 DIAGNOSIS — R53.83 FATIGUE, UNSPECIFIED TYPE: ICD-10-CM

## 2024-01-25 LAB
ALBUMIN SERPL BCG-MCNC: 3.6 G/DL (ref 3.5–5.2)
ALP SERPL-CCNC: 119 U/L (ref 40–150)
ALT SERPL W P-5'-P-CCNC: 20 U/L (ref 0–50)
ANION GAP SERPL CALCULATED.3IONS-SCNC: 9 MMOL/L (ref 7–15)
AST SERPL W P-5'-P-CCNC: 47 U/L (ref 0–45)
BASOPHILS # BLD AUTO: 0 10E3/UL (ref 0–0.2)
BASOPHILS NFR BLD AUTO: 0 %
BILIRUB SERPL-MCNC: 1.1 MG/DL
BUN SERPL-MCNC: 14 MG/DL (ref 8–23)
CALCIUM SERPL-MCNC: 9 MG/DL (ref 8.8–10.2)
CHLORIDE SERPL-SCNC: 106 MMOL/L (ref 98–107)
CREAT SERPL-MCNC: 0.9 MG/DL (ref 0.51–0.95)
DEPRECATED HCO3 PLAS-SCNC: 26 MMOL/L (ref 22–29)
EGFRCR SERPLBLD CKD-EPI 2021: 66 ML/MIN/1.73M2
EOSINOPHIL # BLD AUTO: 0.1 10E3/UL (ref 0–0.7)
EOSINOPHIL NFR BLD AUTO: 1 %
ERYTHROCYTE [DISTWIDTH] IN BLOOD BY AUTOMATED COUNT: 12.7 % (ref 10–15)
GLUCOSE SERPL-MCNC: 89 MG/DL (ref 70–99)
HCT VFR BLD AUTO: 41.6 % (ref 35–47)
HGB BLD-MCNC: 13.5 G/DL (ref 11.7–15.7)
IMM GRANULOCYTES # BLD: 0 10E3/UL
IMM GRANULOCYTES NFR BLD: 0 %
LYMPHOCYTES # BLD AUTO: 1.6 10E3/UL (ref 0.8–5.3)
LYMPHOCYTES NFR BLD AUTO: 22 %
MCH RBC QN AUTO: 32 PG (ref 26.5–33)
MCHC RBC AUTO-ENTMCNC: 32.5 G/DL (ref 31.5–36.5)
MCV RBC AUTO: 99 FL (ref 78–100)
MONOCYTES # BLD AUTO: 0.7 10E3/UL (ref 0–1.3)
MONOCYTES NFR BLD AUTO: 9 %
NEUTROPHILS # BLD AUTO: 5 10E3/UL (ref 1.6–8.3)
NEUTROPHILS NFR BLD AUTO: 67 %
PLATELET # BLD AUTO: 115 10E3/UL (ref 150–450)
POTASSIUM SERPL-SCNC: 4.1 MMOL/L (ref 3.4–5.3)
PROT SERPL-MCNC: 6.9 G/DL (ref 6.4–8.3)
RBC # BLD AUTO: 4.22 10E6/UL (ref 3.8–5.2)
SODIUM SERPL-SCNC: 141 MMOL/L (ref 135–145)
WBC # BLD AUTO: 7.4 10E3/UL (ref 4–11)

## 2024-01-25 PROCEDURE — 36415 COLL VENOUS BLD VENIPUNCTURE: CPT

## 2024-01-25 PROCEDURE — 80053 COMPREHEN METABOLIC PANEL: CPT

## 2024-01-25 PROCEDURE — 85025 COMPLETE CBC W/AUTO DIFF WBC: CPT

## 2024-01-25 PROCEDURE — 82533 TOTAL CORTISOL: CPT

## 2024-01-26 LAB — CORTIS SERPL-MCNC: 13.6 UG/DL

## 2024-01-27 DIAGNOSIS — D69.6 THROMBOCYTOPENIA (H): Primary | ICD-10-CM

## 2024-01-28 NOTE — RESULT ENCOUNTER NOTE
Nerissa  Labs were mostly normal.  The one thing that was not normal was that platelets were low - usually they're over 150, and yours were 115.  This is not at a dangerously low level.  However, we should re-test that because if they it stays abnormal, then we need to do further testing.  I will place order and you can set up lab at your convenience.  Let me know if you have questions.  Sarah

## 2024-02-03 ENCOUNTER — LAB (OUTPATIENT)
Dept: LAB | Facility: CLINIC | Age: 79
End: 2024-02-03
Payer: COMMERCIAL

## 2024-02-03 DIAGNOSIS — R53.83 FATIGUE, UNSPECIFIED TYPE: ICD-10-CM

## 2024-02-03 DIAGNOSIS — D69.6 THROMBOCYTOPENIA (H): ICD-10-CM

## 2024-02-03 DIAGNOSIS — Z13.220 LIPID SCREENING: Primary | ICD-10-CM

## 2024-02-03 LAB
BASOPHILS # BLD AUTO: 0 10E3/UL (ref 0–0.2)
BASOPHILS NFR BLD AUTO: 0 %
CHOLEST SERPL-MCNC: 109 MG/DL
CORTIS SERPL-MCNC: 14.3 UG/DL
EOSINOPHIL # BLD AUTO: 0.1 10E3/UL (ref 0–0.7)
EOSINOPHIL NFR BLD AUTO: 1 %
ERYTHROCYTE [DISTWIDTH] IN BLOOD BY AUTOMATED COUNT: 12.6 % (ref 10–15)
FASTING STATUS PATIENT QL REPORTED: YES
HCT VFR BLD AUTO: 41.3 % (ref 35–47)
HDLC SERPL-MCNC: 39 MG/DL
HGB BLD-MCNC: 13.3 G/DL (ref 11.7–15.7)
IMM GRANULOCYTES # BLD: 0 10E3/UL
IMM GRANULOCYTES NFR BLD: 0 %
LDLC SERPL CALC-MCNC: 54 MG/DL
LYMPHOCYTES # BLD AUTO: 1.8 10E3/UL (ref 0.8–5.3)
LYMPHOCYTES NFR BLD AUTO: 24 %
MCH RBC QN AUTO: 31.8 PG (ref 26.5–33)
MCHC RBC AUTO-ENTMCNC: 32.2 G/DL (ref 31.5–36.5)
MCV RBC AUTO: 99 FL (ref 78–100)
MONOCYTES # BLD AUTO: 0.7 10E3/UL (ref 0–1.3)
MONOCYTES NFR BLD AUTO: 9 %
NEUTROPHILS # BLD AUTO: 4.9 10E3/UL (ref 1.6–8.3)
NEUTROPHILS NFR BLD AUTO: 66 %
NONHDLC SERPL-MCNC: 70 MG/DL
PLATELET # BLD AUTO: 141 10E3/UL (ref 150–450)
RBC # BLD AUTO: 4.18 10E6/UL (ref 3.8–5.2)
TRIGL SERPL-MCNC: 80 MG/DL
WBC # BLD AUTO: 7.5 10E3/UL (ref 4–11)

## 2024-02-03 PROCEDURE — 85025 COMPLETE CBC W/AUTO DIFF WBC: CPT

## 2024-02-03 PROCEDURE — 36415 COLL VENOUS BLD VENIPUNCTURE: CPT

## 2024-02-03 PROCEDURE — 82533 TOTAL CORTISOL: CPT

## 2024-02-03 PROCEDURE — 80061 LIPID PANEL: CPT

## 2024-02-04 NOTE — RESULT ENCOUNTER NOTE
"Nerissa  Normal cortisol level.  Cholesterol looks ok overall.  HDL \"good\" cholesterol remains lower than desired but improved compared to the past 7 years.  Platelets are slightly low but improved from 9 days ago.  They are close to normal and close to where they had been in the past, so no further testing at this time.  Let me know if you have questions.  Sarah  "

## 2024-03-17 ENCOUNTER — MYC MEDICAL ADVICE (OUTPATIENT)
Dept: FAMILY MEDICINE | Facility: CLINIC | Age: 79
End: 2024-03-17
Payer: COMMERCIAL

## 2024-03-17 DIAGNOSIS — Z12.11 COLON CANCER SCREENING: Primary | ICD-10-CM

## 2024-03-17 DIAGNOSIS — U07.1 INFECTION DUE TO 2019 NOVEL CORONAVIRUS: ICD-10-CM

## 2024-03-19 NOTE — TELEPHONE ENCOUNTER
RN COVID TREATMENT VISIT  03/19/24      The patient has been triaged and does not require a higher level of care.    Nerissa Valentin  78 year old  Current weight? 150    Has the patient been seen by a primary care provider at an [facility] or [facility] within the past two years? Yes.   Have you been in close proximity to/do you have a known exposure to a person with a confirmed case of influenza? No.     General treatment eligibility:  Date of positive COVID test (PCR or at home)?  3/17/24    Are you or have you been hospitalized for this COVID-19 infection? No.   Have you received monoclonal antibodies or antiviral treatment for COVID-19 since this positive test? No.   Do you have any of the following conditions that place you at risk of being very sick from COVID-19?   - Age 50 years or older  Yes, patient has at least one high risk condition as noted above.     Current COVID symptoms:   - fever or chills  - cough  - muscle or body aches  - headache  - congestion or runny nose  Yes. Patient has at least one symptom as selected.     How many days since symptoms started? 5 days or less. Established patient, 12 years or older weighing at least 88.2 lbs, who has symptoms that started in the past 5 days, has not been hospitalized nor received treatment already, and is at risk for being very sick from COVID-19.     Treatment eligibility by RN:  Are you currently pregnant or nursing? No  Do you have a clinically significant hypersensitivity to nirmatrelvir or ritonavir, or toxic epidermal necrolysis (TEN) or Scott-Neftali Syndrome? No  Do you have a history of hepatitis, any hepatic impairment on the Problem List (such as Child-Brandt Class C, cirrhosis, fatty liver disease, alcoholic liver disease), or was the last liver lab (hepatic panel, ALT, AST, ALK Phos, bilirubin) elevated in the past 6 months? No  Do you have any history of severe renal impairment (eGFR < 30mL/min)? No    Is  "patient eligible to continue? Yes, patient meets all eligibility requirements for the RN COVID treatment (as denoted by all no responses above).     Current Outpatient Medications   Medication Sig Dispense Refill    albuterol (PROAIR HFA/PROVENTIL HFA/VENTOLIN HFA) 108 (90 Base) MCG/ACT inhaler Inhale 2 puffs into the lungs every 4 hours as needed for shortness of breath or wheezing 18 g 1    atorvastatin (LIPITOR) 20 MG tablet Take 1 tablet (20 mg) by mouth daily 90 tablet 3    B-D LUER-MINH SYRINGE 25G X 5/8\" 3 ML MISC USE WITH VITAMIN B12 INJECTION WEEKLY X 1 MO THEN 1 PER MONTH 4 each 0    celecoxib (CELEBREX) 100 MG capsule Take 1 capsule (100 mg) by mouth 2 times daily 60 capsule 3    cholecalciferol (VITAMIN D3) 1250 mcg (51951 units) capsule Take 1 capsule (1,250 mcg) by mouth once a week Then OTC vit D supplement 2000 units daily.  Lab-only appt 1 month after finishing prescription. 8 capsule 0    cyanocobalamin (CYANOCOBALAMIN) 1000 MCG/ML injection INJECT 1 ML (1,000 MCG) INTO THE MUSCLE ONCE PER MONTH AS DIRECTED 3 mL 3    lisinopril (ZESTRIL) 40 MG tablet Take 1 tablet (40 mg) by mouth daily 90 tablet 3    omeprazole (PRILOSEC) 20 MG DR capsule Take 1 capsule (20 mg) by mouth daily Stay on this long term due to ulcer history. 90 capsule 3    sertraline (ZOLOFT) 100 MG tablet Take 1 tablet (100 mg) by mouth daily 90 tablet 3       Medications from List 1 of the standing order (on medications that exclude the use of Paxlovid) that patient is taking: NONE. Is patient taking Raiza's Wort? No  Is patient taking Raiza's Wort or any meds from List 1? No.   Medications from List 2 of the standing order (on meds that provider needs to adjust) that patient is taking: NONE. Is patient on any of the meds from List 2? No.   Medications from List 3 of standing order (on meds that a RN needs to adjust) that patient is taking: atorvastatin (Lipitor): Instructed patient to stop atorvastatin while taking Paxlovid " and restart atorvastatin 1 day after the completion of Paxlovid.   yes  Is patient on any meds from List 3? No.     Paxlovid has an approximate 90% reduction in hospitalization. Paxlovid can possibly cause altered sense of taste, diarrhea (loose, watery stools), high blood pressure, muscle aches.     Would patient like a Paxlovid prescription?   Yes.   Lab Results   Component Value Date    GFRESTIMATED 66 01/25/2024       Was last eGFR reduced? No, eGFR 60 or greater/ No Result on record. Patient can receive the normal renal function dose. Paxlovid Rx sent to [place] [facility]   [facility]   [phone]    [address] [place] [address]    Hours:  Mon-Fri: 8:00a - 9:00p  Sat-Sun: 9:00a - 5:00p    Temporary change to home medications: holding atorvastatin    All medication adjustments (holds, etc) were discussed with the patient and patient was asked to repeat back (teachback) their med adjustment.  Did patient understand med adjustment? Yes, patient repeated back and understood correctly.        Reviewed the following instructions with the patient:    Paxlovid (nimatrelvir and ritonavir)    How it works  Two medicines (nirmatrelvir and ritonavir) are taken together. They stop the virus from growing. Less amount of virus is easier for your body to fight.    How to take  Medicine comes in a daily container with both medicine tablets. Take by mouth twice daily (once in the morning, once at night) for 5 days.  The number of tablets to take varies by patient.  Don't chew or break capsules. Swallow whole.    When to take  Take as soon as possible after positive COVID-19 test result, and within 5 days of your first symptoms.    Possible side effects  Can cause altered sense of taste, diarrhea (loose, watery stools), high blood pressure, muscle aches.    Miya Abraham RN

## 2024-04-12 NOTE — RESULT ENCOUNTER NOTE
Nerissa,    Labs are looking like iron deficiency.  We have to figure out cause via the upcoming colonoscopy and endoscopy.  Meanwhile you should take a daily OTC iron supplement such as ferrous sulfate 325 mg or ferrous gluconate 324 mg.  Ferrous gluconate is sometimes a bit easier on the stomach.  By taking the iron with citrus (orange juice, etc) or a vitamin C supplement, you can increase absorption.  Watch for constipation from the iron, and take miralax if needed for constipation.  Recheck lab in 1 month.    Please contact me if you have questions.    Sarah    Attending Only

## 2024-12-05 ENCOUNTER — PATIENT OUTREACH (OUTPATIENT)
Dept: CARE COORDINATION | Facility: CLINIC | Age: 79
End: 2024-12-05
Payer: COMMERCIAL

## 2024-12-19 ENCOUNTER — PATIENT OUTREACH (OUTPATIENT)
Dept: CARE COORDINATION | Facility: CLINIC | Age: 79
End: 2024-12-19
Payer: COMMERCIAL

## 2024-12-29 ENCOUNTER — OFFICE VISIT (OUTPATIENT)
Dept: URGENT CARE | Facility: URGENT CARE | Age: 79
End: 2024-12-29
Payer: COMMERCIAL

## 2024-12-29 VITALS
OXYGEN SATURATION: 98 % | SYSTOLIC BLOOD PRESSURE: 173 MMHG | WEIGHT: 151 LBS | RESPIRATION RATE: 18 BRPM | TEMPERATURE: 100.1 F | DIASTOLIC BLOOD PRESSURE: 75 MMHG | HEART RATE: 79 BPM | BODY MASS INDEX: 32.58 KG/M2

## 2024-12-29 DIAGNOSIS — J18.9 PNEUMONIA OF RIGHT LOWER LOBE DUE TO INFECTIOUS ORGANISM: Primary | ICD-10-CM

## 2024-12-29 DIAGNOSIS — E78.5 DYSLIPIDEMIA: ICD-10-CM

## 2024-12-29 DIAGNOSIS — R05.1 ACUTE COUGH: ICD-10-CM

## 2024-12-29 PROCEDURE — 99213 OFFICE O/P EST LOW 20 MIN: CPT | Performed by: NURSE PRACTITIONER

## 2024-12-29 PROCEDURE — 87798 DETECT AGENT NOS DNA AMP: CPT | Performed by: NURSE PRACTITIONER

## 2024-12-29 RX ORDER — CODEINE PHOSPHATE AND GUAIFENESIN 10; 100 MG/5ML; MG/5ML
1-2 SOLUTION ORAL EVERY 4 HOURS PRN
Qty: 180 ML | Refills: 0 | Status: SHIPPED | OUTPATIENT
Start: 2024-12-29

## 2024-12-29 RX ORDER — CEFDINIR 300 MG/1
300 CAPSULE ORAL 2 TIMES DAILY
Qty: 20 CAPSULE | Refills: 0 | Status: SHIPPED | OUTPATIENT
Start: 2024-12-29 | End: 2025-01-08

## 2024-12-29 RX ORDER — BENZONATATE 200 MG/1
200 CAPSULE ORAL 3 TIMES DAILY PRN
Qty: 42 CAPSULE | Refills: 0 | Status: SHIPPED | OUTPATIENT
Start: 2024-12-29

## 2024-12-29 RX ORDER — AZITHROMYCIN 250 MG/1
TABLET, FILM COATED ORAL
Qty: 6 TABLET | Refills: 0 | Status: SHIPPED | OUTPATIENT
Start: 2024-12-29 | End: 2025-01-03

## 2024-12-29 NOTE — PROGRESS NOTES
"Assessment & Plan     Pneumonia of right lower lobe due to infectious organism  Patient has had ongoing cough for approximately 1 month, worsening as of recent.  Reports low-grade fevers, temp 100.1 today.  Notable mild dyspnea noted on exam and crackles in right lower lobe.  Discussed with patient likely pneumonia, agreed to defer chest x-ray as will not change treatment.  Will start azithromycin and cefdinir as well as Robitussin and Tessalon for cough.  Encourage patient to drink plenty of fluids, rest, elevate head of bed and take Tylenol and/or ibuprofen as needed.  Advised to return to clinic if symptoms are not improving or worsening in the next 5 to 7 days or sooner if significantly worsening.  - azithromycin (ZITHROMAX) 250 MG tablet; Take 2 tablets (500 mg) by mouth daily for 1 day, THEN 1 tablet (250 mg) daily for 4 days.  - cefdinir (OMNICEF) 300 MG capsule; Take 1 capsule (300 mg) by mouth 2 times daily for 10 days.  - benzonatate (TESSALON) 200 MG capsule; Take 1 capsule (200 mg) by mouth 3 times daily as needed for cough.  - guaiFENesin-codeine (ROBITUSSIN AC) 100-10 MG/5ML solution; Take 5-10 mLs by mouth every 4 hours as needed for cough.    Acute cough  Acute cough as above.  Likely secondary to pneumonia, however pertussis obtained and will wait results.  Treatment of cough as above.  - B. pertussis/parapertussis PCR-NP  - benzonatate (TESSALON) 200 MG capsule; Take 1 capsule (200 mg) by mouth 3 times daily as needed for cough.  - guaiFENesin-codeine (ROBITUSSIN AC) 100-10 MG/5ML solution; Take 5-10 mLs by mouth every 4 hours as needed for cough.           BMI  Estimated body mass index is 32.58 kg/m  as calculated from the following:    Height as of 1/4/24: 1.45 m (4' 9.09\").    Weight as of this encounter: 68.5 kg (151 lb).   Weight management plan: Patient was referred to their PCP to discuss a diet and exercise plan.      See patient instructions    No follow-ups on file.    Ivory De, " DNP, APRN-CNP   [facility]     Nerissa Valentin is a 79 year old female who presents today for the following health issues:        HPI    Patient presents with:  Cough: Cough been going on since couple days before thanksgiving, and has gotten worse, sob, daughter is a nurse and she heard wet in her right lung.        Feels is getting worse  Low grade fevers at home   Has some upper airway congestion with post nasal drainage ~ has had for at least the last half  Some shortness of breath at rest and with activity         Review of Systems  Constitutional, HEENT, cardiovascular, pulmonary, gi and gu systems are negative, except as otherwise noted.    Objective   BP (!) 173/75   Pulse 79   Temp 100.1  F (37.8  C) (Tympanic)   Resp 18   Wt 68.5 kg (151 lb)   SpO2 98%   BMI 32.58 kg/m    Body mass index is 32.58 kg/m .    Physical Exam  GENERAL APPEARANCE: healthy, alert, and mild distress  NECK: no adenopathy and no asymmetry, masses, or scars  RESP: Crackles noted in right lower lobe, harsh cough present and mild dyspnea noted  CV: regular rates and rhythm, normal S1 S2, no S3 or S4, and no murmur, click or rub  ABDOMEN: soft, nontender, without hepatosplenomegaly or masses and bowel sounds normal  MS: extremities normal- no gross deformities noted  SKIN: no suspicious lesions or rashes  NEURO: Normal strength and tone, mentation intact, and speech normal  PSYCH: mentation appears normal and affect normal/bright    Diagnostic Test Results:  Pending pertussis        Chart documentation with Dragon Voice recognition Software. Although reviewed after completion, some words and grammatical errors may remain.

## 2024-12-29 NOTE — PATIENT INSTRUCTIONS
Pneumonia of right lower lobe due to infectious organism  Patient has had ongoing cough for approximately 1 month, worsening as of recent.  Reports low-grade fevers, temp 100.1 today.  Notable mild dyspnea noted on exam and crackles in right lower lobe.  Discussed with patient likely pneumonia, agreed to defer chest x-ray as will not change treatment.  Will start azithromycin and cefdinir as well as Robitussin and Tessalon for cough.  Encourage patient to drink plenty of fluids, rest, elevate head of bed and take Tylenol and/or ibuprofen as needed.  Advised to return to clinic if symptoms are not improving or worsening in the next 5 to 7 days or sooner if significantly worsening.  - azithromycin (ZITHROMAX) 250 MG tablet; Take 2 tablets (500 mg) by mouth daily for 1 day, THEN 1 tablet (250 mg) daily for 4 days.  - cefdinir (OMNICEF) 300 MG capsule; Take 1 capsule (300 mg) by mouth 2 times daily for 10 days.  - benzonatate (TESSALON) 200 MG capsule; Take 1 capsule (200 mg) by mouth 3 times daily as needed for cough.  - guaiFENesin-codeine (ROBITUSSIN AC) 100-10 MG/5ML solution; Take 5-10 mLs by mouth every 4 hours as needed for cough.    Acute cough  Acute cough as above.  Likely secondary to pneumonia, however pertussis obtained and will wait results.  Treatment of cough as above.  - B. pertussis/parapertussis PCR-NP  - benzonatate (TESSALON) 200 MG capsule; Take 1 capsule (200 mg) by mouth 3 times daily as needed for cough.  - guaiFENesin-codeine (ROBITUSSIN AC) 100-10 MG/5ML solution; Take 5-10 mLs by mouth every 4 hours as needed for cough.

## 2024-12-30 LAB
B PARAPERT DNA SPEC QL NAA+PROBE: NOT DETECTED
B PERT DNA SPEC QL NAA+PROBE: NOT DETECTED

## 2024-12-30 RX ORDER — ATORVASTATIN CALCIUM 20 MG/1
20 TABLET, FILM COATED ORAL DAILY
Qty: 90 TABLET | Refills: 0 | Status: SHIPPED | OUTPATIENT
Start: 2024-12-30

## 2025-01-03 ENCOUNTER — HOSPITAL ENCOUNTER (EMERGENCY)
Facility: CLINIC | Age: 80
Discharge: HOME | End: 2025-01-03
Attending: NURSE PRACTITIONER | Admitting: NURSE PRACTITIONER
Payer: COMMERCIAL

## 2025-01-03 ENCOUNTER — APPOINTMENT (OUTPATIENT)
Dept: CT IMAGING | Facility: CLINIC | Age: 80
End: 2025-01-03
Attending: NURSE PRACTITIONER
Payer: COMMERCIAL

## 2025-01-03 VITALS
OXYGEN SATURATION: 95 % | DIASTOLIC BLOOD PRESSURE: 70 MMHG | RESPIRATION RATE: 16 BRPM | SYSTOLIC BLOOD PRESSURE: 163 MMHG | TEMPERATURE: 97.9 F | HEART RATE: 72 BPM

## 2025-01-03 DIAGNOSIS — S09.90XA HEAD INJURY, INITIAL ENCOUNTER: ICD-10-CM

## 2025-01-03 DIAGNOSIS — S00.81XA FACIAL ABRASION, INITIAL ENCOUNTER: ICD-10-CM

## 2025-01-03 PROCEDURE — 72125 CT NECK SPINE W/O DYE: CPT

## 2025-01-03 PROCEDURE — G0463 HOSPITAL OUTPT CLINIC VISIT: HCPCS | Mod: 25 | Performed by: NURSE PRACTITIONER

## 2025-01-03 PROCEDURE — 70450 CT HEAD/BRAIN W/O DYE: CPT

## 2025-01-03 PROCEDURE — 99213 OFFICE O/P EST LOW 20 MIN: CPT | Performed by: NURSE PRACTITIONER

## 2025-01-03 ASSESSMENT — ACTIVITIES OF DAILY LIVING (ADL): ADLS_ACUITY_SCORE: 41

## 2025-01-03 NOTE — DISCHARGE INSTRUCTIONS
No brain bleeding seen on CT or cervical vertebrae fractures.  Normal alignment.  Tylenol for pain recommended.

## 2025-01-03 NOTE — ED TRIAGE NOTES
Pt reports falling today , landing face first on the sidewalk   Denies taking blood thinning medication   Denies headaches, vomiting, changes in vision or loss of consciousness

## 2025-01-03 NOTE — ED PROVIDER NOTES
ED Provider Note  [facility]      History     Chief Complaint   Patient presents with    Fall     HPI  Nerissa Valentin is a 79 year old female who presents after following onto cement directly onto her face this morning.  She denies loss of consciousness, she did take ibuprofen after the injury.  Has some mild aches and pains including cervical spine.  Denies any loss or change of sensation in upper or lower extremities.  Nuys any change or loss of vision.  Patient is accompanied by her  today.  Has facial abrasions on her forehead, nose and chin.  No lacerations.            Allergies:  Allergies   Allergen Reactions    Influenza Virus Vaccine Dermatitis and Other (See Comments)     2 years in a row after the flu shot did get  High fever,  Localized reaction .     Other [No Clinical Screening - See Comments]      CHROMIC GUT SUTURES CAUSE ABCESS    Pcn [Penicillins] Hives       Problem List:    Patient Active Problem List    Diagnosis Date Noted    Chronic gastritis 01/18/2024     Priority: Medium     2021 endoscopy -  Localized moderately erythematous mucosa without bleeding was found in        the cardia...Diffuse moderate inflammation characterized by erythema, friability,        granularity and linear erosions was found in the gastric antrum.  Increased omeprazole to 40 mg.  When she had quit omeprazole in 2023, found severe anemia and pos FIT (she opted not to scope - but resume omeprazole).      Vitamin D deficiency 02/06/2023     Priority: Medium     Level of 10 in Jan 2023.      Severe anemia 02/23/2021     Priority: Medium     Caustic gastritis noted on March 2021 endoscopy.  2/23/21 - anemia worsening despite correcting b12 deficiency, doing work up.      Calculus of kidney 12/19/2019     Priority: Medium     Added automatically from request for surgery [id number]      Obesity 04/01/2013     Priority: Medium    Family history of diabetes mellitus 02/11/2013     Priority:  Medium    Mild intermittent asthma without complication      Priority: Medium    Dysthymic disorder      Priority: Medium    Osteoarthritis      Priority: Medium     Handicap parking form completed for R knee Jan 2024      Vitamin B12 deficiency anemia      Priority: Medium    Postmenopausal      Priority: Medium     HRT 10 years      Leucocytosis 01/29/2010     Priority: Medium     white count runs from 13-15      HTN (hypertension) 01/29/2010     Priority: Medium    Dyslipidemia 01/29/2010     Priority: Medium     Dyslipidemia.  LDL in 140s and fam history.            Past Medical History:    Past Medical History:   Diagnosis Date    Asthma     Asthma, mild persistent     Depressive disorder 1992    Dysthymic disorder     Dysthymic disorder     Hematuria     Hyperlipidemia     Hyperlipidemia LDL goal < 130     Hypertension     Hypokalemia 03/15/2013    Kidney stone     Leucocytosis     Leukocytosis, unspecified     Obesity     Osteoarthritis     Osteoarthritis     Pernicious anemia     Postmenopausal     Shortness of breath     Unspecified essential hypertension     Vitamin B12 deficiency        Past Surgical History:    Past Surgical History:   Procedure Laterality Date    ABDOMEN SURGERY  1969, 1970, 1971    ovary removed x2 and abd. hyst.    ABDOMEN SURGERY      APPENDECTOMY  1971    APPENDECTOMY      ARTHROSCOPY SHOULDER ROTATOR CUFF REPAIR Bilateral     BIOPSY BREAST Right     BREAST BIOPSY, RT/LT  1999    Breat Biopsy RT    COLONOSCOPY N/A 03/08/2021    Procedure: COLONOSCOPY, WITH POLYPECTOMY AND BIOPSY;  Surgeon: Yadi Vital MD;  Location: [facility]    ESOPHAGOSCOPY, GASTROSCOPY, DUODENOSCOPY (EGD), COMBINED N/A 03/08/2021    Procedure: ESOPHAGOGASTRODUODENOSCOPY, WITH BIOPSY;  Surgeon: Yadi Vital MD;  Location: [facility]    ESOPHAGOSCOPY, GASTROSCOPY, DUODENOSCOPY (EGD), COMBINED N/A 06/14/2021    Procedure: ESOPHAGOGASTRODUODENOSCOPY, WITH BIOPSY;  Surgeon: Dante Jensen MD;  Location: [facility]    FLEXIBLE  "SIGMOIDOSCOPY  2000 ?    Dr. Cline at [facility]    HYSTERECTOMY      HYSTERECTOMY, PAP NO LONGER INDICATED      HYSTERECTOMY, VAGINAL  1971    LAPAROSCOPIC CYSTECTOMY OVARIAN (ONCOLOGY)      LITHOTRIPSY  2005    LITHOTRIPSY      OTHER SURGICAL HISTORY  01/01/1971    kidney stone removal    OTHER SURGICAL HISTORY  1971 or 72    open surgery - right kidney stone     OTHER SURGICAL HISTORY Bilateral     salpingo-oophorectomy    OVARIAN CYST REMOVAL  x2    PUNC/ASPIR BREAST CYST      ROTATOR CUFF REPAIR RT/LT  2007    left    ROTATOR CUFF REPAIR RT/LT  2009    right twice?    SALPINGO OOPHORECTOMY,R/L/HARLEY  1969 and 1970    Salpingo Oophorectomy, RT/LT/HARLEY    SURGICAL HISTORY OF -   1969, 1970    ovary cystectomy    URETEROSCOPY      x2    ZZC REMOVAL OF KIDNEY STONE  1971       Social History:  Marital Status:   [2]  Social History     Tobacco Use    Smoking status: Never    Smokeless tobacco: Never   Vaping Use    Vaping status: Never Used   Substance Use Topics    Alcohol use: Yes     Comment: rare    Drug use: No        Medications:    albuterol (PROAIR HFA/PROVENTIL HFA/VENTOLIN HFA) 108 (90 Base) MCG/ACT inhaler  atorvastatin (LIPITOR) 20 MG tablet  azithromycin (ZITHROMAX) 250 MG tablet  B-D LUER-MINH SYRINGE 25G X 5/8\" 3 ML MISC  benzonatate (TESSALON) 200 MG capsule  cefdinir (OMNICEF) 300 MG capsule  celecoxib (CELEBREX) 100 MG capsule  cholecalciferol (VITAMIN D3) 1250 mcg (10781 units) capsule  cyanocobalamin (CYANOCOBALAMIN) 1000 MCG/ML injection  guaiFENesin-codeine (ROBITUSSIN AC) 100-10 MG/5ML solution  lisinopril (ZESTRIL) 40 MG tablet  omeprazole (PRILOSEC) 20 MG DR capsule  sertraline (ZOLOFT) 100 MG tablet          Review of Systems  A medically appropriate review of systems was performed with pertinent positives and negatives noted in the HPI, and all other systems negative.    Physical Exam   Patient Vitals for the past 24 hrs:   BP Temp Temp src Pulse Resp SpO2   01/03/25 1625 (!) 163/70 97.9 "  F (36.6  C) Tympanic 72 16 95 %          Physical Exam  General: alert and in no acute distress on arrival  Head: Has abrasions on her forehead, bridge of her nose and chin.  Normocephalic, Eyes:  non-traumatic.  Left Eyes: Non-reddened conjunctiva, no icterus, noninjected, normal pupillary response to light. Normal extraocular movement.  Right eye: Non-reddened conjunctiva, no icterus, noninjected, normal pupillary response to light. Normal extraocular movement bilateral. No drainage present. ENT: Left Ear: TM intact, middle ear is not erythremic, no purulence, canal patent. Right Ear: TM intact, middle ear is not erythremic, no purulence, canal patent. Nose: No erythema or edema patent nostrils bilateral. Throat: midline uvula, non-erythremic, non-enlarged tonsils, without exudate.  No cervical adenopathy.  Lungs:  nonlabored, CTA bilateral throughout.  CV: Regular rate and rhythm, extremities warm and perfused  Abd: nondistended  Skin: no rashes, no diaphoresis and skin color normal has abrasions on her forehead, bridge of her nose and chin.  Patient cleaned these before coming all areas are not bleeding any longer.  Neuro: Patient awake, alert, speech is fluent, no focal deficits  Psychiatric: affect/mood normal, appropriate historian      ED Course                 Procedures                    Results for orders placed or performed during the hospital encounter of 01/03/25 (from the past 48 hours)   Head CT w/o contrast    Narrative    EXAM: CT HEAD W/O CONTRAST  LOCATION: [facility]  DATE: 1/3/2025    INDICATION: fall onto cement today  COMPARISON: None.  TECHNIQUE: Routine CT Head without IV contrast. Multiplanar reformats. Dose reduction techniques were used.    FINDINGS:  INTRACRANIAL CONTENTS: No evidence of acute intracranial hemorrhage or mass effect. Scattered foci of decreased attenuation within the cerebral hemispheric white matter which are nonspecific, though most  commonly ascribed to chronic small vessel ischemic   disease. The ventricles and sulci are prominent consistent with mild brain parenchymal volume loss. Gray-white matter differentiation is maintained. The basilar cisterns are patent.    VISUALIZED ORBITS/SINUSES/MASTOIDS: The globes are unremarkable. The partially imaged paranasal sinuses, mastoid air cells and middle ear cavities are unremarkable.     BONES/SOFT TISSUES: The visualized skull base and calvarium are unremarkable.      Impression    IMPRESSION:    1.  No evidence of acute intracranial hemorrhage or mass effect.  2.  Mild nonspecific white matter changes.  3.  Mild brain parenchymal volume loss.   CT Cervical Spine w/o Contrast    Narrative    EXAM: CT CERVICAL SPINE W/O CONTRAST  LOCATION: [facility]  DATE: 1/3/2025    INDICATION: fall onto face today  COMPARISON: None.  TECHNIQUE: Routine CT Cervical Spine without IV contrast. Multiplanar reformats. Dose reduction techniques were used.    FINDINGS:  No evidence of acute fracture or subluxation of the cervical spine by CT imaging. The vertebral bodies of the cervical spine have normal stature and alignment. Anterolisthesis of C3 on C4 measuring 2 mm. The vertebral bodies of the lumbar spine otherwise   have normal stature and alignment. Multilevel degenerative disc disease of the cervical spine with disc height loss, most pronounced at C5/C6 and C6/C7. No extraspinal abnormality.     Craniovertebral junction and C1-C2: Normal.    C2-C3: No posterior disc bulge or spinal canal narrowing. No neural foraminal narrowing.     C3-C4: No posterior disc bulge or spinal canal narrowing. Uncovertebral joint disease and facet arthropathy with moderate bilateral neural foraminal narrowing.     C4-C5: No posterior disc bulge or spinal canal narrowing. No neural foraminal narrowing.     C5-C6: No posterior disc bulge or spinal canal narrowing. No neural foraminal narrowing.     C6-C7:  No posterior disc bulge or spinal canal narrowing. No neural foraminal narrowing.     C7-T1: No posterior disc bulge or spinal canal narrowing. No neural foraminal narrowing.       Impression    IMPRESSION:  1.  No evidence of acute fracture or subluxation of the cervical spine by CT imaging.  2.  Degenerative cervical spondylosis as described above.       MEDICATIONS GIVEN IN THE EMERGENCY DEPARTMENT:  Medications - No data to display             Assessments & Plan (with Medical Decision Making)  79 year old female who presents to the Urgent Care for evaluation of head injury and facial abrasions.  Ordered a head CT and cervical spine CT no contrast.  Patient denies any loss of consciousness.  Denies any current vision changes, nausea or vomiting.  Patient is accompanied by her  today.  CT of the head is without hemorrhage or abnormality from trauma  CT of spine shows no subluxation or fractures  Reassurance provided advised patient to return if she develops new onset of headache, vomiting dizziness.  Tylenol recommended for pain as needed.  Discharged home stable      Patient verbalized agreement with and understanding of the rational for the diagnosis and treatment plan.  All questions were answered to best of my ability and the patient's satisfaction. The patient was advised to contact or return to their primary clinic or UC/ED with any questions that may arise after this visit.       I have reviewed the nursing notes.    I have reviewed the findings, diagnosis, plan and need for follow up with the patient.        NEW PRESCRIPTIONS STARTED AT TODAY'S ER VISIT  New Prescriptions    No medications on file       Final diagnoses:   Facial abrasion, initial encounter   Head injury, initial encounter       1/3/2025   [facility] EMERGENCY DEPT    Disclaimer: This note consists of symbols derived from keyboarding, dictation, and/or voice recognition software. As a result, there may be errors in the  script that have gone undetected.  Please consider this when interpreting information found in the chart.      Jolly Howell, RICCI CNP  01/03/25 3607

## 2025-01-12 SDOH — HEALTH STABILITY: PHYSICAL HEALTH: ON AVERAGE, HOW MANY MINUTES DO YOU ENGAGE IN EXERCISE AT THIS LEVEL?: 20 MIN

## 2025-01-12 SDOH — HEALTH STABILITY: PHYSICAL HEALTH: ON AVERAGE, HOW MANY DAYS PER WEEK DO YOU ENGAGE IN MODERATE TO STRENUOUS EXERCISE (LIKE A BRISK WALK)?: 7 DAYS

## 2025-01-12 ASSESSMENT — ASTHMA QUESTIONNAIRES
ACT_TOTALSCORE: 15
ACT_TOTALSCORE: 15
QUESTION_1 LAST FOUR WEEKS HOW MUCH OF THE TIME DID YOUR ASTHMA KEEP YOU FROM GETTING AS MUCH DONE AT WORK, SCHOOL OR AT HOME: SOME OF THE TIME
QUESTION_3 LAST FOUR WEEKS HOW OFTEN DID YOUR ASTHMA SYMPTOMS (WHEEZING, COUGHING, SHORTNESS OF BREATH, CHEST TIGHTNESS OR PAIN) WAKE YOU UP AT NIGHT OR EARLIER THAN USUAL IN THE MORNING: TWO OR THREE NIGHTS A WEEK
QUESTION_2 LAST FOUR WEEKS HOW OFTEN HAVE YOU HAD SHORTNESS OF BREATH: THREE TO SIX TIMES A WEEK
QUESTION_4 LAST FOUR WEEKS HOW OFTEN HAVE YOU USED YOUR RESCUE INHALER OR NEBULIZER MEDICATION (SUCH AS ALBUTEROL): TWO OR THREE TIMES PER WEEK
QUESTION_5 LAST FOUR WEEKS HOW WOULD YOU RATE YOUR ASTHMA CONTROL: WELL CONTROLLED

## 2025-01-12 ASSESSMENT — SOCIAL DETERMINANTS OF HEALTH (SDOH): HOW OFTEN DO YOU GET TOGETHER WITH FRIENDS OR RELATIVES?: ONCE A WEEK

## 2025-01-16 ENCOUNTER — OFFICE VISIT (OUTPATIENT)
Dept: FAMILY MEDICINE | Facility: CLINIC | Age: 80
End: 2025-01-16
Attending: PHYSICIAN ASSISTANT
Payer: COMMERCIAL

## 2025-01-16 VITALS
HEIGHT: 57 IN | BODY MASS INDEX: 32.15 KG/M2 | HEART RATE: 88 BPM | RESPIRATION RATE: 18 BRPM | OXYGEN SATURATION: 95 % | WEIGHT: 149 LBS | DIASTOLIC BLOOD PRESSURE: 73 MMHG | TEMPERATURE: 98.2 F | SYSTOLIC BLOOD PRESSURE: 177 MMHG

## 2025-01-16 DIAGNOSIS — Z00.00 ENCOUNTER FOR MEDICARE ANNUAL WELLNESS EXAM: Primary | ICD-10-CM

## 2025-01-16 DIAGNOSIS — J45.20 MILD INTERMITTENT ASTHMA WITHOUT COMPLICATION: ICD-10-CM

## 2025-01-16 DIAGNOSIS — Z13.220 LIPID SCREENING: ICD-10-CM

## 2025-01-16 DIAGNOSIS — R29.6 RECURRENT FALLS: ICD-10-CM

## 2025-01-16 DIAGNOSIS — R05.2 SUBACUTE COUGH: ICD-10-CM

## 2025-01-16 DIAGNOSIS — R29.898 LEG WEAKNESS, BILATERAL: ICD-10-CM

## 2025-01-16 DIAGNOSIS — K29.50 CHRONIC GASTRITIS, PRESENCE OF BLEEDING UNSPECIFIED, UNSPECIFIED GASTRITIS TYPE: ICD-10-CM

## 2025-01-16 DIAGNOSIS — E78.5 DYSLIPIDEMIA: ICD-10-CM

## 2025-01-16 DIAGNOSIS — I10 ESSENTIAL HYPERTENSION: ICD-10-CM

## 2025-01-16 DIAGNOSIS — M17.9 OSTEOARTHRITIS OF KNEE, UNSPECIFIED LATERALITY, UNSPECIFIED OSTEOARTHRITIS TYPE: ICD-10-CM

## 2025-01-16 DIAGNOSIS — Z12.31 ENCOUNTER FOR SCREENING MAMMOGRAM FOR BREAST CANCER: ICD-10-CM

## 2025-01-16 DIAGNOSIS — D51.9 ANEMIA DUE TO VITAMIN B12 DEFICIENCY, UNSPECIFIED B12 DEFICIENCY TYPE: ICD-10-CM

## 2025-01-16 DIAGNOSIS — F34.1 DYSTHYMIC DISORDER: ICD-10-CM

## 2025-01-16 DIAGNOSIS — E55.9 VITAMIN D DEFICIENCY: ICD-10-CM

## 2025-01-16 LAB
ANION GAP SERPL CALCULATED.3IONS-SCNC: 13 MMOL/L (ref 7–15)
BASOPHILS # BLD AUTO: 0.1 10E3/UL (ref 0–0.2)
BASOPHILS NFR BLD AUTO: 1 %
BUN SERPL-MCNC: 10.9 MG/DL (ref 8–23)
CALCIUM SERPL-MCNC: 9.4 MG/DL (ref 8.8–10.4)
CHLORIDE SERPL-SCNC: 105 MMOL/L (ref 98–107)
CHOLEST SERPL-MCNC: 122 MG/DL
CREAT SERPL-MCNC: 0.86 MG/DL (ref 0.51–0.95)
EGFRCR SERPLBLD CKD-EPI 2021: 69 ML/MIN/1.73M2
EOSINOPHIL # BLD AUTO: 0.2 10E3/UL (ref 0–0.7)
EOSINOPHIL NFR BLD AUTO: 2 %
ERYTHROCYTE [DISTWIDTH] IN BLOOD BY AUTOMATED COUNT: 14.3 % (ref 10–15)
FASTING STATUS PATIENT QL REPORTED: YES
FASTING STATUS PATIENT QL REPORTED: YES
FERRITIN SERPL-MCNC: 70 NG/ML (ref 11–328)
GLUCOSE SERPL-MCNC: 101 MG/DL (ref 70–99)
HCO3 SERPL-SCNC: 26 MMOL/L (ref 22–29)
HCT VFR BLD AUTO: 34.4 % (ref 35–47)
HDLC SERPL-MCNC: 28 MG/DL
HGB BLD-MCNC: 10.6 G/DL (ref 11.7–15.7)
IMM GRANULOCYTES # BLD: 0 10E3/UL
IMM GRANULOCYTES NFR BLD: 0 %
IRON BINDING CAPACITY (ROCHE): 304 UG/DL (ref 240–430)
IRON SATN MFR SERPL: 10 % (ref 15–46)
IRON SERPL-MCNC: 31 UG/DL (ref 37–145)
LDLC SERPL CALC-MCNC: 67 MG/DL
LYMPHOCYTES # BLD AUTO: 2.1 10E3/UL (ref 0.8–5.3)
LYMPHOCYTES NFR BLD AUTO: 19 %
MCH RBC QN AUTO: 29 PG (ref 26.5–33)
MCHC RBC AUTO-ENTMCNC: 30.8 G/DL (ref 31.5–36.5)
MCV RBC AUTO: 94 FL (ref 78–100)
MONOCYTES # BLD AUTO: 1 10E3/UL (ref 0–1.3)
MONOCYTES NFR BLD AUTO: 9 %
NEUTROPHILS # BLD AUTO: 7.5 10E3/UL (ref 1.6–8.3)
NEUTROPHILS NFR BLD AUTO: 69 %
NONHDLC SERPL-MCNC: 94 MG/DL
PLATELET # BLD AUTO: 161 10E3/UL (ref 150–450)
POTASSIUM SERPL-SCNC: 3.9 MMOL/L (ref 3.4–5.3)
RBC # BLD AUTO: 3.66 10E6/UL (ref 3.8–5.2)
SODIUM SERPL-SCNC: 144 MMOL/L (ref 135–145)
TRIGL SERPL-MCNC: 137 MG/DL
TSH SERPL DL<=0.005 MIU/L-ACNC: 3.49 UIU/ML (ref 0.3–4.2)
WBC # BLD AUTO: 10.8 10E3/UL (ref 4–11)

## 2025-01-16 RX ORDER — ROSUVASTATIN CALCIUM 10 MG/1
10 TABLET, COATED ORAL DAILY
Qty: 90 TABLET | Refills: 3 | Status: SHIPPED | OUTPATIENT
Start: 2025-01-16

## 2025-01-16 RX ORDER — ATORVASTATIN CALCIUM 20 MG/1
20 TABLET, FILM COATED ORAL DAILY
Qty: 90 TABLET | Refills: 0 | Status: CANCELLED | OUTPATIENT
Start: 2025-01-16

## 2025-01-16 RX ORDER — CYANOCOBALAMIN 1000 UG/ML
INJECTION, SOLUTION INTRAMUSCULAR; SUBCUTANEOUS
Qty: 3 ML | Refills: 3 | Status: SHIPPED | OUTPATIENT
Start: 2025-01-16

## 2025-01-16 RX ORDER — LISINOPRIL 40 MG/1
40 TABLET ORAL DAILY
Qty: 90 TABLET | Refills: 3 | Status: SHIPPED | OUTPATIENT
Start: 2025-01-16

## 2025-01-16 RX ORDER — SERTRALINE HYDROCHLORIDE 100 MG/1
100 TABLET, FILM COATED ORAL DAILY
Qty: 90 TABLET | Refills: 3 | Status: SHIPPED | OUTPATIENT
Start: 2025-01-16

## 2025-01-16 RX ORDER — ALBUTEROL SULFATE 90 UG/1
2 INHALANT RESPIRATORY (INHALATION) EVERY 4 HOURS PRN
Qty: 18 G | Refills: 1 | Status: SHIPPED | OUTPATIENT
Start: 2025-01-16

## 2025-01-16 RX ORDER — CELECOXIB 100 MG/1
100 CAPSULE ORAL 2 TIMES DAILY
Qty: 60 CAPSULE | Refills: 3 | Status: CANCELLED | OUTPATIENT
Start: 2025-01-16

## 2025-01-16 ASSESSMENT — PATIENT HEALTH QUESTIONNAIRE - PHQ9
SUM OF ALL RESPONSES TO PHQ QUESTIONS 1-9: 5
10. IF YOU CHECKED OFF ANY PROBLEMS, HOW DIFFICULT HAVE THESE PROBLEMS MADE IT FOR YOU TO DO YOUR WORK, TAKE CARE OF THINGS AT HOME, OR GET ALONG WITH OTHER PEOPLE: NOT DIFFICULT AT ALL
SUM OF ALL RESPONSES TO PHQ QUESTIONS 1-9: 5

## 2025-01-16 ASSESSMENT — ANXIETY QUESTIONNAIRES
1. FEELING NERVOUS, ANXIOUS, OR ON EDGE: NOT AT ALL
7. FEELING AFRAID AS IF SOMETHING AWFUL MIGHT HAPPEN: NOT AT ALL
2. NOT BEING ABLE TO STOP OR CONTROL WORRYING: NOT AT ALL
IF YOU CHECKED OFF ANY PROBLEMS ON THIS QUESTIONNAIRE, HOW DIFFICULT HAVE THESE PROBLEMS MADE IT FOR YOU TO DO YOUR WORK, TAKE CARE OF THINGS AT HOME, OR GET ALONG WITH OTHER PEOPLE: NOT DIFFICULT AT ALL
GAD7 TOTAL SCORE: 0
7. FEELING AFRAID AS IF SOMETHING AWFUL MIGHT HAPPEN: NOT AT ALL
4. TROUBLE RELAXING: NOT AT ALL
GAD7 TOTAL SCORE: 0
5. BEING SO RESTLESS THAT IT IS HARD TO SIT STILL: NOT AT ALL
GAD7 TOTAL SCORE: 0
6. BECOMING EASILY ANNOYED OR IRRITABLE: NOT AT ALL
8. IF YOU CHECKED OFF ANY PROBLEMS, HOW DIFFICULT HAVE THESE MADE IT FOR YOU TO DO YOUR WORK, TAKE CARE OF THINGS AT HOME, OR GET ALONG WITH OTHER PEOPLE?: NOT DIFFICULT AT ALL
3. WORRYING TOO MUCH ABOUT DIFFERENT THINGS: NOT AT ALL

## 2025-01-16 ASSESSMENT — PAIN SCALES - PAIN ENJOYMENT GENERAL ACTIVITY SCALE (PEG)
INTERFERED_GENERAL_ACTIVITY: 4
INTERFERED_GENERAL_ACTIVITY: 4
PEG_TOTALSCORE: 4
AVG_PAIN_PASTWEEK: 4
INTERFERED_ENJOYMENT_LIFE: 4
AVG_PAIN_PASTWEEK: 4
INTERFERED_ENJOYMENT_LIFE: 4
PEG_TOTALSCORE: 4

## 2025-01-16 ASSESSMENT — PAIN SCALES - GENERAL: PAINLEVEL_OUTOF10: SEVERE PAIN (6)

## 2025-01-16 NOTE — NURSING NOTE
"Chief Complaint   Patient presents with    Physical    Lipids    Hypertension    Depression       Initial There were no vitals taken for this visit. Estimated body mass index is 32.58 kg/m  as calculated from the following:    Height as of 1/4/24: 1.45 m (4' 9.09\").    Weight as of 12/29/24: 68.5 kg (151 lb).    Patient presents to the clinic using No DME    Is there anyone who you would like to be able to receive your results? No  If yes have patient fill out [facility]      "

## 2025-01-16 NOTE — PATIENT INSTRUCTIONS
Chest xray and labs - will contact you with results  Replace atorvastatin with rosvuastatin to see if less aches  Referral back to ortho for knee injections   Physical Therapy for balance  Set up mammogram - Call [phone] to set up your imaging testing.   Declined flu and covid vaccines  If interested in RSV or tetanus vaccine, get at pharmacy  List of bladder irritants that can impact urgency:  Bladder irritants: all alcoholic beverages, apples, apple juice, artificial sweeteners, cantaloupes, carbonation, chiles/spicy foods, citrus fruits, coffee (including decaf), strawberries, vinegar, cranberries, grapes, guava, peaches, pineapples, plums, tea, tomatoes, chocolate, vitamin B complex, caffeeine    Substitute:  Low acid fruits: pears, apricots, papaya, watermelon, grape juice, cranberry capsules  For coffee drinkers: KAVA (low acid instant), cold brew from [employer], donnie  For tea drinkers: non-citrus herbal, sun brewed tea  Vitamin C substitute - calcium carbonate co buffered with calcium ascorbate      Patient Education   Preventive Care Advice   This is general advice given by our system to help you stay healthy. However, your care team may have specific advice just for you. Please talk to your care team about your preventive care needs.  Nutrition  Eat 5 or more servings of fruits and vegetables each day.  Try wheat bread, brown rice and whole grain pasta (instead of white bread, rice, and pasta).  Get enough calcium and vitamin D. Check the label on foods and aim for 100% of the RDA (recommended daily allowance).  Lifestyle  Exercise at least 150 minutes each week  (30 minutes a day, 5 days a week).  Do muscle strengthening activities 2 days a week. These help control your weight and prevent disease.  No smoking.  Wear sunscreen to prevent skin cancer.  Have a dental exam and cleaning every 6 months.  Yearly exams  See your health care team every year to talk about:  Any changes in your health.  Any  medicines your care team has prescribed.  Preventive care, family planning, and ways to prevent chronic diseases.  Shots (vaccines)   HPV shots (up to age 26), if you've never had them before.  Hepatitis B shots (up to age 59), if you've never had them before.  COVID-19 shot: Get this shot when it's due.  Flu shot: Get a flu shot every year.  Tetanus shot: Get a tetanus shot every 10 years.  Pneumococcal, hepatitis A, and RSV shots: Ask your care team if you need these based on your risk.  Shingles shot (for age 50 and up)  General health tests  Diabetes screening:  Starting at age 35, Get screened for diabetes at least every 3 years.  If you are younger than age 35, ask your care team if you should be screened for diabetes.  Cholesterol test: At age 39, start having a cholesterol test every 5 years, or more often if advised.  Bone density scan (DEXA): At age 50, ask your care team if you should have this scan for osteoporosis (brittle bones).  Hepatitis C: Get tested at least once in your life.  STIs (sexually transmitted infections)  Before age 24: Ask your care team if you should be screened for STIs.  After age 24: Get screened for STIs if you're at risk. You are at risk for STIs (including HIV) if:  You are sexually active with more than one person.  You don't use condoms every time.  You or a partner was diagnosed with a sexually transmitted infection.  If you are at risk for HIV, ask about PrEP medicine to prevent HIV.  Get tested for HIV at least once in your life, whether you are at risk for HIV or not.  Cancer screening tests  Cervical cancer screening: If you have a cervix, begin getting regular cervical cancer screening tests starting at age 21.  Breast cancer scan (mammogram): If you've ever had breasts, begin having regular mammograms starting at age 40. This is a scan to check for breast cancer.  Colon cancer screening: It is important to start screening for colon cancer at age 45.  Have a colonoscopy  test every 10 years (or more often if you're at risk) Or, ask your provider about stool tests like a FIT test every year or Cologuard test every 3 years.  To learn more about your testing options, visit:   .  For help making a decision, visit:   [email or web address].  Prostate cancer screening test: If you have a prostate, ask your care team if a prostate cancer screening test (PSA) at age 55 is right for you.  Lung cancer screening: If you are a current or former smoker ages 50 to 80, ask your care team if ongoing lung cancer screenings are right for you.  For informational purposes only. Not to replace the advice of your health care provider. Copyright   2023 [employer]. All rights reserved. Clinically reviewed by the  [employer] [facility]. [employer] [id number] - REV 01/24.  Preventing Falls: Care Instructions  Injuries and health problems such as trouble walking or poor eyesight can increase your risk of falling. So can some medicines. But there are things you can do to help prevent falls. You can exercise to get stronger. You can also arrange your home to make it safer.    Talk to your doctor about the medicines you take. Ask if any of them increase the risk of falls and whether they can be changed or stopped.   Try to exercise regularly. It can help improve your strength and balance. This can help lower your risk of falling.         Practice fall safety and prevention.   Wear low-heeled shoes that fit well and give your feet good support. Talk to your doctor if you have foot problems that make this hard.  Carry a cellphone or wear a medical alert device that you can use to call for help.  Use stepladders instead of chairs to reach high objects. Don't climb if you're at risk for falls. Ask for help, if needed.  Wear the correct eyeglasses, if you need them.        Make your home safer.   Remove rugs, cords, clutter, and furniture from walkways.  Keep your house well lit. Use  "night-lights in hallways and bathrooms.  Install and use sturdy handrails on stairways.  Wear nonskid footwear, even inside. Don't walk barefoot or in socks without shoes.        Be safe outside.   Use handrails, curb cuts, and ramps whenever possible.  Keep your hands free by using a shoulder bag or backpack.  Try to walk in well-lit areas. Watch out for uneven ground, changes in pavement, and debris.  Be careful in the winter. Walk on the grass or gravel when sidewalks are slippery. Use de-icer on steps and walkways. Add non-slip devices to shoes.    Put grab bars and nonskid mats in your shower or tub and near the toilet. Try to use a shower chair or bath bench when bathing.   Get into a tub or shower by putting in your weaker leg first. Get out with your strong side first. Have a phone or medical alert device in the bathroom with you.   Where can you learn more?  Go to https://www.[facility] [facility].net/patiented  Enter G117 in the search box to learn more about \"Preventing Falls: Care Instructions.\"  Current as of: July 31, 2024  Content Version: 14.3    2024 [employer].   Care instructions adapted under license by your healthcare professional. If you have questions about a medical condition or this instruction, always ask your healthcare professional. [employer] disclaims any warranty or liability for your use of this information.    Learning About Sleeping Well  What does sleeping well mean?     Sleeping well means getting enough sleep to feel good and stay healthy. How much sleep is enough varies among people.  The number of hours you sleep and how you feel when you wake up are both important. If you do not feel refreshed, you probably need more sleep. Another sign of not getting enough sleep is feeling tired during the day.  Experts recommend that adults get at least 7 or more hours of sleep per day. Children and older adults need more sleep.  Why is getting enough sleep important?  Getting " "enough quality sleep is a basic part of good health. When your sleep suffers, your physical health, mood, and your thoughts can suffer too. You may find yourself feeling more grumpy or stressed. Not getting enough sleep also can lead to serious problems, including injury, accidents, anxiety, and depression.  What might cause poor sleeping?  Many things can cause sleep problems, including:  Changes to your sleep schedule.  Stress. Stress can be caused by fear about a single event, such as giving a speech. Or you may have ongoing stress, such as worry about work or school.  Depression, anxiety, and other mental or emotional conditions.  Changes in your sleep habits or surroundings. This includes changes that happen where you sleep, such as noise, light, or sleeping in a different bed. It also includes changes in your sleep pattern, such as having jet lag or working a late shift.  Health problems, such as pain, breathing problems, and restless legs syndrome.  Lack of regular exercise.  Using alcohol, nicotine, or caffeine before bed.  How can you help yourself?  Here are some tips that may help you sleep more soundly and wake up feeling more refreshed.  Your sleeping area   Use your bedroom only for sleeping and sex. A bit of light reading may help you fall asleep. But if it doesn't, do your reading elsewhere in the house. Try not to use your TV, computer, smartphone, or tablet while you are in bed.  Be sure your bed is big enough to stretch out comfortably, especially if you have a sleep partner.  Keep your bedroom quiet, dark, and cool. Use curtains, blinds, or a sleep mask to block out light. To block out noise, use earplugs, soothing music, or a \"white noise\" machine.  Your evening and bedtime routine   Create a relaxing bedtime routine. You might want to take a warm shower or bath, or listen to soothing music.  Go to bed at the same time every night. And get up at the same time every morning, even if you feel " "tired.  What to avoid   Limit caffeine (coffee, tea, caffeinated sodas) during the day, and don't have any for at least 6 hours before bedtime.  Avoid drinking alcohol before bedtime. Alcohol can cause you to wake up more often during the night.  Try not to smoke or use tobacco, especially in the evening. Nicotine can keep you awake.  Limit naps during the day, especially close to bedtime.  Avoid lying in bed awake for too long. If you can't fall asleep or if you wake up in the middle of the night and can't get back to sleep within about 20 minutes, get out of bed and go to another room until you feel sleepy.  Avoid taking medicine right before bed that may keep you awake or make you feel hyper or energized. Your doctor can tell you if your medicine may do this and if you can take it earlier in the day.  If you can't sleep   Imagine yourself in a peaceful, pleasant scene. Focus on the details and feelings of being in a place that is relaxing.  Get up and do a quiet or boring activity until you feel sleepy.  Avoid drinking any liquids before going to bed to help prevent waking up often to use the bathroom.  Where can you learn more?  Go to https://www.[employer].net/patiented  Enter J942 in the search box to learn more about \"Learning About Sleeping Well.\"  Current as of: July 31, 2024  Content Version: 14.3    2024 [employer].   Care instructions adapted under license by your healthcare professional. If you have questions about a medical condition or this instruction, always ask your healthcare professional. [employer] disclaims any warranty or liability for your use of this information.    Bladder Training: Care Instructions  Your Care Instructions     Bladder training is used to treat urge incontinence and stress incontinence. Urge incontinence means that the need to urinate comes on so fast that you can't get to a toilet in time. Stress incontinence means that you leak urine because of " pressure on your bladder. For example, it may happen when you laugh, cough, or lift something heavy.  Bladder training can increase how long you can wait before you have to urinate. It can also help your bladder hold more urine. And it can give you better control over the urge to urinate.  It is important to remember that bladder training takes a few weeks to a few months to make a difference. You may not see results right away, but don't give up.  Follow-up care is a key part of your treatment and safety. Be sure to make and go to all appointments, and call your doctor if you are having problems. It's also a good idea to know your test results and keep a list of the medicines you take.  How can you care for yourself at home?  Work with your doctor to come up with a bladder training program that is right for you. You may use one or more of the following methods.  Delayed urination  In the beginning, try to keep from urinating for 5 minutes after you first feel the need to go.  While you wait, take deep, slow breaths to relax. Kegel exercises can also help you delay the need to go to the bathroom.  After some practice, when you can easily wait 5 minutes to urinate, try to wait 10 minutes before you urinate.  Slowly increase the waiting period until you are able to control when you have to urinate.  Scheduled urination  Empty your bladder when you first wake up in the morning.  Schedule times throughout the day when you will urinate.  Start by going to the bathroom every hour, even if you don't need to go.  Slowly increase the time between trips to the bathroom.  When you have found a schedule that works well for you, keep doing it.  If you wake up during the night and have to urinate, do it. Apply your schedule to waking hours only.  Kegel exercises  These tighten and strengthen pelvic muscles, which can help you control the flow of urine. (If doing these exercises causes pain, stop doing them and talk with your  "doctor.) To do Kegel exercises:  Squeeze your muscles as if you were trying not to pass gas. Or squeeze your muscles as if you were stopping the flow of urine. Your belly, legs, and buttocks shouldn't move.  Hold the squeeze for 3 seconds, then relax for 5 to 10 seconds.  Start with 3 seconds, then add 1 second each week until you are able to squeeze for 10 seconds.  Repeat the exercise 10 times a session. Do 3 to 8 sessions a day.  When should you call for help?  Watch closely for changes in your health, and be sure to contact your doctor if:    Your incontinence is getting worse.     You do not get better as expected.   Where can you learn more?  Go to https://www.[employer].net/patiented  Enter V684 in the search box to learn more about \"Bladder Training: Care Instructions.\"  Current as of: April 30, 2024  Content Version: 14.3    2024 [employer].   Care instructions adapted under license by your healthcare professional. If you have questions about a medical condition or this instruction, always ask your healthcare professional. [employer] disclaims any warranty or liability for your use of this information.    Learning About Depression Screening  What is depression screening?  Depression screening is a way to see if you have depression symptoms. It may be done by a doctor or counselor. It's often part of a routine checkup. That's because your mental health is just as important as your physical health.  Depression is a mental health condition that affects how you feel, think, and act. You may:  Have less energy.  Lose interest in your daily activities.  Feel sad and grouchy for a long time.  Depression is very common. It affects people of all ages.  Many things can lead to depression. Some people become depressed after they have a stroke or find out they have a major illness like cancer or heart disease. The death of a loved one or a breakup may lead to depression. It can run in families. " "Most experts believe that a combination of inherited genes and stressful life events can cause it.  What happens during screening?  You may be asked to fill out a form about your depression symptoms. You and the doctor will discuss your answers. The doctor may ask you more questions to learn more about how you think, act, and feel.  What happens after screening?  If you have symptoms of depression, your doctor will talk to you about your options.  Doctors usually treat depression with medicines or counseling. Often, combining the two works best. Many people don't get help because they think that they'll get over the depression on their own. But people with depression may not get better unless they get treatment.  The cause of depression is not well understood. There may be many factors involved. But if you have depression, it's not your fault.  A serious symptom of depression is thinking about death or suicide. If you or someone you care about talks about this or about feeling hopeless, get help right away.  It's important to know that depression can be treated. Medicine, counseling, and self-care may help.  Where can you learn more?  Go to https://www.[employer].net/patiented  Enter T185 in the search box to learn more about \"Learning About Depression Screening.\"  Current as of: July 31, 2024  Content Version: 14.3    2024 [employer].   Care instructions adapted under license by your healthcare professional. If you have questions about a medical condition or this instruction, always ask your healthcare professional. [employer] disclaims any warranty or liability for your use of this information.    Substance Use Disorder: Care Instructions  Overview     You can improve your life and health by stopping your use of alcohol or drugs. When you don't drink or use drugs, you may feel and sleep better. You may get along better with your family, friends, and coworkers. There are medicines and " programs that can help with substance use disorder.  How can you care for yourself at home?  Here are some ways to help you stay sober and prevent relapse.  If you have been given medicine to help keep you sober or reduce your cravings, be sure to take it exactly as prescribed.  Talk to your doctor about programs that can help you stop using drugs or drinking alcohol.  Do not keep alcohol or drugs in your home.  Plan ahead. Think about what you'll say if other people ask you to drink or use drugs. Try not to spend time with people who drink or use drugs.  Use the time and money spent on drinking or drugs to do something that's important to you.  Preventing a relapse  Have a plan to deal with relapse. Learn to recognize changes in your thinking that lead you to drink or use drugs. Get help before you start to drink or use drugs again.  Try to stay away from situations, friends, or places that may lead you to drink or use drugs.  If you feel the need to drink alcohol or use drugs again, seek help right away. Call a trusted friend or family member. Some people get support from organizations such as Narcotics Anonymous or "[employer] or from treatment facilities.  If you relapse, get help as soon as you can. Some people make a plan with another person that outlines what they want that person to do for them if they relapse. The plan usually includes how to handle the relapse and who to notify in case of relapse.  Don't give up. Remember that a relapse doesn't mean that you have failed. Use the experience to learn the triggers that lead you to drink or use drugs. Then quit again. Recovery is a lifelong process. Many people have several relapses before they are able to quit for good.  Follow-up care is a key part of your treatment and safety. Be sure to make and go to all appointments, and call your doctor if you are having problems. It's also a good idea to know your test results and keep a list of the medicines you  "take.  When should you call for help?   Call 911  anytime you think you may need emergency care. For example, call if you or someone else:    Has overdosed or has withdrawal signs. Be sure to tell the emergency workers that you are or someone else is using or trying to quit using drugs. Overdose or withdrawal signs may include:  Losing consciousness.  Seizure.  Seeing or hearing things that aren't there (hallucinations).     Is thinking or talking about suicide or harming others.   Where to get help 24 hours a day, 7 days a week   If you or someone you know talks about suicide, self-harm, a mental health crisis, a substance use crisis, or any other kind of emotional distress, get help right away. You can:    Call the Suicide and Crisis Lifeline at 988.     Call 5-692-284-ARDZ (1-594.475.9287).     Text HOME to 260985 to access the Crisis Text Line.   Consider saving these numbers in your phone.  Go to [employer] for more information or to chat online.  Call your doctor now or seek immediate medical care if:    You are having withdrawal symptoms. These may include nausea or vomiting, sweating, shakiness, and anxiety.   Watch closely for changes in your health, and be sure to contact your doctor if:    You have a relapse.     You need more help or support to stop.   Where can you learn more?  Go to https://www.[employer].net/patiented  Enter H573 in the search box to learn more about \"Substance Use Disorder: Care Instructions.\"  Current as of: November 15, 2023  Content Version: 14.3    2024 [employer].   Care instructions adapted under license by your healthcare professional. If you have questions about a medical condition or this instruction, always ask your healthcare professional. [employer] disclaims any warranty or liability for your use of this information.       "

## 2025-01-16 NOTE — PROGRESS NOTES
Preventive Care Visit  [facility]  Sarah Carlos PA-C, Family Medicine  Jan 16, 2025      Assessment & Plan     Encounter for Medicare annual wellness exam    Subacute cough  Since Nov.  Not improved with recent antibiotics.    - XR Chest 2 Views; Future    Essential hypertension  High last couple visits and high when checked at home, but checks rarely.  Need more data to assess control - she will update me.  - BASIC METABOLIC PANEL; Future  - lisinopril (ZESTRIL) 40 MG tablet; Take 1 tablet (40 mg) by mouth daily.  - TSH with free T4 reflex; Future  - BASIC METABOLIC PANEL  - TSH with free T4 reflex    Anemia due to vitamin B12 deficiency, unspecified B12 deficiency type  Recheck.  History B12 and iron def likely sec to chronic gastritis.  - Vitamin B12; Future  - cyanocobalamin (CYANOCOBALAMIN) 1000 mcg/mL injection; INJECT 1 ML (1,000 MCG) INTO THE MUSCLE ONCE PER MONTH AS DIRECTED  - CBC with platelets and differential; Future  - Ferritin; Future  - Iron and iron binding capacity; Future  - Vitamin B12  - CBC with platelets and differential  - Ferritin  - Iron and iron binding capacity    Dyslipidemia  Med change due to side effects with atorvastatin.  Off med x 1 mo today.  - rosuvastatin (CRESTOR) 10 MG tablet; Take 1 tablet (10 mg) by mouth daily.    Mild intermittent asthma without complication  Refill, currently with exacerbation from illness.  Anticipate adding prednisone to help cough and asthma but checking CXR first.  - albuterol (PROAIR HFA/PROVENTIL HFA/VENTOLIN HFA) 108 (90 Base) MCG/ACT inhaler; Inhale 2 puffs into the lungs every 4 hours as needed for shortness of breath or wheezing.    Osteoarthritis of knee, unspecified laterality, unspecified osteoarthritis type  Opts to return to [facility] for knee injections  - Orthopedic  Referral; Future    Dysthymic disorder  Fair control - wants no med change today so refill  - sertraline (ZOLOFT) 100 MG tablet;  Take 1 tablet (100 mg) by mouth daily.    Recurrent falls  Weakness in knees, so will refer to Physical Therapy for this contributory issue.  Monitor her B12 and vit D deficiencies as contributors.  Check thyroid.  - TSH with free T4 reflex; Future  - TSH with free T4 reflex    Vitamin D deficiency  Recheck  -vit D def    Leg weakness, bilateral  Refer.  No other red flag symptoms to suggest her back.  - Physical Therapy  Referral; Future    Chronic gastritis, presence of bleeding unspecified, unspecified gastritis type  Refill stable  - omeprazole (PRILOSEC) 20 MG DR capsule; Take 1 capsule (20 mg) by mouth daily. Stay on this long term due to ulcer history.    Encounter for screening mammogram for breast cancer  - MA Screening Bilateral w/ Surendra; Future    Lipid screening  - Lipid panel reflex to direct LDL Non-fasting; Future  - Lipid panel reflex to direct LDL Non-fasting    Patient Instructions   Chest xray and labs - will contact you with results  Replace atorvastatin with rosvuastatin to see if less aches  Referral back to ortho for knee injections   Physical Therapy for balance  Set up mammogram - Call [phone] to set up your imaging testing.   Declined flu and covid vaccines  If interested in RSV or tetanus vaccine, get at pharmacy  List of bladder irritants that can impact urgency:  Bladder irritants: all alcoholic beverages, apples, apple juice, artificial sweeteners, cantaloupes, carbonation, chiles/spicy foods, citrus fruits, coffee (including decaf), strawberries, vinegar, cranberries, grapes, guava, peaches, pineapples, plums, tea, tomatoes, chocolate, vitamin B complex, caffeeine    Substitute:  Low acid fruits: pears, apricots, papaya, watermelon, grape juice, cranberry capsules  For coffee drinkers: KAVA (low acid instant), cold brew from [employer], donnie  For tea drinkers: non-citrus herbal, sun brewed tea  Vitamin C substitute - calcium carbonate co buffered with calcium  ascorbate      Patient Education  Preventive Care Advice   This is general advice given by our system to help you stay healthy. However, your care team may have specific advice just for you. Please talk to your care team about your preventive care needs.  Nutrition  Eat 5 or more servings of fruits and vegetables each day.  Try wheat bread, brown rice and whole grain pasta (instead of white bread, rice, and pasta).  Get enough calcium and vitamin D. Check the label on foods and aim for 100% of the RDA (recommended daily allowance).  Lifestyle  Exercise at least 150 minutes each week  (30 minutes a day, 5 days a week).  Do muscle strengthening activities 2 days a week. These help control your weight and prevent disease.  No smoking.  Wear sunscreen to prevent skin cancer.  Have a dental exam and cleaning every 6 months.  Yearly exams  See your health care team every year to talk about:  Any changes in your health.  Any medicines your care team has prescribed.  Preventive care, family planning, and ways to prevent chronic diseases.  Shots (vaccines)   HPV shots (up to age 26), if you've never had them before.  Hepatitis B shots (up to age 59), if you've never had them before.  COVID-19 shot: Get this shot when it's due.  Flu shot: Get a flu shot every year.  Tetanus shot: Get a tetanus shot every 10 years.  Pneumococcal, hepatitis A, and RSV shots: Ask your care team if you need these based on your risk.  Shingles shot (for age 50 and up)  General health tests  Diabetes screening:  Starting at age 35, Get screened for diabetes at least every 3 years.  If you are younger than age 35, ask your care team if you should be screened for diabetes.  Cholesterol test: At age 39, start having a cholesterol test every 5 years, or more often if advised.  Bone density scan (DEXA): At age 50, ask your care team if you should have this scan for osteoporosis (brittle bones).  Hepatitis C: Get tested at least once in your life.  STIs  (sexually transmitted infections)  Before age 24: Ask your care team if you should be screened for STIs.  After age 24: Get screened for STIs if you're at risk. You are at risk for STIs (including HIV) if:  You are sexually active with more than one person.  You don't use condoms every time.  You or a partner was diagnosed with a sexually transmitted infection.  If you are at risk for HIV, ask about PrEP medicine to prevent HIV.  Get tested for HIV at least once in your life, whether you are at risk for HIV or not.  Cancer screening tests  Cervical cancer screening: If you have a cervix, begin getting regular cervical cancer screening tests starting at age 21.  Breast cancer scan (mammogram): If you've ever had breasts, begin having regular mammograms starting at age 40. This is a scan to check for breast cancer.  Colon cancer screening: It is important to start screening for colon cancer at age 45.  Have a colonoscopy test every 10 years (or more often if you're at risk) Or, ask your provider about stool tests like a FIT test every year or Cologuard test every 3 years.  To learn more about your testing options, visit:   .  For help making a decision, visit:   https://bit.ly/il53150.  Prostate cancer screening test: If you have a prostate, ask your care team if a prostate cancer screening test (PSA) at age 55 is right for you.  Lung cancer screening: If you are a current or former smoker ages 50 to 80, ask your care team if ongoing lung cancer screenings are right for you.  For informational purposes only. Not to replace the advice of your health care provider. Copyright   2023 [facility]. All rights reserved. Clinically reviewed by the [facility] Transitions Program. [employer] [id number] - REV 01/24.  Preventing Falls: Care Instructions  Injuries and health problems such as trouble walking or poor eyesight can increase your risk of falling. So can some medicines. But there are things you can do to  "help prevent falls. You can exercise to get stronger. You can also arrange your home to make it safer.    Talk to your doctor about the medicines you take. Ask if any of them increase the risk of falls and whether they can be changed or stopped.   Try to exercise regularly. It can help improve your strength and balance. This can help lower your risk of falling.         Practice fall safety and prevention.   Wear low-heeled shoes that fit well and give your feet good support. Talk to your doctor if you have foot problems that make this hard.  Carry a cellphone or wear a medical alert device that you can use to call for help.  Use stepladders instead of chairs to reach high objects. Don't climb if you're at risk for falls. Ask for help, if needed.  Wear the correct eyeglasses, if you need them.        Make your home safer.   Remove rugs, cords, clutter, and furniture from walkways.  Keep your house well lit. Use night-lights in hallways and bathrooms.  Install and use sturdy handrails on stairways.  Wear nonskid footwear, even inside. Don't walk barefoot or in socks without shoes.        Be safe outside.   Use handrails, curb cuts, and ramps whenever possible.  Keep your hands free by using a shoulder bag or backpack.  Try to walk in well-lit areas. Watch out for uneven ground, changes in pavement, and debris.  Be careful in the winter. Walk on the grass or gravel when sidewalks are slippery. Use de-icer on steps and walkways. Add non-slip devices to shoes.    Put grab bars and nonskid mats in your shower or tub and near the toilet. Try to use a shower chair or bath bench when bathing.   Get into a tub or shower by putting in your weaker leg first. Get out with your strong side first. Have a phone or medical alert device in the bathroom with you.   Where can you learn more?  Go to https://www.[employer].net/patiented  Enter G117 in the search box to learn more about \"Preventing Falls: Care Instructions.\"  Current as " of: July 31, 2024  Content Version: 14.3    2024 [employer].   Care instructions adapted under license by your healthcare professional. If you have questions about a medical condition or this instruction, always ask your healthcare professional. [employer] disclaims any warranty or liability for your use of this information.    Learning About Sleeping Well  What does sleeping well mean?     Sleeping well means getting enough sleep to feel good and stay healthy. How much sleep is enough varies among people.  The number of hours you sleep and how you feel when you wake up are both important. If you do not feel refreshed, you probably need more sleep. Another sign of not getting enough sleep is feeling tired during the day.  Experts recommend that adults get at least 7 or more hours of sleep per day. Children and older adults need more sleep.  Why is getting enough sleep important?  Getting enough quality sleep is a basic part of good health. When your sleep suffers, your physical health, mood, and your thoughts can suffer too. You may find yourself feeling more grumpy or stressed. Not getting enough sleep also can lead to serious problems, including injury, accidents, anxiety, and depression.  What might cause poor sleeping?  Many things can cause sleep problems, including:  Changes to your sleep schedule.  Stress. Stress can be caused by fear about a single event, such as giving a speech. Or you may have ongoing stress, such as worry about work or school.  Depression, anxiety, and other mental or emotional conditions.  Changes in your sleep habits or surroundings. This includes changes that happen where you sleep, such as noise, light, or sleeping in a different bed. It also includes changes in your sleep pattern, such as having jet lag or working a late shift.  Health problems, such as pain, breathing problems, and restless legs syndrome.  Lack of regular exercise.  Using alcohol, nicotine,  "or caffeine before bed.  How can you help yourself?  Here are some tips that may help you sleep more soundly and wake up feeling more refreshed.  Your sleeping area   Use your bedroom only for sleeping and sex. A bit of light reading may help you fall asleep. But if it doesn't, do your reading elsewhere in the house. Try not to use your TV, computer, smartphone, or tablet while you are in bed.  Be sure your bed is big enough to stretch out comfortably, especially if you have a sleep partner.  Keep your bedroom quiet, dark, and cool. Use curtains, blinds, or a sleep mask to block out light. To block out noise, use earplugs, soothing music, or a \"white noise\" machine.  Your evening and bedtime routine   Create a relaxing bedtime routine. You might want to take a warm shower or bath, or listen to soothing music.  Go to bed at the same time every night. And get up at the same time every morning, even if you feel tired.  What to avoid   Limit caffeine (coffee, tea, caffeinated sodas) during the day, and don't have any for at least 6 hours before bedtime.  Avoid drinking alcohol before bedtime. Alcohol can cause you to wake up more often during the night.  Try not to smoke or use tobacco, especially in the evening. Nicotine can keep you awake.  Limit naps during the day, especially close to bedtime.  Avoid lying in bed awake for too long. If you can't fall asleep or if you wake up in the middle of the night and can't get back to sleep within about 20 minutes, get out of bed and go to another room until you feel sleepy.  Avoid taking medicine right before bed that may keep you awake or make you feel hyper or energized. Your doctor can tell you if your medicine may do this and if you can take it earlier in the day.  If you can't sleep   Imagine yourself in a peaceful, pleasant scene. Focus on the details and feelings of being in a place that is relaxing.  Get up and do a quiet or boring activity until you feel " "sleepy.  Avoid drinking any liquids before going to bed to help prevent waking up often to use the bathroom.  Where can you learn more?  Go to https://www.[employer].net/patiented  Enter J942 in the search box to learn more about \"Learning About Sleeping Well.\"  Current as of: July 31, 2024  Content Version: 14.3    2024 [employer].   Care instructions adapted under license by your healthcare professional. If you have questions about a medical condition or this instruction, always ask your healthcare professional. [employer] disclaims any warranty or liability for your use of this information.    Bladder Training: Care Instructions  Your Care Instructions     Bladder training is used to treat urge incontinence and stress incontinence. Urge incontinence means that the need to urinate comes on so fast that you can't get to a toilet in time. Stress incontinence means that you leak urine because of pressure on your bladder. For example, it may happen when you laugh, cough, or lift something heavy.  Bladder training can increase how long you can wait before you have to urinate. It can also help your bladder hold more urine. And it can give you better control over the urge to urinate.  It is important to remember that bladder training takes a few weeks to a few months to make a difference. You may not see results right away, but don't give up.  Follow-up care is a key part of your treatment and safety. Be sure to make and go to all appointments, and call your doctor if you are having problems. It's also a good idea to know your test results and keep a list of the medicines you take.  How can you care for yourself at home?  Work with your doctor to come up with a bladder training program that is right for you. You may use one or more of the following methods.  Delayed urination  In the beginning, try to keep from urinating for 5 minutes after you first feel the need to go.  While you wait, take " "deep, slow breaths to relax. Kegel exercises can also help you delay the need to go to the bathroom.  After some practice, when you can easily wait 5 minutes to urinate, try to wait 10 minutes before you urinate.  Slowly increase the waiting period until you are able to control when you have to urinate.  Scheduled urination  Empty your bladder when you first wake up in the morning.  Schedule times throughout the day when you will urinate.  Start by going to the bathroom every hour, even if you don't need to go.  Slowly increase the time between trips to the bathroom.  When you have found a schedule that works well for you, keep doing it.  If you wake up during the night and have to urinate, do it. Apply your schedule to waking hours only.  Kegel exercises  These tighten and strengthen pelvic muscles, which can help you control the flow of urine. (If doing these exercises causes pain, stop doing them and talk with your doctor.) To do Kegel exercises:  Squeeze your muscles as if you were trying not to pass gas. Or squeeze your muscles as if you were stopping the flow of urine. Your belly, legs, and buttocks shouldn't move.  Hold the squeeze for 3 seconds, then relax for 5 to 10 seconds.  Start with 3 seconds, then add 1 second each week until you are able to squeeze for 10 seconds.  Repeat the exercise 10 times a session. Do 3 to 8 sessions a day.  When should you call for help?  Watch closely for changes in your health, and be sure to contact your doctor if:    Your incontinence is getting worse.     You do not get better as expected.   Where can you learn more?  Go to https://www.[employer].net/patiented  Enter V684 in the search box to learn more about \"Bladder Training: Care Instructions.\"  Current as of: April 30, 2024  Content Version: 14.3    2024 [employer].   Care instructions adapted under license by your healthcare professional. If you have questions about a medical condition or this " instruction, always ask your healthcare professional. [employer] disclaims any warranty or liability for your use of this information.    Learning About Depression Screening  What is depression screening?  Depression screening is a way to see if you have depression symptoms. It may be done by a doctor or counselor. It's often part of a routine checkup. That's because your mental health is just as important as your physical health.  Depression is a mental health condition that affects how you feel, think, and act. You may:  Have less energy.  Lose interest in your daily activities.  Feel sad and grouchy for a long time.  Depression is very common. It affects people of all ages.  Many things can lead to depression. Some people become depressed after they have a stroke or find out they have a major illness like cancer or heart disease. The death of a loved one or a breakup may lead to depression. It can run in families. Most experts believe that a combination of inherited genes and stressful life events can cause it.  What happens during screening?  You may be asked to fill out a form about your depression symptoms. You and the doctor will discuss your answers. The doctor may ask you more questions to learn more about how you think, act, and feel.  What happens after screening?  If you have symptoms of depression, your doctor will talk to you about your options.  Doctors usually treat depression with medicines or counseling. Often, combining the two works best. Many people don't get help because they think that they'll get over the depression on their own. But people with depression may not get better unless they get treatment.  The cause of depression is not well understood. There may be many factors involved. But if you have depression, it's not your fault.  A serious symptom of depression is thinking about death or suicide. If you or someone you care about talks about this or about feeling hopeless, get  "help right away.  It's important to know that depression can be treated. Medicine, counseling, and self-care may help.  Where can you learn more?  Go to https://www.[employer].net/patiented  Enter T185 in the search box to learn more about \"Learning About Depression Screening.\"  Current as of: July 31, 2024  Content Version: 14.3    2024 [employer].   Care instructions adapted under license by your healthcare professional. If you have questions about a medical condition or this instruction, always ask your healthcare professional. [employer] disclaims any warranty or liability for your use of this information.    Substance Use Disorder: Care Instructions  Overview     You can improve your life and health by stopping your use of alcohol or drugs. When you don't drink or use drugs, you may feel and sleep better. You may get along better with your family, friends, and coworkers. There are medicines and programs that can help with substance use disorder.  How can you care for yourself at home?  Here are some ways to help you stay sober and prevent relapse.  If you have been given medicine to help keep you sober or reduce your cravings, be sure to take it exactly as prescribed.  Talk to your doctor about programs that can help you stop using drugs or drinking alcohol.  Do not keep alcohol or drugs in your home.  Plan ahead. Think about what you'll say if other people ask you to drink or use drugs. Try not to spend time with people who drink or use drugs.  Use the time and money spent on drinking or drugs to do something that's important to you.  Preventing a relapse  Have a plan to deal with relapse. Learn to recognize changes in your thinking that lead you to drink or use drugs. Get help before you start to drink or use drugs again.  Try to stay away from situations, friends, or places that may lead you to drink or use drugs.  If you feel the need to drink alcohol or use drugs again, seek help " right away. Call a trusted friend or family member. Some people get support from organizations such as Narcotics Anonymous or [employer] or from treatment facilities.  If you relapse, get help as soon as you can. Some people make a plan with another person that outlines what they want that person to do for them if they relapse. The plan usually includes how to handle the relapse and who to notify in case of relapse.  Don't give up. Remember that a relapse doesn't mean that you have failed. Use the experience to learn the triggers that lead you to drink or use drugs. Then quit again. Recovery is a lifelong process. Many people have several relapses before they are able to quit for good.  Follow-up care is a key part of your treatment and safety. Be sure to make and go to all appointments, and call your doctor if you are having problems. It's also a good idea to know your test results and keep a list of the medicines you take.  When should you call for help?   Call 911  anytime you think you may need emergency care. For example, call if you or someone else:    Has overdosed or has withdrawal signs. Be sure to tell the emergency workers that you are or someone else is using or trying to quit using drugs. Overdose or withdrawal signs may include:  Losing consciousness.  Seizure.  Seeing or hearing things that aren't there (hallucinations).     Is thinking or talking about suicide or harming others.   Where to get help 24 hours a day, 7 days a week   If you or someone you know talks about suicide, self-harm, a mental health crisis, a substance use crisis, or any other kind of emotional distress, get help right away. You can:    Call the Suicide and Crisis Lifeline at 081.     Call 7-286-109-TALK (1-586.383.9246).     Text HOME to 624069 to access the Crisis Text Line.   Consider saving these numbers in your phone.  Go to [email or web address] for more information or to chat online.  Call your doctor now or seek immediate  "medical care if:    You are having withdrawal symptoms. These may include nausea or vomiting, sweating, shakiness, and anxiety.   Watch closely for changes in your health, and be sure to contact your doctor if:    You have a relapse.     You need more help or support to stop.   Where can you learn more?  Go to https://www.s[employer]atec.net/patiented  Enter H573 in the search box to learn more about \"Substance Use Disorder: Care Instructions.\"  Current as of: November 15, 2023  Content Version: 14.3    2024 [employer].   Care instructions adapted under license by your healthcare professional. If you have questions about a medical condition or this instruction, always ask your healthcare professional. [employer] disclaims any warranty or liability for your use of this information.         Counseling  Appropriate preventive services were addressed with this patient via screening, questionnaire, or discussion as appropriate for fall prevention, nutrition, physical activity, Tobacco-use cessation, social engagement, weight loss and cognition.  Checklist reviewing preventive services available has been given to the patient.  Reviewed patient's diet, addressing concerns and/or questions.   The patient was instructed to see the dentist every 6 months.   Discussed possible causes of fatigue. Information on urinary incontinence and treatment options given to patient.   The patient's PHQ-9 score is consistent with mild depression. She was provided with information regarding depression.     Dimitry Multani is a 79 year old, presenting for the following:  Physical, Lipids, Hypertension, and Depression        1/16/2025    10:44 AM   Additional Questions   Roomed by richar cleaning cma     HPI    Started as a cold at [facility].  Came in 12/29, treated for pneumonia with azithromycin and cefdinir based on clinical findings, but cough and shortness of breath have continued.  Low grade fevers resolved.  Coughing " spells, more at night.  SOB is with even doing a single flight of stairs.  Asthma did well until she got the cold.  Currently albuterol not making much difference.  No chest pain or palpitations.  A lot of nasal congestion.    Stopped celebrex - not helpful.  Wondering about knee injections which were helpful in past.    Hyperlipidemia Follow-Up  Are you regularly taking any medication or supplement to lower your cholesterol?   Yes- lipitor   Are you having muscle aches or other side effects that you think could be caused by your cholesterol lowering medication?  Yes- ran out a month ago.  Does feel it worsens knee pain.    Hypertension Follow-up  Do you check your blood pressure regularly outside of the clinic? No   Are you following a low salt diet? No  Are your blood pressures ever more than 140 on the top number (systolic) OR more   than 90 on the bottom number (diastolic), for example 140/90? No  At home BP runs 150/unsure.  Doesn't check often.      Depression   How are you doing with your depression since your last visit? No change  Are you having other symptoms that might be associated with depression? No  Have you had a significant life event?  No   Are you feeling anxious or having panic attacks?   No  Do you have any concerns with your use of alcohol or other drugs? No    Dealing with 's worsening memory.      Social History     Tobacco Use    Smoking status: Never    Smokeless tobacco: Never    Tobacco comments:     never smoked   Vaping Use    Vaping status: Never Used   Substance Use Topics    Alcohol use: Yes     Comment: rare    Drug use: No         2/2/2023    12:47 PM 1/3/2024     9:05 AM 1/16/2025    10:09 AM   PHQ   PHQ-9 Total Score 3 3 5    Q9: Thoughts of better off dead/self-harm past 2 weeks Not at all Not at all Not at all       Patient-reported         12/21/2021     9:44 AM 2/2/2023    12:49 PM 1/16/2025    10:12 AM   JOSE CARLOS-7 SCORE   Total Score   0 (minimal anxiety)   Total Score 1  2 0        Patient-reported         1/16/2025    10:09 AM   Last PHQ-9   1.  Little interest or pleasure in doing things 1   2.  Feeling down, depressed, or hopeless 1   3.  Trouble falling or staying asleep, or sleeping too much 2   4.  Feeling tired or having little energy 1   5.  Poor appetite or overeating 0   6.  Feeling bad about yourself 0   7.  Trouble concentrating 0   8.  Moving slowly or restless 0   Q9: Thoughts of better off dead/self-harm past 2 weeks 0   PHQ-9 Total Score 5        Patient-reported         1/16/2025    10:12 AM   JOSE CARLOS-7    1. Feeling nervous, anxious, or on edge 0   2. Not being able to stop or control worrying 0   3. Worrying too much about different things 0   4. Trouble relaxing 0   5. Being so restless that it is hard to sit still 0   6. Becoming easily annoyed or irritable 0   7. Feeling afraid, as if something awful might happen 0   JOSE CARLOS-7 Total Score 0    If you checked any problems, how difficult have they made it for you to do your work, take care of things at home, or get along with other people? Not difficult at all       Patient-reported       Suicide Assessment Five-step Evaluation and Treatment (SAFE-T)      Health Care Directive  Patient has a Health Care Directive on file  Advance care planning document is on file and is current.      1/12/2025   General Health   How would you rate your overall physical health? (!) FAIR   Feel stress (tense, anxious, or unable to sleep) Only a little   (!) STRESS CONCERN      1/12/2025   Nutrition   Diet: Regular (no restrictions)   Fair amount of fruits/veggies      1/12/2025   Exercise   Days per week of moderate/strenous exercise 7 days   Average minutes spent exercising at this level 20 min         1/12/2025   Social Factors   Frequency of gathering with friends or relatives Once a week   Worry food won't last until get money to buy more No   Food not last or not have enough money for food? No   Do you have housing? (Housing is  defined as stable permanent housing and does not include staying ouside in a car, in a tent, in an abandoned building, in an overnight shelter, or couch-surfing.) Yes   Are you worried about losing your housing? No   Lack of transportation? No   Unable to get utilities (heat,electricity)? No         1/16/2025   Fall Risk   Gait Speed Test (Document in seconds) 6.11   Gait Speed Test Interpretation Greater than 5.01 seconds - ABNORMAL    Balance starting to be a problem, using a cane   Feels lacking strength in her knees - slides off the bed and onto the floor.  No back pain.  No b/b changes or saddle paresthesia.    No orthostatic symptoms.        1/12/2025   Activities of Daily Living- Home Safety   Needs help with the following daily activites None of the above   Safety concerns in the home None of the above         1/12/2025   Dental   Dentist two times every year? (!) NO   Has insurance       1/12/2025   Hearing Screening   Hearing concerns? None of the above         1/12/2025   Driving Risk Screening   Patient/family members have concerns about driving No         1/12/2025   General Alertness/Fatigue Screening   Have you been more tired than usual lately? (!) YES         1/12/2025   Urinary Incontinence Screening   Bothered by leaking urine in past 6 months Yes   Urgency and slow to move         1/12/2025   TB Screening   Were you born outside of the [place]? No     Today's PHQ-9 Score:       1/16/2025    10:09 AM   PHQ-9 SCORE   PHQ-9 Total Score MyChart 5 (Mild depression)   PHQ-9 Total Score 5        Patient-reported         1/12/2025   Substance Use   Alcohol more than 3/day or more than 7/wk No   Do you have a current opioid prescription? No   How severe/bad is pain from 1 to 10? 6/10   Do you use any other substances recreationally? No -  inhaler used as prescribed     Social History     Tobacco Use    Smoking status: Never    Smokeless tobacco: Never    Tobacco comments:     never smoked   Vaping Use     Vaping status: Never Used   Substance Use Topics    Alcohol use: Yes     Comment: rare    Drug use: No         3/9/2023   LAST FHS-7 RESULTS   1st degree relative breast or ovarian cancer Yes   Any relative bilateral breast cancer No   Any male have breast cancer No   Any ONE woman have BOTH breast AND ovarian cancer No   Any woman with breast cancer before 50yrs Yes   2 or more relatives with breast AND/OR ovarian cancer Yes   2 or more relatives with breast AND/OR bowel cancer No     Mammogram Screening - After age 74- determine frequency with patient based on health status, life expectancy and patient goals    ASCVD Risk   The ASCVD Risk score (Napoleon HALE, et al., 2019) failed to calculate for the following reasons:    The valid total cholesterol range is 130 to 320 mg/dL    Reviewed and updated as needed this visit by Provider   Tobacco  Allergies  Meds  Problems  Med Hx  Surg Hx  Fam Hx            Current providers sharing in care for this patient include:  Patient Care Team:  Sarah Carlos PA-C as PCP - General (Family Medicine)  Sarah Carlos PA-C as Assigned PCP    The following health maintenance items are reviewed in Epic and correct as of today:  Health Maintenance   Topic Date Due    ASTHMA ACTION PLAN  11/29/2020    DTAP/TDAP/TD IMMUNIZATION (3 - Td or Tdap) 02/01/2021    RSV VACCINE (1 - 1-dose 75+ series) Never done    INFLUENZA VACCINE (1) 09/01/2024    COVID-19 Vaccine (4 - 2024-25 season) 09/01/2024    ANNUAL REVIEW OF HM ORDERS  01/04/2025    VITAMIN B12  01/04/2025    BMP  01/25/2025    LIPID  02/03/2025    ASTHMA CONTROL TEST  07/16/2025    PHQ-9  07/16/2025    MEDICARE ANNUAL WELLNESS VISIT  01/16/2026    FALL RISK ASSESSMENT  01/16/2026    GLUCOSE  01/25/2027    COLORECTAL CANCER SCREENING  03/08/2028    ADVANCE CARE PLANNING  01/16/2030    DEXA  03/09/2038    HEPATITIS C SCREENING  Completed    DEPRESSION ACTION PLAN  Completed    Pneumococcal Vaccine: 50+  "Years  Completed    HPV IMMUNIZATION  Aged Out    MENINGITIS IMMUNIZATION  Aged Out    RSV MONOCLONAL ANTIBODY  Aged Out    MAMMO SCREENING  Discontinued    ZOSTER IMMUNIZATION  Discontinued        Objective    Exam  BP (!) 177/73   Pulse 88   Temp 98.2  F (36.8  C)   Resp 18   Ht 1.45 m (4' 9.09\")   Wt 67.6 kg (149 lb)   SpO2 95%   BMI 32.15 kg/m     Estimated body mass index is 32.15 kg/m  as calculated from the following:    Height as of this encounter: 1.45 m (4' 9.09\").    Weight as of this encounter: 67.6 kg (149 lb).    Physical Exam           1/16/2025   Mini Cog   Clock Draw Score 0 Abnormal   3 Item Recall 2 objects recalled   Mini Cog Total Score 2              Signed Electronically by: Sarah Carlos PA-C    "

## 2025-01-23 NOTE — PROGRESS NOTES
CHIEF COMPLAINT:   Chief Complaint   Patient presents with    Knee Pain     Bilateral knee pain. Onset: years. NKI. Patient notes she has fallen quite a few times but no significant injury. Pain has gotten worse over time. Right knee is worse than left. Pain is over the front of the bilateral knee. Pain is there all the time. She has had a right knee injection with Sarah Carlos PA-C on 1/4/24. She has had injections in the past as well. She has an appt for physical therapy tomorrow.        Nerissa Valentin is seen today in the [facility] for evaluation of bilateral knee pain at the request of Sarah Carlos         HISTORY OF PRESENT ILLNESS    Nerissa Valentin is a 79 year old female seen for evaluation of ongoing bilateral knee pain with no known injury.   Pain has been present for  years. Right more painful than the left, getting worse over time. Locates pain to the front and inner aspect of the knees. Ok at rest, worse with movements, sit to stand, stairs, walking.    Had a right knee injection 1/4/2024, helped for a while. Has had other injections in the past. Starts Physical Therapy tomorrow.    Intermittent low back pain.      Present symptoms: pain medially  and anteriorly, pain sharp, dull/achy , moderate pain, severe pain.    Pain severity: 7/10  Frequency of symptoms: are constant  Exacerbating Factors: weight bearing, stairs, [id number], prolonged standing  Relieving Factors: rest, sitting  Night Pain: Yes  Pain while at rest: Yes   Numbness or tingling: No   Patient has tried:     NSAIDS: No      Acetaminophen: Yes     Opioids: No     Physical Therapy: No      Home Exercise Program / Resistance training: No      Activity modification: Yes      Bracing:  ace bandage.       Assistive device:   cane     Injections:  right knee 1/2024  helped for a while.     Ice: No      Heat: No      Topicals: No     Other PMH:  has a past medical history of Asthma,  mild persistent, Depressive disorder (1992), Hematuria, Hyperlipidemia LDL goal < 130, Hypokalemia (03/15/2013), Kidney stone, Osteoarthritis, Postmenopausal, and Shortness of breath.    She has no past medical history of Breast cancer (H), Cancer (H), Cerebral infarction (H), Complication of anesthesia, Congestive heart failure (H), COPD (chronic obstructive pulmonary disease) (H), Diabetes (H), Heart disease, History of blood transfusion, PONV (postoperative nausea and vomiting), or Thyroid disease.  Patient Active Problem List   Diagnosis    Leucocytosis    HTN (hypertension)    Dyslipidemia    Mild intermittent asthma without complication    Dysthymic disorder    Osteoarthritis    Vitamin B12 deficiency anemia    Postmenopausal    Family history of diabetes mellitus    Obesity    Calculus of kidney    Severe anemia    Vitamin D deficiency    Chronic gastritis       Surgical Hx:  has a past surgical history that includes flexible sigmoidoscopy (2000 ?); REMOVAL OF KIDNEY STONE (1971); breast biopsy, rt/lt (1999); surgical history of -  (1969, 1970); lithotripsy (2005); rotator cuff repair rt/lt (2007); rotator cuff repair rt/lt (2009); salpingo oophorectomy,r/l/kalen (1969 and 1970); hysterectomy, vaginal (1971); hysterectomy, pap no longer indicated; Abdomen surgery (1969, 1970, 1971); appendectomy (1971); Colonoscopy (N/A, 03/08/2021); Esophagoscopy, gastroscopy, duodenoscopy (EGD), combined (N/A, 03/08/2021); Esophagoscopy, gastroscopy, duodenoscopy (EGD), combined (N/A, 06/14/2021); lithotripsy; other surgical history (01/01/1971); Hysterectomy; Arthroscopy shoulder rotator cuff repair (Bilateral); Biopsy breast (Right); appendectomy; other surgical history (1971 or 72); Ureteroscopy; Abdomen surgery; other surgical history (Bilateral); Laparoscopic cystectomy ovarian (oncology); Ovarian Cyst Removal (x2); and punc/aspir breast cyst.    Medications:   Current Outpatient Medications:     albuterol (PROAIR  "HFA/PROVENTIL HFA/VENTOLIN HFA) 108 (90 Base) MCG/ACT inhaler, Inhale 2 puffs into the lungs every 4 hours as needed for shortness of breath or wheezing., Disp: 18 g, Rfl: 1    B-D LUER-MINH SYRINGE 25G X 5/8\" 3 ML MISC, USE WITH VITAMIN B12 INJECTION WEEKLY X 1 MO THEN 1 PER MONTH, Disp: 4 each, Rfl: 0    benzonatate (TESSALON) 200 MG capsule, Take 1 capsule (200 mg) by mouth 3 times daily as needed for cough., Disp: 42 capsule, Rfl: 0    cholecalciferol (VITAMIN D3) 1250 mcg (51283 units) capsule, Take 1 capsule (1,250 mcg) by mouth once a week Then OTC vit D supplement 2000 units daily.  Lab-only appt 1 month after finishing prescription., Disp: 8 capsule, Rfl: 0    cyanocobalamin (CYANOCOBALAMIN) 1000 mcg/mL injection, INJECT 1 ML (1,000 MCG) INTO THE MUSCLE ONCE PER MONTH AS DIRECTED, Disp: 3 mL, Rfl: 3    guaiFENesin-codeine (ROBITUSSIN AC) 100-10 MG/5ML solution, Take 5-10 mLs by mouth every 4 hours as needed for cough., Disp: 180 mL, Rfl: 0    levofloxacin (LEVAQUIN) 750 MG tablet, Take 1 tablet (750 mg) by mouth daily., Disp: 5 tablet, Rfl: 0    lisinopril (ZESTRIL) 40 MG tablet, Take 1 tablet (40 mg) by mouth daily., Disp: 90 tablet, Rfl: 3    omeprazole (PRILOSEC) 20 MG DR capsule, Take 1 capsule (20 mg) by mouth daily. Stay on this long term due to ulcer history., Disp: 90 capsule, Rfl: 3    rosuvastatin (CRESTOR) 10 MG tablet, Take 1 tablet (10 mg) by mouth daily., Disp: 90 tablet, Rfl: 3    sertraline (ZOLOFT) 100 MG tablet, Take 1 tablet (100 mg) by mouth daily., Disp: 90 tablet, Rfl: 3    Allergies:   Allergies   Allergen Reactions    Influenza Virus Vaccine Dermatitis and Other (See Comments)     2 years in a row after the flu shot did get  High fever,  Localized reaction .     Other [No Clinical Screening - See Comments]      CHROMIC GUT SUTURES CAUSE ABCESS    Pcn [Penicillins] Hives       Social Hx: retired.   reports that she has never smoked. She has never used smokeless tobacco. She reports " "current alcohol use. She reports that she does not use drugs.    Family Hx: family history includes Arthritis in her mother; Breast Cancer in her maternal cousin, maternal cousin, sister, and sister; C.A.D. in her father and mother; Cancer in her maternal grandfather; Cancer - colorectal in her paternal grandfather; Cerebrovascular Disease in her maternal grandfather; Chronic Obstructive Pulmonary Disease in her sister; Diabetes in her father; Heart Disease in her father and mother; Hypertension in her father; Parkinsonism in her sister.    REVIEW OF SYSTEMS: 10 point ROS neg other than the symptoms noted above in the HPI and PMH. Notables include  CONSTITUTIONAL:NEGATIVE for fever, chills, change in weight  INTEGUMENTARY/SKIN: NEGATIVE for worrisome rashes, moles or lesions  MUSCULOSKELETAL:See HPI above  NEURO: NEGATIVE for weakness, dizziness or paresthesias    PHYSICAL EXAM:  /74   Pulse 77   Ht 1.45 m (4' 9.09\")   Wt 66.3 kg (146 lb 1.6 oz)   BMI 31.52 kg/m     GENERAL APPEARANCE: healthy, alert, no distress  SKIN: no suspicious lesions or rashes  NEURO: Normal strength and tone, mentation intact and speech normal  PSYCH:  mentation appears normal and affect normal, not anxious  RESPIRATORY: No increased work of breathing.  HANDS: no clubbing, nail pitting  LYMPH: no palpable popliteal lymphadenopathy.    BILATERAL LOWER EXTREMITIES:  Gait: slow, hunched, antalgic right.  Alignment: varus  No gross deformities or masses.  Bilateral  Quad atrophy, strength weak  Intact sensation deep peroneal nerve, superficial peroneal nerve, med/lat tibial nerve, sural nerve, saphenous nerve  Intact EHL, EDL, TA, FHL, GS, quadriceps hamstrings and hip flexors   Bilateral calf soft and nttp or squeeze.   Edema: 1+, Pitting, spider veins.    LEFT KNEE EXAM:    Skin: intact, no ecchymosis or erythema   ROM: 5 extension to 110+ flexion  Tight hamstrings on straight leg raise.  Effusion: none  Tender: pes, med/lat joint " "line, anterior and posterior knee  Posterior fullness.   Varus/valgus laxity.    Patellofemoral joint:                Apprehension: negative              Crepitations: moderate   Grind: positive.    RIGHT KNEE EXAM:    Skin: intact, no ecchymosis or erythema   ROM: 5 extension to 110+ flexion  Tight hamstrings on straight leg raise.  Effusion: none  Tender: pes, med/lat joint line, anterior and posterior knee  Posterior fullness.   Varus/valgus laxity.    Patellofemoral joint:                Apprehension: negative              Crepitations: moderate   Grind: positive.            X-RAY:  3 views bilateral knee from 1/29/2025 were reviewed in clinic today. On my review, no obvious fractures or dislocations.    Severe right knee patellofemoral compartment degenerative changes with bone-on-bone articulation and mild lateral patellar tilt and subluxation. Moderate left knee patello-femoral degenerative changes. Moderate-severe bilateral  medial and mild lateral compartment degenerative changes. Mild left knee lateral tibial translation. Chondrocalcinosis. No significant knee joint effusion. Osteopenia.    Consistent with Kellgren Stage 4 osteoarthritis.            ASSESSMENT/PLAN: Nerissa Valentin is a 79 year old female with chronic bilateral knee pain, primary osteoarthritis.     * reviewed imaging studies with patient, showing arthritic changes, or wearing of the cartilage in the knee. This can be caused by normal \"wear and tear\" over the years or following prior injury to the knee.  Treatment typically starts nonsurgically. Surgical indication for total knee arthroplasty  when nonsurgical management is no longer effective.    Non-surgical treatment for knee arthritis includes:    * rest, sitting  * Activity modification - avoid impact activities or activities that aggravate symptoms.  * NSAIDS (non-steroidal anti-inflammatory medications; e.g. Aleve, advil, motrin, ibuprofen) - regular use for inflammation ( twice " "daily or three times daily), with food, as long as no contra-indications Please discuss with primary care doctor if needed  * ice, 15-20 minutes at a time several times a day or as needed.  * Strengthening of quadriceps muscles  * Physical Therapy for strengthening, stretching and range of motion exercises of legs  * Tylenol as needed for pain, consider Tylenol arthritis or similar  * Weight loss: Weight loss:  Body mass index is 31.52 kg/m .. weight loss benefits, not only for the current pain symptoms, but also overall health. Recommend a good diet plan that works for the patient, with the assistance of a dietician or primary care doctor as needed. Also, a good, low-impact exercise program for at least 20 minutes per day, 3 times per week, such as exercise bike, elliptical , or pool.  * Exercise: low impact such as stationary bike, elliptical, pool.  * Injections: cortisone versus viscosupplementation (hyaluronic acid, \"rooster comb\", \"gel shots\"); risks and perceived benefits discussed today. Patient elects  to proceed.  * Bracing: bracing the knee may offer some relief of symptoms when worn and provide some stability.  * over the counter supplements such as glucosamine and chondroitin sulfate may help with joint pain.  * topical ointments may help as well    * return to clinic as needed.         Azael Muñoz M.D., M.S.  Dept. of Orthopaedic Surgery  [facility]     Large Joint Injection/Arthocentesis: bilateral knee    Date/Time: 1/29/2025 11:15 AM    Performed by: Azael Muñoz MD  Authorized by: Azael Muñoz MD    Needle Size:  22 G  Guidance: landmark guided    Approach:  Medial  Location:  Knee  Laterality:  Bilateral      Medications (Right):  80 mg methylPREDNISolone 80 MG/ML; 4 mL BUPivacaine 0.25 %  Medications (Left):  80 mg methylPREDNISolone 80 MG/ML; 4 mL BUPivacaine 0.25 %  Outcome:  Tolerated well, no immediate complications  Procedure discussed: discussed risks, " benefits, and alternatives    Consent Given by:  Patient  Timeout: timeout called immediately prior to procedure    Prep: patient was prepped and draped in usual sterile fashion     Left knee lateral approach, Right knee medial approach

## 2025-01-29 ENCOUNTER — ANCILLARY PROCEDURE (OUTPATIENT)
Dept: GENERAL RADIOLOGY | Facility: CLINIC | Age: 80
End: 2025-01-29
Attending: ORTHOPAEDIC SURGERY
Payer: COMMERCIAL

## 2025-01-29 ENCOUNTER — OFFICE VISIT (OUTPATIENT)
Dept: ORTHOPEDICS | Facility: CLINIC | Age: 80
End: 2025-01-29
Payer: COMMERCIAL

## 2025-01-29 VITALS
SYSTOLIC BLOOD PRESSURE: 137 MMHG | HEART RATE: 77 BPM | BODY MASS INDEX: 31.52 KG/M2 | DIASTOLIC BLOOD PRESSURE: 74 MMHG | WEIGHT: 146.1 LBS | HEIGHT: 57 IN

## 2025-01-29 DIAGNOSIS — G89.29 CHRONIC PAIN OF BOTH KNEES: ICD-10-CM

## 2025-01-29 DIAGNOSIS — M17.0 PRIMARY OSTEOARTHRITIS OF BOTH KNEES: Primary | ICD-10-CM

## 2025-01-29 DIAGNOSIS — M25.561 CHRONIC PAIN OF BOTH KNEES: ICD-10-CM

## 2025-01-29 DIAGNOSIS — M17.9 OSTEOARTHRITIS OF KNEE, UNSPECIFIED LATERALITY, UNSPECIFIED OSTEOARTHRITIS TYPE: ICD-10-CM

## 2025-01-29 DIAGNOSIS — M25.562 CHRONIC PAIN OF BOTH KNEES: ICD-10-CM

## 2025-01-29 PROCEDURE — 73562 X-RAY EXAM OF KNEE 3: CPT | Mod: TC | Performed by: INTERNAL MEDICINE

## 2025-01-29 PROCEDURE — 20610 DRAIN/INJ JOINT/BURSA W/O US: CPT | Mod: 50 | Performed by: ORTHOPAEDIC SURGERY

## 2025-01-29 PROCEDURE — 99214 OFFICE O/P EST MOD 30 MIN: CPT | Mod: 25 | Performed by: ORTHOPAEDIC SURGERY

## 2025-01-29 RX ORDER — METHYLPREDNISOLONE ACETATE 80 MG/ML
80 INJECTION, SUSPENSION INTRA-ARTICULAR; INTRALESIONAL; INTRAMUSCULAR; SOFT TISSUE
Status: COMPLETED | OUTPATIENT
Start: 2025-01-29 | End: 2025-01-29

## 2025-01-29 RX ORDER — BUPIVACAINE HYDROCHLORIDE 2.5 MG/ML
4 INJECTION, SOLUTION INFILTRATION; PERINEURAL
Status: COMPLETED | OUTPATIENT
Start: 2025-01-29 | End: 2025-01-29

## 2025-01-29 RX ADMIN — BUPIVACAINE HYDROCHLORIDE 4 ML: 2.5 INJECTION, SOLUTION INFILTRATION; PERINEURAL at 11:15

## 2025-01-29 RX ADMIN — METHYLPREDNISOLONE ACETATE 80 MG: 80 INJECTION, SUSPENSION INTRA-ARTICULAR; INTRALESIONAL; INTRAMUSCULAR; SOFT TISSUE at 11:15

## 2025-01-29 ASSESSMENT — ACTIVITIES OF DAILY LIVING (ADL)
LIMPING: THE SYMPTOM AFFECTS MY ACTIVITY MODERATELY
LIMPING: THE SYMPTOM AFFECTS MY ACTIVITY MODERATELY
SQUAT: ACTIVITY IS VERY DIFFICULT
GO UP STAIRS: ACTIVITY IS VERY DIFFICULT
KNEEL ON THE FRONT OF YOUR KNEE: ACTIVITY IS FAIRLY DIFFICULT
WEAKNESS: THE SYMPTOM AFFECTS MY ACTIVITY MODERATELY
HOW_WOULD_YOU_RATE_THE_OVERALL_FUNCTION_OF_YOUR_KNEE_DURING_YOUR_USUAL_DAILY_ACTIVITIES?: ABNORMAL
SWELLING: I HAVE THE SYMPTOM BUT IT DOES NOT AFFECT MY ACTIVITY
WALK: ACTIVITY IS FAIRLY DIFFICULT
KNEE_ACTIVITY_OF_DAILY_LIVING_SUM: 29
SWELLING: I HAVE THE SYMPTOM BUT IT DOES NOT AFFECT MY ACTIVITY
SIT WITH YOUR KNEE BENT: ACTIVITY IS FAIRLY DIFFICULT
GO DOWN STAIRS: ACTIVITY IS VERY DIFFICULT
AS_A_RESULT_OF_YOUR_KNEE_INJURY,_HOW_WOULD_YOU_RATE_YOUR_CURRENT_LEVEL_OF_DAILY_ACTIVITY?: ABNORMAL
GO UP STAIRS: ACTIVITY IS VERY DIFFICULT
STAND: ACTIVITY IS MINIMALLY DIFFICULT
STIFFNESS: THE SYMPTOM AFFECTS MY ACTIVITY MODERATELY
STAND: ACTIVITY IS MINIMALLY DIFFICULT
WALK: ACTIVITY IS FAIRLY DIFFICULT
GIVING WAY, BUCKLING OR SHIFTING OF KNEE: THE SYMPTOM AFFECTS MY ACTIVITY MODERATELY
PAIN: THE SYMPTOM AFFECTS MY ACTIVITY MODERATELY
SQUAT: ACTIVITY IS VERY DIFFICULT
RISE FROM A CHAIR: ACTIVITY IS FAIRLY DIFFICULT
KNEEL ON THE FRONT OF YOUR KNEE: ACTIVITY IS FAIRLY DIFFICULT
STIFFNESS: THE SYMPTOM AFFECTS MY ACTIVITY MODERATELY
GO DOWN STAIRS: ACTIVITY IS VERY DIFFICULT
HOW_WOULD_YOU_RATE_THE_OVERALL_FUNCTION_OF_YOUR_KNEE_DURING_YOUR_USUAL_DAILY_ACTIVITIES?: ABNORMAL
RISE FROM A CHAIR: ACTIVITY IS FAIRLY DIFFICULT
PLEASE_INDICATE_YOR_PRIMARY_REASON_FOR_REFERRAL_TO_THERAPY:: KNEE
AS_A_RESULT_OF_YOUR_KNEE_INJURY,_HOW_WOULD_YOU_RATE_YOUR_CURRENT_LEVEL_OF_DAILY_ACTIVITY?: ABNORMAL
WEAKNESS: THE SYMPTOM AFFECTS MY ACTIVITY MODERATELY
KNEE_ACTIVITY_OF_DAILY_LIVING_SCORE: 41.43
GIVING WAY, BUCKLING OR SHIFTING OF KNEE: THE SYMPTOM AFFECTS MY ACTIVITY MODERATELY
SIT WITH YOUR KNEE BENT: ACTIVITY IS FAIRLY DIFFICULT
RAW_SCORE: 29
PAIN: THE SYMPTOM AFFECTS MY ACTIVITY MODERATELY

## 2025-01-29 ASSESSMENT — PAIN SCALES - GENERAL: PAINLEVEL_OUTOF10: SEVERE PAIN (7)

## 2025-01-29 NOTE — LETTER
1/29/2025      Nerissa Valentin  [address] [place] [id number]      Dear Colleague,    Thank you for referring your patient, Nerissa Valentin, to the [facility] [place]. Please see a copy of my visit note below.    CHIEF COMPLAINT:   Chief Complaint   Patient presents with     Knee Pain     Bilateral knee pain. Onset: years. NKI. Patient notes she has fallen quite a few times but no significant injury. Pain has gotten worse over time. Right knee is worse than left. Pain is over the front of the bilateral knee. Pain is there all the time. She has had a right knee injection with Sarah Carlos PA-C on 1/4/24. She has had injections in the past as well. She has an appt for physical therapy tomorrow.        Nerissa Valentin is seen today in the [facility] for evaluation of bilateral knee pain at the request of Sarah Carlos         HISTORY OF PRESENT ILLNESS    Nerissa Valentin is a 79 year old female seen for evaluation of ongoing bilateral knee pain with no known injury.   Pain has been present for  years. Right more painful than the left, getting worse over time. Locates pain to the front and inner aspect of the knees. Ok at rest, worse with movements, sit to stand, stairs, walking.    Had a right knee injection 1/4/2024, helped for a while. Has had other injections in the past. Starts Physical Therapy tomorrow.    Intermittent low back pain.      Present symptoms: pain medially  and anteriorly, pain sharp, dull/achy , moderate pain, severe pain.    Pain severity: 7/10  Frequency of symptoms: are constant  Exacerbating Factors: weight bearing, stairs, [id number], prolonged standing  Relieving Factors: rest, sitting  Night Pain: Yes  Pain while at rest: Yes   Numbness or tingling: No   Patient has tried:     NSAIDS: No      Acetaminophen: Yes     Opioids: No     Physical Therapy: No      Home Exercise Program / Resistance  training: No      Activity modification: Yes      Bracing:  ace bandage.       Assistive device:   cane     Injections:  right knee 1/2024  helped for a while.     Ice: No      Heat: No      Topicals: No     Other PMH:  has a past medical history of Asthma, mild persistent, Depressive disorder (1992), Hematuria, Hyperlipidemia LDL goal < 130, Hypokalemia (03/15/2013), Kidney stone, Osteoarthritis, Postmenopausal, and Shortness of breath.    She has no past medical history of Breast cancer (H), Cancer (H), Cerebral infarction (H), Complication of anesthesia, Congestive heart failure (H), COPD (chronic obstructive pulmonary disease) (H), Diabetes (H), Heart disease, History of blood transfusion, PONV (postoperative nausea and vomiting), or Thyroid disease.  Patient Active Problem List   Diagnosis     Leucocytosis     HTN (hypertension)     Dyslipidemia     Mild intermittent asthma without complication     Dysthymic disorder     Osteoarthritis     Vitamin B12 deficiency anemia     Postmenopausal     Family history of diabetes mellitus     Obesity     Calculus of kidney     Severe anemia     Vitamin D deficiency     Chronic gastritis       Surgical Hx:  has a past surgical history that includes flexible sigmoidoscopy (2000 ?); REMOVAL OF KIDNEY STONE (1971); breast biopsy, rt/lt (1999); surgical history of -  (1969, 1970); lithotripsy (2005); rotator cuff repair rt/lt (2007); rotator cuff repair rt/lt (2009); salpingo oophorectomy,r/l/kalen (1969 and 1970); hysterectomy, vaginal (1971); hysterectomy, pap no longer indicated; Abdomen surgery (1969, 1970, 1971); appendectomy (1971); Colonoscopy (N/A, 03/08/2021); Esophagoscopy, gastroscopy, duodenoscopy (EGD), combined (N/A, 03/08/2021); Esophagoscopy, gastroscopy, duodenoscopy (EGD), combined (N/A, 06/14/2021); lithotripsy; other surgical history (01/01/1971); Hysterectomy; Arthroscopy shoulder rotator cuff repair (Bilateral); Biopsy breast (Right); appendectomy; other  "surgical history (1971 or 72); Ureteroscopy; Abdomen surgery; other surgical history (Bilateral); Laparoscopic cystectomy ovarian (oncology); Ovarian Cyst Removal (x2); and punc/aspir breast cyst.    Medications:   Current Outpatient Medications:      albuterol (PROAIR HFA/PROVENTIL HFA/VENTOLIN HFA) 108 (90 Base) MCG/ACT inhaler, Inhale 2 puffs into the lungs every 4 hours as needed for shortness of breath or wheezing., Disp: 18 g, Rfl: 1     B-D LUER-MINH SYRINGE 25G X 5/8\" 3 ML MISC, USE WITH VITAMIN B12 INJECTION WEEKLY X 1 MO THEN 1 PER MONTH, Disp: 4 each, Rfl: 0     benzonatate (TESSALON) 200 MG capsule, Take 1 capsule (200 mg) by mouth 3 times daily as needed for cough., Disp: 42 capsule, Rfl: 0     cholecalciferol (VITAMIN D3) 1250 mcg (71751 units) capsule, Take 1 capsule (1,250 mcg) by mouth once a week Then OTC vit D supplement 2000 units daily.  Lab-only appt 1 month after finishing prescription., Disp: 8 capsule, Rfl: 0     cyanocobalamin (CYANOCOBALAMIN) 1000 mcg/mL injection, INJECT 1 ML (1,000 MCG) INTO THE MUSCLE ONCE PER MONTH AS DIRECTED, Disp: 3 mL, Rfl: 3     guaiFENesin-codeine (ROBITUSSIN AC) 100-10 MG/5ML solution, Take 5-10 mLs by mouth every 4 hours as needed for cough., Disp: 180 mL, Rfl: 0     levofloxacin (LEVAQUIN) 750 MG tablet, Take 1 tablet (750 mg) by mouth daily., Disp: 5 tablet, Rfl: 0     lisinopril (ZESTRIL) 40 MG tablet, Take 1 tablet (40 mg) by mouth daily., Disp: 90 tablet, Rfl: 3     omeprazole (PRILOSEC) 20 MG DR capsule, Take 1 capsule (20 mg) by mouth daily. Stay on this long term due to ulcer history., Disp: 90 capsule, Rfl: 3     rosuvastatin (CRESTOR) 10 MG tablet, Take 1 tablet (10 mg) by mouth daily., Disp: 90 tablet, Rfl: 3     sertraline (ZOLOFT) 100 MG tablet, Take 1 tablet (100 mg) by mouth daily., Disp: 90 tablet, Rfl: 3    Allergies:   Allergies   Allergen Reactions     Influenza Virus Vaccine Dermatitis and Other (See Comments)     2 years in a row after the " "flu shot did get  High fever,  Localized reaction .      Other [No Clinical Screening - See Comments]      CHROMIC GUT SUTURES CAUSE ABCESS     Pcn [Penicillins] Hives       Social Hx: retired.   reports that she has never smoked. She has never used smokeless tobacco. She reports current alcohol use. She reports that she does not use drugs.    Family Hx: family history includes Arthritis in her mother; Breast Cancer in her maternal cousin, maternal cousin, sister, and sister; C.A.D. in her father and mother; Cancer in her maternal grandfather; Cancer - colorectal in her paternal grandfather; Cerebrovascular Disease in her maternal grandfather; Chronic Obstructive Pulmonary Disease in her sister; Diabetes in her father; Heart Disease in her father and mother; Hypertension in her father; Parkinsonism in her sister.    REVIEW OF SYSTEMS: 10 point ROS neg other than the symptoms noted above in the HPI and PMH. Notables include  CONSTITUTIONAL:NEGATIVE for fever, chills, change in weight  INTEGUMENTARY/SKIN: NEGATIVE for worrisome rashes, moles or lesions  MUSCULOSKELETAL:See HPI above  NEURO: NEGATIVE for weakness, dizziness or paresthesias    PHYSICAL EXAM:  /74   Pulse 77   Ht 1.45 m (4' 9.09\")   Wt 66.3 kg (146 lb 1.6 oz)   BMI 31.52 kg/m     GENERAL APPEARANCE: healthy, alert, no distress  SKIN: no suspicious lesions or rashes  NEURO: Normal strength and tone, mentation intact and speech normal  PSYCH:  mentation appears normal and affect normal, not anxious  RESPIRATORY: No increased work of breathing.  HANDS: no clubbing, nail pitting  LYMPH: no palpable popliteal lymphadenopathy.    BILATERAL LOWER EXTREMITIES:  Gait: slow, hunched, antalgic right.  Alignment: varus  No gross deformities or masses.  Bilateral  Quad atrophy, strength weak  Intact sensation deep peroneal nerve, superficial peroneal nerve, med/lat tibial nerve, sural nerve, saphenous nerve  Intact EHL, EDL, TA, FHL, GS, quadriceps " "hamstrings and hip flexors   Bilateral calf soft and nttp or squeeze.   Edema: 1+, Pitting, spider veins.    LEFT KNEE EXAM:    Skin: intact, no ecchymosis or erythema   ROM: 5 extension to 110+ flexion  Tight hamstrings on straight leg raise.  Effusion: none  Tender: pes, med/lat joint line, anterior and posterior knee  Posterior fullness.   Varus/valgus laxity.    Patellofemoral joint:                Apprehension: negative              Crepitations: moderate   Grind: positive.    RIGHT KNEE EXAM:    Skin: intact, no ecchymosis or erythema   ROM: 5 extension to 110+ flexion  Tight hamstrings on straight leg raise.  Effusion: none  Tender: pes, med/lat joint line, anterior and posterior knee  Posterior fullness.   Varus/valgus laxity.    Patellofemoral joint:                Apprehension: negative              Crepitations: moderate   Grind: positive.            X-RAY:  3 views bilateral knee from 1/29/2025 were reviewed in clinic today. On my review, no obvious fractures or dislocations.    Severe right knee patellofemoral compartment degenerative changes with bone-on-bone articulation and mild lateral patellar tilt and subluxation. Moderate left knee patello-femoral degenerative changes. Moderate-severe bilateral  medial and mild lateral compartment degenerative changes. Mild left knee lateral tibial translation. Chondrocalcinosis. No significant knee joint effusion. Osteopenia.    Consistent with Kellgren Stage 4 osteoarthritis.            ASSESSMENT/PLAN: Nerissa Valentin is a 79 year old female with chronic bilateral knee pain, primary osteoarthritis.     * reviewed imaging studies with patient, showing arthritic changes, or wearing of the cartilage in the knee. This can be caused by normal \"wear and tear\" over the years or following prior injury to the knee.  Treatment typically starts nonsurgically. Surgical indication for total knee arthroplasty  when nonsurgical management is no longer " "effective.    Non-surgical treatment for knee arthritis includes:    * rest, sitting  * Activity modification - avoid impact activities or activities that aggravate symptoms.  * NSAIDS (non-steroidal anti-inflammatory medications; e.g. Aleve, advil, motrin, ibuprofen) - regular use for inflammation ( twice daily or three times daily), with food, as long as no contra-indications Please discuss with primary care doctor if needed  * ice, 15-20 minutes at a time several times a day or as needed.  * Strengthening of quadriceps muscles  * Physical Therapy for strengthening, stretching and range of motion exercises of legs  * Tylenol as needed for pain, consider Tylenol arthritis or similar  * Weight loss: Weight loss:  Body mass index is 31.52 kg/m .. weight loss benefits, not only for the current pain symptoms, but also overall health. Recommend a good diet plan that works for the patient, with the assistance of a dietician or primary care doctor as needed. Also, a good, low-impact exercise program for at least 20 minutes per day, 3 times per week, such as exercise bike, elliptical , or pool.  * Exercise: low impact such as stationary bike, elliptical, pool.  * Injections: cortisone versus viscosupplementation (hyaluronic acid, \"rooster comb\", \"gel shots\"); risks and perceived benefits discussed today. Patient elects  to proceed.  * Bracing: bracing the knee may offer some relief of symptoms when worn and provide some stability.  * over the counter supplements such as glucosamine and chondroitin sulfate may help with joint pain.  * topical ointments may help as well    * return to clinic as needed.         Azael Muñoz M.D., M.S.  Dept. of Orthopaedic Surgery  [facility]     Large Joint Injection/Arthocentesis: bilateral knee    Date/Time: 1/29/2025 11:15 AM    Performed by: Azael Muñoz MD  Authorized by: Azael Muñoz MD    Needle Size:  22 G  Guidance: landmark guided    Approach:  " Medial  Location:  Knee  Laterality:  Bilateral      Medications (Right):  80 mg methylPREDNISolone 80 MG/ML; 4 mL BUPivacaine 0.25 %  Medications (Left):  80 mg methylPREDNISolone 80 MG/ML; 4 mL BUPivacaine 0.25 %  Outcome:  Tolerated well, no immediate complications  Procedure discussed: discussed risks, benefits, and alternatives    Consent Given by:  Patient  Timeout: timeout called immediately prior to procedure    Prep: patient was prepped and draped in usual sterile fashion     Left knee lateral approach, Right knee medial approach         Again, thank you for allowing me to participate in the care of your patient.        Sincerely,        Azael Muñoz MD    Electronically signed

## 2025-01-30 ENCOUNTER — THERAPY VISIT (OUTPATIENT)
Dept: PHYSICAL THERAPY | Facility: CLINIC | Age: 80
End: 2025-01-30
Attending: PHYSICIAN ASSISTANT
Payer: COMMERCIAL

## 2025-01-30 DIAGNOSIS — R29.898 LEG WEAKNESS, BILATERAL: ICD-10-CM

## 2025-01-30 PROCEDURE — 97161 PT EVAL LOW COMPLEX 20 MIN: CPT | Mod: GP | Performed by: PHYSICAL THERAPIST

## 2025-01-30 PROCEDURE — 97110 THERAPEUTIC EXERCISES: CPT | Mod: GP | Performed by: PHYSICAL THERAPIST

## 2025-01-30 NOTE — PROGRESS NOTES
PHYSICAL THERAPY EVALUATION  Type of Visit: Evaluation       Fall Risk Screen:  Fall screen completed by: PT  Have you fallen 2 or more times in the past year?: Yes  Have you fallen and had an injury in the past year?: Yes  Timed Up and Go score (seconds): 28.90 sec  Is patient a fall risk?: Yes    Subjective      Condition type:  Chronic (continuous duration <3 months)  Cause of current episode:  Repetitive     Nature of treatment:  Rehabilitative  Functional ability:  Moderate functional limitations  Documented POC (choose all that apply):  Measurable short and long term/discharge treatment goals related to physical and functional deficits.;Frequency of treatment visits and treatment activities to address deficit areas.;Patient agrees to program participation including home program  Briefly describe symptoms:  B knee pain and weakness  How did the symptoms start:  Over time worsening symptoms  Average pain/intensity last 24 hours:  6/10  Average pain/intensity past week:  9/10  Frequency of Symptoms:  Constantly (% of the time)  Symptom impact on ADLs:  Moderately  Condition change since eval:  N/A (first visit)  General health reported by patient:  Fair      Presenting condition or subjective complaint: pain in both knees and weakness in walking  saw ortho yesterday    Pt presents with bilateral knee pain R>L. Pain is located mainly anteriorly. Had an injection yesterday in bilateral knees which she states they help at the current moment. Presents with 4 point cane   Aggravating factors are stairs, prolonged walking and sit to stand. Relieving factors are rest.  Goal is walk further.     Date of onset: 01/29/25    Relevant medical history: Depression; High blood pressure; History of fractures; Osteoarthritis; Osteoporosis   Dates & types of surgery: hyst, kidney stones ankle reconstruction, shoulders x 3    Prior diagnostic imaging/testing results: X-ray         X-RAY:  3 views bilateral knee from 1/29/2025  were reviewed in clinic today. On my review, no obvious fractures or dislocations.    Severe right knee patellofemoral compartment degenerative changes with bone-on-bone articulation and mild lateral patellar tilt and subluxation. Moderate left knee patello-femoral degenerative changes. Moderate-severe bilateral  medial and mild lateral compartment degenerative changes. Mild left knee lateral tibial translation. Chondrocalcinosis. No significant knee joint effusion. Osteopenia.    Prior therapy history for the same diagnosis, illness or injury:        Prior Level of Function  Independent     Living Environment  Social support: With a significant other or spouse   Type of home: House; 1 level; Basement   Stairs to enter the home: Yes 6 Is there a railing: Yes     Ramp: No   Stairs inside the home: Yes 6 Is there a railing: No     Help at home: Self Cares (home health aide/personal care attendant, family, etc)  Equipment owned: Four-point cane     Employment: No    Hobbies/Interests:      Patient goals for therapy:  Improve walking     Pain assessment: Pain present     Objective   KNEE EVALUATION  PAIN: Pain Level at Rest: 6/10  Pain Level with Use: 9/10  Pain is Exacerbated By: WB and knee flexion  INTEGUMENTARY (edema, incisions): WFL  POSTURE: genu valgus    GAIT:  Weightbearing Status: WBAT  Assistive Device(s): Cane (quad)  Gait Deviations: Antalgic  Stance time decreased  Stride length decreased  [place] decreased  [place]  Stairs: step to pattern with LLE as primary leg          ROM:   (Degrees) Left AROM Left PROM  Right AROM Right PROM   Knee Flexion 120  110 120   Knee Extension 0  Lacking 1 deg 0     STRENGTH:   Pain: - none + mild ++ moderate +++ severe  Strength Scale: 0-5/5 Left Right   Hip Flexion 3 3   Knee Extension 4 3, pain   Knee Flexion 3 3   External Rotation 3 3, pain   Hip extension     Hip Abduction- hooklying 4+ 3   Quad set Good  fair     FLEXIBILITY: Decreased hamstrings L, Decreased  "hamstrings R    SPECIAL TESTS: NT    FUNCTIONAL TESTS:   5x STS 20.9 sec     PALPATION: tender at R medial knee jt line   JOINT MOBILITY: impaired bilateral patellar mobility       Assessment & Plan   CLINICAL IMPRESSIONS  Medical Diagnosis: Leg weakness, bilateral (R29.898)    Treatment Diagnosis: BLE weakness   Impression/Assessment: Patient is a 79 year old female with B knee pain and BLE weakness complaints.  The following significant findings have been identified: Pain, Decreased ROM/flexibility, Decreased joint mobility, Decreased strength, Impaired balance, Decreased proprioception, Inflammation, Impaired gait, Impaired muscle performance, Decreased activity tolerance, Impaired posture, and Instability. These impairments interfere with their ability to perform self care tasks, recreational activities, household chores, driving , household mobility, and community mobility as compared to previous level of function.     Clinical Decision Making (Complexity):  Clinical Presentation: Stable/Uncomplicated  Clinical Presentation Rationale: based on medical and personal factors listed in PT evaluation  Clinical Decision Making (Complexity): Low complexity    PLAN OF CARE  Treatment Interventions:  Modalities: Cryotherapy, Cupping, E-stim, Iontophoresis, Ultrasound  Interventions: Gait Training, Manual Therapy, Neuromuscular Re-education, Therapeutic Activity, Therapeutic Exercise, Self-Care/Home Management    Long Term Goals     PT Goal 1  Goal Identifier: LTG  Goal Description: Pt will demonstrate 5/5 knee and hip flexion strength in order to perform a step up on a 6\" stair  Target Date: 04/10/25  PT Goal 2  Goal Identifier: STG  Goal Description: Pt will demonstrate full knee ROM without pain in order to improve mobility for daily tasks  Target Date: 02/27/25  PT Goal 3  Goal Identifier: LTG  Goal Description: Pt will demonstrate 5x STS score < 15 seconds in order to improve LE strength for transfers  Target Date: " 04/10/25  PT Goal 4  Goal Identifier: LTG  Goal Description: Pt will be able to ambulate 1 block with SEC safely in community  Target Date: 04/10/25      Frequency of Treatment: 1x week  Duration of Treatment: 10 weeks    Recommended Referrals to Other Professionals:   Education Assessment:   Learner/Method: Patient;Listening;Demonstration;Pictures/Video;Reading    Risks and benefits of evaluation/treatment have been explained.   Patient/Family/caregiver agrees with Plan of Care.     Evaluation Time:     PT Eval, Low Complexity Minutes (01390): 20       Signing Clinician: Mariela Ness PT        [facility]                                                                                   OUTPATIENT PHYSICAL THERAPY      PLAN OF TREATMENT FOR OUTPATIENT REHABILITATION   Patient's Last Name, First Name, Nerissa Ji YOB: 1945   Provider's Name   [facility]   Medical Record No.  [id number]     Onset Date: 01/29/25  Start of Care Date: 01/30/25     Medical Diagnosis:  Leg weakness, bilateral (R29.898)      PT Treatment Diagnosis:  BLE weakness Plan of Treatment  Frequency/Duration: 1x week/ 10 weeks    Certification date from 01/30/25 to 04/10/25         See note for plan of treatment details and functional goals     Mariela Ness, PT                         I CERTIFY THE NEED FOR THESE SERVICES FURNISHED UNDER        THIS PLAN OF TREATMENT AND WHILE UNDER MY CARE     (Physician attestation of this document indicates review and certification of the therapy plan).              Referring Provider:  Sarah Carlos    Initial Assessment  See Epic Evaluation- Start of Care Date: 01/30/25

## 2025-02-04 ENCOUNTER — THERAPY VISIT (OUTPATIENT)
Dept: PHYSICAL THERAPY | Facility: CLINIC | Age: 80
End: 2025-02-04
Attending: SURGERY
Payer: COMMERCIAL

## 2025-02-04 DIAGNOSIS — R29.898 LEG WEAKNESS, BILATERAL: Primary | ICD-10-CM

## 2025-02-04 PROCEDURE — 97110 THERAPEUTIC EXERCISES: CPT | Mod: GP | Performed by: PHYSICAL THERAPIST

## 2025-02-10 DIAGNOSIS — I10 ESSENTIAL HYPERTENSION: ICD-10-CM

## 2025-02-10 DIAGNOSIS — E78.5 DYSLIPIDEMIA: ICD-10-CM

## 2025-02-10 RX ORDER — LISINOPRIL 40 MG/1
40 TABLET ORAL DAILY
Qty: 100 TABLET | Refills: 3 | Status: SHIPPED | OUTPATIENT
Start: 2025-02-10

## 2025-02-10 RX ORDER — ROSUVASTATIN CALCIUM 10 MG/1
10 TABLET, COATED ORAL DAILY
Qty: 100 TABLET | Refills: 3 | Status: SHIPPED | OUTPATIENT
Start: 2025-02-10

## 2025-02-10 NOTE — TELEPHONE ENCOUNTER
Patient has [facility] coverage and is eligible to get certain prescriptions as a 100-day supply.      Prescriptions updated to 100-day supply: lisinopril, rosuvastatin     Hailee KamaraD, [facility]  Retail Pharmacy Specialist  [phone]

## 2025-02-11 ENCOUNTER — THERAPY VISIT (OUTPATIENT)
Dept: PHYSICAL THERAPY | Facility: CLINIC | Age: 80
End: 2025-02-11
Attending: SURGERY
Payer: COMMERCIAL

## 2025-02-11 DIAGNOSIS — R29.898 LEG WEAKNESS, BILATERAL: Primary | ICD-10-CM

## 2025-02-11 PROCEDURE — 97110 THERAPEUTIC EXERCISES: CPT | Mod: GP | Performed by: PHYSICAL THERAPIST

## 2025-02-25 ENCOUNTER — THERAPY VISIT (OUTPATIENT)
Dept: PHYSICAL THERAPY | Facility: CLINIC | Age: 80
End: 2025-02-25
Attending: SURGERY
Payer: COMMERCIAL

## 2025-02-25 DIAGNOSIS — R29.898 LEG WEAKNESS, BILATERAL: Primary | ICD-10-CM

## 2025-02-25 PROCEDURE — 97110 THERAPEUTIC EXERCISES: CPT | Mod: GP | Performed by: PHYSICAL THERAPIST

## 2025-02-25 ASSESSMENT — ACTIVITIES OF DAILY LIVING (ADL)
SWELLING: THE SYMPTOM AFFECTS MY ACTIVITY SLIGHTLY
LIMPING: THE SYMPTOM AFFECTS MY ACTIVITY SLIGHTLY
GIVING WAY, BUCKLING OR SHIFTING OF KNEE: THE SYMPTOM AFFECTS MY ACTIVITY SLIGHTLY
WALK: ACTIVITY IS SOMEWHAT DIFFICULT
RAW_SCORE: 36
GO DOWN STAIRS: ACTIVITY IS FAIRLY DIFFICULT
HOW_WOULD_YOU_RATE_THE_CURRENT_FUNCTION_OF_YOUR_KNEE_DURING_YOUR_USUAL_DAILY_ACTIVITIES_ON_A_SCALE_FROM_0_TO_100_WITH_100_BEING_YOUR_LEVEL_OF_KNEE_FUNCTION_PRIOR_TO_YOUR_INJURY_AND_0_BEING_THE_INABILITY_TO_PERFORM_ANY_OF_YOUR_USUAL_DAILY_ACTIVITIES?: 25
HOW_WOULD_YOU_RATE_THE_OVERALL_FUNCTION_OF_YOUR_KNEE_DURING_YOUR_USUAL_DAILY_ACTIVITIES?: NEARLY NORMAL
KNEE_ACTIVITY_OF_DAILY_LIVING_SCORE: 51.43
KNEEL ON THE FRONT OF YOUR KNEE: I AM UNABLE TO DO THE ACTIVITY
PAIN: I HAVE THE SYMPTOM BUT IT DOES NOT AFFECT MY ACTIVITY
WEAKNESS: THE SYMPTOM AFFECTS MY ACTIVITY SLIGHTLY
STAND: ACTIVITY IS SOMEWHAT DIFFICULT
GO UP STAIRS: ACTIVITY IS VERY DIFFICULT
SQUAT: I AM UNABLE TO DO THE ACTIVITY
KNEE_ACTIVITY_OF_DAILY_LIVING_SUM: 36
AS_A_RESULT_OF_YOUR_KNEE_INJURY,_HOW_WOULD_YOU_RATE_YOUR_CURRENT_LEVEL_OF_DAILY_ACTIVITY?: ABNORMAL
STIFFNESS: THE SYMPTOM AFFECTS MY ACTIVITY MODERATELY
SIT WITH YOUR KNEE BENT: ACTIVITY IS NOT DIFFICULT
RISE FROM A CHAIR: ACTIVITY IS MINIMALLY DIFFICULT

## 2025-03-13 ENCOUNTER — THERAPY VISIT (OUTPATIENT)
Dept: PHYSICAL THERAPY | Facility: CLINIC | Age: 80
End: 2025-03-13
Attending: SURGERY
Payer: COMMERCIAL

## 2025-03-13 DIAGNOSIS — R29.898 LEG WEAKNESS, BILATERAL: Primary | ICD-10-CM

## 2025-03-13 PROCEDURE — 97110 THERAPEUTIC EXERCISES: CPT | Mod: GP | Performed by: PHYSICAL THERAPIST

## 2025-03-13 ASSESSMENT — ACTIVITIES OF DAILY LIVING (ADL)
RISE FROM A CHAIR: ACTIVITY IS MINIMALLY DIFFICULT
SQUAT: I AM UNABLE TO DO THE ACTIVITY
KNEE_ACTIVITY_OF_DAILY_LIVING_SUM: 38
STIFFNESS: THE SYMPTOM AFFECTS MY ACTIVITY MODERATELY
HOW_WOULD_YOU_RATE_THE_OVERALL_FUNCTION_OF_YOUR_KNEE_DURING_YOUR_USUAL_DAILY_ACTIVITIES?: NEARLY NORMAL
KNEEL ON THE FRONT OF YOUR KNEE: ACTIVITY IS SOMEWHAT DIFFICULT
KNEE_ACTIVITY_OF_DAILY_LIVING_SCORE: 58.46
LIMPING: THE SYMPTOM AFFECTS MY ACTIVITY SLIGHTLY
WALK: ACTIVITY IS SOMEWHAT DIFFICULT
HOW_WOULD_YOU_RATE_THE_CURRENT_FUNCTION_OF_YOUR_KNEE_DURING_YOUR_USUAL_DAILY_ACTIVITIES_ON_A_SCALE_FROM_0_TO_100_WITH_100_BEING_YOUR_LEVEL_OF_KNEE_FUNCTION_PRIOR_TO_YOUR_INJURY_AND_0_BEING_THE_INABILITY_TO_PERFORM_ANY_OF_YOUR_USUAL_DAILY_ACTIVITIES?: 50
STAND: ACTIVITY IS SOMEWHAT DIFFICULT
WEAKNESS: THE SYMPTOM AFFECTS MY ACTIVITY MODERATELY
AS_A_RESULT_OF_YOUR_KNEE_INJURY,_HOW_WOULD_YOU_RATE_YOUR_CURRENT_LEVEL_OF_DAILY_ACTIVITY?: NEARLY NORMAL
RAW_SCORE: 40.92
GIVING WAY, BUCKLING OR SHIFTING OF KNEE: I HAVE THE SYMPTOM BUT IT DOES NOT AFFECT MY ACTIVITY
GO DOWN STAIRS: ACTIVITY IS SOMEWHAT DIFFICULT
SIT WITH YOUR KNEE BENT: ACTIVITY IS NOT DIFFICULT
SWELLING: THE SYMPTOM AFFECTS MY ACTIVITY SLIGHTLY
GO UP STAIRS: ACTIVITY IS SOMEWHAT DIFFICULT

## 2025-03-18 ENCOUNTER — THERAPY VISIT (OUTPATIENT)
Dept: PHYSICAL THERAPY | Facility: CLINIC | Age: 80
End: 2025-03-18
Attending: SURGERY
Payer: COMMERCIAL

## 2025-03-18 DIAGNOSIS — R29.898 LEG WEAKNESS, BILATERAL: Primary | ICD-10-CM

## 2025-03-18 PROCEDURE — 97110 THERAPEUTIC EXERCISES: CPT | Mod: GP | Performed by: PHYSICAL THERAPIST

## 2025-03-18 NOTE — PROGRESS NOTES
"    DISCHARGE  Reason for Discharge: Patient has met all goals.  No further expectation of progress.    Equipment Issued: none    Discharge Plan: Patient to continue home program.    Referring Provider:  Sarah Carlos       03/18/25 0500   Appointment Info   Signing clinician's name / credentials Mariela Ness, PT, DPT   Visits Used 6   Medical Diagnosis Leg weakness, bilateral (R29.898)   PT Tx Diagnosis BLE weakness   Progress Note/Certification   Start of Care Date 01/30/25   Onset of illness/injury or Date of Surgery 01/29/25   Therapy Frequency 1x week   Predicted Duration 10 weeks   Certification date from 01/30/25   Certification date to 04/10/25   Progress Note Due Date 04/10/25   Progress Note Completed Date 01/30/25   [facility] Authorization Information   Condition type Chronic (continuous duration <3 months)   Cause of current episode Repetitive   Nature of treatment Rehabilitative   Documented POC (choose all that apply) Measurable short and long term/discharge treatment goals related to physical and functional deficits.;Frequency of treatment visits and treatment activities to address deficit areas.;Patient agrees to program participation including home program   How did the symptoms start Over time worsening symptoms   General health reported by patient Fair   GOALS   PT Goals 2;3;4   PT Goal 1   Goal Identifier LTG   Goal Description Pt will demonstrate 5/5 knee and hip flexion strength in order to perform a step up on a 6\" stair   Goal Progress addressing with HEP   Target Date 04/10/25   PT Goal 2   Goal Identifier STG   Goal Description Pt will demonstrate full knee ROM without pain in order to improve mobility for daily tasks   Target Date 02/27/25   Date Met 02/11/25   PT Goal 3   Goal Identifier LTG   Goal Description Pt will demonstrate 5x STS score < 15 seconds in order to improve LE strength for transfers   Target Date 04/10/25   Date Met 03/13/25   PT Goal 4   Goal Identifier LTG   Goal " "Description Pt will be able to ambulate 1 block with SEC safely in community   Target Date 04/10/25   Date Met 03/13/25   Subjective Report   Subjective Report Stairs are going better. Exercises are going well   Objective Measures   Objective Measures Objective Measure 2;Objective Measure 1;Objective Measure 3   Objective Measure 1   Objective Measure AROM   Objective Measure 2   Objective Measure 5 x STS   Objective Measure 3   Objective Measure MMT   Details Hip flex: 5-/5 R, 5/5 L. Knee ext: 4/5 B. Knee flex: 5-/5 B   Treatment Interventions (PT)   Interventions Therapeutic Procedure/Exercise   Therapeutic Procedure/Exercise   Therapeutic Procedures: strength, endurance, ROM, flexibility minutes (34211) 25   Therapeutic Procedures Ther Proc 2   Ther Proc 1 SLR x10 B- cues for only height to other knee. Bridge hip add x10. clam shell x5 R- discontinue due to easyness, STS 2 lb x3 and 4 lb x5. standing hip abd x5 B, RTB x5 B, YTB x10 B. Side stepping x10 steps each direction YTB x10 steps each direction. stairs step to pattern using LLE as primary x1. step ups 6\" x5 R   Skilled Intervention Increase strength and ROM. Cueing on dosage and proper form   Patient Response/Progress tolerated ex well   Eval/Assessments   Assessments   (Pt tolerated all exercise progressions well. at this time, pt has met 3/4 goals and ready to be discharged HEP independently. Encouraged continuation of HEP for most optimal outcome of diagnosis.)   Education   Learner/Method Patient;Listening;Demonstration;Pictures/Video;Reading   Plan   Home program papers   Plan for next session HEP   Total Session Time   Timed Code Treatment Minutes 25   Total Treatment Time (sum of timed and untimed services) 25         "

## 2025-04-07 ENCOUNTER — OFFICE VISIT (OUTPATIENT)
Dept: FAMILY MEDICINE | Facility: CLINIC | Age: 80
End: 2025-04-07
Payer: COMMERCIAL

## 2025-04-07 ENCOUNTER — ANCILLARY PROCEDURE (OUTPATIENT)
Dept: GENERAL RADIOLOGY | Facility: CLINIC | Age: 80
End: 2025-04-07
Attending: NURSE PRACTITIONER
Payer: COMMERCIAL

## 2025-04-07 VITALS
RESPIRATION RATE: 14 BRPM | DIASTOLIC BLOOD PRESSURE: 76 MMHG | TEMPERATURE: 98.9 F | OXYGEN SATURATION: 97 % | HEART RATE: 83 BPM | SYSTOLIC BLOOD PRESSURE: 132 MMHG

## 2025-04-07 DIAGNOSIS — J18.9 PNEUMONIA DUE TO INFECTIOUS ORGANISM, UNSPECIFIED LATERALITY, UNSPECIFIED PART OF LUNG: ICD-10-CM

## 2025-04-07 DIAGNOSIS — J18.9 PNEUMONIA DUE TO INFECTIOUS ORGANISM, UNSPECIFIED LATERALITY, UNSPECIFIED PART OF LUNG: Primary | ICD-10-CM

## 2025-04-07 DIAGNOSIS — J45.20 MILD INTERMITTENT ASTHMA WITHOUT COMPLICATION: ICD-10-CM

## 2025-04-07 PROCEDURE — 3075F SYST BP GE 130 - 139MM HG: CPT | Performed by: NURSE PRACTITIONER

## 2025-04-07 PROCEDURE — 71046 X-RAY EXAM CHEST 2 VIEWS: CPT | Mod: TC | Performed by: RADIOLOGY

## 2025-04-07 PROCEDURE — 99214 OFFICE O/P EST MOD 30 MIN: CPT | Performed by: NURSE PRACTITIONER

## 2025-04-07 PROCEDURE — 3078F DIAST BP <80 MM HG: CPT | Performed by: NURSE PRACTITIONER

## 2025-04-07 PROCEDURE — 1126F AMNT PAIN NOTED NONE PRSNT: CPT | Performed by: NURSE PRACTITIONER

## 2025-04-07 RX ORDER — DOXYCYCLINE 100 MG/1
100 CAPSULE ORAL 2 TIMES DAILY
Qty: 14 CAPSULE | Refills: 0 | Status: SHIPPED | OUTPATIENT
Start: 2025-04-07 | End: 2025-04-07

## 2025-04-07 RX ORDER — DOXYCYCLINE 100 MG/1
100 CAPSULE ORAL 2 TIMES DAILY
Qty: 20 CAPSULE | Refills: 0 | Status: SHIPPED | OUTPATIENT
Start: 2025-04-07 | End: 2025-04-17

## 2025-04-07 RX ORDER — PREDNISONE 20 MG/1
20 TABLET ORAL 2 TIMES DAILY
Qty: 10 TABLET | Refills: 0 | Status: SHIPPED | OUTPATIENT
Start: 2025-04-07 | End: 2025-04-12

## 2025-04-07 ASSESSMENT — PAIN SCALES - GENERAL: PAINLEVEL_OUTOF10: NO PAIN (0)

## 2025-04-07 ASSESSMENT — ENCOUNTER SYMPTOMS: COUGH: 1

## 2025-04-07 NOTE — PROGRESS NOTES
Assessment & Plan     Pneumonia due to infectious organism, unspecified laterality, unspecified part of lung  No acute distress with significant symptoms despite antibiotics chest x-ray was completed today which showed likely decreased size of the left apex however new questionable opacities present. Plan CT scan for further evaluation.  - XR Chest 2 Views; Future  - doxycycline monohydrate (MONODOX) 100 MG capsule; Take 1 capsule (100 mg) by mouth 2 times daily for 10 days.  - CT Chest w/o Contrast; Future    Mild intermittent asthma without complication  Prednisone sent to the pharmacy with wheezing present.  Continue to use inhaler as needed for shortness  - predniSONE (DELTASONE) 20 MG tablet; Take 1 tablet (20 mg) by mouth 2 times daily for 5 days.        Dimitry Multani is a 79 year old, presenting for the following health issues:  Cough (Sore throat, short of breath)        4/7/2025    11:14 AM   Additional Questions   Roomed by Josephine SMALLWOOD   Accompanied by Self         4/7/2025    11:14 AM   Patient Reported Additional Medications   Patient reports taking the following new medications .     Cough            Acute Illness  Acute illness concerns: cough - sick since November - has been on 3 different antibiotics and nothing has helped  Onset/Duration: November  Symptoms:  Fever: No  Chills/Sweats: YES- chills  Headache (location?): YES- some, no specific location  Sinus Pressure: YES  Conjunctivitis:  No  Ear Pain: no  Rhinorrhea: YES  Congestion: YES- chest, productive cough, yellow and white  Sore Throat: YES  Cough: YES-productive of yellow sputum  Wheeze: YES  Decreased Appetite: YES  Nausea: No  Vomiting: No  Diarrhea: No  Dysuria/Freq.: No  Dysuria or Hematuria: No  Fatigue/Achiness: YES- fatigue  Sick/Strep Exposure: YES-  got sick after her - he has same symptoms for just as long of time  Therapies tried and outcome: antibiotics  Cefdinir and azithromycin, robitussin AC 12/29  Levaquin  1/17/25  All without improvement    Chest Xray 1/16/25  EXAM: XR CHEST 2 VIEWS  LOCATION: [facility]  DATE: 1/16/2025     INDICATION: Persistent pneumonia.  COMPARISON: 10/4/2023.                                                                      IMPRESSION: Areas of ill-defined opacity in the left upper lung and left lung base may be related to atelectasis or infection. The right lung is clear. No pleural effusions. Aortic calcification. Heart size and pulmonary vascularity are within normal   limits.            Review of Systems  Constitutional, HEENT, cardiovascular, pulmonary, gi and gu systems are negative, except as otherwise noted.      Objective    BP (!) 146/70   Pulse 83   Temp 98.9  F (37.2  C) (Tympanic)   Resp 14   SpO2 97%   There is no height or weight on file to calculate BMI.  Physical Exam   GENERAL: alert and no distress  HENT: ear canals and TM's normal, nose and mouth without ulcers or lesions  NECK: no adenopathy, no asymmetry, masses, or scars  RESP: expiratory wheezes R upper posterior and L upper posterior  CV: regular rate and rhythm, normal S1 S2, no S3 or S4, no murmur, click or rub, no peripheral edema  PSYCH: mentation appears normal, affect normal/bright    Results for orders placed or performed in visit on 04/07/25   XR Chest 2 Views     Status: None    Narrative    EXAM: XR CHEST 2 VIEWS  LOCATION: [facility]  DATE: 4/7/2025    INDICATION:  Pneumonia due to infectious organism, unspecified laterality, unspecified part of lung  COMPARISON: Chest x-ray January 16, 2025, cervical spine CT January 3, 2025. No chest CT available for correlation.      Impression    IMPRESSION: There is some interval decrease in the extent of density at the left lung apex. However, there is questionably a new cavitary area in the left upper lung zone as well as slightly increased parenchymal density in the left midlung zone. Right   lung is clear.  Consider further evaluation with chest CT.           Signed Electronically by: RICCI Peters CNP

## 2025-04-07 NOTE — PATIENT INSTRUCTIONS
Prednisone twice daily for 5 days    Doxycycline twice daily for 5 days    Consider CT scan pending results    Follow up if symptoms do not improve or worsen.

## 2025-04-07 NOTE — NURSING NOTE
"Chief Complaint   Patient presents with    Cough     Sore throat, short of breath     BP (!) 146/70   Pulse 83   Temp 98.9  F (37.2  C) (Tympanic)   Resp 14   SpO2 97%  Estimated body mass index is 31.52 kg/m  as calculated from the following:    Height as of 1/29/25: 1.45 m (4' 9.09\").    Weight as of 1/29/25: 66.3 kg (146 lb 1.6 oz).  Patient presents to the clinic using No DME      Health Maintenance that is potentially due pending provider review:    Health Maintenance Due   Topic Date Due    ASTHMA ACTION PLAN  11/29/2020    DTAP/TDAP/TD IMMUNIZATION (3 - Td or Tdap) 02/01/2021    RSV VACCINE (1 - 1-dose 75+ series) Never done    INFLUENZA VACCINE (1) 09/01/2024    COVID-19 Vaccine (4 - 2024-25 season) 09/01/2024                  "

## 2025-04-09 PROBLEM — R29.898 LEG WEAKNESS, BILATERAL: Status: RESOLVED | Noted: 2025-01-30 | Resolved: 2025-04-09

## 2025-05-03 ENCOUNTER — HOSPITAL ENCOUNTER (OUTPATIENT)
Dept: CT IMAGING | Facility: CLINIC | Age: 80
Discharge: HOME | End: 2025-05-03
Attending: NURSE PRACTITIONER | Admitting: NURSE PRACTITIONER
Payer: COMMERCIAL

## 2025-05-03 DIAGNOSIS — J18.9 PNEUMONIA DUE TO INFECTIOUS ORGANISM, UNSPECIFIED LATERALITY, UNSPECIFIED PART OF LUNG: ICD-10-CM

## 2025-05-03 PROCEDURE — 71250 CT THORAX DX C-: CPT

## 2025-05-06 DIAGNOSIS — R91.8 PULMONARY NODULES: Primary | ICD-10-CM

## 2025-05-07 ENCOUNTER — RESULTS FOLLOW-UP (OUTPATIENT)
Dept: FAMILY MEDICINE | Facility: CLINIC | Age: 80
End: 2025-05-07

## 2025-05-07 ENCOUNTER — PATIENT OUTREACH (OUTPATIENT)
Dept: ONCOLOGY | Facility: CLINIC | Age: 80
End: 2025-05-07
Payer: COMMERCIAL

## 2025-05-07 DIAGNOSIS — R91.8 PULMONARY NODULES: Primary | ICD-10-CM

## 2025-05-07 NOTE — PROGRESS NOTES
LUNG NODULE & INTERVENTIONAL PULMONARY CLINIC  [facility]     Nerissa Valentin MRN# [id number]   Age: 79 year old YOB: 1945     Reason for Consultation: Pulmonary nodules    Requesting Physician: RICCI Peters CNP  [address] [place] [id number]       Assessment and Plan:    1. 3.4 x 2.3 JATIN cavitating mass       2.0 LLL nodule       Several other left sided nodules  Seen on 5/2025 CT Chest, no prior CT for comparison. Differential includes infectious, tuberculosis, inflammation, or malignancy.   --Plan for navigational bronchoscopy with biopsy of JATIN cavitating mass, possibly LLL nodule if JATIN is nondiagnostic, EBUS of lymph nodes, BAL    Jayde Coffey MD  Interventional Pulmonology  Department of Pulmonary, Allergy, Critical Care and Sleep Medicine   [facility]           History:     Nerissa Valentin is a 79 year old female with sig h/o for asthma who is here for pulmonary nodules.    Cough since right after Thanksgiving, has gotten 3 rounds of antibiotics (cefdinir, azithromycin, levofloxacin, doxycyline) but cough is still the same. Would feel better for a few days (less coughing), then would get worse again. Coughs up clear or yellowish sputum, about 2-3 times per day.     When illness first started around Thanksgiving, had low grade temperature, but no fever. Has lost about 15lbs since then. Has not had much appetite.     Is short of breath just walking across the room or going up a flight of stairs. Has been short of breath for years and has been stable. Using albuterol at least once day, does not help with cough.     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 Previous negative TB skin test every year    - Personal hx of cancer: None  - Family hx of cancer: No history of lung cancer  - Tobacco hx: Never  - My interpretation of the images relevant for this visit includes:  "JATIN cavitating mass   - My interpretation of the PFT's relevant for this visit includes: None         Allergies:      Allergies   Allergen Reactions    Influenza Virus Vaccine Dermatitis and Other (See Comments)     2 years in a row after the flu shot did get  High fever,  Localized reaction .     Other [No Clinical Screening - See Comments]      CHROMIC GUT SUTURES CAUSE ABCESS    Pcn [Penicillins] Hives          Medications:     Current Outpatient Medications   Medication Sig Dispense Refill    albuterol (PROAIR HFA/PROVENTIL HFA/VENTOLIN HFA) 108 (90 Base) MCG/ACT inhaler Inhale 2 puffs into the lungs every 4 hours as needed for shortness of breath or wheezing. 18 g 1    B-D LUER-MINH SYRINGE 25G X 5/8\" 3 ML MISC USE WITH VITAMIN B12 INJECTION WEEKLY X 1 MO THEN 1 PER MONTH 4 each 0    cholecalciferol (VITAMIN D3) 1250 mcg (87291 units) capsule Take 1 capsule (1,250 mcg) by mouth once a week Then OTC vit D supplement 2000 units daily.  Lab-only appt 1 month after finishing prescription. (Patient not taking: Reported on 4/7/2025) 8 capsule 0    cyanocobalamin (CYANOCOBALAMIN) 1000 mcg/mL injection INJECT 1 ML (1,000 MCG) INTO THE MUSCLE ONCE PER MONTH AS DIRECTED 3 mL 3    lisinopril (ZESTRIL) 40 MG tablet Take 1 tablet (40 mg) by mouth daily. 100 tablet 3    omeprazole (PRILOSEC) 20 MG DR capsule Take 1 capsule (20 mg) by mouth daily. Stay on this long term due to ulcer history. 90 capsule 3    rosuvastatin (CRESTOR) 10 MG tablet Take 1 tablet (10 mg) by mouth daily. 100 tablet 3    sertraline (ZOLOFT) 100 MG tablet Take 1 tablet (100 mg) by mouth daily. 90 tablet 3     No current facility-administered medications for this visit.            Physical Exam:   BP (!) 170/84 (BP Location: Right arm, Patient Position: Sitting, Cuff Size: Adult Regular)   Pulse 85   Temp 97.6  F (36.4  C) (Tympanic)   Resp 16   Ht 1.448 m (4' 9\")   Wt 61.9 kg (136 lb 8 oz)   SpO2 93%   BMI 29.54 kg/m    Wt Readings from Last 4 " Encounters:   05/08/25 61.9 kg (136 lb 8 oz)   01/29/25 66.3 kg (146 lb 1.6 oz)   01/16/25 67.6 kg (149 lb)   12/29/24 68.5 kg (151 lb)     General: Well appearing  Lungs: Nonlabored breathing  Neuro: Answering questions appropriately  Psych: Normal affect     Imaging/Lab Data   All laboratory and imaging data reviewed.

## 2025-05-07 NOTE — PROGRESS NOTES
LM on pt's VM to take form to providers new office. New IP (Interventional Pulmonology) referral rec'd.  Chart reviewed.        New Patient: Interventional Pulmonary (Lung nodule) Nurse Navigator Note    Referring provider: Maria Guadalupe Gale APRN [facility]Nb [facility]    Referred to (specialty): Interventional Pulmonary (Lung nodule)    Requested provider (if applicable): n/a    Date Referral Received: 5/7/2025    Evaluation for:  lung nodules    Clinical History (per Nurse review of records provided):    **BOOK MARKED**    EXAM: CT CHEST W/O CONTRAST  LOCATION: [facility]  DATE: 5/3/2025     INDICATION:  Pneumonia due to infectious organism, unspecified laterality, unspecified part of lung  COMPARISON: CT chest, abdomen and pelvis performed on 5/5/2013  TECHNIQUE: CT chest without IV contrast. Multiplanar reformats were obtained. Dose reduction techniques were used.  CONTRAST: None.     FINDINGS:   LUNGS AND PLEURA: No pleural effusion or pneumothorax is seen. Elevation of the left hemidiaphragm is seen. Multiple new pulmonary nodules and masses are seen in the lungs. Several of these nodules and masses are cavitating including the largest   measuring 3.4 x 2.3 cm within the posterior left upper lobe (series 4, image 63). One of the largest solid nodules measures up to 2.0 cm in the left lower lobe (series 4, image 187). Patchy groundglass opacities are seen in the left lung.     MEDIASTINUM/AXILLAE: No intrathoracic lymphadenopathy is present. Heart size appears mildly enlarged. Moderate vascular calcifications are seen in the thoracic aorta. Mitral annular calcifications are present.     CORONARY ARTERY CALCIFICATION: Severe.     UPPER ABDOMEN: Nodular contour is seen along the liver suggestive of cirrhosis. Partially seen vascular calcifications in the abdominal aorta.     MUSCULOSKELETAL: Multilevel degenerative changes are seen in the spine.                                                                       IMPRESSION:   1.  Multiple new pulmonary nodules and masses are seen in the lungs, several of which are cavitating. Findings are concerning for metastatic disease. Differential diagnosis also includes septate emboli or infectious process such as tuberculosis.   Pulmonary consultation is recommended. Consider follow-up PET/CT and 3 month CT chest follow-up exam.  2.  Patchy groundglass opacities are seen in the left lung which may suggest superimposed pneumonia.  3.  Cirrhotic appearance of the liver.    Records Location: [facility]     Records Needed: none    Additional testing needed prior to consult: PFT's

## 2025-05-07 NOTE — TELEPHONE ENCOUNTER
RECORDS STATUS - ALL OTHER DIAGNOSIS      RECORDS RECEIVED FROM: [facility]   NOTES STATUS DETAILS   OFFICE NOTE from referring provider Epic 4/7/2025 - RICCI Peters CNP   MEDICATION LIST [facility]    LABS     PATHOLOGY REPORTS     ANYTHING RELATED TO DIAGNOSIS Epic 1/17/2025   IMAGING (NEED IMAGES & REPORT)     CT SCANS PACS CT Chest: 5/83/2025   XRAYS PACS Xray Chest: 4/7/2025, 1/16/2025, 10/4/2023

## 2025-05-08 ENCOUNTER — ONCOLOGY VISIT (OUTPATIENT)
Dept: PULMONOLOGY | Facility: CLINIC | Age: 80
End: 2025-05-08
Attending: NURSE PRACTITIONER
Payer: COMMERCIAL

## 2025-05-08 ENCOUNTER — PRE VISIT (OUTPATIENT)
Dept: PULMONOLOGY | Facility: CLINIC | Age: 80
End: 2025-05-08
Payer: COMMERCIAL

## 2025-05-08 VITALS
DIASTOLIC BLOOD PRESSURE: 84 MMHG | WEIGHT: 136.5 LBS | HEIGHT: 57 IN | HEART RATE: 85 BPM | OXYGEN SATURATION: 93 % | BODY MASS INDEX: 29.45 KG/M2 | TEMPERATURE: 97.6 F | SYSTOLIC BLOOD PRESSURE: 170 MMHG | RESPIRATION RATE: 16 BRPM

## 2025-05-08 DIAGNOSIS — R91.8 PULMONARY NODULES: ICD-10-CM

## 2025-05-08 PROBLEM — E55.9 VITAMIN D DEFICIENCY: Status: RESOLVED | Noted: 2023-02-06 | Resolved: 2025-05-08

## 2025-05-08 PROCEDURE — G0463 HOSPITAL OUTPT CLINIC VISIT: HCPCS | Performed by: STUDENT IN AN ORGANIZED HEALTH CARE EDUCATION/TRAINING PROGRAM

## 2025-05-08 ASSESSMENT — PAIN SCALES - GENERAL: PAINLEVEL_OUTOF10: NO PAIN (0)

## 2025-05-08 NOTE — NURSING NOTE
"Oncology Rooming Note    May 8, 2025 12:55 PM   Nerissa Valentin is a 79 year old female who presents for:    Chief Complaint   Patient presents with    Oncology Clinic Visit     Pulmonary Nodules     Initial Vitals: BP (!) 170/84 (BP Location: Right arm, Patient Position: Sitting, Cuff Size: Adult Regular)   Pulse 85   Temp 97.6  F (36.4  C) (Tympanic)   Resp 16   Ht 1.448 m (4' 9\")   Wt 61.9 kg (136 lb 8 oz)   SpO2 93%   BMI 29.54 kg/m   Estimated body mass index is 29.54 kg/m  as calculated from the following:    Height as of this encounter: 1.448 m (4' 9\").    Weight as of this encounter: 61.9 kg (136 lb 8 oz). Body surface area is 1.58 meters squared.  No Pain (0) Comment: Data Unavailable   No LMP recorded. Patient has had a hysterectomy.  Allergies reviewed: Yes  Medications reviewed: Yes    Medications: Medication refills not needed today.  Pharmacy name entered into [employer]: [facility] [place], [place] - [facility]    Frailty Screening:   Is the patient here for a new oncology consult visit in cancer care? 2. No    PHQ9:  Did this patient require a PHQ9?: No      Clinical concerns: Patient is new.       Megan Capps LPN  5/8/2025              "

## 2025-05-12 ASSESSMENT — ASTHMA QUESTIONNAIRES
QUESTION_4 LAST FOUR WEEKS HOW OFTEN HAVE YOU USED YOUR RESCUE INHALER OR NEBULIZER MEDICATION (SUCH AS ALBUTEROL): ONCE A WEEK OR LESS
QUESTION_2 LAST FOUR WEEKS HOW OFTEN HAVE YOU HAD SHORTNESS OF BREATH: ONCE A DAY
QUESTION_1 LAST FOUR WEEKS HOW MUCH OF THE TIME DID YOUR ASTHMA KEEP YOU FROM GETTING AS MUCH DONE AT WORK, SCHOOL OR AT HOME: MOST OF THE TIME
ACT_TOTALSCORE: 12
QUESTION_3 LAST FOUR WEEKS HOW OFTEN DID YOUR ASTHMA SYMPTOMS (WHEEZING, COUGHING, SHORTNESS OF BREATH, CHEST TIGHTNESS OR PAIN) WAKE YOU UP AT NIGHT OR EARLIER THAN USUAL IN THE MORNING: TWO OR THREE NIGHTS A WEEK
QUESTION_5 LAST FOUR WEEKS HOW WOULD YOU RATE YOUR ASTHMA CONTROL: POORLY CONTROLLED

## 2025-05-15 ENCOUNTER — RESULTS FOLLOW-UP (OUTPATIENT)
Dept: FAMILY MEDICINE | Facility: CLINIC | Age: 80
End: 2025-05-15

## 2025-05-15 ENCOUNTER — OFFICE VISIT (OUTPATIENT)
Dept: FAMILY MEDICINE | Facility: CLINIC | Age: 80
End: 2025-05-15
Payer: COMMERCIAL

## 2025-05-15 VITALS
HEART RATE: 78 BPM | BODY MASS INDEX: 29.56 KG/M2 | SYSTOLIC BLOOD PRESSURE: 137 MMHG | WEIGHT: 137 LBS | DIASTOLIC BLOOD PRESSURE: 55 MMHG | OXYGEN SATURATION: 96 % | TEMPERATURE: 98.2 F | RESPIRATION RATE: 20 BRPM | HEIGHT: 57 IN

## 2025-05-15 DIAGNOSIS — R91.8 ABNORMAL COMPUTED TOMOGRAPHY OF LUNG: Primary | ICD-10-CM

## 2025-05-15 DIAGNOSIS — E61.1 IRON DEFICIENCY: ICD-10-CM

## 2025-05-15 DIAGNOSIS — K74.60 HEPATIC CIRRHOSIS, UNSPECIFIED HEPATIC CIRRHOSIS TYPE, UNSPECIFIED WHETHER ASCITES PRESENT (H): ICD-10-CM

## 2025-05-15 LAB
ALBUMIN SERPL BCG-MCNC: 3.4 G/DL (ref 3.5–5.2)
ALP SERPL-CCNC: 106 U/L (ref 40–150)
ALT SERPL W P-5'-P-CCNC: 7 U/L (ref 0–50)
ANION GAP SERPL CALCULATED.3IONS-SCNC: 10 MMOL/L (ref 7–15)
AST SERPL W P-5'-P-CCNC: 28 U/L (ref 0–45)
BILIRUB SERPL-MCNC: 1 MG/DL
BUN SERPL-MCNC: 11.7 MG/DL (ref 8–23)
CALCIUM SERPL-MCNC: 9.5 MG/DL (ref 8.8–10.4)
CHLORIDE SERPL-SCNC: 108 MMOL/L (ref 98–107)
CREAT SERPL-MCNC: 1.27 MG/DL (ref 0.51–0.95)
EGFRCR SERPLBLD CKD-EPI 2021: 43 ML/MIN/1.73M2
ERYTHROCYTE [DISTWIDTH] IN BLOOD BY AUTOMATED COUNT: 13.8 % (ref 10–15)
FERRITIN SERPL-MCNC: 105 NG/ML (ref 11–328)
GLUCOSE SERPL-MCNC: 98 MG/DL (ref 70–99)
HCO3 SERPL-SCNC: 27 MMOL/L (ref 22–29)
HCT VFR BLD AUTO: 37 % (ref 35–47)
HGB BLD-MCNC: 11.6 G/DL (ref 11.7–15.7)
HOLD SPECIMEN: NORMAL
IRON BINDING CAPACITY (ROCHE): 265 UG/DL (ref 240–430)
IRON SATN MFR SERPL: 20 % (ref 15–46)
IRON SERPL-MCNC: 52 UG/DL (ref 37–145)
MCH RBC QN AUTO: 31.4 PG (ref 26.5–33)
MCHC RBC AUTO-ENTMCNC: 31.4 G/DL (ref 31.5–36.5)
MCV RBC AUTO: 100 FL (ref 78–100)
PLATELET # BLD AUTO: 142 10E3/UL (ref 150–450)
POTASSIUM SERPL-SCNC: 4 MMOL/L (ref 3.4–5.3)
PROT SERPL-MCNC: 6.5 G/DL (ref 6.4–8.3)
RBC # BLD AUTO: 3.69 10E6/UL (ref 3.8–5.2)
SODIUM SERPL-SCNC: 145 MMOL/L (ref 135–145)
WBC # BLD AUTO: 9.4 10E3/UL (ref 4–11)

## 2025-05-15 PROCEDURE — 1126F AMNT PAIN NOTED NONE PRSNT: CPT | Performed by: PHYSICIAN ASSISTANT

## 2025-05-15 PROCEDURE — 83540 ASSAY OF IRON: CPT | Performed by: PHYSICIAN ASSISTANT

## 2025-05-15 PROCEDURE — 83550 IRON BINDING TEST: CPT | Performed by: PHYSICIAN ASSISTANT

## 2025-05-15 PROCEDURE — 3075F SYST BP GE 130 - 139MM HG: CPT | Performed by: PHYSICIAN ASSISTANT

## 2025-05-15 PROCEDURE — 80053 COMPREHEN METABOLIC PANEL: CPT | Performed by: PHYSICIAN ASSISTANT

## 2025-05-15 PROCEDURE — 3078F DIAST BP <80 MM HG: CPT | Performed by: PHYSICIAN ASSISTANT

## 2025-05-15 PROCEDURE — 82728 ASSAY OF FERRITIN: CPT | Performed by: PHYSICIAN ASSISTANT

## 2025-05-15 PROCEDURE — 36415 COLL VENOUS BLD VENIPUNCTURE: CPT | Performed by: PHYSICIAN ASSISTANT

## 2025-05-15 PROCEDURE — 85027 COMPLETE CBC AUTOMATED: CPT | Performed by: PHYSICIAN ASSISTANT

## 2025-05-15 PROCEDURE — 86481 TB AG RESPONSE T-CELL SUSP: CPT | Performed by: PHYSICIAN ASSISTANT

## 2025-05-15 PROCEDURE — 99214 OFFICE O/P EST MOD 30 MIN: CPT | Performed by: PHYSICIAN ASSISTANT

## 2025-05-15 ASSESSMENT — PAIN SCALES - GENERAL: PAINLEVEL_OUTOF10: NO PAIN (0)

## 2025-05-15 NOTE — PROGRESS NOTES
Assessment & Plan     Abnormal computed tomography of lung  Unclear diagnosis, suspected malignancy.  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  - Quantiferon-TB Gold Plus; Future  - Quantiferon-TB Gold Plus    Hepatic cirrhosis, unspecified hepatic cirrhosis type, unspecified whether ascites present (H)  Newly diagnosed, she does not want to discuss in detail or work this up at this point until lung diagnosis is determined.  Will do lab for upcoming pre-op.  - Comprehensive metabolic panel (BMP + Alb, Alk Phos, ALT, AST, Total. Bili, TP); Future  - Comprehensive metabolic panel (BMP + Alb, Alk Phos, ALT, AST, Total. Bili, TP)    Iron deficiency  Recheck.  History chronic gastritis, history anemia, and now possible cause of potential portal HTN/esophageal varices  - Ferritin  - **Iron and iron binding capacity FUTURE 2mo  - **CBC with platelets FUTURE 2mo    There are no Patient Instructions on file for this visit.    Dimitry Multani is a 79 year old, presenting for the following health issues:  Cough        5/15/2025    10:31 AM   Additional Questions   Roomed by Denita HUMPHRIES CMA     History of Present Illness       Reason for visit:  Follow up    She eats 2-3 servings of fruits and vegetables daily.She consumes 3 sweetened beverage(s) daily.She exercises with enough effort to increase her heart rate 9 or less minutes per day.  She exercises with enough effort to increase her heart rate 3 or less days per week.   She is taking medications regularly.      Pt presents today to follow up on cough since January 2025. It is wet and productive.  Refractory to multiple antibiotics.    Most recent CXR with concern for new cavitary area.      CT IMPRESSION:   1.  Multiple new pulmonary nodules and masses are seen in the lungs, several of which are cavitating. Findings are  "concerning for metastatic disease. Differential diagnosis also includes septate emboli or infectious process such as tuberculosis.   Pulmonary consultation is recommended. Consider follow-up PET/CT and 3 month CT chest follow-up exam.  2.  Patchy groundglass opacities are seen in the left lung which may suggest superimposed pneumonia.  3.  Cirrhotic appearance of the liver.    Upcoming bronchoscopy        Objective    /55 (BP Location: Right arm, Patient Position: Sitting, Cuff Size: Adult Regular)   Pulse 78   Temp 98.2  F (36.8  C)   Resp 20   Ht 1.448 m (4' 9.01\")   Wt 62.1 kg (137 lb)   SpO2 96%   BMI 29.64 kg/m    Body mass index is 29.64 kg/m .          Signed Electronically by: Sarah Carlos PA-C    "

## 2025-05-15 NOTE — NURSING NOTE
"Chief Complaint   Patient presents with    Cough       Initial /55 (BP Location: Right arm, Patient Position: Sitting, Cuff Size: Adult Regular)   Pulse 78   Temp 98.2  F (36.8  C)   Resp 20   Ht 1.448 m (4' 9.01\")   Wt 62.1 kg (137 lb)   BMI 29.64 kg/m   Estimated body mass index is 29.64 kg/m  as calculated from the following:    Height as of this encounter: 1.448 m (4' 9.01\").    Weight as of this encounter: 62.1 kg (137 lb).    Patient presents to the clinic using No DME    Is there anyone who you would like to be able to receive your results? No  If yes have patient fill out [facility]      "

## 2025-05-16 PROBLEM — N20.0 CALCULUS OF KIDNEY: Status: ACTIVE | Noted: 2019-12-19

## 2025-05-16 NOTE — RESULT ENCOUNTER NOTE
Nerissa  Iron labs have normalized.  Hemoglobin is improving but is a bit low.    Liver labs are normal to very slightly off.  Platelets are likely slightly low due to cirrhosis.    Kidney function dipped.  Kidney function does vary somewhat according to hydration levels.  Drink plenty of water and we'll recheck kidney function either tomorrow or within the next week.    Tuberculosis test still in process.  See you tomorrow.  Sarah

## 2025-05-17 LAB
GAMMA INTERFERON BACKGROUND BLD IA-ACNC: 0.04 IU/ML
M TB IFN-G BLD-IMP: NEGATIVE
M TB IFN-G CD4+ BCKGRND COR BLD-ACNC: 9.96 IU/ML
MITOGEN IGNF BCKGRD COR BLD-ACNC: 0.02 IU/ML
MITOGEN IGNF BCKGRD COR BLD-ACNC: 0.03 IU/ML
QUANTIFERON MITOGEN: 10 IU/ML
QUANTIFERON NIL TUBE: 0.04 IU/ML
QUANTIFERON TB1 TUBE: 0.06 IU/ML
QUANTIFERON TB2 TUBE: 0.07

## 2025-05-19 ENCOUNTER — PATIENT OUTREACH (OUTPATIENT)
Dept: CARE COORDINATION | Facility: CLINIC | Age: 80
End: 2025-05-19
Payer: COMMERCIAL

## 2025-05-22 ENCOUNTER — DOCUMENTATION ONLY (OUTPATIENT)
Dept: PULMONOLOGY | Facility: CLINIC | Age: 80
End: 2025-05-22
Payer: COMMERCIAL

## 2025-05-23 NOTE — NURSING NOTE
Interventional Pulmonology: Pre-Flight Planning    Procedure date: May 28th, 2025    Scheduled procedure: BRONCHOSCOPY, ROBOT-ASSISTED ULTRASOUND, WITH BIOPSY    Location: [facility]     Anesthesia: General.    H&P plan: Completed on 5/16/2025 by Sarah Carlos PA-C.    Medication hold(s): No anticoagulation.     CT scheduled: Scheduled DOS prior to arrival in preop.     Preoperative Instructions: Sent to patient via [employer] (confirmed read).    Pathology results follow up: Dr. Jayde Coffey

## 2025-05-27 ENCOUNTER — RESULTS FOLLOW-UP (OUTPATIENT)
Dept: FAMILY MEDICINE | Facility: CLINIC | Age: 80
End: 2025-05-27

## 2025-05-28 ENCOUNTER — HOSPITAL ENCOUNTER (OUTPATIENT)
Dept: CT IMAGING | Facility: CLINIC | Age: 80
Discharge: HOME | End: 2025-05-28
Attending: STUDENT IN AN ORGANIZED HEALTH CARE EDUCATION/TRAINING PROGRAM | Admitting: INTERNAL MEDICINE
Payer: COMMERCIAL

## 2025-05-28 ENCOUNTER — ANESTHESIA EVENT (OUTPATIENT)
Dept: SURGERY | Facility: CLINIC | Age: 80
End: 2025-05-28
Payer: COMMERCIAL

## 2025-05-28 ENCOUNTER — ANESTHESIA (OUTPATIENT)
Dept: SURGERY | Facility: CLINIC | Age: 80
End: 2025-05-28
Payer: COMMERCIAL

## 2025-05-28 ENCOUNTER — HOSPITAL ENCOUNTER (OUTPATIENT)
Facility: CLINIC | Age: 80
Discharge: HOME | End: 2025-05-28
Attending: INTERNAL MEDICINE | Admitting: INTERNAL MEDICINE
Payer: COMMERCIAL

## 2025-05-28 ENCOUNTER — APPOINTMENT (OUTPATIENT)
Dept: GENERAL RADIOLOGY | Facility: CLINIC | Age: 80
End: 2025-05-28
Attending: INTERNAL MEDICINE
Payer: COMMERCIAL

## 2025-05-28 ENCOUNTER — APPOINTMENT (OUTPATIENT)
Dept: GENERAL RADIOLOGY | Facility: CLINIC | Age: 80
End: 2025-05-28
Attending: STUDENT IN AN ORGANIZED HEALTH CARE EDUCATION/TRAINING PROGRAM
Payer: COMMERCIAL

## 2025-05-28 VITALS
DIASTOLIC BLOOD PRESSURE: 67 MMHG | SYSTOLIC BLOOD PRESSURE: 154 MMHG | TEMPERATURE: 98.5 F | BODY MASS INDEX: 29.49 KG/M2 | OXYGEN SATURATION: 94 % | HEIGHT: 57 IN | RESPIRATION RATE: 18 BRPM | WEIGHT: 136.69 LBS | HEART RATE: 91 BPM

## 2025-05-28 DIAGNOSIS — R91.8 PULMONARY NODULES: ICD-10-CM

## 2025-05-28 PROCEDURE — 370N000017 HC ANESTHESIA TECHNICAL FEE, PER MIN: Performed by: INTERNAL MEDICINE

## 2025-05-28 PROCEDURE — 88173 CYTOPATH EVAL FNA REPORT: CPT | Mod: TC | Performed by: INTERNAL MEDICINE

## 2025-05-28 PROCEDURE — 88305 TISSUE EXAM BY PATHOLOGIST: CPT | Mod: 26 | Performed by: PATHOLOGY

## 2025-05-28 PROCEDURE — 250N000009 HC RX 250: Performed by: NURSE ANESTHETIST, CERTIFIED REGISTERED

## 2025-05-28 PROCEDURE — 710N000010 HC RECOVERY PHASE 1, LEVEL 2, PER MIN: Performed by: INTERNAL MEDICINE

## 2025-05-28 PROCEDURE — 710N000012 HC RECOVERY PHASE 2, PER MINUTE: Performed by: INTERNAL MEDICINE

## 2025-05-28 PROCEDURE — 31653 BRONCH EBUS SAMPLNG 3/> NODE: CPT | Mod: GC | Performed by: INTERNAL MEDICINE

## 2025-05-28 PROCEDURE — 31629 BRONCHOSCOPY/NEEDLE BX EACH: CPT | Mod: GC | Performed by: INTERNAL MEDICINE

## 2025-05-28 PROCEDURE — 71250 CT THORAX DX C-: CPT | Mod: 26 | Performed by: RADIOLOGY

## 2025-05-28 PROCEDURE — 272N000001 HC OR GENERAL SUPPLY STERILE: Performed by: INTERNAL MEDICINE

## 2025-05-28 PROCEDURE — 999N000065 XR CHEST PORT 1 VIEW

## 2025-05-28 PROCEDURE — 88173 CYTOPATH EVAL FNA REPORT: CPT | Mod: 26 | Performed by: PATHOLOGY

## 2025-05-28 PROCEDURE — 250N000011 HC RX IP 250 OP 636: Performed by: NURSE ANESTHETIST, CERTIFIED REGISTERED

## 2025-05-28 PROCEDURE — 71045 X-RAY EXAM CHEST 1 VIEW: CPT | Mod: 26 | Performed by: RADIOLOGY

## 2025-05-28 PROCEDURE — 71250 CT THORAX DX C-: CPT

## 2025-05-28 PROCEDURE — 250N000009 HC RX 250: Performed by: STUDENT IN AN ORGANIZED HEALTH CARE EDUCATION/TRAINING PROGRAM

## 2025-05-28 PROCEDURE — 88342 IMHCHEM/IMCYTCHM 1ST ANTB: CPT | Mod: 26 | Performed by: PATHOLOGY

## 2025-05-28 PROCEDURE — 258N000003 HC RX IP 258 OP 636: Performed by: NURSE ANESTHETIST, CERTIFIED REGISTERED

## 2025-05-28 PROCEDURE — 31627 NAVIGATIONAL BRONCHOSCOPY: CPT | Mod: GC | Performed by: INTERNAL MEDICINE

## 2025-05-28 PROCEDURE — 94640 AIRWAY INHALATION TREATMENT: CPT

## 2025-05-28 PROCEDURE — 88172 CYTP DX EVAL FNA 1ST EA SITE: CPT | Mod: 26 | Performed by: PATHOLOGY

## 2025-05-28 PROCEDURE — 999N000141 HC STATISTIC PRE-PROCEDURE NURSING ASSESSMENT: Performed by: INTERNAL MEDICINE

## 2025-05-28 PROCEDURE — 88341 IMHCHEM/IMCYTCHM EA ADD ANTB: CPT | Mod: 26 | Performed by: PATHOLOGY

## 2025-05-28 PROCEDURE — 31654 BRONCH EBUS IVNTJ PERPH LES: CPT | Mod: GC | Performed by: INTERNAL MEDICINE

## 2025-05-28 PROCEDURE — 88360 TUMOR IMMUNOHISTOCHEM/MANUAL: CPT | Mod: 26 | Performed by: PATHOLOGY

## 2025-05-28 PROCEDURE — 360N000087 HC SURGERY LEVEL 7 W/ FLUORO, PER MIN: Performed by: INTERNAL MEDICINE

## 2025-05-28 PROCEDURE — 999N000179 XR SURGERY CARM FLUORO LESS THAN 5 MIN W STILLS

## 2025-05-28 RX ORDER — ONDANSETRON 4 MG/1
4 TABLET, ORALLY DISINTEGRATING ORAL EVERY 30 MIN PRN
Status: DISCONTINUED | OUTPATIENT
Start: 2025-05-28 | End: 2025-05-28 | Stop reason: HOSPADM

## 2025-05-28 RX ORDER — HYDROMORPHONE HCL IN WATER/PF 6 MG/30 ML
0.4 PATIENT CONTROLLED ANALGESIA SYRINGE INTRAVENOUS EVERY 5 MIN PRN
Status: DISCONTINUED | OUTPATIENT
Start: 2025-05-28 | End: 2025-05-28 | Stop reason: HOSPADM

## 2025-05-28 RX ORDER — NALOXONE HYDROCHLORIDE 0.4 MG/ML
0.1 INJECTION, SOLUTION INTRAMUSCULAR; INTRAVENOUS; SUBCUTANEOUS
Status: DISCONTINUED | OUTPATIENT
Start: 2025-05-28 | End: 2025-05-28 | Stop reason: HOSPADM

## 2025-05-28 RX ORDER — FENTANYL CITRATE 50 UG/ML
INJECTION, SOLUTION INTRAMUSCULAR; INTRAVENOUS PRN
Status: DISCONTINUED | OUTPATIENT
Start: 2025-05-28 | End: 2025-05-28

## 2025-05-28 RX ORDER — LIDOCAINE HYDROCHLORIDE 20 MG/ML
INJECTION, SOLUTION INFILTRATION; PERINEURAL PRN
Status: DISCONTINUED | OUTPATIENT
Start: 2025-05-28 | End: 2025-05-28

## 2025-05-28 RX ORDER — ONDANSETRON 2 MG/ML
4 INJECTION INTRAMUSCULAR; INTRAVENOUS EVERY 30 MIN PRN
Status: DISCONTINUED | OUTPATIENT
Start: 2025-05-28 | End: 2025-05-28 | Stop reason: HOSPADM

## 2025-05-28 RX ORDER — ONDANSETRON 2 MG/ML
INJECTION INTRAMUSCULAR; INTRAVENOUS PRN
Status: DISCONTINUED | OUTPATIENT
Start: 2025-05-28 | End: 2025-05-28

## 2025-05-28 RX ORDER — HYDROMORPHONE HCL IN WATER/PF 6 MG/30 ML
0.2 PATIENT CONTROLLED ANALGESIA SYRINGE INTRAVENOUS EVERY 5 MIN PRN
Status: DISCONTINUED | OUTPATIENT
Start: 2025-05-28 | End: 2025-05-28 | Stop reason: HOSPADM

## 2025-05-28 RX ORDER — OXYCODONE HYDROCHLORIDE 10 MG/1
10 TABLET ORAL
Status: DISCONTINUED | OUTPATIENT
Start: 2025-05-28 | End: 2025-05-28 | Stop reason: HOSPADM

## 2025-05-28 RX ORDER — OXYCODONE HYDROCHLORIDE 5 MG/1
5 TABLET ORAL
Status: DISCONTINUED | OUTPATIENT
Start: 2025-05-28 | End: 2025-05-28 | Stop reason: HOSPADM

## 2025-05-28 RX ORDER — PROPOFOL 10 MG/ML
INJECTION, EMULSION INTRAVENOUS CONTINUOUS PRN
Status: DISCONTINUED | OUTPATIENT
Start: 2025-05-28 | End: 2025-05-28

## 2025-05-28 RX ORDER — DEXAMETHASONE SODIUM PHOSPHATE 4 MG/ML
INJECTION, SOLUTION INTRA-ARTICULAR; INTRALESIONAL; INTRAMUSCULAR; INTRAVENOUS; SOFT TISSUE PRN
Status: DISCONTINUED | OUTPATIENT
Start: 2025-05-28 | End: 2025-05-28

## 2025-05-28 RX ORDER — DEXAMETHASONE SODIUM PHOSPHATE 4 MG/ML
4 INJECTION, SOLUTION INTRA-ARTICULAR; INTRALESIONAL; INTRAMUSCULAR; INTRAVENOUS; SOFT TISSUE
Status: DISCONTINUED | OUTPATIENT
Start: 2025-05-28 | End: 2025-05-28 | Stop reason: HOSPADM

## 2025-05-28 RX ORDER — FENTANYL CITRATE 50 UG/ML
50 INJECTION, SOLUTION INTRAMUSCULAR; INTRAVENOUS EVERY 5 MIN PRN
Status: DISCONTINUED | OUTPATIENT
Start: 2025-05-28 | End: 2025-05-28 | Stop reason: HOSPADM

## 2025-05-28 RX ORDER — PROPOFOL 10 MG/ML
INJECTION, EMULSION INTRAVENOUS PRN
Status: DISCONTINUED | OUTPATIENT
Start: 2025-05-28 | End: 2025-05-28

## 2025-05-28 RX ORDER — FENTANYL CITRATE 50 UG/ML
25 INJECTION, SOLUTION INTRAMUSCULAR; INTRAVENOUS EVERY 5 MIN PRN
Status: DISCONTINUED | OUTPATIENT
Start: 2025-05-28 | End: 2025-05-28 | Stop reason: HOSPADM

## 2025-05-28 RX ORDER — ALBUTEROL SULFATE 0.83 MG/ML
2.5 SOLUTION RESPIRATORY (INHALATION)
Status: DISCONTINUED | OUTPATIENT
Start: 2025-05-28 | End: 2025-05-28 | Stop reason: HOSPADM

## 2025-05-28 RX ORDER — SODIUM CHLORIDE, SODIUM LACTATE, POTASSIUM CHLORIDE, CALCIUM CHLORIDE 600; 310; 30; 20 MG/100ML; MG/100ML; MG/100ML; MG/100ML
INJECTION, SOLUTION INTRAVENOUS CONTINUOUS
Status: DISCONTINUED | OUTPATIENT
Start: 2025-05-28 | End: 2025-05-28 | Stop reason: HOSPADM

## 2025-05-28 RX ORDER — SODIUM CHLORIDE, SODIUM LACTATE, POTASSIUM CHLORIDE, CALCIUM CHLORIDE 600; 310; 30; 20 MG/100ML; MG/100ML; MG/100ML; MG/100ML
INJECTION, SOLUTION INTRAVENOUS CONTINUOUS PRN
Status: DISCONTINUED | OUTPATIENT
Start: 2025-05-28 | End: 2025-05-28

## 2025-05-28 RX ADMIN — PROPOFOL 120 MCG/KG/MIN: 10 INJECTION, EMULSION INTRAVENOUS at 10:24

## 2025-05-28 RX ADMIN — ONDANSETRON 4 MG: 2 INJECTION INTRAMUSCULAR; INTRAVENOUS at 10:45

## 2025-05-28 RX ADMIN — PROPOFOL 70 MG: 10 INJECTION, EMULSION INTRAVENOUS at 10:22

## 2025-05-28 RX ADMIN — ALBUTEROL SULFATE 2.5 MG: 2.5 SOLUTION RESPIRATORY (INHALATION) at 12:30

## 2025-05-28 RX ADMIN — SODIUM CHLORIDE, SODIUM LACTATE, POTASSIUM CHLORIDE, AND CALCIUM CHLORIDE: .6; .31; .03; .02 INJECTION, SOLUTION INTRAVENOUS at 10:12

## 2025-05-28 RX ADMIN — Medication 50 MG: at 10:22

## 2025-05-28 RX ADMIN — DEXAMETHASONE SODIUM PHOSPHATE 10 MG: 4 INJECTION, SOLUTION INTRAMUSCULAR; INTRAVENOUS at 10:22

## 2025-05-28 RX ADMIN — FENTANYL CITRATE 100 MCG: 50 INJECTION INTRAMUSCULAR; INTRAVENOUS at 10:23

## 2025-05-28 RX ADMIN — LIDOCAINE HYDROCHLORIDE 60 MG: 20 INJECTION, SOLUTION INFILTRATION; PERINEURAL at 10:22

## 2025-05-28 RX ADMIN — Medication 100 MG: at 11:28

## 2025-05-28 ASSESSMENT — ACTIVITIES OF DAILY LIVING (ADL)
ADLS_ACUITY_SCORE: 36
ADLS_ACUITY_SCORE: 35
ADLS_ACUITY_SCORE: 36

## 2025-05-28 NOTE — ANESTHESIA PREPROCEDURE EVALUATION
Anesthesia Pre-Procedure Evaluation    Patient: Nerissa Valentin   MRN: [id number] : 1945          Procedure : Procedure(s):  ION, BRONCHOSCOPY, ROBOT-ASSISTED, WITH BIOPSY, endobronchial ultrasound, bronchoalveolar lavage         Past Medical History:   Diagnosis Date    Asthma, mild persistent     Depressive disorder     only child killed by drunk     Hematuria     Hyperlipidemia LDL goal < 130     Hypokalemia 03/15/2013    appears when she was on chlorthalidone    Kidney stone     Leucocytosis 2010    white count runs from 13-15      Osteoarthritis     Postmenopausal     HRT 10 years    Shortness of breath       Past Surgical History:   Procedure Laterality Date    ABDOMEN SURGERY  , ,     ovary removed x2 and abd. hyst.    ABDOMEN SURGERY      APPENDECTOMY      APPENDECTOMY      ARTHROSCOPY SHOULDER ROTATOR CUFF REPAIR Bilateral     BIOPSY BREAST Right     BREAST BIOPSY, RT/LT      Breat Biopsy RT    COLONOSCOPY N/A 2021    Procedure: COLONOSCOPY, WITH POLYPECTOMY AND BIOPSY;  Surgeon: Yadi Vital MD;  Location: [facility]    ESOPHAGOSCOPY, GASTROSCOPY, DUODENOSCOPY (EGD), COMBINED N/A 2021    Procedure: ESOPHAGOGASTRODUODENOSCOPY, WITH BIOPSY;  Surgeon: Yadi Vital MD;  Location: [facility]    ESOPHAGOSCOPY, GASTROSCOPY, DUODENOSCOPY (EGD), COMBINED N/A 2021    Procedure: ESOPHAGOGASTRODUODENOSCOPY, WITH BIOPSY;  Surgeon: Dante Jensen MD;  Location: [facility]    FLEXIBLE SIGMOIDOSCOPY   ?    Dr. Cline at [facility]    HYSTERECTOMY      HYSTERECTOMY, PAP NO LONGER INDICATED      HYSTERECTOMY, VAGINAL      LAPAROSCOPIC CYSTECTOMY OVARIAN (ONCOLOGY)      LITHOTRIPSY      LITHOTRIPSY      OTHER SURGICAL HISTORY  1971    kidney stone removal    OTHER SURGICAL HISTORY   or 72    open surgery - right kidney stone     OTHER SURGICAL HISTORY Bilateral     salpingo-oophorectomy    OVARIAN CYST REMOVAL  x2    PUNC/ASPIR BREAST CYST      ROTATOR  CUFF REPAIR RT/LT  2007    left    ROTATOR CUFF REPAIR RT/LT  2009    right twice?    SALPINGO OOPHORECTOMY,R/L/HARLEY  1969 and 1970    Salpingo Oophorectomy, RT/LT/HARLEY    SURGICAL HISTORY OF -   1969, 1970    ovary cystectomy    URETEROSCOPY      x2    ZZC REMOVAL OF KIDNEY STONE  1971      Allergies   Allergen Reactions    Influenza Virus Vaccine Dermatitis and Other (See Comments)     2 years in a row after the flu shot did get  High fever,  Localized reaction .     Other [No Clinical Screening - See Comments]      CHROMIC GUT SUTURES CAUSE ABCESS    Pcn [Penicillins] Hives      Social History     Tobacco Use    Smoking status: Never    Smokeless tobacco: Never    Tobacco comments:     never smoked   Substance Use Topics    Alcohol use: Yes     Comment: rare      Wt Readings from Last 1 Encounters:   05/16/25 62.1 kg (137 lb)        Anesthesia Evaluation   Pt has had prior anesthetic. Type: MAC, General and Regional.        ROS/MED HX  ENT/Pulmonary:     (+)                     Mild Persistent, asthma  Treatment: Inhaler prn,                 Neurologic:       Cardiovascular:     (+) Dyslipidemia hypertension- -   -  - -                                 Previous cardiac testing   Echo: Date: Results:    Stress Test:  Date: Results:    ECG Reviewed:  Date:  Results:  Sinus  Rhythm  -First degree A-V block  - occasional ectopic ventricular beat     Tati = 282  BORDERLINE RHYTHM  Cath:  Date: Results:      METS/Exercise Tolerance:     Hematologic:     (+)      anemia,          Musculoskeletal:  - neg musculoskeletal ROS     GI/Hepatic:     (+) GERD,                   Renal/Genitourinary:     (+)       Nephrolithiasis ,       Endo:     (+)               Obesity,       Psychiatric/Substance Use:     (+) psychiatric history depression       Infectious Disease:       Malignancy:  - neg malignancy ROS     Other:              Physical Exam  Airway  Mallampati: I  Neck ROM: full  Mouth opening: >= 4 cm    Cardiovascular  "- normal exam   Dental   (+) Multiple visibly decayed, broken teeth        Pulmonary - normal exam      Neurological - normal exam  She appears awake, alert and oriented x3.    Other Findings       OUTSIDE LABS:  CBC:   Lab Results   Component Value Date    WBC 9.4 05/15/2025    WBC 10.8 01/16/2025    HGB 11.6 (L) 05/15/2025    HGB 10.6 (L) 01/16/2025    HCT 37.0 05/15/2025    HCT 34.4 (L) 01/16/2025     (L) 05/15/2025     01/16/2025     BMP:   Lab Results   Component Value Date     05/23/2025     05/15/2025    POTASSIUM 3.8 05/23/2025    POTASSIUM 4.0 05/15/2025    CHLORIDE 107 05/23/2025    CHLORIDE 108 (H) 05/15/2025    CO2 29 05/23/2025    CO2 27 05/15/2025    BUN 11.7 05/23/2025    BUN 11.7 05/15/2025    CR 1.11 (H) 05/23/2025    CR 1.27 (H) 05/15/2025     (H) 05/23/2025    GLC 98 05/15/2025     COAGS: No results found for: \"PTT\", \"INR\", \"FIBR\"  POC: No results found for: \"BGM\", \"HCG\", \"HCGS\"  HEPATIC:   Lab Results   Component Value Date    ALBUMIN 3.4 (L) 05/15/2025    PROTTOTAL 6.5 05/15/2025    ALT 7 05/15/2025    AST 28 05/15/2025    ALKPHOS 106 05/15/2025    BILITOTAL 1.0 05/15/2025     OTHER:   Lab Results   Component Value Date    PH 7.36 02/05/2020    DAMIAN 9.4 05/23/2025    PHOS 3.0 02/05/2020    TSH 3.49 01/16/2025       Anesthesia Plan    ASA Status:  3      NPO Status: NPO Appropriate   Anesthesia Type: General.  Airway: oral.  Induction: intravenous.  Maintenance: TIVA.   Techniques and Equipment:       - Monitoring Plan: standard ASA monitoring     Consents            Postoperative Care         Comments:                   Vikash Tsai MD    I have reviewed the pertinent notes and labs in the chart from the past 30 days and (re)examined the patient.  Any updates or changes from those notes are reflected in this note.    Clinically Significant Risk Factors Present on Admission                   # Hypertension: Noted on problem list           # Overweight: " "Estimated body mass index is 29.64 kg/m  as calculated from the following:    Height as of 5/16/25: 1.448 m (4' 9.01\").    Weight as of 5/16/25: 62.1 kg (137 lb).       # Asthma: noted on problem list              "

## 2025-05-28 NOTE — PROCEDURES
INTERVENTIONAL PULMONOLOGY       Procedure(s):    A flexible bronchoscopy  Airway exam  EBUS-TBNA (5 sites)  Fluoroscopic guidance   Radial EBUS Navigation (1 sites)  Therapeutic suctioning (2 sites)  ION Robotic Bronchoscopy   Transbronchial fine needle aspiration (1 site)     Indication:  JATIN and LLL masses     Attending of Record:  Steve Camacho MD    Interventional Pulmonary Fellow   Valentine Shipley    Trainees Present:   None     Medications:    General Anesthesia - See anesthesia flowsheet for details    Sedation Time:   Per Anesthesia Care Provider    Time Out:  Performed    The patient's medical record has been reviewed.  The indication for the procedure was reviewed.  The necessary history and physical examination was performed and reviewed.  The risks, benefits and alternatives of the procedure were discussed with the the patient in detail and she had the opportunity to ask questions.  I discussed in particular the potential complications including risks of minor or life-threatening bleeding and/or infection, respiratory failure, vocal cord trauma / paralysis, pneumothorax, and discomfort. Sedation risks were also discussed including abnormal heart rhythms, low blood pressure, and respiratory failure. All questions were answered to the best of my ability.  Verbal and written informed consent was obtained.  The proposed procedure and the patient's identification were verified prior to the procedure by the physician and the nurse.    The patient was assessed for the adequacy for the procedure and to receive medications.   Mental Status:  Alert and oriented x 3  Airway examination:  Class I (Complete visualization of soft palate)  Pulmonary:  Non labored respirations  CV:  Regular rate  ASA Grade:  (II)  Mild systemic disease    After clinical evaluation and reviewing the indication, risks, alternatives and benefits of the procedure the patient was deemed to be in satisfactory condition to undergo the  procedure.      Immediately before administration of medications the patient was re-assessed for adequacy to receive sedatives including the heart rate, respiratory rate, mental status, oxygen saturation, blood pressure and adequacy of pulmonary ventilation. These same parameters were continuously monitored throughout the procedure.    A Tuberculosis risk assessment was performed:  The patient has no known RISK of Tuberculosis    The procedure was performed in a negative airflow room: The patient could not be moved to a negative airflow room because of needed OR for the procedure    Maneuvers / Procedure:      Airway Examination: A complete airway examination was performed from the distal trachea to the subsegmental level in each lobe of both lungs.  Pertinent findings include JATIN apicoposterior endobronchial mass seen.         ION Robotic Bronchoscopy: Planning was performed on the laptop prior to the procedure. The ION successfully completed its pre-procedural check phase. The ION arm was oriented towards the ETT and docked successfully. The robotic catheter was inserted into the ETT and the guide was clamped down. Registration was then completed successfully. The catheter was advanced towards the JATIN Anterior lesion/nodule using the vision probe. Local confirmation of the lesion utilized with ENB-Targeting, Radial EBUS, and Fluoroscopy. Radial EBUS navigation: The 1.8mm 20MHz radial probe was inserted into the bronchoscope and used to navigate to the lesion. The lesion was concentric by ultrasound and fluoroscopy was used to confirm placement of the probe The lesion was biopsied using 23G FNA Needle. A total of 8 passes were performed. SAVANNA was present throughout the procedure. EBL was minimal. Epinephrine was not administered.  Guide catheter and vision probe was removed successfully. The ION platform was undocked without issues.       EBUS TBNA:   Station 2R; not evaluated .   Station 2L; not evaluated .    Station 4R; visualized and biopsied. A total of 3 biopsies were performed using 22G FNA Needle. .   Station 4L; visualized and biopsied. A total of 3 biopsies were performed using 22G FNA Needle.   Station 7; visualized and biopsied. A total of 3 biopsies were performed using 22G FNA Needle.   Station 10R; visualized and not biopsied. Reason: LN diameter was <5mm.   Station 10L;  visualized and not biopsied. Reason: LN diameter was <5mm.   Station 11R; visualized and biopsied. A total of 3 biopsies were performed using 22G FNA Needle.   Station 11L;visualized and biopsied. A total of 3 biopsies were performed using 22G FNA Needle.   Station 12R;  visualized and not biopsied. Reason: LN diameter was <5mm.   Station 12L;  visualized and not biopsied. Reason: LN diameter was <5mm.     Chanell was Absent      Any disposable equipment was visually inspected and deemed to be intact immediately post procedure.      Relevant Pictures  Taken but unfortunately not saved on the flash drive. The lesion was concentric looking.     Assessment and Recommendations:     Successful completion of ION bronchoscopy with JATIN mass FNA (lesional), LLL nodule was not biopsied.   EBUS TBNA from stations 11R, 4R, 7, 4L and 11L.   Ok to discharge once medically stable.   Follow up pathology results.          Valentine Shipley  Interventional pulmonary fellow  Pager: [phone]

## 2025-05-28 NOTE — DISCHARGE INSTRUCTIONS
Post Bronchoscopy Patient Instructions:    May 28, 2025  Nerissa Valentin    Your procedure completed (bronchoscopy with lung mass and lymph nodes biopsy) without any immediate complications.  You may cough up scant amount of blood for the next 12-24 hours. If you have excessive cough with blood, chest pain, shortness of breath, please report to the closest emergency room.    You may experience low grade (less than 100.5 F) fever next 24 hours, if so, you can take Tylenol. If the fever persists more than 24 hours, please contact to our office or your primary care provider.    Our office will call you with the results of samples taken during the procedure. Please note that you may get a result notification through  My Chart  before us calling you, as the Laboratories are instructed to release the results as soon as they are available to the patients and providers at the same time. Please allow your us 24-48 hours call you to discuss the results.    You may resume your diet as it was prior to procedure.    You may resume your medications after the procedure unless you are instructed to do differently.     Please follow instructions from the nursing staff upon discharge in terms of activity. In general, you should avoid any attention or motor skill requiring activities (e.g., driving or operating any motorized vehicle) for 24 hours as you might be still under the effect of sedation medications. Please make sure an adult to accompany you next 24 hours.     Should you have any question, please do not hesitate to call our office Blanca Robles [phone].     Please take a few moments to evaluate the care you received by me on this link: [email or web address]  Interventional Pulmonary Fellow

## 2025-05-28 NOTE — ANESTHESIA POSTPROCEDURE EVALUATION
Patient: Nerissa Valentin    Procedure: Procedure(s):  ION, BRONCHOSCOPY, ROBOT-ASSISTED, WITH BIOPSY, endobronchial ultrasound       Anesthesia Type:  General    Note:  Disposition: Outpatient   Postop Pain Control: Uneventful            Sign Out: Well controlled pain   PONV: No   Neuro/Psych: Uneventful            Sign Out: Acceptable/Baseline neuro status   Airway/Respiratory: Uneventful            Sign Out: Acceptable/Baseline resp. status   CV/Hemodynamics: Uneventful            Sign Out: Acceptable CV status; No obvious hypovolemia; No obvious fluid overload   Other NRE: NONE   DID A NON-ROUTINE EVENT OCCUR?            Last vitals:  Vitals Value Taken Time   /56 05/28/25 13:00   Temp 37  C (98.6  F) 05/28/25 12:45   Pulse 96 05/28/25 13:13   Resp 25 05/28/25 13:13   SpO2 88 % 05/28/25 13:13   Vitals shown include unfiled device data.    Electronically Signed By: Sandip Vaughn MD  May 28, 2025  1:14 PM

## 2025-05-28 NOTE — ANESTHESIA PROCEDURE NOTES
Airway       Patient location during procedure: OR       Procedure Start/Stop Times: 5/28/2025 10:25 AM  Staff -        Anesthesiologist:  Vikash Tsai MD       CRNA: Lori Silverman APRN CRNA       Performed By: CRNA  Consent for Airway        Urgency: elective  Indications and Patient Condition       Indications for airway management: marleen-procedural       Induction type:intravenous       Mask difficulty assessment: 1 - vent by mask    Final Airway Details       Final airway type: endotracheal airway       Successful airway: ETT - single  Endotracheal Airway Details        ETT size (mm): 8.5       Cuffed: yes       Cuff volume (mL): 8       Successful intubation technique: video laryngoscopy       VL Blade Size: Glidescope 3       Grade View of Cords: 1       Adjucts: stylet       Position: Right       Measured from: gums/teeth       Secured at (cm): 18       Bite block used: None    Post intubation assessment        Placement verified by: capnometry, equal breath sounds and chest rise        Number of attempts at approach: 1       Secured with: tape       Ease of procedure: easy       Dentition: Intact    Medication(s) Administered   Medication Administration Time: 5/28/2025 10:25 AM

## 2025-05-28 NOTE — ANESTHESIA CARE TRANSFER NOTE
Patient: Nerissa Valentin    Procedure: Procedure(s):  ION, BRONCHOSCOPY, ROBOT-ASSISTED, WITH BIOPSY, endobronchial ultrasound       Diagnosis: Pulmonary nodules [R91.8]  Diagnosis Additional Information: No value filed.    Anesthesia Type:   General     Note:    Oropharynx: oropharynx clear of all foreign objects  Level of Consciousness: drowsy  Oxygen Supplementation: face mask  Level of Supplemental Oxygen (L/min / FiO2): 6  Independent Airway: airway patency satisfactory and stable  Dentition: dentition unchanged  Vital Signs Stable: post-procedure vital signs reviewed and stable  Report to RN Given: handoff report given  Patient transferred to: PACU    Handoff Report: Identifed the Patient, Identified the Reponsible Provider, Reviewed the pertinent medical history, Discussed the surgical course, Reviewed Intra-OP anesthesia mangement and issues during anesthesia, Set expectations for post-procedure period and Allowed opportunity for questions and acknowledgement of understanding  Vitals:  Vitals Value Taken Time   /55 05/28/25 11:41   Temp     Pulse 88 05/28/25 11:43   Resp 20 05/28/25 11:43   SpO2 100 % 05/28/25 11:43   Vitals shown include unfiled device data.    Electronically Signed By: RICCI Peng CRNA  May 28, 2025  11:44 AM

## 2025-05-30 LAB
PATH REPORT.COMMENTS IMP SPEC: ABNORMAL
PATH REPORT.COMMENTS IMP SPEC: YES
PATH REPORT.FINAL DX SPEC: ABNORMAL
PATH REPORT.GROSS SPEC: ABNORMAL
PATH REPORT.MICROSCOPIC SPEC OTHER STN: ABNORMAL
PATH REPORT.RELEVANT HX SPEC: ABNORMAL

## 2025-06-02 ENCOUNTER — PATIENT OUTREACH (OUTPATIENT)
Dept: ONCOLOGY | Facility: CLINIC | Age: 80
End: 2025-06-02
Payer: COMMERCIAL

## 2025-06-02 ENCOUNTER — TELEPHONE (OUTPATIENT)
Dept: PULMONOLOGY | Facility: CLINIC | Age: 80
End: 2025-06-02
Payer: COMMERCIAL

## 2025-06-02 DIAGNOSIS — C34.92 PRIMARY LUNG ADENOCARCINOMA, LEFT (H): Primary | ICD-10-CM

## 2025-06-02 DIAGNOSIS — C34.12 MALIGNANT NEOPLASM OF UPPER LOBE, LEFT BRONCHUS OR LUNG (H): ICD-10-CM

## 2025-06-02 NOTE — TELEPHONE ENCOUNTER
Called patient with biopsy results. Biopsy of the largest nodule in JATIN is positive for adenocarcinoma. Lymph nodes were negative. Will order PET scan, MRI brain, and oncology referral. She also requested I send her PCP a message with the results.     Jayde Coffey MD  Interventional Pulmonary

## 2025-06-03 NOTE — TELEPHONE ENCOUNTER
RECORDS STATUS - ALL OTHER DIAGNOSIS      RECORDS RECEIVED FROM: [facility]   NOTES STATUS DETAILS   OFFICE NOTE from referring provider Epic Dr. Jayde Coffey   DISCHARGE SUMMARY from hospital [facility] 5/28/2025 - [facility]   OPERATIVE REPORT [facility] 5/28/2025 - ION, BRONCHOSCOPY, ROBOT-ASSISTED, WITH BIOPSY, endobronchial ultrasound ([place])    MEDICATION LIST [facility]    LABS     PATHOLOGY REPORTS [facility] 5/28/2025 - [id number]    ANYTHING RELATED TO DIAGNOSIS Epic 5/23/2025   IMAGING (NEED IMAGES & REPORT)     CT SCANS PACS CT Chest: 5/28/2025, 5/3/2025   XRAYS PACS Xray Chest: 5/28/2025-10/4/2023   PET PACS PET Eyes to Thighs: 6/12/2025

## 2025-06-03 NOTE — PROGRESS NOTES
New Patient Oncology Nurse Navigator Note     Referring provider: EKTA Moctezuma    Referring Clinic/Organization: [facility]     Referred to: Medical Oncology    Requested provider (if applicable): First available - did not specify     Referral Received: 06/02/25       Evaluation for : lung cancer     Clinical History (per Nurse review of records provided):      5/3/2025 CT chest  IMPRESSION:   1.  Multiple new pulmonary nodules and masses are seen in the lungs, several of which are cavitating. Findings are concerning for metastatic disease. Differential diagnosis also includes septate emboli or infectious process such as tuberculosis.   Pulmonary consultation is recommended. Consider follow-up PET/CT and 3 month CT chest follow-up exam.  2.  Patchy groundglass opacities are seen in the left lung which may suggest superimposed pneumonia.  3.  Cirrhotic appearance of the liver.       5/28/2025 CT chest  IMPRESSION: Multiple abnormal left sided pulmonary opacities including  cystic/cavitary inferior left upper lobe opacity abutting the major  fissure. Other slightly enlarging left lower lobe superior segment  nodular opacity. Bronchiectasis. Unchanged left lung base  consolidative nodule like opacity. Diffuse atherosclerosis. Multiple  nonenlarged lymph nodes. Coarse mitral annular calcification.    Final Diagnosis   A. LUNG, UPPER LOBE, LEFT, LEFT UPPER LOBE, ENDOBRONCHIAL ULTRASOUND-GUIDED FINE-NEEDLE ASPIRATION:  Interpretation  - Positive for malignancy  - Non-small cell carcinoma compatible with adenocarcinoma, see comment  Adequacy: Satisfactory for evaluation but limited by scant cellularity  Note: The cell block is extremely scant and insufficient tumor cells are present for molecular studies.      B. LYMPH NODE(S), 11R, ENDOBRONCHIAL ULTRASOUND-GUIDED FINE-NEEDLE ASPIRATION:  Interpretation -   - Polymorphous population of lymphocytes present  - No evidence of epithelial malignancy  Adequacy:  Satisfactory for evaluation      C. LYMPH NODE(S), 4R, ENDOBRONCHIAL ULTRASOUND-GUIDED FINE-NEEDLE ASPIRATION:  Interpretation -   - Polymorphous population of lymphocytes present  - No evidence of epithelial malignancy  Adequacy: Satisfactory for evaluation     D. LYMPH NODE(S), STATION 7, ENDOBRONCHIAL ULTRASOUND-GUIDED FINE-NEEDLE ASPIRATION:  Interpretation -   - Polymorphous population of lymphocytes present  - No evidence of epithelial malignancy  Adequacy: Satisfactory for evaluation     E. LYMPH NODE(S), 4L, ENDOBRONCHIAL ULTRASOUND-GUIDED FINE-NEEDLE ASPIRATION:  Interpretation -   - Polymorphous population of lymphocytes present  - No evidence of epithelial malignancy  Adequacy: Satisfactory for evaluation     F. LYMPH NODE(S), 11L, ENDOBRONCHIAL ULTRASOUND-GUIDED FINE-NEEDLE ASPIRATION:  Interpretation -   - Polymorphous population of lymphocytes present  - No evidence of epithelial malignancy  Adequacy: Satisfactory for evaluation         Electronically signed by Gurmeet Ryder MD on 5/30/2025 at 1425 CDT   Comment    A: Immunohistochemistry is performed on cell block A1 with appropriate controls. Tumor cells are positive for TTF-1 and CK7 while negative for CK20 and p40. The findings are supportive of adenocarcinoma of pulmonary origin. This case received intradepartmental peer review.     RESULT FOR IMMUNOHISTOCHEMICAL VENTANA CLONE  PD-L1 ASSAY     - TUMOR PROPORTION SCORE (TPS):   70% (Interpretation limited by low cellularity)   - INTERPRETATION: HIGH PD-L1 EXPRESSION (TPS >=50%)                  Clinical Assessment / Barriers to Care (Per Nurse):    Dr Coffey IP, is referring pt to [facility] for further evaluation and treatment of new lung cancer diagnosis. Dr Coffey has ordered PET and MRI Brain to complete staging. Pt lives in [place]; we will offer first available Onc in [place], with staging scans prior to consult. Or schedule per pt preference.      Records Location: [facility]     Records Needed:      N/A    Additional testing needed prior to consult:     PET, MR Brain preferred      Donell Carrillo, RN, BSN, OCN  Oncology New Patient Nurse Navigator   [facility] [place]  [phone]

## 2025-06-09 ENCOUNTER — HOSPITAL ENCOUNTER (OUTPATIENT)
Dept: MRI IMAGING | Facility: CLINIC | Age: 80
Discharge: HOME | End: 2025-06-09
Attending: STUDENT IN AN ORGANIZED HEALTH CARE EDUCATION/TRAINING PROGRAM | Admitting: STUDENT IN AN ORGANIZED HEALTH CARE EDUCATION/TRAINING PROGRAM
Payer: COMMERCIAL

## 2025-06-09 DIAGNOSIS — C34.92 PRIMARY LUNG ADENOCARCINOMA, LEFT (H): ICD-10-CM

## 2025-06-09 PROCEDURE — 70553 MRI BRAIN STEM W/O & W/DYE: CPT

## 2025-06-09 PROCEDURE — A9585 GADOBUTROL INJECTION: HCPCS | Performed by: STUDENT IN AN ORGANIZED HEALTH CARE EDUCATION/TRAINING PROGRAM

## 2025-06-09 PROCEDURE — 255N000002 HC RX 255 OP 636: Performed by: STUDENT IN AN ORGANIZED HEALTH CARE EDUCATION/TRAINING PROGRAM

## 2025-06-09 RX ORDER — GADOBUTROL 604.72 MG/ML
6 INJECTION INTRAVENOUS ONCE
Status: COMPLETED | OUTPATIENT
Start: 2025-06-09 | End: 2025-06-09

## 2025-06-09 RX ADMIN — GADOBUTROL 6 ML: 604.72 INJECTION INTRAVENOUS at 20:07

## 2025-06-12 ENCOUNTER — HOSPITAL ENCOUNTER (OUTPATIENT)
Dept: PET IMAGING | Facility: HOSPITAL | Age: 80
End: 2025-06-12
Attending: STUDENT IN AN ORGANIZED HEALTH CARE EDUCATION/TRAINING PROGRAM
Payer: COMMERCIAL

## 2025-06-12 DIAGNOSIS — C34.92 PRIMARY LUNG ADENOCARCINOMA, LEFT (H): ICD-10-CM

## 2025-06-12 DIAGNOSIS — C34.12 MALIGNANT NEOPLASM OF UPPER LOBE, LEFT BRONCHUS OR LUNG (H): ICD-10-CM

## 2025-06-12 LAB — GLUCOSE BLDC GLUCOMTR-MCNC: 96 MG/DL (ref 70–99)

## 2025-06-12 PROCEDURE — 343N000001 HC RX 343 MED OP 636: Performed by: STUDENT IN AN ORGANIZED HEALTH CARE EDUCATION/TRAINING PROGRAM

## 2025-06-12 PROCEDURE — 78815 PET IMAGE W/CT SKULL-THIGH: CPT | Mod: PI

## 2025-06-12 PROCEDURE — 82962 GLUCOSE BLOOD TEST: CPT

## 2025-06-12 PROCEDURE — A9552 F18 FDG: HCPCS | Performed by: STUDENT IN AN ORGANIZED HEALTH CARE EDUCATION/TRAINING PROGRAM

## 2025-06-12 RX ORDER — FLUDEOXYGLUCOSE F 18 200 MCI/ML
7-18 INJECTION, SOLUTION INTRAVENOUS ONCE
Status: COMPLETED | OUTPATIENT
Start: 2025-06-12 | End: 2025-06-12

## 2025-06-12 RX ADMIN — FLUDEOXYGLUCOSE F 18 10.2 MILLICURIE: 200 INJECTION, SOLUTION INTRAVENOUS at 07:27

## 2025-06-12 ASSESSMENT — ANXIETY QUESTIONNAIRES
1. FEELING NERVOUS, ANXIOUS, OR ON EDGE: SEVERAL DAYS
6. BECOMING EASILY ANNOYED OR IRRITABLE: SEVERAL DAYS
2. NOT BEING ABLE TO STOP OR CONTROL WORRYING: SEVERAL DAYS
IF YOU CHECKED OFF ANY PROBLEMS ON THIS QUESTIONNAIRE, HOW DIFFICULT HAVE THESE PROBLEMS MADE IT FOR YOU TO DO YOUR WORK, TAKE CARE OF THINGS AT HOME, OR GET ALONG WITH OTHER PEOPLE: SOMEWHAT DIFFICULT
4. TROUBLE RELAXING: SEVERAL DAYS
7. FEELING AFRAID AS IF SOMETHING AWFUL MIGHT HAPPEN: SEVERAL DAYS
3. WORRYING TOO MUCH ABOUT DIFFERENT THINGS: SEVERAL DAYS
GAD7 TOTAL SCORE: 6
5. BEING SO RESTLESS THAT IT IS HARD TO SIT STILL: NOT AT ALL
8. IF YOU CHECKED OFF ANY PROBLEMS, HOW DIFFICULT HAVE THESE MADE IT FOR YOU TO DO YOUR WORK, TAKE CARE OF THINGS AT HOME, OR GET ALONG WITH OTHER PEOPLE?: SOMEWHAT DIFFICULT
7. FEELING AFRAID AS IF SOMETHING AWFUL MIGHT HAPPEN: SEVERAL DAYS
GAD7 TOTAL SCORE: 6
GAD7 TOTAL SCORE: 6

## 2025-06-13 ENCOUNTER — PRE VISIT (OUTPATIENT)
Dept: ONCOLOGY | Facility: CLINIC | Age: 80
End: 2025-06-13
Payer: COMMERCIAL

## 2025-06-13 PROBLEM — C34.92 PRIMARY LUNG ADENOCARCINOMA, LEFT (H): Status: ACTIVE | Noted: 2025-06-13

## 2025-06-16 ENCOUNTER — OFFICE VISIT (OUTPATIENT)
Dept: FAMILY MEDICINE | Facility: CLINIC | Age: 80
End: 2025-06-16
Attending: PHYSICIAN ASSISTANT
Payer: COMMERCIAL

## 2025-06-16 VITALS
HEIGHT: 57 IN | WEIGHT: 135.4 LBS | TEMPERATURE: 97.8 F | HEART RATE: 55 BPM | OXYGEN SATURATION: 97 % | BODY MASS INDEX: 29.21 KG/M2 | RESPIRATION RATE: 16 BRPM

## 2025-06-16 DIAGNOSIS — D64.9 ANEMIA, UNSPECIFIED TYPE: ICD-10-CM

## 2025-06-16 DIAGNOSIS — K74.60 HEPATIC CIRRHOSIS, UNSPECIFIED HEPATIC CIRRHOSIS TYPE, UNSPECIFIED WHETHER ASCITES PRESENT (H): ICD-10-CM

## 2025-06-16 DIAGNOSIS — I10 PRIMARY HYPERTENSION: ICD-10-CM

## 2025-06-16 DIAGNOSIS — C34.92 PRIMARY LUNG ADENOCARCINOMA, LEFT (H): Primary | ICD-10-CM

## 2025-06-16 PROCEDURE — 99214 OFFICE O/P EST MOD 30 MIN: CPT | Performed by: PHYSICIAN ASSISTANT

## 2025-06-16 PROCEDURE — G2211 COMPLEX E/M VISIT ADD ON: HCPCS | Performed by: PHYSICIAN ASSISTANT

## 2025-06-16 PROCEDURE — 1111F DSCHRG MED/CURRENT MED MERGE: CPT | Performed by: PHYSICIAN ASSISTANT

## 2025-06-16 NOTE — LETTER
My Asthma Action Plan    Name: Nerissa Valentin   YOB: 1945  Date: 6/16/2025   My doctor: Sarah Carlos PA-C   My clinic: [facility]        My Rescue Medicine: Albuterol (Proair/Ventolin/Proventil HFA) 2-4 puffs EVERY 4 HOURS as needed. Use a spacer if recommended by your provider.   My Asthma Severity:   Intermittent / Exercise Induced  Know your asthma triggers:              GREEN ZONE   Good Control  I feel good  No cough or wheeze  Can work, sleep and play without asthma symptoms       Take your asthma control medicine every day.     If exercise triggers your asthma, take your rescue medication  15 minutes before exercise or sports, and  During exercise if you have asthma symptoms  Spacer to use with inhaler: If you have a spacer, make sure to use it with your inhaler             YELLOW ZONE Getting Worse  I have ANY of these:  I do not feel good  Cough or wheeze  Chest feels tight  Wake up at night   Keep taking your Green Zone medications  Start taking your rescue medicine:  every 20 minutes for up to 1 hour. Then every 4 hours for 24-48 hours.  If you stay in the Yellow Zone for more than 12-24 hours, contact your doctor.  If you do not return to the Green Zone in 12-24 hours or you get worse, start taking your oral steroid medicine if prescribed by your provider.           RED ZONE Medical Alert - Get Help  I have ANY of these:  I feel awful  Medicine is not helping  Breathing getting harder  Trouble walking or talking  Nose opens wide to breathe       Take your rescue medicine NOW  If your provider has prescribed an oral steroid medicine, start taking it NOW  Call your doctor NOW  If you are still in the Red Zone after 20 minutes and you have not reached your doctor:  Take your rescue medicine again and  Call 911 or go to the emergency room right away    See your regular doctor within 2 weeks of an Emergency Room or Urgent Care visit for follow-up  treatment.          Annual Reminders:  Meet with Asthma Educator,  Flu Shot in the Fall, consider Pneumonia Vaccination for patients with asthma (aged 19 and older).    [place] [facility] [place] [address] [place] [address]    Electronically signed by Sarah Carlos PA-C   Date: 06/16/25                    Asthma Triggers  How To Control Things That Make Your Asthma Worse    Triggers are things that make your asthma worse.  Look at the list below to help you find your triggers and   what you can do about them. You can help prevent asthma flare-ups by staying away from your triggers.      Trigger                                                          What you can do   Cigarette Smoke  Tobacco smoke can make asthma worse. Do not allow smoking in your home, car or around you.  Be sure no one smokes at a child s day care or school.  If you smoke, ask your health care provider for ways to help you quit.  Ask family members to quit too.  Ask your health care provider for a referral to Quit Plan to help you quit smoking, or call 0-160-018-PLAN.     Colds, Flu, Bronchitis  These are common triggers of asthma. Wash your hands often.  Don t touch your eyes, nose or mouth.  Get a flu shot every year.     Dust Mites  These are tiny bugs that live in cloth or carpet. They are too small to see. Wash sheets and blankets in hot water every week.   Encase pillows and mattress in dust mite proof covers.  Avoid having carpet if you can. If you have carpet, vacuum weekly.   Use a dust mask and HEPA vacuum.   Pollen and Outdoor Mold  Some people are allergic to trees, grass, or weed pollen, or molds. Try to keep your windows closed.  Limit time out doors when pollen count is high.   Ask you health care provider about taking medicine during allergy season.     Animal Dander  Some people are allergic to skin flakes, urine or saliva from pets with fur or feathers. Keep pets with fur or feathers out of your home.     If you can t keep the pet outdoors, then keep the pet out of your bedroom.  Keep the bedroom door closed.  Keep pets off cloth furniture and away from stuffed toys.     Mice, Rats, and Cockroaches  Some people are allergic to the waste from these pests.   Cover food and garbage.  Clean up spills and food crumbs.  Store grease in the refrigerator.   Keep food out of the bedroom.   Indoor Mold  This can be a trigger if your home has high moisture. Fix leaking faucets, pipes, or other sources of water.   Clean moldy surfaces.  Dehumidify basement if it is damp and smelly.   Smoke, Strong Odors, and Sprays  These can reduce air quality. Stay away from strong odors and sprays, such as perfume, powder, hair spray, paints, smoke incense, paint, cleaning products, candles and new carpet.   Exercise or Sports  Some people with asthma have this trigger. Be active!  Ask your doctor about taking medicine before sports or exercise to prevent symptoms.    Warm up for 5-10 minutes before and after sports or exercise.     Other Triggers of Asthma  Cold air:  Cover your nose and mouth with a scarf.  Sometimes laughing or crying can be a trigger.  Some medicines and food can trigger asthma.

## 2025-06-16 NOTE — PROGRESS NOTES
Assessment & Plan     Primary lung adenocarcinoma, left (H)  New diagnosis, treatment plan still being determined.  Coping ok but stressed.  Discussed coping strategies and supports.      Hepatic cirrhosis, unspecified hepatic cirrhosis type, unspecified whether ascites present (H)  Not ready to discuss yet, is still overwhelmed with new lung cancer diagnosis.    Anemia, unspecified type  Not ready to discuss yet, is still overwhelmed with new lung cancer diagnosis.    Primary hypertension  Uncontrolled today, and recently with stress of biopsy/diagnosis.  Encouraged to obtain some home BP readings.      MED REC REQUIRED  Post Medication Reconciliation Status: discharge medications reconciled, continue medications without change    The longitudinal plan of care for the diagnosis(es)/condition(s) as documented were addressed during this visit. Due to the added complexity in care, I will continue to support Nerissa in the subsequent management and with ongoing continuity of care.    Patient Instructions   Recheck appt in person with me on Fri July 25th, arrive 10:10 - just recheck where you're at with cancer, coping with cancer, do you want to address the other health issues, etc.  If do not end up wanting appt, ok if you cancel.      If need/want to see me sooner, if appt not available, talk to careteam or message me via My Chart.  We make time when needed.    988 crisis line if need someone to talk with, even if doesn't seem a crisis but wish to talk  Also option of counselor referral, or oncology nurse     Check some BP readings when away from medical centers.  Update me next visit.      Subjective   Nerissa is a 79 year old, presenting for the following health issues:  Hospital F/U        6/16/2025     9:57 AM   Additional Questions   Roomed by Abbie SMALLWOOD   Accompanied by Self      History of Present Illness       Mental Health Follow-up:  Patient presents to follow-up on Depression & Anxiety.Patient's depression since  "last visit has been:  Medium  The patient is having other symptoms associated with depression.  Patient's anxiety since last visit has been:  Medium  The patient is having other symptoms associated with anxiety.  Any significant life events: financial concerns  Patient is not feeling anxious or having panic attacks.  Patient has no concerns about alcohol or drug use.    She eats 0-1 servings of fruits and vegetables daily.She consumes 1 sweetened beverage(s) daily. She exercises with enough effort to increase her heart rate 3 or less days per week.   She is taking medications regularly.        Hospital Follow-up Visit:    Hospital/Nursing Home/IP Rehab Facility: [facility]  Most Recent Admission Date: 5/28/2025   Most Recent Admission Diagnosis: Pulmonary nodules - R91.8     Most Recent Discharge Date: 5/28/2025   Most Recent Discharge Diagnosis:    Do you have any other stressors you would like to discuss with your provider? OTHER: cancer diagnosis     Problems taking medications regularly:  None  Medication changes since discharge: None  Problems adhering to non-medication therapy:  None    Summary of hospitalization:  [facility] discharge summary not found, records reviewed  Diagnostic Tests/Treatments reviewed.  Follow up needed: oncology, radiation oncology  Other Healthcare Providers Involved in Patient s Care:         oncology, and soon radiation oncology  Update since discharge: stable.   Plan of care communicated with patient    \"Pretty much scared spitless.\"  Though admits oncologist was optimistic.  Primary adenocarcinoma of lung, looks to be lymph node mediastinal involvement, possible area on colon  Support from , her sisters, and granddaughter Fatou's side of the family  Trouble sleeping    Objective    Pulse 55   Temp 97.8  F (36.6  C) (Tympanic)   Resp 16   Ht 1.454 m (4' 9.25\")   Wt 61.4 kg (135 lb 6.4 oz)   SpO2 97%   BMI " 29.04 kg/m    Body mass index is 29.04 kg/m .    Signed Electronically by: Sarah Carlos PA-C

## 2025-06-16 NOTE — PATIENT INSTRUCTIONS
Recheck appt in person with me on Fri July 25th, arrive 10:10 - just recheck where you're at with cancer, coping with cancer, do you want to address the other health issues, etc.  If do not end up wanting appt, ok if you cancel.      If need/want to see me sooner, if appt not available, talk to careteam or message me via My Chart.  We make time when needed.    988 crisis line if need someone to talk with, even if doesn't seem a crisis but wish to talk  Also option of counselor referral, or oncology nurse     Check some BP readings when away from medical centers.  Update me next visit.

## 2025-06-24 NOTE — TELEPHONE ENCOUNTER
MEDICAL RECORDS REQUEST   Radiation Oncology  [address] [place] [address]  Fax: [phone]          FUTURE VISIT INFORMATION                                                   Nerissa Valentin, : 1945 scheduled for future visit at [facility] Radiation Oncology    RECORDS REQUESTED FOR VISIT                                                     LUNG     OFFICE NOTE from PCP Epic Sarah Carlos PA-C   OFFICE NOTE from medical oncologist Epic 2025 - Dr. Peter Friedell   OFFICE NOTE from pulmonologist Epic 2025 - Dr. Jayde Coffey   BRONCHOSCOPY/EUS/CT guided BX [facility] 2025 - ION, BRONCHOSCOPY, ROBOT-ASSISTED, WITH BIOPSY, endobronchial ultrasound ([place])    PFT Epic 2025   MEDICATION LIST [facility]    LABS     PATHOLOGY REPORTS [facility] 2025 - [id number]    ANYTHING RELATED TO DIAGNOSIS Epic 2025   IMAGING (NEED IMAGES & REPORT)     CT SCANS PACS CT Chest: 2025, 5/3/2025    XRAYS PACS Xray Chest: 2025-10/4/2023    PET PACS PET Eyes to Thighs: 2025

## 2025-06-30 NOTE — PROGRESS NOTES
RADIATION ONCOLOGY CONSULTATION  [facility]    DATE:  June 30, 2025    PATIENT NAME: Nerissa Valentin  MEDICAL RECORD NUMBER: [id number]    REFERRING PHYSICIAN:  Dr. Friedell    REASON FOR CONSULTATION: Consideration of  definitive radiotherapy for management of lung cancer.    STAGING(AJCC 8th ed): Clinical T3 N2 M0   STAGING GROUPING: IIIA (T3 N2 M0)    HISTOLOGY: Adenocarcinoma  GENOMICS:  Not obtained          HISTORY OF PRESENT ILLNESS: The patient is a 79 year old  who was found to have lung cancer.   Chest x-ray (11/2024) for a cough showed a possible infection in the left upper lung.  A follow-up chest x-ray (1/16/2025) at [facility]  showed an ill-defined opacity in the left upper lobe possibly representing atelectasis or infection.    A repeat chest x-ray (4/7/2025) showed interval decrease in the extent of the density of the left upper apex with a questionably new cavitary area in the left upper lobe.  Right lung was clear.    Chest CT(5/3/2025) showed a 3.4 x 2.3 cmcavitary lesion  in the posterior left upper lobe.  Multiple new pulmonary nodules and masses were seen in the lungs.  The findings were concerning for metastatic disease.      EBUS guided FNA( 5/28/2025) of [facility] mass showed adenocarcinoma.  Biopsy of the 11R, 4R, 4L, 11L showed no evidence of malignancy.  The TPS score was 70% with high PD-L1 expression.  The tumor cells were positive for TTF-1 and CK7 while negative for CK20 and p40     Brain MRI(6/9/2025) showed no metastasis.    PET CT scan(6/12/2025) showed a left upper lobe mass measuring 3.5 x 3.4 x 5.2 cm with SUV max of 20.7. Mildly FDG avid 0.6 cm left lower lobe nodule previously measured 0.9 cm on 05/28/2025 and was felt to be inflammatory.  There was a station 5 lymph node measuring 1.7 cm with SUV maximum of 5.5 suspicious for lizet metastasis.      She does have a history of iron deficiency and is taking oral iron.  She is a retired  LPN.  Has never smoked cigarettes and does not drink alcohol to excess.     PFT(5/9/2025) patient was unable to perform pulmonary function test due to persistent and strong cough. DLCO 56%.     PS (Zubrod): 1 (Restricted in physically strenuous activity but ambulatory and able to carry out work of a light or sedentary nature)  WEIGHT LOSS: 10 lbs      No hemoptysis.  The patient continues to have nonproductive cough and is unable to tolerate a additional pulmonary function test.  The patient's 10 to 15 pound weight loss occurred in the past 7 months and is currently able to walk approximately half a block without shortness of breath.      PMH:   Past Medical History:   Diagnosis Date    Asthma, mild persistent     Depressive disorder 1992    only child killed by drunk     Hematuria     Hyperlipidemia LDL goal < 130     Hypertension     Hypokalemia 03/15/2013    appears when she was on chlorthalidone    Kidney stone     Leucocytosis 01/29/2010    white count runs from 13-15      Osteoarthritis     Postmenopausal     HRT 10 years    Shortness of breath        PSH:  Past Surgical History:   Procedure Laterality Date    ABDOMEN SURGERY  1969, 1970, 1971    ovary removed x2 and abd. hyst.    ABDOMEN SURGERY      APPENDECTOMY  1971    APPENDECTOMY      ARTHROSCOPY SHOULDER ROTATOR CUFF REPAIR Bilateral     BIOPSY BREAST Right     BREAST BIOPSY, RT/LT  1999    Breat Biopsy RT    BRONCHOSCOPY, WITH BIOPSY, ROBOT ASSISTED N/A 5/28/2025    Procedure: ION, BRONCHOSCOPY, ROBOT-ASSISTED, WITH BIOPSY, endobronchial ultrasound;  Surgeon: Juna Camacho MD;  Location: [facility]    COLONOSCOPY N/A 03/08/2021    Procedure: COLONOSCOPY, WITH POLYPECTOMY AND BIOPSY;  Surgeon: Yadi Vital MD;  Location: [facility]    ESOPHAGOSCOPY, GASTROSCOPY, DUODENOSCOPY (EGD), COMBINED N/A 03/08/2021    Procedure: ESOPHAGOGASTRODUODENOSCOPY, WITH BIOPSY;  Surgeon: Yadi Vital MD;  Location: [facility]    ESOPHAGOSCOPY, GASTROSCOPY,  "DUODENOSCOPY (EGD), COMBINED N/A 06/14/2021    Procedure: ESOPHAGOGASTRODUODENOSCOPY, WITH BIOPSY;  Surgeon: Dante Jensen MD;  Location: [facility]    FLEXIBLE SIGMOIDOSCOPY  2000 ?    Dr. Cline at [facility]    HYSTERECTOMY      HYSTERECTOMY, PAP NO LONGER INDICATED      HYSTERECTOMY, VAGINAL  1971    LAPAROSCOPIC CYSTECTOMY OVARIAN (ONCOLOGY)      LITHOTRIPSY  2005    LITHOTRIPSY      OTHER SURGICAL HISTORY  01/01/1971    kidney stone removal    OTHER SURGICAL HISTORY  1971 or 72    open surgery - right kidney stone     OTHER SURGICAL HISTORY Bilateral     salpingo-oophorectomy    OVARIAN CYST REMOVAL  x2    PUNC/ASPIR BREAST CYST      ROTATOR CUFF REPAIR RT/LT  2007    left    ROTATOR CUFF REPAIR RT/LT  2009    right twice?    SALPINGO OOPHORECTOMY,R/L/HARLEY  1969 and 1970    Salpingo Oophorectomy, RT/LT/HARLEY    SURGICAL HISTORY OF -   1969, 1970    ovary cystectomy    URETEROSCOPY      x2    ZZC REMOVAL OF KIDNEY STONE  1971       MEDICATIONS:  Current Outpatient Medications   Medication Sig Dispense Refill    albuterol (PROAIR HFA/PROVENTIL HFA/VENTOLIN HFA) 108 (90 Base) MCG/ACT inhaler Inhale 2 puffs into the lungs every 4 hours as needed for shortness of breath or wheezing. 18 g 1    B-D LUER-MINH SYRINGE 25G X 5/8\" 3 ML MISC USE WITH VITAMIN B12 INJECTION WEEKLY X 1 MO THEN 1 PER MONTH 4 each 0    cyanocobalamin (CYANOCOBALAMIN) 1000 mcg/mL injection INJECT 1 ML (1,000 MCG) INTO THE MUSCLE ONCE PER MONTH AS DIRECTED 3 mL 3    lisinopril (ZESTRIL) 40 MG tablet Take 1 tablet (40 mg) by mouth daily. 100 tablet 3    omeprazole (PRILOSEC) 20 MG DR capsule Take 1 capsule (20 mg) by mouth daily. Stay on this long term due to ulcer history. 90 capsule 3    rosuvastatin (CRESTOR) 10 MG tablet Take 1 tablet (10 mg) by mouth daily. 100 tablet 3    sertraline (ZOLOFT) 100 MG tablet Take 1 tablet (100 mg) by mouth daily. 90 tablet 3       ALLERGY:  Allergies   Allergen Reactions    Influenza Virus Vaccine Dermatitis and Other " (See Comments)     2 years in a row after the flu shot did get  High fever,  Localized reaction .     Other [No Clinical Screening - See Comments]      CHROMIC GUT SUTURES CAUSE ABCESS    Pcn [Penicillins] Hives       FAMILY HISTORY:  Family History   Problem Relation Age of Onset    Arthritis Mother     C.A.D. Mother          of MI age 65    Heart Disease Mother     Hypertension Father     Diabetes Father     C.A.D. Father         early 70s    Heart Disease Father     Chronic Obstructive Pulmonary Disease Sister     Breast Cancer Sister         in 30s    Breast Cancer Sister         mid 40s    Parkinsonism Sister     Cerebrovascular Disease Maternal Grandfather     Cancer Maternal Grandfather     Cancer - colorectal Paternal Grandfather     Breast Cancer Maternal Cousin     Breast Cancer Maternal Cousin     Breast Cancer Sister     Breast Cancer Sister        SOCIAL HISTORY  Social History     Tobacco Use    Smoking status: Never    Smokeless tobacco: Never    Tobacco comments:     never smoked   Substance Use Topics    Alcohol use: Yes     Comment: rare        ROS: 10 point ROS reviewed as reported on the nursing assessment note.      PHYSICAL EXAMINATION:  VS: Vitals: BP (!) 157/73   Pulse 62   Resp 18   Wt 60.9 kg (134 lb 3.2 oz)   SpO2 98%   BMI 28.79 kg/m    BMI= Body mass index is 28.79 kg/m .   The patient was alert and oriented X 3.  LN: No palpable LNs in the neck or supraclavicular fossa.  Lungs: Clear to auscultation      IMPRESSION:  Medically inoperable [id number], adenocarcinoma of JATIN lung. No NGS possible.    RECOMMENDATION: The patient has a locally advanced non-small cell lung cancer involving the left upper lobe with possible involvement of station 5 lymph node based on the PET CT scan.  However, this station 5 lymph node was not accessible by EBUS.  She has also found to be inoperable based on the poor DLCO on pulmonary function test.  She could not complete the pulmonary function test due  to excessive coughing.    I recommend combined chemotherapy and radiotherapy to be given concurrently.  The concurrent chemoradiotherapy followed by immunotherapy may have the best outcome but we will have increased toxicity due to chemotherapy and radiation therapy given at the same time.  Patient appears to have good performance status and it is my opinion that she is able to tolerate the concurrent treatment.    I reviewed my recommendations with the patient and answered all questions including alternative therapies. I  also reviewed the early and late toxicities associated with radiotherapy. The toxicities are itemized on the consent form the  radiotherapy and these were reviewed with the patient.    I spent 60 minutes on the day of the consult to review the records, images, and to  the patient.     MARIA TERESA Romero M.D.  Department of Radiation Oncology  [facility]

## 2025-07-01 ENCOUNTER — APPOINTMENT (OUTPATIENT)
Dept: LAB | Facility: CLINIC | Age: 80
End: 2025-07-01
Payer: COMMERCIAL

## 2025-07-01 ENCOUNTER — PRE VISIT (OUTPATIENT)
Dept: RADIATION THERAPY | Facility: OUTPATIENT CENTER | Age: 80
End: 2025-07-01

## 2025-07-01 ENCOUNTER — OFFICE VISIT (OUTPATIENT)
Dept: RADIATION THERAPY | Facility: OUTPATIENT CENTER | Age: 80
End: 2025-07-01
Attending: INTERNAL MEDICINE
Payer: COMMERCIAL

## 2025-07-01 VITALS
RESPIRATION RATE: 18 BRPM | DIASTOLIC BLOOD PRESSURE: 73 MMHG | HEART RATE: 62 BPM | WEIGHT: 134.2 LBS | OXYGEN SATURATION: 98 % | SYSTOLIC BLOOD PRESSURE: 157 MMHG | BODY MASS INDEX: 28.79 KG/M2

## 2025-07-01 DIAGNOSIS — C34.12 PRIMARY MALIGNANT NEOPLASM OF LEFT UPPER LOBE OF LUNG (H): Primary | ICD-10-CM

## 2025-07-01 DIAGNOSIS — C34.92 PRIMARY LUNG ADENOCARCINOMA, LEFT (H): ICD-10-CM

## 2025-07-01 PROCEDURE — 36415 COLL VENOUS BLD VENIPUNCTURE: CPT | Performed by: INTERNAL MEDICINE

## 2025-07-01 NOTE — NURSING NOTE
"REASON FOR APPOINTMENT   Type of Cancer: Adenocarcinoma of Lung  Location: [facility]  Date of Symptom Onset: progressive cough since thanksgiving - abx's x 3 courses. Followed by Dr. Coffey, pulmonary nodule clinic    TREATMENT TO-DATE FOR THIS CANCER  Surgery ? Not offered due to PFT's   Chemotherapy ?  Dr. Friedell has discussed chemo   Other Treatments for this Cancer ? Discussion and education about radiation today    PERSONAL HISTORY OF CANCER   Previous Cancer ? no   Prior Radiation ? no   Prior Chemotherapy ? no   Prior Hormonal Therapy ? no     RECENT IMAGING STUDIES  PET  Brain MRI    REFERRALS NEEDED  None at this time    VITALS  BP (!) 157/73   Pulse 62   Resp 18   Wt 60.9 kg (134 lb 3.2 oz)   SpO2 98%   BMI 28.79 kg/m      PACEMAKER/IMPLANTED CARDIAC DEVICE no    PAIN  Denies    PSYCHOSOCIAL  Marital Status:   Patient lives in [facility] with spouse.  Number of children: 1 adult child, 1 granddaughter  Working status: retired LPN  Do you feel safe in your home? Yes    REVIEW OF SYSTEMS  Skin: negative  Eyes: glasses  Ears/Nose/Throat: negative  Respiratory: Dyspnea on exertion- with stairs most noticeable and Cough- \"coughing fits\" \"white phlegm\", uses inhaler prn   Cardiovascular: negative  Gastrointestinal: negative  Genitourinary: negative  Musculoskeletal: osteoarthritis  Neurologic: negative  Psychiatric: negative  Hematologic/Lymphatic/Immunologic: generalized fatigue  Endocrine: negative    WOMEN ONLY  Any chance you may be pregnant: No    Radiation Oncology Patient Teaching    Current Concern: patient and grand daughter ready to learn    Person involved with teaching: Patient and Granddaughter -Fatou  Patient asked Questions: Yes  Patient was cooperative: Yes  Patient was receptive (willing to accept information given): Yes    Education Assessment  Comprehension ability: Medium  Knowledge level: Medium  Factors affecting teaching: None    Education Materials Given  Radiation " Therapy and You    Educational Topics Discussed  Side effects, Medications, Activity, Nutrition, Adjustment to illness, and When to call MD/RN    Response To Teaching  More review necessary    GYN Only  Vaginal Dilator-given and educated: N/A    Do you have an advanced directive or living will? Yes  Are you DNR/DNI? No

## 2025-07-01 NOTE — LETTER
7/1/2025      Nerissa Valentin  [address] [place] [phone]      Dear Colleague,    Thank you for referring your patient, Nerissa Valentin, to the [facility]ATION [facility]. Please see a copy of my visit note below.      RADIATION ONCOLOGY CONSULTATION  [facility]YSICIANS    DATE:  June 30, 2025    PATIENT NAME: Nerissa Valentin  MEDICAL RECORD NUMBER: [id number]    REFERRING PHYSICIAN:  Dr. Friedell    REASON FOR CONSULTATION: Consideration of  definitive radiotherapy for management of lung cancer.    STAGING(AJCC 8th ed): Clinical T3 N2 M0   STAGING GROUPING: IIIA (T3 N2 M0)    HISTOLOGY: Adenocarcinoma  GENOMICS:  Not obtained          HISTORY OF PRESENT ILLNESS: The patient is a 79 year old  who was found to have lung cancer.   Chest x-ray (11/2024) for a cough showed a possible infection in the left upper lung.  A follow-up chest x-ray (1/16/2025) at [facility]  showed an ill-defined opacity in the left upper lobe possibly representing atelectasis or infection.    A repeat chest x-ray (4/7/2025) showed interval decrease in the extent of the density of the left upper apex with a questionably new cavitary area in the left upper lobe.  Right lung was clear.    Chest CT(5/3/2025) showed a 3.4 x 2.3 cmcavitary lesion  in the posterior left upper lobe.  Multiple new pulmonary nodules and masses were seen in the lungs.  The findings were concerning for metastatic disease.      EBUS guided FNA( 5/28/2025) of JATIN mass showed adenocarcinoma.  Biopsy of the 11R, 4R, 4L, 11L showed no evidence of malignancy.  The TPS score was 70% with high PD-L1 expression.  The tumor cells were positive for TTF-1 and CK7 while negative for CK20 and p40     Brain MRI(6/9/2025) showed no metastasis.    PET CT scan(6/12/2025) showed a left upper lobe mass measuring 3.5 x 3.4 x 5.2 cm with SUV max of 20.7. Mildly FDG avid 0.6 cm left lower lobe nodule previously measured 0.9 cm on  05/28/2025 and was felt to be inflammatory.  There was a station 5 lymph node measuring 1.7 cm with SUV maximum of 5.5 suspicious for lizet metastasis.      She does have a history of iron deficiency and is taking oral iron.  She is a retired LPN.  Has never smoked cigarettes and does not drink alcohol to excess.     PFT(5/9/2025) patient was unable to perform pulmonary function test due to persistent and strong cough. DLCO 56%.     PS (Zubrod): 1 (Restricted in physically strenuous activity but ambulatory and able to carry out work of a light or sedentary nature)  WEIGHT LOSS: 10 lbs      No hemoptysis.  The patient continues to have nonproductive cough and is unable to tolerate a additional pulmonary function test.  The patient's 10 to 15 pound weight loss occurred in the past 7 months and is currently able to walk approximately half a block without shortness of breath.      PMH:   Past Medical History:   Diagnosis Date     Asthma, mild persistent      Depressive disorder 1992    only child killed by drunk      Hematuria      Hyperlipidemia LDL goal < 130      Hypertension      Hypokalemia 03/15/2013    appears when she was on chlorthalidone     Kidney stone      Leucocytosis 01/29/2010    white count runs from 13-15       Osteoarthritis      Postmenopausal     HRT 10 years     Shortness of breath        PSH:  Past Surgical History:   Procedure Laterality Date     ABDOMEN SURGERY  1969, 1970, 1971    ovary removed x2 and abd. hyst.     ABDOMEN SURGERY       APPENDECTOMY  1971     APPENDECTOMY       ARTHROSCOPY SHOULDER ROTATOR CUFF REPAIR Bilateral      BIOPSY BREAST Right      BREAST BIOPSY, RT/LT  1999    Breat Biopsy RT     BRONCHOSCOPY, WITH BIOPSY, ROBOT ASSISTED N/A 5/28/2025    Procedure: ION, BRONCHOSCOPY, ROBOT-ASSISTED, WITH BIOPSY, endobronchial ultrasound;  Surgeon: Juan Camacho MD;  Location: [facility]     COLONOSCOPY N/A 03/08/2021    Procedure: COLONOSCOPY, WITH POLYPECTOMY AND BIOPSY;   "Surgeon: Yadi Vital MD;  Location: [facility]     ESOPHAGOSCOPY, GASTROSCOPY, DUODENOSCOPY (EGD), COMBINED N/A 03/08/2021    Procedure: ESOPHAGOGASTRODUODENOSCOPY, WITH BIOPSY;  Surgeon: Yadi Vital MD;  Location: [facility]     ESOPHAGOSCOPY, GASTROSCOPY, DUODENOSCOPY (EGD), COMBINED N/A 06/14/2021    Procedure: ESOPHAGOGASTRODUODENOSCOPY, WITH BIOPSY;  Surgeon: Dante Jensen MD;  Location: [facility]     FLEXIBLE SIGMOIDOSCOPY  2000 ?    Dr. Cline [facility]     HYSTERECTOMY       HYSTERECTOMY, PAP NO LONGER INDICATED       HYSTERECTOMY, VAGINAL  1971     LAPAROSCOPIC CYSTECTOMY OVARIAN (ONCOLOGY)       LITHOTRIPSY  2005     LITHOTRIPSY       OTHER SURGICAL HISTORY  01/01/1971    kidney stone removal     OTHER SURGICAL HISTORY  1971 or 72    open surgery - right kidney stone      OTHER SURGICAL HISTORY Bilateral     salpingo-oophorectomy     OVARIAN CYST REMOVAL  x2     PUNC/ASPIR BREAST CYST       ROTATOR CUFF REPAIR RT/LT  2007    left     ROTATOR CUFF REPAIR RT/LT  2009    right twice?     SALPINGO OOPHORECTOMY,R/L/HARLEY  1969 and 1970    Salpingo Oophorectomy, RT/LT/HARLEY     SURGICAL HISTORY OF -   1969, 1970    ovary cystectomy     URETEROSCOPY      x2     ZZC REMOVAL OF KIDNEY STONE  1971       MEDICATIONS:  Current Outpatient Medications   Medication Sig Dispense Refill     albuterol (PROAIR HFA/PROVENTIL HFA/VENTOLIN HFA) 108 (90 Base) MCG/ACT inhaler Inhale 2 puffs into the lungs every 4 hours as needed for shortness of breath or wheezing. 18 g 1     B-D LUER-MINH SYRINGE 25G X 5/8\" 3 ML MISC USE WITH VITAMIN B12 INJECTION WEEKLY X 1 MO THEN 1 PER MONTH 4 each 0     cyanocobalamin (CYANOCOBALAMIN) 1000 mcg/mL injection INJECT 1 ML (1,000 MCG) INTO THE MUSCLE ONCE PER MONTH AS DIRECTED 3 mL 3     lisinopril (ZESTRIL) 40 MG tablet Take 1 tablet (40 mg) by mouth daily. 100 tablet 3     omeprazole (PRILOSEC) 20 MG DR capsule Take 1 capsule (20 mg) by mouth daily. Stay on this long term due to ulcer history. 90 capsule " 3     rosuvastatin (CRESTOR) 10 MG tablet Take 1 tablet (10 mg) by mouth daily. 100 tablet 3     sertraline (ZOLOFT) 100 MG tablet Take 1 tablet (100 mg) by mouth daily. 90 tablet 3       ALLERGY:  Allergies   Allergen Reactions     Influenza Virus Vaccine Dermatitis and Other (See Comments)     2 years in a row after the flu shot did get  High fever,  Localized reaction .      Other [No Clinical Screening - See Comments]      CHROMIC GUT SUTURES CAUSE ABCESS     Pcn [Penicillins] Hives       FAMILY HISTORY:  Family History   Problem Relation Age of Onset     Arthritis Mother      C.A.D. Mother          of MI age 65     Heart Disease Mother      Hypertension Father      Diabetes Father      C.A.D. Father         early 70s     Heart Disease Father      Chronic Obstructive Pulmonary Disease Sister      Breast Cancer Sister         in 30s     Breast Cancer Sister         mid 40s     Parkinsonism Sister      Cerebrovascular Disease Maternal Grandfather      Cancer Maternal Grandfather      Cancer - colorectal Paternal Grandfather      Breast Cancer Maternal Cousin      Breast Cancer Maternal Cousin      Breast Cancer Sister      Breast Cancer Sister        SOCIAL HISTORY  Social History     Tobacco Use     Smoking status: Never     Smokeless tobacco: Never     Tobacco comments:     never smoked   Substance Use Topics     Alcohol use: Yes     Comment: rare        ROS: 10 point ROS reviewed as reported on the nursing assessment note.      PHYSICAL EXAMINATION:  VS: Vitals: BP (!) 157/73   Pulse 62   Resp 18   Wt 60.9 kg (134 lb 3.2 oz)   SpO2 98%   BMI 28.79 kg/m    BMI= Body mass index is 28.79 kg/m .   The patient was alert and oriented X 3.  LN: No palpable LNs in the neck or supraclavicular fossa.  Lungs: Clear to auscultation      IMPRESSION:  Medically inoperable [id number], adenocarcinoma of JATIN lung. No NGS possible.    RECOMMENDATION: The patient has a locally advanced non-small cell lung cancer involving  the left upper lobe with possible involvement of station 5 lymph node based on the PET CT scan.  However, this station 5 lymph node was not accessible by EBUS.  She has also found to be inoperable based on the poor DLCO on pulmonary function test.  She could not complete the pulmonary function test due to excessive coughing.    I recommend combined chemotherapy and radiotherapy to be given concurrently.  The concurrent chemoradiotherapy followed by immunotherapy may have the best outcome but we will have increased toxicity due to chemotherapy and radiation therapy given at the same time.  Patient appears to have good performance status and it is my opinion that she is able to tolerate the concurrent treatment.    I reviewed my recommendations with the patient and answered all questions including alternative therapies. I  also reviewed the early and late toxicities associated with radiotherapy. The toxicities are itemized on the consent form the  radiotherapy and these were reviewed with the patient.    I spent 60 minutes on the day of the consult to review the records, images, and to  the patient.     MARIA TERESA Romero M.D.  Department of Radiation Oncology  [facility]     Again, thank you for allowing me to participate in the care of your patient.        Sincerely,        Vince Romero MD    Electronically signed

## 2025-07-02 ENCOUNTER — ONCOLOGY VISIT (OUTPATIENT)
Dept: ONCOLOGY | Facility: CLINIC | Age: 80
End: 2025-07-02
Attending: INTERNAL MEDICINE
Payer: COMMERCIAL

## 2025-07-02 VITALS
TEMPERATURE: 98.9 F | WEIGHT: 134 LBS | SYSTOLIC BLOOD PRESSURE: 132 MMHG | OXYGEN SATURATION: 95 % | BODY MASS INDEX: 28.91 KG/M2 | RESPIRATION RATE: 16 BRPM | HEIGHT: 57 IN | HEART RATE: 75 BPM | DIASTOLIC BLOOD PRESSURE: 46 MMHG

## 2025-07-02 DIAGNOSIS — C34.92 PRIMARY LUNG ADENOCARCINOMA, LEFT (H): Primary | ICD-10-CM

## 2025-07-02 PROCEDURE — G0463 HOSPITAL OUTPT CLINIC VISIT: HCPCS | Performed by: INTERNAL MEDICINE

## 2025-07-02 PROCEDURE — G2211 COMPLEX E/M VISIT ADD ON: HCPCS | Performed by: INTERNAL MEDICINE

## 2025-07-02 PROCEDURE — 99215 OFFICE O/P EST HI 40 MIN: CPT | Performed by: INTERNAL MEDICINE

## 2025-07-02 ASSESSMENT — PAIN SCALES - GENERAL: PAINLEVEL_OUTOF10: NO PAIN (0)

## 2025-07-02 NOTE — LETTER
"7/2/2025      Nerissa Valentin  [address] [place] [phone]      Dear Colleague,    Thank you for referring your patient, Nerissa Valentin, to the [facility]. Please see a copy of my visit note below.    Oncology Rooming Note    July 2, 2025 10:38 AM   Nerissa Valentin is a 79 year old female who presents for:    Chief Complaint   Patient presents with     Oncology Clinic Visit     Primary lung adenocarcinoma, left - Provider and infusion      Initial Vitals: /46 (BP Location: Left arm, Patient Position: Sitting, Cuff Size: Adult Regular)   Pulse 75   Temp 98.9  F (37.2  C) (Tympanic)   Resp 16   Ht 1.454 m (4' 9.25\")   Wt 60.8 kg (134 lb)   SpO2 95%   BMI 28.74 kg/m   Estimated body mass index is 28.74 kg/m  as calculated from the following:    Height as of this encounter: 1.454 m (4' 9.25\").    Weight as of this encounter: 60.8 kg (134 lb). Body surface area is 1.57 meters squared.  No Pain (0) Comment: Data Unavailable   No LMP recorded. Patient has had a hysterectomy.  Allergies reviewed: Yes  Medications reviewed: Yes    Medications: Medication refills not needed today.  Pharmacy name entered into [facility]: [facility] [place] [address] [facility]    Frailty Screening:   Is the patient here for a new oncology consult visit in cancer care? 2. No    PHQ9:  Did this patient require a PHQ9?: No      Clinical concerns:  Follow up      Suly Rainey, [facility] Hematology and Oncology Progress Note    Patient: Nerissa Valentin  MRN: [id number]  Date of Service: Jul 2, 2025       Reason for Visit    I was consulted by Jayde Coffey MD for evaluation and management of newly diagnosed adenocarcinoma of the lung.      Encounter Diagnoses Assessment and Plan:    Problem List Items Addressed This Visit       Primary lung adenocarcinoma, left (H) - Primary    Relevant Orders    Adult Gen Surg  Referral    Magnesium    " Comprehensive metabolic panel    Infusion Appointment Request - Adult    Infusion Appointment Request - Adult    Infusion Appointment Request - Adult    Infusion Appointment Request - Adult    Infusion Appointment Request - Adult    Infusion Appointment Request - Adult    Infusion Appointment Request - Adult   Patient with newly diagnosed adenocarcinoma of the left lung.  There is mediastinal involvement by PET CT scan.  Primary tumor extends into the left hilum and left lower lobe.  Patient has reduced diffusing capacity and is not a candidate for lobectomy.  Brain MRI is negative for metastatic disease.  Patient is referred to radiation oncology for consideration of definitive treatment with chemotherapy and radiation.  Follow-up to be arranged after radiation oncology consultation.    Update 7/2/2025 patient is a candidate for chemoradiation.  Treatment plan is entered for carboplatin and paclitaxel.  Patient will need a chemotherapy port.  Follow-up is planned for day 1 cycle 1 of chemotherapy.        ______________________________________________________________________________    Staging History   Cancer Staging   Primary lung adenocarcinoma, left (H)  Staging form: Lung, AJCC V9  - Clinical: Stage IIIA (cT3, cN2a, cM0) - Signed by Friedell, Peter E, MD on 6/13/2025    ECOG Performance  1 - Can't do physically strenuous work, but fully ambulatory and can do light sedentary work.    History of Present Illness    Ms. Nerissa Valentin is a 79 year old woman with a history of hyperlipidemia hypertension.  In November 2024 she began to develop cough.  Chest x-ray at that time showed possible infection in the left upper lung.  On 5/3/2025 she had a CT scan of the chest which showed a cavitary lesion 3.4 x 2.3 cm in the posterior left upper lobe.  In 5/28/2025 patient had a endobronchial ultrasound fine-needle aspiration which was positive for adenocarcinoma.  Mediastinal lymph nodes that were sampled were negative  "for metastatic disease.  PET CT scan 6/12/2025 showed that the cancer had increased in size to 14 cm in maximum dimension.  There was invasion into the left hilum and into the lower lobe.  A FDG avid station 5 lymph node was suspicious for lizet metastases.  This lymph node was not included in the EBUS exam.    Presently the patient feels fairly well.  She has lost about 10 pounds.  And notes decreased appetite.  Aside from persistent cough, she said she is not short of breath at rest.  She has no change in bowel or bladder habit.  She has no peripheral neuropathy.  She has kept up with colonoscopy and mammograms.  She does have a history of iron deficiency and is taking oral iron.  She is a retired LPN.  She has never smoked cigarettes and does not drink alcohol to excess.      Review of systems.  Pertinent Findings are included in the History of Present Illness    Physical Exam    /46 (BP Location: Left arm, Patient Position: Sitting, Cuff Size: Adult Regular)   Pulse 75   Temp 98.9  F (37.2  C) (Tympanic)   Resp 16   Ht 1.454 m (4' 9.25\")   Wt 60.8 kg (134 lb)   SpO2 95%   BMI 28.74 kg/m       GENERAL APPEARANCE: 79-year-old woman in no apparent distress.  HEAD: Atraumatic; normocephalic; without lesions.  EYES: Conjunctiva, corneas and eyelids normal; pupils equal, round, reactive to light; No Icterus.  MOUTH/OROPHARYNX: Oral mucosa intact  NECK: Supple with no nodes.  LUNGS: Decreased breath sounds throughout   HEART: Regular rhythm and rate; S1 and S2 normal; no murmurs noted.  ABDOMEN: Soft; no masses or tenderness, no hepatosplenomegaly.  NEUROLOGIC: Alert and oriented.  No obvious focal findings.  EXTREMITIES: No cyanosis, or edema.  SKIN: No abnormal bruising or bleeding. No suspicious lesions noted on exposed skin.  PSYCHIATRIC: Mental status normal; no apparent psychiatric issues    Medications:    Current Outpatient Medications   Medication Sig Dispense Refill     albuterol (PROAIR " "HFA/PROVENTIL HFA/VENTOLIN HFA) 108 (90 Base) MCG/ACT inhaler Inhale 2 puffs into the lungs every 4 hours as needed for shortness of breath or wheezing. 18 g 1     B-D LUER-MINH SYRINGE 25G X 5/8\" 3 ML MISC USE WITH VITAMIN B12 INJECTION WEEKLY X 1 MO THEN 1 PER MONTH 4 each 0     cyanocobalamin (CYANOCOBALAMIN) 1000 mcg/mL injection INJECT 1 ML (1,000 MCG) INTO THE MUSCLE ONCE PER MONTH AS DIRECTED 3 mL 3     lisinopril (ZESTRIL) 40 MG tablet Take 1 tablet (40 mg) by mouth daily. 100 tablet 3     omeprazole (PRILOSEC) 20 MG DR capsule Take 1 capsule (20 mg) by mouth daily. Stay on this long term due to ulcer history. 90 capsule 3     rosuvastatin (CRESTOR) 10 MG tablet Take 1 tablet (10 mg) by mouth daily. 100 tablet 3     sertraline (ZOLOFT) 100 MG tablet Take 1 tablet (100 mg) by mouth daily. 90 tablet 3     No current facility-administered medications for this visit.           Past History    Past Medical History:   Diagnosis Date     Asthma, mild persistent      Depressive disorder 1992    only child killed by drunk      Hematuria      Hyperlipidemia LDL goal < 130      Hypertension      Hypokalemia 03/15/2013    appears when she was on chlorthalidone     Kidney stone      Leucocytosis 01/29/2010    white count runs from 13-15       Osteoarthritis      Postmenopausal     HRT 10 years     Shortness of breath      Past Surgical History:   Procedure Laterality Date     ABDOMEN SURGERY  1969, 1970, 1971    ovary removed x2 and abd. hyst.     ABDOMEN SURGERY       APPENDECTOMY  1971     APPENDECTOMY       ARTHROSCOPY SHOULDER ROTATOR CUFF REPAIR Bilateral      BIOPSY BREAST Right      BREAST BIOPSY, RT/LT  1999    Breat Biopsy RT     BRONCHOSCOPY, WITH BIOPSY, ROBOT ASSISTED N/A 5/28/2025    Procedure: ION, BRONCHOSCOPY, ROBOT-ASSISTED, WITH BIOPSY, endobronchial ultrasound;  Surgeon: Juan Camacho MD;  Location: [facility]     COLONOSCOPY N/A 03/08/2021    Procedure: COLONOSCOPY, WITH POLYPECTOMY AND " BIOPSY;  Surgeon: Yadi Vital MD;  Location: [facility]     ESOPHAGOSCOPY, GASTROSCOPY, DUODENOSCOPY (EGD), COMBINED N/A 2021    Procedure: ESOPHAGOGASTRODUODENOSCOPY, WITH BIOPSY;  Surgeon: Yadi Vital MD;  Location: [facility]     ESOPHAGOSCOPY, GASTROSCOPY, DUODENOSCOPY (EGD), COMBINED N/A 2021    Procedure: ESOPHAGOGASTRODUODENOSCOPY, WITH BIOPSY;  Surgeon: Dante Jensen MD;  Location: [facility]     FLEXIBLE SIGMOIDOSCOPY   ?    Dr. Cline at [facility]     HYSTERECTOMY       HYSTERECTOMY, PAP NO LONGER INDICATED       HYSTERECTOMY, VAGINAL       LAPAROSCOPIC CYSTECTOMY OVARIAN (ONCOLOGY)       LITHOTRIPSY       LITHOTRIPSY       OTHER SURGICAL HISTORY  1971    kidney stone removal     OTHER SURGICAL HISTORY   or     open surgery - right kidney stone      OTHER SURGICAL HISTORY Bilateral     salpingo-oophorectomy     OVARIAN CYST REMOVAL  x2     PUNC/ASPIR BREAST CYST       ROTATOR CUFF REPAIR RT/LT      left     ROTATOR CUFF REPAIR RT/LT      right twice?     SALPINGO OOPHORECTOMY,R/L/HARLEY   and     Salpingo Oophorectomy, RT/LT/HARLEY     SURGICAL HISTORY OF -   ,     ovary cystectomy     URETEROSCOPY      x2     ZZC REMOVAL OF KIDNEY STONE       Allergies   Allergen Reactions     Influenza Virus Vaccine Dermatitis and Other (See Comments)     2 years in a row after the flu shot did get  High fever,  Localized reaction .      Other [No Clinical Screening - See Comments]      CHROMIC GUT SUTURES CAUSE ABCESS     Pcn [Penicillins] Hives     Family History   Problem Relation Age of Onset     Arthritis Mother      C.A.D. Mother          of MI age 65     Heart Disease Mother      Hypertension Father      Diabetes Father      C.A.D. Father         early 70s     Heart Disease Father      Chronic Obstructive Pulmonary Disease Sister      Breast Cancer Sister         in 30s     Breast Cancer Sister         mid 40s     Parkinsonism Sister      Cerebrovascular Disease  Maternal Grandfather      Cancer Maternal Grandfather      Cancer - colorectal Paternal Grandfather      Breast Cancer Maternal Cousin      Breast Cancer Maternal Cousin      Breast Cancer Sister      Breast Cancer Sister      Social History     Socioeconomic History     Marital status:    Tobacco Use     Smoking status: Never     Smokeless tobacco: Never     Tobacco comments:     never smoked   Vaping Use     Vaping status: Never Used   Substance and Sexual Activity     Alcohol use: Yes     Comment: rare     Drug use: No     Sexual activity: Not Currently     Partners: Male     Birth control/protection: None   Other Topics Concern     Parent/sibling w/ CABG, MI or angioplasty before 65F 55M? No      Service No     Blood Transfusions No     Caffeine Concern Yes     Comment: 2-3 cups or cans a day     Occupational Exposure Yes     Comment: retired nurse med surg     Hobby Hazards No     Sleep Concern No     Stress Concern No     Weight Concern No     Special Diet No     Back Care No     Exercise Yes     Bike Helmet No     Seat Belt Yes     Self-Exams Yes     Social Drivers of Health     Financial Resource Strain: Low Risk  (1/12/2025)    Financial Resource Strain      Within the past 12 months, have you or your family members you live with been unable to get utilities (heat, electricity) when it was really needed?: No   Food Insecurity: Low Risk  (1/12/2025)    Food Insecurity      Within the past 12 months, did you worry that your food would run out before you got money to buy more?: No      Within the past 12 months, did the food you bought just not last and you didn t have money to get more?: No   Transportation Needs: Low Risk  (1/12/2025)    Transportation Needs      Within the past 12 months, has lack of transportation kept you from medical appointments, getting your medicines, non-medical meetings or appointments, work, or from getting things that you need?: No   Physical Activity:  Insufficiently Active (1/12/2025)    Exercise Vital Sign      Days of Exercise per Week: 7 days      Minutes of Exercise per Session: 20 min   Stress: No Stress Concern Present (1/12/2025)    [employer] - Occupational Stress Questionnaire      Feeling of Stress : Only a little   Social Connections: Unknown (1/12/2025)    Social Connection and Isolation Panel [NHANES]      Frequency of Social Gatherings with Friends and Family: Once a week   Interpersonal Safety: Low Risk  (5/28/2025)    Interpersonal Safety      Do you feel physically and emotionally safe where you currently live?: Yes      Within the past 12 months, have you been hit, slapped, kicked or otherwise physically hurt by someone?: No      Within the past 12 months, have you been humiliated or emotionally abused in other ways by your partner or ex-partner?: No   Housing Stability: Low Risk  (1/12/2025)    Housing Stability      Do you have housing? : Yes      Are you worried about losing your housing?: No           Lab Results    No results found for this or any previous visit (from the past 240 hours).      Imaging Results    PET Oncology (Eyes to Thighs)  Result Date: 6/13/2025  EXAM: PET ONCOLOGY (EYES TO THIGHS) LOCATION: [facility] DATE: 6/12/2025 INDICATION: Initial treatment planning and staging for malignant neoplasm of upper lobe, left bronchus or lung. Biopsy-proven left upper lobe adenocarcinoma. Additional left lung nodules. COMPARISON: CT chest 05/28/2025. CONTRAST: None. TECHNIQUE: Serum glucose level 96 mg/dL. One hour post intravenous administration of 10.2mCi F18 FDG, PET imaging was performed from the skull vertex to mid thighs, utilizing attenuation correction with concurrent axial CT and PET/CT image fusion. Dose reduction techniques were used. PET/CT FINDINGS: FDG avid partially cavitary left upper lobe mass measuring 3.5 x 3.4 x 5.2 cm with SUV max of 20.7, compatible with  biopsy-proven pulmonary adenocarcinoma. This mass invades through the left major fissure into the lower lobe and extends into the left hilum. FDG avid subaortic (station 5) lymph node measuring 1.7 x 0.8 cm with SUV max of 5.5, suspicious for lizet metastasis. Mildly FDG avid 0.6 cm left lower lobe nodule previously measured 0.9 cm on 05/28/2025 (series 4, image 155) and is therefore favored inflammatory. Focal FDG activity of a small bowel loop in the pelvis without corresponding mass lesion by noncontrast CT  (SUV max of 18.6). Mild focal FDG activity of the perineum is favored inflammatory or secondary to urinary contamination artifact. CT FINDINGS: Moderate intracranial senescent changes. Severe coronary artery calcifications. Trace left pleural effusion. Multiple additional left lung nodules without definite FDG uptake, noting that some are below the resolution of PET. Nonobstructing 7 mm right renal calculus. Cirrhotic liver morphology. Hysterectomy. Colonic diverticulosis. Heavy burden of vascular calcifications. Polyarticular degenerative changes including multilevel degenerative changes of the spine. Left rotator cuff repair. Moderate anterolisthesis of C3 on C4.     IMPRESSION: 1.  FDG avid left upper lobe mass compatible with biopsy-proven pulmonary adenocarcinoma. This mass invades through the left major fissure into the left lower lobe and extends into the left hilum. FDG avid subaortic lymph node, suspicious for lizet metastasis. No convincing findings of FDG avid distant metastatic disease. 2.  Mildly FDG avid left lower lobe nodule is favored inflammatory given decrease in size. Additional left lung nodules are without definite FDG uptake, noting that some are below the resolution of PET. Continued imaging follow-up of these nodules is recommended. 3.  Focal FDG activity of a small bowel loop in the pelvis without corresponding mass lesion by noncontrast CT. This is nonspecific and may simply  represent prominent physiologic activity but is ultimately indeterminate in etiology. This could be followed on future oncologic imaging or further evaluated with CT enterography.    MR Brain w/o & w Contrast  Result Date: 6/11/2025  EXAM: MR BRAIN W/O and W CONTRAST LOCATION: [facility] DATE: 6/9/2025 INDICATION: Newly diagnosed lung adenocarcinoma, rule out mets COMPARISON: None. CONTRAST: gadavist 6 ml TECHNIQUE: Routine multiplanar multisequence head MRI without and with intravenous contrast. FINDINGS: INTRACRANIAL CONTENTS: No acute or subacute infarct. No mass, acute hemorrhage, or extra-axial fluid collections. Patchy nonspecific T2/FLAIR hyperintensities within the cerebral white matter most consistent with mild to moderate chronic microvascular ischemic change. Moderate generalized cerebral atrophy. No hydrocephalus. Normal position of the cerebellar tonsils. No pathologic contrast enhancement. SELLA: No abnormality accounting for technique. OSSEOUS STRUCTURES/SOFT TISSUES: Normal marrow signal. The major intracranial vascular flow voids are maintained. ORBITS: No abnormality accounting for technique. SINUSES/MASTOIDS: No paranasal sinus mucosal disease. No middle ear or mastoid effusion.     IMPRESSION: 1.  Negative for intracranial neoplasm. 2.  No superimposed acute intracranial process.    The informed consent process has occurred, as I have provided the patient with an explanation of their lung cancer diagnosis as well as the prognosis of their disease. I discussed the risks, benefits and alternatives to treatment. Risks which are common with this treatment may include, but are not limited to nausea, vomiting, and diarrhea.  Low blood counts are frequently seen..  Peripheral neuropathy is a possibility.  Less common risks with this treatment may include, but are not limited to persistent neuropathy and damage to the bone marrow.. The patient was given the opportunity to ask  questions, and their questions were answered. The patient has consented to carboplatin and paclitaxel for chemotherapy with radiation for lung cancer.      I spent 39 minutes on the patient's visit today.  This included preparation for the visit, face-to-face time with the patient and documentation following the visit.  It did not include teaching or procedure time.    Signed by: Peter E. Friedell, MD          Again, thank you for allowing me to participate in the care of your patient.        Sincerely,        Peter E. Friedell, MD    Electronically signed

## 2025-07-02 NOTE — PROGRESS NOTES
"Oncology Rooming Note    July 2, 2025 10:38 AM   Nerissa Valentin is a 79 year old female who presents for:    Chief Complaint   Patient presents with    Oncology Clinic Visit     Primary lung adenocarcinoma, left - Provider and infusion      Initial Vitals: /46 (BP Location: Left arm, Patient Position: Sitting, Cuff Size: Adult Regular)   Pulse 75   Temp 98.9  F (37.2  C) (Tympanic)   Resp 16   Ht 1.454 m (4' 9.25\")   Wt 60.8 kg (134 lb)   SpO2 95%   BMI 28.74 kg/m   Estimated body mass index is 28.74 kg/m  as calculated from the following:    Height as of this encounter: 1.454 m (4' 9.25\").    Weight as of this encounter: 60.8 kg (134 lb). Body surface area is 1.57 meters squared.  No Pain (0) Comment: Data Unavailable   No LMP recorded. Patient has had a hysterectomy.  Allergies reviewed: Yes  Medications reviewed: Yes    Medications: Medication refills not needed today.  Pharmacy name entered into [facility]: [facility] [place], [place] - [facility]    Frailty Screening:   Is the patient here for a new oncology consult visit in cancer care? 2. No    PHQ9:  Did this patient require a PHQ9?: No      Clinical concerns:  Follow up      Suly Rainey CMA            "

## 2025-07-02 NOTE — PROGRESS NOTES
[facility] Hematology and Oncology Progress Note    Patient: Nerissa Valentin  MRN: [id number]  Date of Service: Jul 2, 2025       Reason for Visit    I was consulted by Jayde Coffey MD for evaluation and management of newly diagnosed adenocarcinoma of the lung.      Encounter Diagnoses Assessment and Plan:    Problem List Items Addressed This Visit       Primary lung adenocarcinoma, left (H) - Primary    Relevant Orders    Adult Gen Surg  Referral    Magnesium    Comprehensive metabolic panel    Infusion Appointment Request - Adult    Infusion Appointment Request - Adult    Infusion Appointment Request - Adult    Infusion Appointment Request - Adult    Infusion Appointment Request - Adult    Infusion Appointment Request - Adult    Infusion Appointment Request - Adult   Patient with newly diagnosed adenocarcinoma of the left lung.  There is mediastinal involvement by PET CT scan.  Primary tumor extends into the left hilum and left lower lobe.  Patient has reduced diffusing capacity and is not a candidate for lobectomy.  Brain MRI is negative for metastatic disease.  Patient is referred to radiation oncology for consideration of definitive treatment with chemotherapy and radiation.  Follow-up to be arranged after radiation oncology consultation.    Update 7/2/2025 patient is a candidate for chemoradiation.  Treatment plan is entered for carboplatin and paclitaxel.  Patient will need a chemotherapy port.  Follow-up is planned for day 1 cycle 1 of chemotherapy.        ______________________________________________________________________________    Staging History   Cancer Staging   Primary lung adenocarcinoma, left (H)  Staging form: Lung, AJCC V9  - Clinical: Stage IIIA (cT3, cN2a, cM0) - Signed by Friedell, Peter E, MD on 6/13/2025    ECOG Performance  1 - Can't do physically strenuous work, but fully ambulatory and can do light sedentary work.    History of Present Illness    Ms. Nerissa Valentin is  "a 79 year old woman with a history of hyperlipidemia hypertension.  In November 2024 she began to develop cough.  Chest x-ray at that time showed possible infection in the left upper lung.  On 5/3/2025 she had a CT scan of the chest which showed a cavitary lesion 3.4 x 2.3 cm in the posterior left upper lobe.  In 5/28/2025 patient had a endobronchial ultrasound fine-needle aspiration which was positive for adenocarcinoma.  Mediastinal lymph nodes that were sampled were negative for metastatic disease.  PET CT scan 6/12/2025 showed that the cancer had increased in size to 14 cm in maximum dimension.  There was invasion into the left hilum and into the lower lobe.  A FDG avid station 5 lymph node was suspicious for lizet metastases.  This lymph node was not included in the EBUS exam.    Presently the patient feels fairly well.  She has lost about 10 pounds.  And notes decreased appetite.  Aside from persistent cough, she said she is not short of breath at rest.  She has no change in bowel or bladder habit.  She has no peripheral neuropathy.  She has kept up with colonoscopy and mammograms.  She does have a history of iron deficiency and is taking oral iron.  She is a retired LPN.  She has never smoked cigarettes and does not drink alcohol to excess.      Review of systems.  Pertinent Findings are included in the History of Present Illness    Physical Exam    /46 (BP Location: Left arm, Patient Position: Sitting, Cuff Size: Adult Regular)   Pulse 75   Temp 98.9  F (37.2  C) (Tympanic)   Resp 16   Ht 1.454 m (4' 9.25\")   Wt 60.8 kg (134 lb)   SpO2 95%   BMI 28.74 kg/m       GENERAL APPEARANCE: 79-year-old woman in no apparent distress.  HEAD: Atraumatic; normocephalic; without lesions.  EYES: Conjunctiva, corneas and eyelids normal; pupils equal, round, reactive to light; No Icterus.  MOUTH/OROPHARYNX: Oral mucosa intact  NECK: Supple with no nodes.  LUNGS: Decreased breath sounds throughout   HEART: " "Regular rhythm and rate; S1 and S2 normal; no murmurs noted.  ABDOMEN: Soft; no masses or tenderness, no hepatosplenomegaly.  NEUROLOGIC: Alert and oriented.  No obvious focal findings.  EXTREMITIES: No cyanosis, or edema.  SKIN: No abnormal bruising or bleeding. No suspicious lesions noted on exposed skin.  PSYCHIATRIC: Mental status normal; no apparent psychiatric issues    Medications:    Current Outpatient Medications   Medication Sig Dispense Refill    albuterol (PROAIR HFA/PROVENTIL HFA/VENTOLIN HFA) 108 (90 Base) MCG/ACT inhaler Inhale 2 puffs into the lungs every 4 hours as needed for shortness of breath or wheezing. 18 g 1    B-D LUER-MINH SYRINGE 25G X 5/8\" 3 ML MISC USE WITH VITAMIN B12 INJECTION WEEKLY X 1 MO THEN 1 PER MONTH 4 each 0    cyanocobalamin (CYANOCOBALAMIN) 1000 mcg/mL injection INJECT 1 ML (1,000 MCG) INTO THE MUSCLE ONCE PER MONTH AS DIRECTED 3 mL 3    lisinopril (ZESTRIL) 40 MG tablet Take 1 tablet (40 mg) by mouth daily. 100 tablet 3    omeprazole (PRILOSEC) 20 MG DR capsule Take 1 capsule (20 mg) by mouth daily. Stay on this long term due to ulcer history. 90 capsule 3    rosuvastatin (CRESTOR) 10 MG tablet Take 1 tablet (10 mg) by mouth daily. 100 tablet 3    sertraline (ZOLOFT) 100 MG tablet Take 1 tablet (100 mg) by mouth daily. 90 tablet 3     No current facility-administered medications for this visit.           Past History    Past Medical History:   Diagnosis Date    Asthma, mild persistent     Depressive disorder 1992    only child killed by drunk     Hematuria     Hyperlipidemia LDL goal < 130     Hypertension     Hypokalemia 03/15/2013    appears when she was on chlorthalidone    Kidney stone     Leucocytosis 01/29/2010    white count runs from 13-15      Osteoarthritis     Postmenopausal     HRT 10 years    Shortness of breath      Past Surgical History:   Procedure Laterality Date    ABDOMEN SURGERY  1969, 1970, 1971    ovary removed x2 and abd. hyst.    ABDOMEN SURGERY  "     APPENDECTOMY  1971    APPENDECTOMY      ARTHROSCOPY SHOULDER ROTATOR CUFF REPAIR Bilateral     BIOPSY BREAST Right     BREAST BIOPSY, RT/LT  1999    Breat Biopsy RT    BRONCHOSCOPY, WITH BIOPSY, ROBOT ASSISTED N/A 5/28/2025    Procedure: ION, BRONCHOSCOPY, ROBOT-ASSISTED, WITH BIOPSY, endobronchial ultrasound;  Surgeon: Juan Camacho MD;  Location: [facility]    COLONOSCOPY N/A 03/08/2021    Procedure: COLONOSCOPY, WITH POLYPECTOMY AND BIOPSY;  Surgeon: Yadi Vital MD;  Location: [facility]    ESOPHAGOSCOPY, GASTROSCOPY, DUODENOSCOPY (EGD), COMBINED N/A 03/08/2021    Procedure: ESOPHAGOGASTRODUODENOSCOPY, WITH BIOPSY;  Surgeon: Yadi Vital MD;  Location: [facility]    ESOPHAGOSCOPY, GASTROSCOPY, DUODENOSCOPY (EGD), COMBINED N/A 06/14/2021    Procedure: ESOPHAGOGASTRODUODENOSCOPY, WITH BIOPSY;  Surgeon: Dante Jensen MD;  Location: [facility]    FLEXIBLE SIGMOIDOSCOPY  2000 ?    Dr. Clien at [facility]    HYSTERECTOMY      HYSTERECTOMY, PAP NO LONGER INDICATED      HYSTERECTOMY, VAGINAL  1971    LAPAROSCOPIC CYSTECTOMY OVARIAN (ONCOLOGY)      LITHOTRIPSY  2005    LITHOTRIPSY      OTHER SURGICAL HISTORY  01/01/1971    kidney stone removal    OTHER SURGICAL HISTORY  1971 or 72    open surgery - right kidney stone     OTHER SURGICAL HISTORY Bilateral     salpingo-oophorectomy    OVARIAN CYST REMOVAL  x2    PUNC/ASPIR BREAST CYST      ROTATOR CUFF REPAIR RT/LT  2007    left    ROTATOR CUFF REPAIR RT/LT  2009    right twice?    SALPINGO OOPHORECTOMY,R/L/HARLEY  1969 and 1970    Salpingo Oophorectomy, RT/LT/HARLEY    SURGICAL HISTORY OF -   1969, 1970    ovary cystectomy    URETEROSCOPY      x2    ZZC REMOVAL OF KIDNEY STONE  1971     Allergies   Allergen Reactions    Influenza Virus Vaccine Dermatitis and Other (See Comments)     2 years in a row after the flu shot did get  High fever,  Localized reaction .     Other [No Clinical Screening - See Comments]      CHROMIC GUT SUTURES CAUSE ABCESS    Pcn [Penicillins] Hives     Family  History   Problem Relation Age of Onset    Arthritis Mother     C.A.D. Mother          of MI age 65    Heart Disease Mother     Hypertension Father     Diabetes Father     C.A.D. Father         early 70s    Heart Disease Father     Chronic Obstructive Pulmonary Disease Sister     Breast Cancer Sister         in 30s    Breast Cancer Sister         mid 40s    Parkinsonism Sister     Cerebrovascular Disease Maternal Grandfather     Cancer Maternal Grandfather     Cancer - colorectal Paternal Grandfather     Breast Cancer Maternal Cousin     Breast Cancer Maternal Cousin     Breast Cancer Sister     Breast Cancer Sister      Social History     Socioeconomic History    Marital status:    Tobacco Use    Smoking status: Never    Smokeless tobacco: Never    Tobacco comments:     never smoked   Vaping Use    Vaping status: Never Used   Substance and Sexual Activity    Alcohol use: Yes     Comment: rare    Drug use: No    Sexual activity: Not Currently     Partners: Male     Birth control/protection: None   Other Topics Concern    Parent/sibling w/ CABG, MI or angioplasty before 65F 55M? No     Service No    Blood Transfusions No    Caffeine Concern Yes     Comment: 2-3 cups or cans a day    Occupational Exposure Yes     Comment: retired nurse med surg    Hobby Hazards No    Sleep Concern No    Stress Concern No    Weight Concern No    Special Diet No    Back Care No    Exercise Yes    Bike Helmet No    Seat Belt Yes    Self-Exams Yes     Social Drivers of Health     Financial Resource Strain: Low Risk  (2025)    Financial Resource Strain     Within the past 12 months, have you or your family members you live with been unable to get utilities (heat, electricity) when it was really needed?: No   Food Insecurity: Low Risk  (2025)    Food Insecurity     Within the past 12 months, did you worry that your food would run out before you got money to buy more?: No     Within the past 12 months, did the  food you bought just not last and you didn t have money to get more?: No   Transportation Needs: Low Risk  (1/12/2025)    Transportation Needs     Within the past 12 months, has lack of transportation kept you from medical appointments, getting your medicines, non-medical meetings or appointments, work, or from getting things that you need?: No   Physical Activity: Insufficiently Active (1/12/2025)    Exercise Vital Sign     Days of Exercise per Week: 7 days     Minutes of Exercise per Session: 20 min   Stress: No Stress Concern Present (1/12/2025)    [employer] - Occupational Stress Questionnaire     Feeling of Stress : Only a little   Social Connections: Unknown (1/12/2025)    Social Connection and Isolation Panel [NHANES]     Frequency of Social Gatherings with Friends and Family: Once a week   Interpersonal Safety: Low Risk  (5/28/2025)    Interpersonal Safety     Do you feel physically and emotionally safe where you currently live?: Yes     Within the past 12 months, have you been hit, slapped, kicked or otherwise physically hurt by someone?: No     Within the past 12 months, have you been humiliated or emotionally abused in other ways by your partner or ex-partner?: No   Housing Stability: Low Risk  (1/12/2025)    Housing Stability     Do you have housing? : Yes     Are you worried about losing your housing?: No           Lab Results    No results found for this or any previous visit (from the past 240 hours).      Imaging Results    PET Oncology (Eyes to Thighs)  Result Date: 6/13/2025  EXAM: PET ONCOLOGY (EYES TO THIGHS) LOCATION: [facility] DATE: 6/12/2025 INDICATION: Initial treatment planning and staging for malignant neoplasm of upper lobe, left bronchus or lung. Biopsy-proven left upper lobe adenocarcinoma. Additional left lung nodules. COMPARISON: CT chest 05/28/2025. CONTRAST: None. TECHNIQUE: Serum glucose level 96 mg/dL. One hour post  intravenous administration of 10.2mCi F18 FDG, PET imaging was performed from the skull vertex to mid thighs, utilizing attenuation correction with concurrent axial CT and PET/CT image fusion. Dose reduction techniques were used. PET/CT FINDINGS: FDG avid partially cavitary left upper lobe mass measuring 3.5 x 3.4 x 5.2 cm with SUV max of 20.7, compatible with biopsy-proven pulmonary adenocarcinoma. This mass invades through the left major fissure into the lower lobe and extends into the left hilum. FDG avid subaortic (station 5) lymph node measuring 1.7 x 0.8 cm with SUV max of 5.5, suspicious for lizet metastasis. Mildly FDG avid 0.6 cm left lower lobe nodule previously measured 0.9 cm on 05/28/2025 (series 4, image 155) and is therefore favored inflammatory. Focal FDG activity of a small bowel loop in the pelvis without corresponding mass lesion by noncontrast CT  (SUV max of 18.6). Mild focal FDG activity of the perineum is favored inflammatory or secondary to urinary contamination artifact. CT FINDINGS: Moderate intracranial senescent changes. Severe coronary artery calcifications. Trace left pleural effusion. Multiple additional left lung nodules without definite FDG uptake, noting that some are below the resolution of PET. Nonobstructing 7 mm right renal calculus. Cirrhotic liver morphology. Hysterectomy. Colonic diverticulosis. Heavy burden of vascular calcifications. Polyarticular degenerative changes including multilevel degenerative changes of the spine. Left rotator cuff repair. Moderate anterolisthesis of C3 on C4.     IMPRESSION: 1.  FDG avid left upper lobe mass compatible with biopsy-proven pulmonary adenocarcinoma. This mass invades through the left major fissure into the left lower lobe and extends into the left hilum. FDG avid subaortic lymph node, suspicious for lizet metastasis. No convincing findings of FDG avid distant metastatic disease. 2.  Mildly FDG avid left lower lobe nodule is favored  inflammatory given decrease in size. Additional left lung nodules are without definite FDG uptake, noting that some are below the resolution of PET. Continued imaging follow-up of these nodules is recommended. 3.  Focal FDG activity of a small bowel loop in the pelvis without corresponding mass lesion by noncontrast CT. This is nonspecific and may simply represent prominent physiologic activity but is ultimately indeterminate in etiology. This could be followed on future oncologic imaging or further evaluated with CT enterography.    MR Brain w/o & w Contrast  Result Date: 6/11/2025  EXAM: MR BRAIN W/O and W CONTRAST LOCATION: [facility] DATE: 6/9/2025 INDICATION: Newly diagnosed lung adenocarcinoma, rule out mets COMPARISON: None. CONTRAST: gadavist 6 ml TECHNIQUE: Routine multiplanar multisequence head MRI without and with intravenous contrast. FINDINGS: INTRACRANIAL CONTENTS: No acute or subacute infarct. No mass, acute hemorrhage, or extra-axial fluid collections. Patchy nonspecific T2/FLAIR hyperintensities within the cerebral white matter most consistent with mild to moderate chronic microvascular ischemic change. Moderate generalized cerebral atrophy. No hydrocephalus. Normal position of the cerebellar tonsils. No pathologic contrast enhancement. SELLA: No abnormality accounting for technique. OSSEOUS STRUCTURES/SOFT TISSUES: Normal marrow signal. The major intracranial vascular flow voids are maintained. ORBITS: No abnormality accounting for technique. SINUSES/MASTOIDS: No paranasal sinus mucosal disease. No middle ear or mastoid effusion.     IMPRESSION: 1.  Negative for intracranial neoplasm. 2.  No superimposed acute intracranial process.    The informed consent process has occurred, as I have provided the patient with an explanation of their lung cancer diagnosis as well as the prognosis of their disease. I discussed the risks, benefits and alternatives to treatment. Risks  which are common with this treatment may include, but are not limited to nausea, vomiting, and diarrhea.  Low blood counts are frequently seen..  Peripheral neuropathy is a possibility.  Less common risks with this treatment may include, but are not limited to persistent neuropathy and damage to the bone marrow.. The patient was given the opportunity to ask questions, and their questions were answered. The patient has consented to carboplatin and paclitaxel for chemotherapy with radiation for lung cancer.      I spent 39 minutes on the patient's visit today.  This included preparation for the visit, face-to-face time with the patient and documentation following the visit.  It did not include teaching or procedure time.    Signed by: Peter E. Friedell, MD

## 2025-07-07 ENCOUNTER — OFFICE VISIT (OUTPATIENT)
Dept: SURGERY | Facility: CLINIC | Age: 80
End: 2025-07-07
Payer: COMMERCIAL

## 2025-07-07 ENCOUNTER — MYC MEDICAL ADVICE (OUTPATIENT)
Dept: FAMILY MEDICINE | Facility: CLINIC | Age: 80
End: 2025-07-07

## 2025-07-07 VITALS
TEMPERATURE: 98.5 F | SYSTOLIC BLOOD PRESSURE: 139 MMHG | BODY MASS INDEX: 28.74 KG/M2 | DIASTOLIC BLOOD PRESSURE: 70 MMHG | WEIGHT: 134 LBS | HEART RATE: 89 BPM

## 2025-07-07 DIAGNOSIS — C34.92 PRIMARY LUNG ADENOCARCINOMA, LEFT (H): Primary | ICD-10-CM

## 2025-07-07 PROCEDURE — 3075F SYST BP GE 130 - 139MM HG: CPT | Performed by: STUDENT IN AN ORGANIZED HEALTH CARE EDUCATION/TRAINING PROGRAM

## 2025-07-07 PROCEDURE — 1126F AMNT PAIN NOTED NONE PRSNT: CPT | Performed by: STUDENT IN AN ORGANIZED HEALTH CARE EDUCATION/TRAINING PROGRAM

## 2025-07-07 PROCEDURE — 99203 OFFICE O/P NEW LOW 30 MIN: CPT | Performed by: STUDENT IN AN ORGANIZED HEALTH CARE EDUCATION/TRAINING PROGRAM

## 2025-07-07 PROCEDURE — 3078F DIAST BP <80 MM HG: CPT | Performed by: STUDENT IN AN ORGANIZED HEALTH CARE EDUCATION/TRAINING PROGRAM

## 2025-07-07 ASSESSMENT — PATIENT HEALTH QUESTIONNAIRE - PHQ9
SUM OF ALL RESPONSES TO PHQ QUESTIONS 1-9: 6
SUM OF ALL RESPONSES TO PHQ QUESTIONS 1-9: 6
10. IF YOU CHECKED OFF ANY PROBLEMS, HOW DIFFICULT HAVE THESE PROBLEMS MADE IT FOR YOU TO DO YOUR WORK, TAKE CARE OF THINGS AT HOME, OR GET ALONG WITH OTHER PEOPLE: SOMEWHAT DIFFICULT

## 2025-07-07 ASSESSMENT — ASTHMA QUESTIONNAIRES
QUESTION_1 LAST FOUR WEEKS HOW MUCH OF THE TIME DID YOUR ASTHMA KEEP YOU FROM GETTING AS MUCH DONE AT WORK, SCHOOL OR AT HOME: SOME OF THE TIME
QUESTION_4 LAST FOUR WEEKS HOW OFTEN HAVE YOU USED YOUR RESCUE INHALER OR NEBULIZER MEDICATION (SUCH AS ALBUTEROL): TWO OR THREE TIMES PER WEEK
ACT_TOTALSCORE: 13
QUESTION_3 LAST FOUR WEEKS HOW OFTEN DID YOUR ASTHMA SYMPTOMS (WHEEZING, COUGHING, SHORTNESS OF BREATH, CHEST TIGHTNESS OR PAIN) WAKE YOU UP AT NIGHT OR EARLIER THAN USUAL IN THE MORNING: TWO OR THREE NIGHTS A WEEK
QUESTION_2 LAST FOUR WEEKS HOW OFTEN HAVE YOU HAD SHORTNESS OF BREATH: ONCE A DAY
QUESTION_5 LAST FOUR WEEKS HOW WOULD YOU RATE YOUR ASTHMA CONTROL: SOMEWHAT CONTROLLED

## 2025-07-07 ASSESSMENT — PAIN SCALES - GENERAL: PAINLEVEL_OUTOF10: NO PAIN (0)

## 2025-07-07 NOTE — PROGRESS NOTES
Surgical Consultation/History and Physical  [facility]    Nerissa is seen in consultation for port placement, at the request of Sarah Carlos PA-C.    Chief Complaint:  left lung cancer    HPI:  Nerissa Valentin presents in consultation today for evaluation of infusion port placement. She has a history of left lung cancer. The patient is right-hand dominant, has never had central venous access, pneumothorax, lung surgery, history of dialysis or renal failure, and denies repetitive movements with the upper extremity (such as pitching, chopping wood, bowling, or hunting).      Patient Active Problem List   Diagnosis    HTN (hypertension)    Dyslipidemia    Mild intermittent asthma without complication    Dysthymic disorder    Osteoarthritis    Vitamin B12 deficiency anemia    Family history of diabetes mellitus    Calculus of kidney    Severe anemia    Chronic gastritis    Primary lung adenocarcinoma, left (H)       Past Medical History:   Diagnosis Date    Asthma, mild persistent     Depressive disorder 1992    only child killed by drunk     Hematuria     Hyperlipidemia LDL goal < 130     Hypertension     Hypokalemia 03/15/2013    appears when she was on chlorthalidone    Kidney stone     Leucocytosis 01/29/2010    white count runs from 13-15      Osteoarthritis     Postmenopausal     HRT 10 years    Shortness of breath        Past Surgical History:   Procedure Laterality Date    ABDOMEN SURGERY  1969, 1970, 1971    ovary removed x2 and abd. hyst.    ABDOMEN SURGERY      APPENDECTOMY  1971    APPENDECTOMY      ARTHROSCOPY SHOULDER ROTATOR CUFF REPAIR Bilateral     BIOPSY BREAST Right     BREAST BIOPSY, RT/LT  1999    Breat Biopsy RT    BRONCHOSCOPY, WITH BIOPSY, ROBOT ASSISTED N/A 5/28/2025    Procedure: ION, BRONCHOSCOPY, ROBOT-ASSISTED, WITH BIOPSY, endobronchial ultrasound;  Surgeon: Juan Camacho MD;  Location: [facility]    COLONOSCOPY N/A 03/08/2021    Procedure:  COLONOSCOPY, WITH POLYPECTOMY AND BIOPSY;  Surgeon: Yadi Vital MD;  Location: [facility]    ESOPHAGOSCOPY, GASTROSCOPY, DUODENOSCOPY (EGD), COMBINED N/A 2021    Procedure: ESOPHAGOGASTRODUODENOSCOPY, WITH BIOPSY;  Surgeon: Yadi Vital MD;  Location: [facility]    ESOPHAGOSCOPY, GASTROSCOPY, DUODENOSCOPY (EGD), COMBINED N/A 2021    Procedure: ESOPHAGOGASTRODUODENOSCOPY, WITH BIOPSY;  Surgeon: Dante Jensen MD;  Location: [facility]    FLEXIBLE SIGMOIDOSCOPY   ?    Dr. [facility]Cine at [facility]    HYSTERECTOMY      HYSTERECTOMY, PAP NO LONGER INDICATED      HYSTERECTOMY, VAGINAL      LAPAROSCOPIC CYSTECTOMY OVARIAN (ONCOLOGY)      LITHOTRIPSY      LITHOTRIPSY      OTHER SURGICAL HISTORY  1971    kidney stone removal    OTHER SURGICAL HISTORY   or     open surgery - right kidney stone     OTHER SURGICAL HISTORY Bilateral     salpingo-oophorectomy    OVARIAN CYST REMOVAL  x2    PUNC/ASPIR BREAST CYST      ROTATOR CUFF REPAIR RT/LT      left    ROTATOR CUFF REPAIR RT/LT      right twice?    SALPINGO OOPHORECTOMY,R/L/HARLEY   and     Salpingo Oophorectomy, RT/LT/HARLEY    SURGICAL HISTORY OF -   ,     ovary cystectomy    URETEROSCOPY      x2    ZZC REMOVAL OF KIDNEY STONE         Family History   Problem Relation Age of Onset    Arthritis Mother     C.A.D. Mother          of MI age 65    Heart Disease Mother     Hypertension Father     Diabetes Father     C.A.D. Father         early 70s    Heart Disease Father     Chronic Obstructive Pulmonary Disease Sister     Breast Cancer Sister         in 30s    Breast Cancer Sister         mid 40s    Parkinsonism Sister     Cerebrovascular Disease Maternal Grandfather     Cancer Maternal Grandfather     Cancer - colorectal Paternal Grandfather     Breast Cancer Maternal Cousin     Breast Cancer Maternal Cousin        Social History     Tobacco Use    Smoking status: Never    Smokeless tobacco: Never    Tobacco comments:     never smoked  "  Substance Use Topics    Alcohol use: Yes     Comment: rare        History   Drug Use No       Current Outpatient Medications   Medication Sig Dispense Refill    albuterol (PROAIR HFA/PROVENTIL HFA/VENTOLIN HFA) 108 (90 Base) MCG/ACT inhaler Inhale 2 puffs into the lungs every 4 hours as needed for shortness of breath or wheezing. 18 g 1    B-D LUER-MINH SYRINGE 25G X 5/8\" 3 ML MISC USE WITH VITAMIN B12 INJECTION WEEKLY X 1 MO THEN 1 PER MONTH 4 each 0    cyanocobalamin (CYANOCOBALAMIN) 1000 mcg/mL injection INJECT 1 ML (1,000 MCG) INTO THE MUSCLE ONCE PER MONTH AS DIRECTED 3 mL 3    lisinopril (ZESTRIL) 40 MG tablet Take 1 tablet (40 mg) by mouth daily. 100 tablet 3    omeprazole (PRILOSEC) 20 MG DR capsule Take 1 capsule (20 mg) by mouth daily. Stay on this long term due to ulcer history. 90 capsule 3    rosuvastatin (CRESTOR) 10 MG tablet Take 1 tablet (10 mg) by mouth daily. 100 tablet 3    sertraline (ZOLOFT) 100 MG tablet Take 1 tablet (100 mg) by mouth daily. 90 tablet 3       Allergies   Allergen Reactions    Influenza Virus Vaccine Dermatitis and Other (See Comments)     2 years in a row after the flu shot did get  High fever,  Localized reaction .     Other [No Clinical Screening - See Comments]      CHROMIC GUT SUTURES CAUSE ABCESS    Pcn [Penicillins] Hives       Review of Systems:   Constitutional - Denies fevers, weight loss, malaise, lethargy  Neuro - Denies tremors or seizures  Pulmon - Denies SOB, dyspnea, hemoptysis, chronic cough or use of an inhaler  CV - Denies CP, SOB, lower extremity edema, difficulty w/ stairs, has never used NTG  GI - Denies hematemesis, BRBPR, melena, chronic diarrhea or epigastric pain   - Denies hematuria, difficulty voiding, h/o STDs  Hematology - Denies blood clotting disorders, chronic anemias  Dermatology - No melanomas or skin cancers  Rheumatology - No h/o RA  Pysch - Denies depression, bipolar d/o or schizophrenia    Physical Exam:  /70 (BP Location: Left " "arm, Patient Position: Chair, Cuff Size: Adult Regular)   Pulse 89   Temp 98.5  F (36.9  C) (Tympanic)   Wt 60.8 kg (134 lb)   BMI 28.74 kg/m      Constitutional- No acute distress, well nourished, non-toxic  Eyes: Anicteric, no injection.  PERRL  ENT:  Normocephalic, atraumatic, Nose midline, moist mucus membranes  Neck - supple, no LAD  Respiratory- Clear to auscultation bilaterally, good inspiratory effort  Cardiovascular - Heart RRR, no lift's, thrills, murmurs, rubs, or gallop.  No peripheral edema.  No clubbing.  Neuro - No focal neuro deficits, Alert and oriented x 3  Psych: Appropriate mood and affect  Musculoskeletal: Normal gait, symmetric strength.  FROM upper and lower extremities.  Skin: Warm, Dry    LABS:     Lab Results   Component Value Date    WBC 9.4 05/15/2025    HGB 11.6 (L) 05/15/2025    HCT 37.0 05/15/2025     05/15/2025     (L) 05/15/2025      No results found for: \"INR\", \"PROTIME\"       INFORMED CONSENT:     A discussion of the indications, risks, benefits and alternatives to surgery was held with the patient, and the risks discussed include beeding, infection, thrombosis, stenosis, port malfunction or malposition, air embolism, pneumothorax, hemothorax, or cardiac arrythmia . The patient understood the information given, questions addressed, and she wishes to proceed. She understands the need for maintenance of the infusion port at least once a month while in place.    ASSESSMENT AND PLAN:     Ms. Nerissa Valentin is in need of an infusion port for neoadjuvant chemotherapy, and is being expedited to surgery to facilitate care. I anticipate this will be placed in the right internal jugular vein. She understood the information given, and wishes to proceed accordingly.    Candelario Norris MD on 7/7/2025 at 10:04 AM          "

## 2025-07-07 NOTE — LETTER
7/7/2025      Nerissa Valentin  [address] [place] [id number]      Dear Colleague,    Thank you for referring your patient, Nerissa Valentin, to the [facility]. Please see a copy of my visit note below.    Surgical Consultation/History and Physical  [facility]    Nerissa is seen in consultation for port placement, at the request of Sarah Carlos PA-C.    Chief Complaint:  left lung cancer    HPI:  Nerissa Valentin presents in consultation today for evaluation of infusion port placement. She has a history of left lung cancer. The patient is right-hand dominant, has never had central venous access, pneumothorax, lung surgery, history of dialysis or renal failure, and denies repetitive movements with the upper extremity (such as pitching, chopping wood, bowling, or hunting).      Patient Active Problem List   Diagnosis     HTN (hypertension)     Dyslipidemia     Mild intermittent asthma without complication     Dysthymic disorder     Osteoarthritis     Vitamin B12 deficiency anemia     Family history of diabetes mellitus     Calculus of kidney     Severe anemia     Chronic gastritis     Primary lung adenocarcinoma, left (H)       Past Medical History:   Diagnosis Date     Asthma, mild persistent      Depressive disorder 1992    only child killed by drunk      Hematuria      Hyperlipidemia LDL goal < 130      Hypertension      Hypokalemia 03/15/2013    appears when she was on chlorthalidone     Kidney stone      Leucocytosis 01/29/2010    white count runs from 13-15       Osteoarthritis      Postmenopausal     HRT 10 years     Shortness of breath        Past Surgical History:   Procedure Laterality Date     ABDOMEN SURGERY  1969, 1970, 1971    ovary removed x2 and abd. hyst.     ABDOMEN SURGERY       APPENDECTOMY  1971     APPENDECTOMY       ARTHROSCOPY SHOULDER ROTATOR CUFF REPAIR Bilateral      BIOPSY BREAST Right      BREAST BIOPSY, RT/LT  1999     Breat Biopsy RT     BRONCHOSCOPY, WITH BIOPSY, ROBOT ASSISTED N/A 2025    Procedure: ION, BRONCHOSCOPY, ROBOT-ASSISTED, WITH BIOPSY, endobronchial ultrasound;  Surgeon: Juan Camacho MD;  Location: [facility]     COLONOSCOPY N/A 2021    Procedure: COLONOSCOPY, WITH POLYPECTOMY AND BIOPSY;  Surgeon: Yadi Vital MD;  Location: [facility]     ESOPHAGOSCOPY, GASTROSCOPY, DUODENOSCOPY (EGD), COMBINED N/A 2021    Procedure: ESOPHAGOGASTRODUODENOSCOPY, WITH BIOPSY;  Surgeon: Yadi Vital MD;  Location: [facility]     ESOPHAGOSCOPY, GASTROSCOPY, DUODENOSCOPY (EGD), COMBINED N/A 2021    Procedure: ESOPHAGOGASTRODUODENOSCOPY, WITH BIOPSY;  Surgeon: Dante Jensen MD;  Location: [facility]     FLEXIBLE SIGMOIDOSCOPY   ?    Dr. Cline at [facility]     HYSTERECTOMY       HYSTERECTOMY, PAP NO LONGER INDICATED       HYSTERECTOMY, VAGINAL       LAPAROSCOPIC CYSTECTOMY OVARIAN (ONCOLOGY)       LITHOTRIPSY       LITHOTRIPSY       OTHER SURGICAL HISTORY  1971    kidney stone removal     OTHER SURGICAL HISTORY   or 72    open surgery - right kidney stone      OTHER SURGICAL HISTORY Bilateral     salpingo-oophorectomy     OVARIAN CYST REMOVAL  x2     PUNC/ASPIR BREAST CYST       ROTATOR CUFF REPAIR RT/LT      left     ROTATOR CUFF REPAIR RT/LT      right twice?     SALPINGO OOPHORECTOMY,R/L/HARLEY   and     Salpingo Oophorectomy, RT/LT/HARLEY     SURGICAL HISTORY OF -   ,     ovary cystectomy     URETEROSCOPY      x2     ZZC REMOVAL OF KIDNEY STONE         Family History   Problem Relation Age of Onset     Arthritis Mother      C.A.D. Mother          of MI age 65     Heart Disease Mother      Hypertension Father      Diabetes Father      C.A.D. Father         early 70s     Heart Disease Father      Chronic Obstructive Pulmonary Disease Sister      Breast Cancer Sister         in 30s     Breast Cancer Sister         mid 40s     Parkinsonism Sister      Cerebrovascular Disease  "Maternal Grandfather      Cancer Maternal Grandfather      Cancer - colorectal Paternal Grandfather      Breast Cancer Maternal Cousin      Breast Cancer Maternal Cousin        Social History     Tobacco Use     Smoking status: Never     Smokeless tobacco: Never     Tobacco comments:     never smoked   Substance Use Topics     Alcohol use: Yes     Comment: rare        History   Drug Use No       Current Outpatient Medications   Medication Sig Dispense Refill     albuterol (PROAIR HFA/PROVENTIL HFA/VENTOLIN HFA) 108 (90 Base) MCG/ACT inhaler Inhale 2 puffs into the lungs every 4 hours as needed for shortness of breath or wheezing. 18 g 1     B-D LUER-MINH SYRINGE 25G X 5/8\" 3 ML MISC USE WITH VITAMIN B12 INJECTION WEEKLY X 1 MO THEN 1 PER MONTH 4 each 0     cyanocobalamin (CYANOCOBALAMIN) 1000 mcg/mL injection INJECT 1 ML (1,000 MCG) INTO THE MUSCLE ONCE PER MONTH AS DIRECTED 3 mL 3     lisinopril (ZESTRIL) 40 MG tablet Take 1 tablet (40 mg) by mouth daily. 100 tablet 3     omeprazole (PRILOSEC) 20 MG DR capsule Take 1 capsule (20 mg) by mouth daily. Stay on this long term due to ulcer history. 90 capsule 3     rosuvastatin (CRESTOR) 10 MG tablet Take 1 tablet (10 mg) by mouth daily. 100 tablet 3     sertraline (ZOLOFT) 100 MG tablet Take 1 tablet (100 mg) by mouth daily. 90 tablet 3       Allergies   Allergen Reactions     Influenza Virus Vaccine Dermatitis and Other (See Comments)     2 years in a row after the flu shot did get  High fever,  Localized reaction .      Other [No Clinical Screening - See Comments]      CHROMIC GUT SUTURES CAUSE ABCESS     Pcn [Penicillins] Hives       Review of Systems:   Constitutional - Denies fevers, weight loss, malaise, lethargy  Neuro - Denies tremors or seizures  Pulmon - Denies SOB, dyspnea, hemoptysis, chronic cough or use of an inhaler  CV - Denies CP, SOB, lower extremity edema, difficulty w/ stairs, has never used NTG  GI - Denies hematemesis, BRBPR, melena, chronic " "diarrhea or epigastric pain   - Denies hematuria, difficulty voiding, h/o STDs  Hematology - Denies blood clotting disorders, chronic anemias  Dermatology - No melanomas or skin cancers  Rheumatology - No h/o RA  Pysch - Denies depression, bipolar d/o or schizophrenia    Physical Exam:  /70 (BP Location: Left arm, Patient Position: Chair, Cuff Size: Adult Regular)   Pulse 89   Temp 98.5  F (36.9  C) (Tympanic)   Wt 60.8 kg (134 lb)   BMI 28.74 kg/m      Constitutional- No acute distress, well nourished, non-toxic  Eyes: Anicteric, no injection.  PERRL  ENT:  Normocephalic, atraumatic, Nose midline, moist mucus membranes  Neck - supple, no LAD  Respiratory- Clear to auscultation bilaterally, good inspiratory effort  Cardiovascular - Heart RRR, no lift's, thrills, murmurs, rubs, or gallop.  No peripheral edema.  No clubbing.  Neuro - No focal neuro deficits, Alert and oriented x 3  Psych: Appropriate mood and affect  Musculoskeletal: Normal gait, symmetric strength.  FROM upper and lower extremities.  Skin: Warm, Dry    LABS:     Lab Results   Component Value Date    WBC 9.4 05/15/2025    HGB 11.6 (L) 05/15/2025    HCT 37.0 05/15/2025     05/15/2025     (L) 05/15/2025      No results found for: \"INR\", \"PROTIME\"       INFORMED CONSENT:     A discussion of the indications, risks, benefits and alternatives to surgery was held with the patient, and the risks discussed include beeding, infection, thrombosis, stenosis, port malfunction or malposition, air embolism, pneumothorax, hemothorax, or cardiac arrythmia . The patient understood the information given, questions addressed, and she wishes to proceed. She understands the need for maintenance of the infusion port at least once a month while in place.    ASSESSMENT AND PLAN:     Ms. Nerissa Valentin is in need of an infusion port for neoadjuvant chemotherapy, and is being expedited to surgery to facilitate care. I anticipate this will be placed in " the right internal jugular vein. She understood the information given, and wishes to proceed accordingly.    Candelario Norris MD on 7/7/2025 at 10:04 AM            Again, thank you for allowing me to participate in the care of your patient.        Sincerely,        Candelario Norris MD    Electronically signed

## 2025-07-07 NOTE — NURSING NOTE
"Initial /70 (BP Location: Left arm, Patient Position: Chair, Cuff Size: Adult Regular)   Pulse 89   Temp 98.5  F (36.9  C) (Tympanic)   Wt 60.8 kg (134 lb)   BMI 28.74 kg/m   Estimated body mass index is 28.74 kg/m  as calculated from the following:    Height as of 7/2/25: 1.454 m (4' 9.25\").    Weight as of this encounter: 60.8 kg (134 lb). .  Celina Trevino LPN    "

## 2025-07-08 ENCOUNTER — ANESTHESIA EVENT (OUTPATIENT)
Dept: SURGERY | Facility: CLINIC | Age: 80
End: 2025-07-08
Payer: COMMERCIAL

## 2025-07-08 ENCOUNTER — OFFICE VISIT (OUTPATIENT)
Dept: FAMILY MEDICINE | Facility: CLINIC | Age: 80
End: 2025-07-08
Payer: COMMERCIAL

## 2025-07-08 VITALS
HEIGHT: 58 IN | BODY MASS INDEX: 27.5 KG/M2 | TEMPERATURE: 99.7 F | HEART RATE: 92 BPM | WEIGHT: 131 LBS | SYSTOLIC BLOOD PRESSURE: 138 MMHG | RESPIRATION RATE: 16 BRPM | OXYGEN SATURATION: 96 % | DIASTOLIC BLOOD PRESSURE: 64 MMHG

## 2025-07-08 DIAGNOSIS — Z01.818 PREOP GENERAL PHYSICAL EXAM: Primary | ICD-10-CM

## 2025-07-08 DIAGNOSIS — D51.9 ANEMIA DUE TO VITAMIN B12 DEFICIENCY, UNSPECIFIED B12 DEFICIENCY TYPE: ICD-10-CM

## 2025-07-08 DIAGNOSIS — K29.50 CHRONIC GASTRITIS WITHOUT BLEEDING, UNSPECIFIED GASTRITIS TYPE: ICD-10-CM

## 2025-07-08 DIAGNOSIS — I10 PRIMARY HYPERTENSION: ICD-10-CM

## 2025-07-08 DIAGNOSIS — E78.5 DYSLIPIDEMIA: ICD-10-CM

## 2025-07-08 DIAGNOSIS — J45.20 MILD INTERMITTENT ASTHMA WITHOUT COMPLICATION: ICD-10-CM

## 2025-07-08 DIAGNOSIS — C34.92 PRIMARY LUNG ADENOCARCINOMA, LEFT (H): ICD-10-CM

## 2025-07-08 LAB
ALBUMIN SERPL BCG-MCNC: 3.6 G/DL (ref 3.5–5.2)
ALP SERPL-CCNC: 99 U/L (ref 40–150)
ALT SERPL W P-5'-P-CCNC: 7 U/L (ref 0–50)
ANION GAP SERPL CALCULATED.3IONS-SCNC: 15 MMOL/L (ref 7–15)
AST SERPL W P-5'-P-CCNC: 28 U/L (ref 0–45)
BILIRUB SERPL-MCNC: 0.8 MG/DL
BUN SERPL-MCNC: 14.7 MG/DL (ref 8–23)
CALCIUM SERPL-MCNC: 9.3 MG/DL (ref 8.8–10.4)
CHLORIDE SERPL-SCNC: 105 MMOL/L (ref 98–107)
CREAT SERPL-MCNC: 1.48 MG/DL (ref 0.51–0.95)
EGFRCR SERPLBLD CKD-EPI 2021: 36 ML/MIN/1.73M2
GLUCOSE SERPL-MCNC: 93 MG/DL (ref 70–99)
HCO3 SERPL-SCNC: 22 MMOL/L (ref 22–29)
MAGNESIUM SERPL-MCNC: 1.8 MG/DL (ref 1.7–2.3)
POTASSIUM SERPL-SCNC: 3.9 MMOL/L (ref 3.4–5.3)
PROT SERPL-MCNC: 6.8 G/DL (ref 6.4–8.3)
SODIUM SERPL-SCNC: 142 MMOL/L (ref 135–145)

## 2025-07-08 PROCEDURE — 1126F AMNT PAIN NOTED NONE PRSNT: CPT

## 2025-07-08 PROCEDURE — 3075F SYST BP GE 130 - 139MM HG: CPT

## 2025-07-08 PROCEDURE — 36415 COLL VENOUS BLD VENIPUNCTURE: CPT

## 2025-07-08 PROCEDURE — 99214 OFFICE O/P EST MOD 30 MIN: CPT

## 2025-07-08 PROCEDURE — 83735 ASSAY OF MAGNESIUM: CPT

## 2025-07-08 PROCEDURE — 3078F DIAST BP <80 MM HG: CPT

## 2025-07-08 PROCEDURE — 80053 COMPREHEN METABOLIC PANEL: CPT

## 2025-07-08 ASSESSMENT — PAIN SCALES - GENERAL: PAINLEVEL_OUTOF10: NO PAIN (0)

## 2025-07-08 NOTE — ANESTHESIA PREPROCEDURE EVALUATION
Anesthesia Pre-Procedure Evaluation    Patient: Nerissa Valentin   MRN: [id number] : 1945          Procedure : Procedure(s):  ION, BRONCHOSCOPY, ROBOT-ASSISTED, WITH BIOPSY, endobronchial ultrasound, bronchoalveolar lavage         Past Medical History:   Diagnosis Date    Asthma, mild persistent     Depressive disorder     only child killed by drunk     Hematuria     Hyperlipidemia LDL goal < 130     Hypertension     Hypokalemia 03/15/2013    appears when she was on chlorthalidone    Kidney stone     Leucocytosis 2010    white count runs from 13-15      Osteoarthritis     Postmenopausal     HRT 10 years    Shortness of breath       Past Surgical History:   Procedure Laterality Date    ABDOMEN SURGERY  , ,     ovary removed x2 and abd. hyst.    ABDOMEN SURGERY      APPENDECTOMY      APPENDECTOMY      ARTHROSCOPY SHOULDER ROTATOR CUFF REPAIR Bilateral     BIOPSY BREAST Right     BREAST BIOPSY, RT/LT      Breat Biopsy RT    BRONCHOSCOPY, WITH BIOPSY, ROBOT ASSISTED N/A 2025    Procedure: ION, BRONCHOSCOPY, ROBOT-ASSISTED, WITH BIOPSY, endobronchial ultrasound;  Surgeon: Juan Camacho MD;  Location: [facility]    COLONOSCOPY N/A 2021    Procedure: COLONOSCOPY, WITH POLYPECTOMY AND BIOPSY;  Surgeon: Yadi Vital MD;  Location: [facility]    ESOPHAGOSCOPY, GASTROSCOPY, DUODENOSCOPY (EGD), COMBINED N/A 2021    Procedure: ESOPHAGOGASTRODUODENOSCOPY, WITH BIOPSY;  Surgeon: Yadi Vital MD;  Location: [facility]    ESOPHAGOSCOPY, GASTROSCOPY, DUODENOSCOPY (EGD), COMBINED N/A 2021    Procedure: ESOPHAGOGASTRODUODENOSCOPY, WITH BIOPSY;  Surgeon: aDnte Jensen MD;  Location: [facility]    FLEXIBLE SIGMOIDOSCOPY   ?    Dr. Cline at [facility]    HYSTERECTOMY      HYSTERECTOMY, PAP NO LONGER INDICATED      HYSTERECTOMY, VAGINAL      LAPAROSCOPIC CYSTECTOMY OVARIAN (ONCOLOGY)      LITHOTRIPSY  2005    LITHOTRIPSY      OTHER SURGICAL HISTORY  1971    kidney  stone removal    OTHER SURGICAL HISTORY  1971 or 72    open surgery - right kidney stone     OTHER SURGICAL HISTORY Bilateral     salpingo-oophorectomy    OVARIAN CYST REMOVAL  x2    PUNC/ASPIR BREAST CYST      ROTATOR CUFF REPAIR RT/LT  2007    left    ROTATOR CUFF REPAIR RT/LT  2009    right twice?    SALPINGO OOPHORECTOMY,R/L/HARLEY  1969 and 1970    Salpingo Oophorectomy, RT/LT/HARLEY    SURGICAL HISTORY OF -   1969, 1970    ovary cystectomy    URETEROSCOPY      x2    ZZC REMOVAL OF KIDNEY STONE  1971      Allergies   Allergen Reactions    Influenza Virus Vaccine Dermatitis and Other (See Comments)     2 years in a row after the flu shot did get  High fever,  Localized reaction .     Other [No Clinical Screening - See Comments]      CHROMIC GUT SUTURES CAUSE ABCESS    Pcn [Penicillins] Hives      Social History     Tobacco Use    Smoking status: Never    Smokeless tobacco: Never    Tobacco comments:     never smoked   Substance Use Topics    Alcohol use: Yes     Comment: rare      Wt Readings from Last 1 Encounters:   07/07/25 60.8 kg (134 lb)        Anesthesia Evaluation   Pt has had prior anesthetic. Type: MAC, General and Regional.    No history of anesthetic complications       ROS/MED HX  ENT/Pulmonary:     (+)     MINNIE risk factors,  hypertension,              Mild Persistent, asthma  Treatment: Inhaler prn,                 Neurologic:  - neg neurologic ROS     Cardiovascular:     (+) Dyslipidemia hypertension- -   -  - -                                 Previous cardiac testing   Echo: Date: Results:    Stress Test:  Date: Results:    ECG Reviewed:  Date: 5/25 Results:  Sinus Rhythm -With rate variation   cv = 10.  -Poor R-wave progression -nonspecific -consider old anterior infarct.    BORDERLINE  Cath:  Date: Results:      METS/Exercise Tolerance:     Hematologic:     (+)      anemia,          Musculoskeletal:   (+)  arthritis,             GI/Hepatic:     (+) GERD, Asymptomatic on medication,                 "  Renal/Genitourinary:     (+) renal disease, type: CRI, Pt does not require dialysis,    Nephrolithiasis ,       Endo: Comment: overweight      Psychiatric/Substance Use:     (+) psychiatric history depression       Infectious Disease:  - neg infectious disease ROS     Malignancy:  - neg malignancy ROS (+) Malignancy, History of Lung.  Lung CA Active status post.      Other:  - neg other ROS            Physical Exam  Airway  Mallampati: I  Neck ROM: full  Mouth opening: >= 4 cm    Cardiovascular - normal exam  Rhythm: regular  Rate: normal rate     Dental   (+) Multiple visibly decayed, broken teeth        Pulmonary - normal examBreath sounds clear to auscultation        Neurological - normal exam  She appears awake, alert and oriented x3.    Other Findings       OUTSIDE LABS:  CBC:   Lab Results   Component Value Date    WBC 9.4 05/15/2025    WBC 10.8 01/16/2025    HGB 11.6 (L) 05/15/2025    HGB 10.6 (L) 01/16/2025    HCT 37.0 05/15/2025    HCT 34.4 (L) 01/16/2025     (L) 05/15/2025     01/16/2025     BMP:   Lab Results   Component Value Date     05/23/2025     05/15/2025    POTASSIUM 3.8 05/23/2025    POTASSIUM 4.0 05/15/2025    CHLORIDE 107 05/23/2025    CHLORIDE 108 (H) 05/15/2025    CO2 29 05/23/2025    CO2 27 05/15/2025    BUN 11.7 05/23/2025    BUN 11.7 05/15/2025    CR 1.11 (H) 05/23/2025    CR 1.27 (H) 05/15/2025    GLC 96 06/12/2025     (H) 05/23/2025     COAGS: No results found for: \"PTT\", \"INR\", \"FIBR\"  POC: No results found for: \"BGM\", \"HCG\", \"HCGS\"  HEPATIC:   Lab Results   Component Value Date    ALBUMIN 3.4 (L) 05/15/2025    PROTTOTAL 6.5 05/15/2025    ALT 7 05/15/2025    AST 28 05/15/2025    ALKPHOS 106 05/15/2025    BILITOTAL 1.0 05/15/2025     OTHER:   Lab Results   Component Value Date    PH 7.36 02/05/2020    DAMIAN 9.4 05/23/2025    PHOS 3.0 02/05/2020    TSH 3.49 01/16/2025       Anesthesia Plan    ASA Status:  3      NPO Status: NPO Appropriate   Anesthesia " 69 year old man PMH NICM HFrEF s/p HM2 LVAD 6/2017, who underwent heart transplant on 2/23/18 (CMV D-/R+ and Toxo D-/R+, Hep C+ heart) with post op graft dysfunction who underwent plasmapheresis and IVIG x2 as well as rituximab, with 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 S/p lung biopsy 8/18. C diff toxin positive "Type: General.  Airway: supraglottic airway.  Induction: intravenous.   Techniques and Equipment:     - Airway:  Planned airway equipment includes supraglottic airway.     - Monitoring Plan: standard ASA monitoring     Consents    Anesthesia Plan(s) and associated risks, benefits, and realistic alternatives discussed. Questions answered and patient/representative(s) expressed understanding.     - Discussed:     - Discussed with:  Patient        - Pt is DNR/DNI Status: no DNR          Postoperative Care         Comments:                   RICCI Heath CRNA    I have reviewed the pertinent notes and labs in the chart from the past 30 days and (re)examined the patient.  Any updates or changes from those notes are reflected in this note.    Clinically Significant Risk Factors Present on Admission                   # Hypertension: Noted on problem list           # Overweight: Estimated body mass index is 28.74 kg/m  as calculated from the following:    Height as of 7/2/25: 1.454 m (4' 9.25\").    Weight as of 7/7/25: 60.8 kg (134 lb).       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 S/p lung biopsy 8/18. C diff toxin positive - on oral vanco and flagyl with improvement.

## 2025-07-08 NOTE — PROGRESS NOTES
Preoperative Evaluation  [facility]  [address] [place] [id number]  Phone: [phone]  Fax: [phone]  Primary Provider: Sarah Carlos PA-C  Pre-op Performing Provider: Fatou Comer DNP  Jul 8, 2025 7/7/2025   Surgical Information   What procedure is being done? PLACING A PORT FOR INFUSIONS   Facility or Hospital where procedure/surgery [place] be [place] [facility] [place] [facility]   Who is doing the procedure / surgery? Dr Candelario Norris   Date of surgery / procedure: 7/9/2025   Time of surgery / procedure: 3:15pm   Where do you plan to recover after surgery? at home with family     Fax number for surgical facility: Note does not need to be faxed, will be available electronically in Epic.    Assessment & Plan     The proposed surgical procedure is considered LOW risk.    Preop general physical exam    Primary lung adenocarcinoma, left (H)  Planning infusion port placement for chemotherapy.   - Comprehensive metabolic panel  - Magnesium    Primary hypertension  Stable. Hold lisinopril day of surgery.     Mild intermittent asthma without complication  Stable. Chronic cough and symptoms due to lung adenocarcinoma.     Dyslipidemia  Stable. Taking rosuvastatin.      Anemia due to vitamin B12 deficiency, unspecified B12 deficiency type  Stable. Monthly cyanocobalamin injections . Last hemoglobin was 11.6 on 5/15/25    Chronic gastritis without bleeding, unspecified gastritis type  Stable taking omeprazole.     Patient verbalizes understanding and is in agreement with the plan of care. All questions and concerns addressed.               - No identified additional risk factors other than previously addressed    Preoperative Medication Instructions  Antiplatelet or Anticoagulation Medication Instructions   - We reviewed the medication list and the patient is not on an antiplatelet or anticoagulation medications.    Additional Medication  Instructions  Take all scheduled medications on the day of surgery EXCEPT for modifications listed below:   - Herbal medications and vitamins: DO NOT TAKE 14 days prior to surgery.   - ACE/ARB/ARNI (lisinopril) : DO NOT TAKE on day of surgery (minimum 11 hours for general anesthesia).  Continue your sertraline and omeprazole with sip of water day of surgery. May use inhaler if needed.     Recommendation  Approval given to proceed with proposed procedure, without further diagnostic evaluation.        Dimitry Multani is a 79 year old, presenting for the following:  Pre-Op Exam (07/09/2025 [facility]: Insertion, Vascular access port by Dr. DE Norris)          7/8/2025     1:10 PM   Additional Questions   Roomed by Becky [facility]KE   Accompanied by None         7/8/2025     1:10 PM   Patient Reported Additional Medications   Patient reports taking the following new medications None     HPI: port placement due to left lung cancer  Planing to have neoadjuvant chemotherapy         7/7/2025   Pre-Op Questionnaire   Have you ever had a heart attack or stroke? No   Have you ever had surgery on your heart or blood vessels, such as a stent placement, a coronary artery bypass, or surgery on an artery in your head, neck, heart, or legs? No   Do you have chest pain with activity? No   Do you have a history of heart failure? No   Do you currently have a cold, bronchitis or symptoms of other infection? No   Do you have a cough, shortness of breath, or wheezing? (!) YES chronic cough   Do you or anyone in your family have previous history of blood clots? No   Do you or does anyone in your family have a serious bleeding problem such as prolonged bleeding following surgeries or cuts? No   Have you ever had problems with anemia or been told to take iron pills? (!) YES chronic anemia   Have you had any abnormal blood loss such as black, tarry or bloody stools, or abnormal vaginal bleeding? No   Have you ever had a blood  transfusion? No   Are you willing to have a blood transfusion if it is medically needed before, during, or after your surgery? Yes   Have you or any of your relatives ever had problems with anesthesia? No   Do you have sleep apnea, excessive snoring or daytime drowsiness? No   Do you have any artifical heart valves or other implanted medical devices like a pacemaker, defibrillator, or continuous glucose monitor? No   Do you have artificial joints? No   Are you allergic to latex? No     Advance Care Planning    Discussed advance care planning with patient; informed AVS has link to [employer].    Preoperative Review of    reviewed - previous short term prescription in Dec 2024. No longer taking any controlled substances       Status of Chronic Conditions:  See problem list for active medical problems.  Problems all longstanding and stable, except as noted/documented.  See ROS for pertinent symptoms related to these conditions.    Patient Active Problem List    Diagnosis Date Noted    Primary lung adenocarcinoma, left (H) 06/13/2025     Priority: Medium    Chronic gastritis 01/18/2024     Priority: Medium     2021 endoscopy -  Localized moderately erythematous mucosa without bleeding was found in        the cardia...Diffuse moderate inflammation characterized by erythema, friability,        granularity and linear erosions was found in the gastric antrum.  Increased omeprazole to 40 mg.  When she had quit omeprazole in 2023, found severe anemia and pos FIT (she opted not to scope - but resume omeprazole).      Severe anemia 02/23/2021     Priority: Medium     Caustic gastritis noted on March 2021 endoscopy.  2/23/21 - anemia worsening despite correcting b12 deficiency, doing work up.      Calculus of kidney 12/19/2019     Priority: Medium     several      Family history of diabetes mellitus 02/11/2013     Priority: Medium    Mild intermittent asthma without complication      Priority: Medium    Dysthymic  disorder      Priority: Medium    Osteoarthritis      Priority: Medium     Handicap parking form completed for R knee Jan 2024      Vitamin B12 deficiency anemia      Priority: Medium    HTN (hypertension) 01/29/2010     Priority: Medium    Dyslipidemia 01/29/2010     Priority: Medium     Dyslipidemia.  LDL in 140s and fam history.          Past Medical History:   Diagnosis Date    Asthma, mild persistent     Depressive disorder 1992    only child killed by drunk     Hematuria     Hyperlipidemia LDL goal < 130     Hypertension     Hypokalemia 03/15/2013    appears when she was on chlorthalidone    Kidney stone     Leucocytosis 01/29/2010    white count runs from 13-15      Osteoarthritis     Postmenopausal     HRT 10 years    Shortness of breath      Past Surgical History:   Procedure Laterality Date    ABDOMEN SURGERY  1969, 1970, 1971    ovary removed x2 and abd. hyst.    ABDOMEN SURGERY      APPENDECTOMY  1971    APPENDECTOMY      ARTHROSCOPY SHOULDER ROTATOR CUFF REPAIR Bilateral     BIOPSY BREAST Right     BREAST BIOPSY, RT/LT  1999    Breat Biopsy RT    BRONCHOSCOPY, WITH BIOPSY, ROBOT ASSISTED N/A 5/28/2025    Procedure: ION, BRONCHOSCOPY, ROBOT-ASSISTED, WITH BIOPSY, endobronchial ultrasound;  Surgeon: Juan Camacho MD;  Location: [facility]    COLONOSCOPY N/A 03/08/2021    Procedure: COLONOSCOPY, WITH POLYPECTOMY AND BIOPSY;  Surgeon: Yadi Vital MD;  Location: [facility]    ESOPHAGOSCOPY, GASTROSCOPY, DUODENOSCOPY (EGD), COMBINED N/A 03/08/2021    Procedure: ESOPHAGOGASTRODUODENOSCOPY, WITH BIOPSY;  Surgeon: Yadi Vital MD;  Location: [facility]    ESOPHAGOSCOPY, GASTROSCOPY, DUODENOSCOPY (EGD), COMBINED N/A 06/14/2021    Procedure: ESOPHAGOGASTRODUODENOSCOPY, WITH BIOPSY;  Surgeon: Dante Jensen MD;  Location: [facility]    FLEXIBLE SIGMOIDOSCOPY  2000 ?    Dr. Cline at [facility]    HYSTERECTOMY      HYSTERECTOMY, PAP NO LONGER INDICATED      HYSTERECTOMY, VAGINAL  1971    LAPAROSCOPIC CYSTECTOMY OVARIAN  "(ONCOLOGY)      LITHOTRIPSY  2005    LITHOTRIPSY      OTHER SURGICAL HISTORY  01/01/1971    kidney stone removal    OTHER SURGICAL HISTORY  1971 or 72    open surgery - right kidney stone     OTHER SURGICAL HISTORY Bilateral     salpingo-oophorectomy    OVARIAN CYST REMOVAL  x2    PUNC/ASPIR BREAST CYST      ROTATOR CUFF REPAIR RT/LT  2007    left    ROTATOR CUFF REPAIR RT/LT  2009    right twice?    SALPINGO OOPHORECTOMY,R/L/HARLEY  1969 and 1970    Salpingo Oophorectomy, RT/LT/HARLEY    SURGICAL HISTORY OF -   1969, 1970    ovary cystectomy    URETEROSCOPY      x2    ZZC REMOVAL OF KIDNEY STONE  1971     Current Outpatient Medications   Medication Sig Dispense Refill    albuterol (PROAIR HFA/PROVENTIL HFA/VENTOLIN HFA) 108 (90 Base) MCG/ACT inhaler Inhale 2 puffs into the lungs every 4 hours as needed for shortness of breath or wheezing. 18 g 1    B-D LUER-MINH SYRINGE 25G X 5/8\" 3 ML MISC USE WITH VITAMIN B12 INJECTION WEEKLY X 1 MO THEN 1 PER MONTH 4 each 0    cyanocobalamin (CYANOCOBALAMIN) 1000 mcg/mL injection INJECT 1 ML (1,000 MCG) INTO THE MUSCLE ONCE PER MONTH AS DIRECTED 3 mL 3    lisinopril (ZESTRIL) 40 MG tablet Take 1 tablet (40 mg) by mouth daily. 100 tablet 3    omeprazole (PRILOSEC) 20 MG DR capsule Take 1 capsule (20 mg) by mouth daily. Stay on this long term due to ulcer history. 90 capsule 3    rosuvastatin (CRESTOR) 10 MG tablet Take 1 tablet (10 mg) by mouth daily. 100 tablet 3    sertraline (ZOLOFT) 100 MG tablet Take 1 tablet (100 mg) by mouth daily. 90 tablet 3       Allergies   Allergen Reactions    Influenza Virus Vaccine Dermatitis and Other (See Comments)     2 years in a row after the flu shot did get  High fever,  Localized reaction .     Other [No Clinical Screening - See Comments]      CHROMIC GUT SUTURES CAUSE ABCESS    Pcn [Penicillins] Hives        Social History     Tobacco Use    Smoking status: Never     Passive exposure: Current    Smokeless tobacco: Never    Tobacco comments:     " "never smoked   Substance Use Topics    Alcohol use: Yes     Comment: rare     Family History   Problem Relation Age of Onset    Arthritis Mother     C.A.D. Mother          of MI age 65    Heart Disease Mother     Hypertension Father     Diabetes Father     C.A.D. Father         early 70s    Heart Disease Father     Chronic Obstructive Pulmonary Disease Sister     Breast Cancer Sister         in 30s    Breast Cancer Sister         mid 40s    Parkinsonism Sister     Cerebrovascular Disease Maternal Grandfather     Cancer Maternal Grandfather     Cancer - colorectal Paternal Grandfather     Breast Cancer Maternal Cousin     Breast Cancer Maternal Cousin      History   Drug Use No             Review of Systems  Constitutional, HEENT, cardiovascular, pulmonary, gi and gu systems are negative, except as otherwise noted.    Objective    /64   Pulse 92   Temp 99.7  F (37.6  C) (Tympanic)   Resp 16   Ht 1.473 m (4' 10\")   Wt 59.4 kg (131 lb)   SpO2 96%   BMI 27.38 kg/m     Estimated body mass index is 27.38 kg/m  as calculated from the following:    Height as of this encounter: 1.473 m (4' 10\").    Weight as of this encounter: 59.4 kg (131 lb).  Physical Exam  GENERAL: alert and no distress  EYES: Eyes grossly normal to inspection, PERRL and conjunctivae and sclerae normal  HENT: ear canals and TM's normal, nose and mouth without ulcers or lesions  NECK: no adenopathy, no asymmetry, masses, or scars  RESP: lungs clear to auscultation - no rales, rhonchi or wheezes  CV: regular rate and rhythm, normal S1 S2, no S3 or S4, no murmur, click or rub, no peripheral edema  ABDOMEN: soft, nontender, no hepatosplenomegaly, no masses and bowel sounds normal  MS: no gross musculoskeletal defects noted, no edema  SKIN: no suspicious lesions or rashes  NEURO: Normal strength and tone, mentation intact and speech normal  PSYCH: mentation appears normal, affect normal/bright    Recent Labs   Lab Test 25  1007 " 05/15/25  1115 01/16/25  1202   HGB  --  11.6* 10.6*   PLT  --  142* 161    145 144   POTASSIUM 3.8 4.0 3.9   CR 1.11* 1.27* 0.86        Diagnostics  Labs pending at this time.  Results will be reviewed when available. Dr. Friedell had CMP and Magnesium ordered for 7/2/25. Will complete these labs today.   EKG: appears normal, NSR on 5/16/25.        Revised Cardiac Risk Index (RCRI)  The patient has the following serious cardiovascular risks for perioperative complications:   - No serious cardiac risks = 0 points     RCRI Interpretation: 0 points: Class I (very low risk - 0.4% complication rate)         Signed Electronically by: Fatou Comer DNP  A copy of this evaluation report is provided to the requesting physician.

## 2025-07-08 NOTE — PATIENT INSTRUCTIONS
How to Take Your Medication Before Surgery  Preoperative Medication Instructions   Antiplatelet or Anticoagulation Medication Instructions   - We reviewed the medication list and the patient is not on an antiplatelet or anticoagulation medications.    Additional Medication Instructions  Take all scheduled medications on the day of surgery EXCEPT for modifications listed below:   - Herbal medications and vitamins: DO NOT TAKE 14 days prior to surgery.   - ACE/ARB/ARNI (lisinopril) : DO NOT TAKE on day of surgery (minimum 11 hours for general anesthesia).  Continue your sertraline and omeprazole with sip of water day of surgery. May use inhaler if needed.        Patient Education   Preparing for Your Surgery  For Adults  Getting started  In most cases, a nurse will call to review your health history and instructions. They will give you an arrival time based on your scheduled surgery time. Please be ready to share:  Your doctor's clinic name and phone number  Your medical, surgical, and anesthesia history  A list of allergies and sensitivities  A list of medicines, including herbal treatments and over-the-counter drugs  Whether the patient has a legal guardian (ask how to send us the papers in advance)  Note: You may not receive a call if you were seen at our PAC (Preoperative Assessment Center).  Please tell us if you're pregnant--or if there's any chance you might be pregnant. Some surgeries may injure a fetus (unborn baby), so they require a pregnancy test. Surgeries that are safe for a fetus don't always need a test, and you can choose whether to have one.   Preparing for surgery  Within 10 to 30 days of surgery: Have a pre-op exam (sometimes called an H&P, or History and Physical). This can be done at a clinic or pre-operative center.  If you're having a , you may not need this exam. Talk to your care team.  At your pre-op exam, talk to your care team about all medicines you take. (This includes CBD oil  and any drugs, such as THC, marijuana, and other forms of cannabis.) If you need to stop any medicine before surgery, ask when to start taking it again.  This is for your safety. Many medicines and drugs can make you bleed too much during surgery. Some change how well surgery (anesthesia) drugs work.  Call your insurance company to let them know you're having surgery. (If you don't have insurance, call [phone].)  Call your clinic if there's any change in your health. This includes a scrape or scratch near the surgery site, or any signs of a cold (sore throat, runny nose, cough, rash, fever).  Eating and drinking guidelines  For your safety: Unless your surgeon tells you otherwise, follow the guidelines below.  Eat and drink as normal until 8 hours before you arrive for surgery. After that, no food or milk. You can spit out gum when you arrive.  Drink clear liquids until 2 hours before you arrive. These are liquids you can see through, like water, Gatorade, and Propel Water. They also include plain black coffee and tea (no cream or milk).  No alcohol for 24 hours before you arrive. The night before surgery, stop any drinks that contain THC.  If your care team tells you to take medicine on the morning of surgery, it's okay to take it with a sip of water. No other medicines or drugs are allowed (including CBD oil)--follow your care team's instructions.  If you have questions the day of surgery, call your hospital or surgery center.   Preventing infection  Shower or bathe the night before and the morning of surgery. Follow the instructions your clinic gave you. (If no instructions, use regular soap.)  Don't shave or clip hair near your surgery site. We'll remove the hair if needed.  Don't smoke or vape the morning of surgery. No chewing tobacco for 6 hours before you arrive. A nicotine patch is okay. You may spit out nicotine gum when you arrive.  For some surgeries, the surgeon will tell you to fully quit smoking  and nicotine.  We will make every effort to keep you safe from infection. We will:  Clean our hands often with soap and water (or an alcohol-based hand rub).  Clean the skin at your surgery site with a special soap that kills germs.  Give you a special gown to keep you warm. (Cold raises the risk of infection.)  Wear hair covers, masks, gowns, and gloves during surgery.  Give antibiotic medicine, if prescribed. Not all surgeries need this medicine.  What to bring on the day of surgery  Photo ID and insurance card  Copy of your health care directive, if you have one  Glasses and hearing aids (bring cases)  You can't wear contacts during surgery  Inhaler and eye drops, if you use them (tell us about these when you arrive)  CPAP machine or breathing device, if you use them  A few personal items, if spending the night  If you have . . .  A pacemaker, ICD (cardiac defibrillator), or other implant: Bring the ID card.  An implanted stimulator: Bring the remote control.  A legal guardian: Bring a copy of the certified (court-stamped) guardianship papers.  Please remove any jewelry, including body piercings. Leave jewelry and other valuables at home.  If you're going home the day of surgery  You must have a support person drive you home. They should stay with you overnight, and they may need to help with your self-care.  If you don't have a support person, please tells us as soon as possible. We can help.  After surgery  If it's hard to control your pain or you need more pain medicine, please call your surgeon's office.  Questions?   If you have any questions for your care team, list them here:   ____________________________________________________________________________________________________________________________________________________________________________________________________________________________________________________________  For informational purposes only. Not to replace the advice of your health care  provider. Copyright   2003, 2019 [facility]. All rights reserved. Clinically reviewed by Arian Watson MD. 8hands [id number] - REV 02/25.

## 2025-07-08 NOTE — H&P (VIEW-ONLY)
Preoperative Evaluation  [facility]  [address] [place] [address]  Phone: [phone]  Fax: [phone]  Primary Provider: Sarah Carlos PA-C  Pre-op Performing Provider: Fatou Comer DNP  Jul 8, 2025 7/7/2025   Surgical Information   What procedure is being done? PLACING A PORT FOR INFUSIONS   Facility or Hospital where procedure/surgery will be performed: [facility] in [facility]   Who is doing the procedure / surgery? Dr Candelario Norris   Date of surgery / procedure: 7/9/2025   Time of surgery / procedure: 3:15pm   Where do you plan to recover after surgery? at home with family     Fax number for surgical facility: Note does not need to be faxed, will be available electronically in Epic.    Assessment & Plan     The proposed surgical procedure is considered LOW risk.    Preop general physical exam    Primary lung adenocarcinoma, left (H)  Planning infusion port placement for chemotherapy.   - Comprehensive metabolic panel  - Magnesium    Primary hypertension  Stable. Hold lisinopril day of surgery.     Mild intermittent asthma without complication  Stable. Chronic cough and symptoms due to lung adenocarcinoma.     Dyslipidemia  Stable. Taking rosuvastatin.      Anemia due to vitamin B12 deficiency, unspecified B12 deficiency type  Stable. Monthly cyanocobalamin injections . Last hemoglobin was 11.6 on 5/15/25    Chronic gastritis without bleeding, unspecified gastritis type  Stable taking omeprazole.     Patient verbalizes understanding and is in agreement with the plan of care. All questions and concerns addressed.               - No identified additional risk factors other than previously addressed    Preoperative Medication Instructions  Antiplatelet or Anticoagulation Medication Instructions   - We reviewed the medication list and the patient is not on an antiplatelet or anticoagulation medications.    Additional Medication  Instructions  Take all scheduled medications on the day of surgery EXCEPT for modifications listed below:   - Herbal medications and vitamins: DO NOT TAKE 14 days prior to surgery.   - ACE/ARB/ARNI (lisinopril) : DO NOT TAKE on day of surgery (minimum 11 hours for general anesthesia).  Continue your sertraline and omeprazole with sip of water day of surgery. May use inhaler if needed.     Recommendation  Approval given to proceed with proposed procedure, without further diagnostic evaluation.        Dimitry Multani is a 79 year old, presenting for the following:  Pre-Op Exam (07/09/2025 [facility]: Insertion, Vascular access port by Dr. DE Norris)          7/8/2025     1:10 PM   Additional Questions   Roomed by Becky DUKE   Accompanied by None         7/8/2025     1:10 PM   Patient Reported Additional Medications   Patient reports taking the following new medications None     HPI: port placement due to left lung cancer  Planing to have neoadjuvant chemotherapy         7/7/2025   Pre-Op Questionnaire   Have you ever had a heart attack or stroke? No   Have you ever had surgery on your heart or blood vessels, such as a stent placement, a coronary artery bypass, or surgery on an artery in your head, neck, heart, or legs? No   Do you have chest pain with activity? No   Do you have a history of heart failure? No   Do you currently have a cold, bronchitis or symptoms of other infection? No   Do you have a cough, shortness of breath, or wheezing? (!) YES chronic cough   Do you or anyone in your family have previous history of blood clots? No   Do you or does anyone in your family have a serious bleeding problem such as prolonged bleeding following surgeries or cuts? No   Have you ever had problems with anemia or been told to take iron pills? (!) YES chronic anemia   Have you had any abnormal blood loss such as black, tarry or bloody stools, or abnormal vaginal bleeding? No   Have you ever had a blood  transfusion? No   Are you willing to have a blood transfusion if it is medically needed before, during, or after your surgery? Yes   Have you or any of your relatives ever had problems with anesthesia? No   Do you have sleep apnea, excessive snoring or daytime drowsiness? No   Do you have any artifical heart valves or other implanted medical devices like a pacemaker, defibrillator, or continuous glucose monitor? No   Do you have artificial joints? No   Are you allergic to latex? No     Advance Care Planning    Discussed advance care planning with patient; informed AVS has link to [employer].    Preoperative Review of    reviewed - previous short term prescription in Dec 2024. No longer taking any controlled substances       Status of Chronic Conditions:  See problem list for active medical problems.  Problems all longstanding and stable, except as noted/documented.  See ROS for pertinent symptoms related to these conditions.    Patient Active Problem List    Diagnosis Date Noted    Primary lung adenocarcinoma, left (H) 06/13/2025     Priority: Medium    Chronic gastritis 01/18/2024     Priority: Medium     2021 endoscopy -  Localized moderately erythematous mucosa without bleeding was found in        the cardia...Diffuse moderate inflammation characterized by erythema, friability,        granularity and linear erosions was found in the gastric antrum.  Increased omeprazole to 40 mg.  When she had quit omeprazole in 2023, found severe anemia and pos FIT (she opted not to scope - but resume omeprazole).      Severe anemia 02/23/2021     Priority: Medium     Caustic gastritis noted on March 2021 endoscopy.  2/23/21 - anemia worsening despite correcting b12 deficiency, doing work up.      Calculus of kidney 12/19/2019     Priority: Medium     several      Family history of diabetes mellitus 02/11/2013     Priority: Medium    Mild intermittent asthma without complication      Priority: Medium    Dysthymic  disorder      Priority: Medium    Osteoarthritis      Priority: Medium     Handicap parking form completed for R knee Jan 2024      Vitamin B12 deficiency anemia      Priority: Medium    HTN (hypertension) 01/29/2010     Priority: Medium    Dyslipidemia 01/29/2010     Priority: Medium     Dyslipidemia.  LDL in 140s and fam history.          Past Medical History:   Diagnosis Date    Asthma, mild persistent     Depressive disorder 1992    only child killed by drunk     Hematuria     Hyperlipidemia LDL goal < 130     Hypertension     Hypokalemia 03/15/2013    appears when she was on chlorthalidone    Kidney stone     Leucocytosis 01/29/2010    white count runs from 13-15      Osteoarthritis     Postmenopausal     HRT 10 years    Shortness of breath      Past Surgical History:   Procedure Laterality Date    ABDOMEN SURGERY  1969, 1970, 1971    ovary removed x2 and abd. hyst.    ABDOMEN SURGERY      APPENDECTOMY  1971    APPENDECTOMY      ARTHROSCOPY SHOULDER ROTATOR CUFF REPAIR Bilateral     BIOPSY BREAST Right     BREAST BIOPSY, RT/LT  1999    Breat Biopsy RT    BRONCHOSCOPY, WITH BIOPSY, ROBOT ASSISTED N/A 5/28/2025    Procedure: ION, BRONCHOSCOPY, ROBOT-ASSISTED, WITH BIOPSY, endobronchial ultrasound;  Surgeon: Juan Camacho MD;  Location: [facility]    COLONOSCOPY N/A 03/08/2021    Procedure: COLONOSCOPY, WITH POLYPECTOMY AND BIOPSY;  Surgeon: Yadi Vital MD;  Location: [facility]    ESOPHAGOSCOPY, GASTROSCOPY, DUODENOSCOPY (EGD), COMBINED N/A 03/08/2021    Procedure: ESOPHAGOGASTRODUODENOSCOPY, WITH BIOPSY;  Surgeon: Yadi Vital MD;  Location: [facility]    ESOPHAGOSCOPY, GASTROSCOPY, DUODENOSCOPY (EGD), COMBINED N/A 06/14/2021    Procedure: ESOPHAGOGASTRODUODENOSCOPY, WITH BIOPSY;  Surgeon: Dante Jensen MD;  Location: [facility]    FLEXIBLE SIGMOIDOSCOPY  2000 ?    Dr. Cline at [facility]    HYSTERECTOMY      HYSTERECTOMY, PAP NO LONGER INDICATED      HYSTERECTOMY, VAGINAL  1971    LAPAROSCOPIC CYSTECTOMY OVARIAN  "(ONCOLOGY)      LITHOTRIPSY  2005    LITHOTRIPSY      OTHER SURGICAL HISTORY  01/01/1971    kidney stone removal    OTHER SURGICAL HISTORY  1971 or 72    open surgery - right kidney stone     OTHER SURGICAL HISTORY Bilateral     salpingo-oophorectomy    OVARIAN CYST REMOVAL  x2    PUNC/ASPIR BREAST CYST      ROTATOR CUFF REPAIR RT/LT  2007    left    ROTATOR CUFF REPAIR RT/LT  2009    right twice?    SALPINGO OOPHORECTOMY,R/L/HARLEY  1969 and 1970    Salpingo Oophorectomy, RT/LT/HARLEY    SURGICAL HISTORY OF -   1969, 1970    ovary cystectomy    URETEROSCOPY      x2    ZZC REMOVAL OF KIDNEY STONE  1971     Current Outpatient Medications   Medication Sig Dispense Refill    albuterol (PROAIR HFA/PROVENTIL HFA/VENTOLIN HFA) 108 (90 Base) MCG/ACT inhaler Inhale 2 puffs into the lungs every 4 hours as needed for shortness of breath or wheezing. 18 g 1    B-D LUER-MINH SYRINGE 25G X 5/8\" 3 ML MISC USE WITH VITAMIN B12 INJECTION WEEKLY X 1 MO THEN 1 PER MONTH 4 each 0    cyanocobalamin (CYANOCOBALAMIN) 1000 mcg/mL injection INJECT 1 ML (1,000 MCG) INTO THE MUSCLE ONCE PER MONTH AS DIRECTED 3 mL 3    lisinopril (ZESTRIL) 40 MG tablet Take 1 tablet (40 mg) by mouth daily. 100 tablet 3    omeprazole (PRILOSEC) 20 MG DR capsule Take 1 capsule (20 mg) by mouth daily. Stay on this long term due to ulcer history. 90 capsule 3    rosuvastatin (CRESTOR) 10 MG tablet Take 1 tablet (10 mg) by mouth daily. 100 tablet 3    sertraline (ZOLOFT) 100 MG tablet Take 1 tablet (100 mg) by mouth daily. 90 tablet 3       Allergies   Allergen Reactions    Influenza Virus Vaccine Dermatitis and Other (See Comments)     2 years in a row after the flu shot did get  High fever,  Localized reaction .     Other [No Clinical Screening - See Comments]      CHROMIC GUT SUTURES CAUSE ABCESS    Pcn [Penicillins] Hives        Social History     Tobacco Use    Smoking status: Never     Passive exposure: Current    Smokeless tobacco: Never    Tobacco comments:     " "never smoked   Substance Use Topics    Alcohol use: Yes     Comment: rare     Family History   Problem Relation Age of Onset    Arthritis Mother     C.A.D. Mother          of MI age 65    Heart Disease Mother     Hypertension Father     Diabetes Father     C.A.D. Father         early 70s    Heart Disease Father     Chronic Obstructive Pulmonary Disease Sister     Breast Cancer Sister         in 30s    Breast Cancer Sister         mid 40s    Parkinsonism Sister     Cerebrovascular Disease Maternal Grandfather     Cancer Maternal Grandfather     Cancer - colorectal Paternal Grandfather     Breast Cancer Maternal Cousin     Breast Cancer Maternal Cousin      History   Drug Use No             Review of Systems  Constitutional, HEENT, cardiovascular, pulmonary, gi and gu systems are negative, except as otherwise noted.    Objective    /64   Pulse 92   Temp 99.7  F (37.6  C) (Tympanic)   Resp 16   Ht 1.473 m (4' 10\")   Wt 59.4 kg (131 lb)   SpO2 96%   BMI 27.38 kg/m     Estimated body mass index is 27.38 kg/m  as calculated from the following:    Height as of this encounter: 1.473 m (4' 10\").    Weight as of this encounter: 59.4 kg (131 lb).  Physical Exam  GENERAL: alert and no distress  EYES: Eyes grossly normal to inspection, PERRL and conjunctivae and sclerae normal  HENT: ear canals and TM's normal, nose and mouth without ulcers or lesions  NECK: no adenopathy, no asymmetry, masses, or scars  RESP: lungs clear to auscultation - no rales, rhonchi or wheezes  CV: regular rate and rhythm, normal S1 S2, no S3 or S4, no murmur, click or rub, no peripheral edema  ABDOMEN: soft, nontender, no hepatosplenomegaly, no masses and bowel sounds normal  MS: no gross musculoskeletal defects noted, no edema  SKIN: no suspicious lesions or rashes  NEURO: Normal strength and tone, mentation intact and speech normal  PSYCH: mentation appears normal, affect normal/bright    Recent Labs   Lab Test 25  1007 " 05/15/25  1115 01/16/25  1202   HGB  --  11.6* 10.6*   PLT  --  142* 161    145 144   POTASSIUM 3.8 4.0 3.9   CR 1.11* 1.27* 0.86        Diagnostics  Labs pending at this time.  Results will be reviewed when available. Dr. Friedell had CMP and Magnesium ordered for 7/2/25. Will complete these labs today.   EKG: appears normal, NSR on 5/16/25.        Revised Cardiac Risk Index (RCRI)  The patient has the following serious cardiovascular risks for perioperative complications:   - No serious cardiac risks = 0 points     RCRI Interpretation: 0 points: Class I (very low risk - 0.4% complication rate)         Signed Electronically by: Fatou Comer DNP  A copy of this evaluation report is provided to the requesting physician.

## 2025-07-09 ENCOUNTER — APPOINTMENT (OUTPATIENT)
Dept: GENERAL RADIOLOGY | Facility: CLINIC | Age: 80
End: 2025-07-09
Attending: STUDENT IN AN ORGANIZED HEALTH CARE EDUCATION/TRAINING PROGRAM
Payer: COMMERCIAL

## 2025-07-09 ENCOUNTER — ANESTHESIA (OUTPATIENT)
Dept: SURGERY | Facility: CLINIC | Age: 80
End: 2025-07-09
Payer: COMMERCIAL

## 2025-07-09 ENCOUNTER — HOSPITAL ENCOUNTER (OUTPATIENT)
Facility: CLINIC | Age: 80
Discharge: HOME | End: 2025-07-09
Attending: STUDENT IN AN ORGANIZED HEALTH CARE EDUCATION/TRAINING PROGRAM | Admitting: STUDENT IN AN ORGANIZED HEALTH CARE EDUCATION/TRAINING PROGRAM
Payer: COMMERCIAL

## 2025-07-09 VITALS
DIASTOLIC BLOOD PRESSURE: 58 MMHG | WEIGHT: 131 LBS | RESPIRATION RATE: 16 BRPM | HEIGHT: 58 IN | TEMPERATURE: 97.9 F | HEART RATE: 72 BPM | OXYGEN SATURATION: 92 % | SYSTOLIC BLOOD PRESSURE: 153 MMHG | BODY MASS INDEX: 27.5 KG/M2

## 2025-07-09 DIAGNOSIS — C34.92 PRIMARY LUNG ADENOCARCINOMA, LEFT (H): Primary | ICD-10-CM

## 2025-07-09 PROCEDURE — 999N000141 HC STATISTIC PRE-PROCEDURE NURSING ASSESSMENT: Performed by: STUDENT IN AN ORGANIZED HEALTH CARE EDUCATION/TRAINING PROGRAM

## 2025-07-09 PROCEDURE — C1788 PORT, INDWELLING, IMP: HCPCS | Performed by: STUDENT IN AN ORGANIZED HEALTH CARE EDUCATION/TRAINING PROGRAM

## 2025-07-09 PROCEDURE — 250N000009 HC RX 250: Performed by: NURSE ANESTHETIST, CERTIFIED REGISTERED

## 2025-07-09 PROCEDURE — 258N000003 HC RX IP 258 OP 636: Performed by: NURSE ANESTHETIST, CERTIFIED REGISTERED

## 2025-07-09 PROCEDURE — 36561 INSERT TUNNELED CV CATH: CPT | Performed by: STUDENT IN AN ORGANIZED HEALTH CARE EDUCATION/TRAINING PROGRAM

## 2025-07-09 PROCEDURE — 370N000017 HC ANESTHESIA TECHNICAL FEE, PER MIN: Performed by: STUDENT IN AN ORGANIZED HEALTH CARE EDUCATION/TRAINING PROGRAM

## 2025-07-09 PROCEDURE — 250N000011 HC RX IP 250 OP 636: Performed by: STUDENT IN AN ORGANIZED HEALTH CARE EDUCATION/TRAINING PROGRAM

## 2025-07-09 PROCEDURE — 250N000013 HC RX MED GY IP 250 OP 250 PS 637: Performed by: STUDENT IN AN ORGANIZED HEALTH CARE EDUCATION/TRAINING PROGRAM

## 2025-07-09 PROCEDURE — 250N000011 HC RX IP 250 OP 636: Performed by: NURSE ANESTHETIST, CERTIFIED REGISTERED

## 2025-07-09 PROCEDURE — 999N000180 XR SURGERY CARM FLUORO LESS THAN 5 MIN

## 2025-07-09 PROCEDURE — 271N000001 HC OR GENERAL SUPPLY NON-STERILE: Performed by: STUDENT IN AN ORGANIZED HEALTH CARE EDUCATION/TRAINING PROGRAM

## 2025-07-09 PROCEDURE — 999N000065 XR CHEST PORT 1 VIEW

## 2025-07-09 PROCEDURE — 258N000003 HC RX IP 258 OP 636

## 2025-07-09 PROCEDURE — 77001 FLUOROGUIDE FOR VEIN DEVICE: CPT | Mod: 26 | Performed by: STUDENT IN AN ORGANIZED HEALTH CARE EDUCATION/TRAINING PROGRAM

## 2025-07-09 PROCEDURE — 250N000009 HC RX 250: Performed by: STUDENT IN AN ORGANIZED HEALTH CARE EDUCATION/TRAINING PROGRAM

## 2025-07-09 PROCEDURE — 360N000082 HC SURGERY LEVEL 2 W/ FLUORO, PER MIN: Performed by: STUDENT IN AN ORGANIZED HEALTH CARE EDUCATION/TRAINING PROGRAM

## 2025-07-09 PROCEDURE — 710N000012 HC RECOVERY PHASE 2, PER MINUTE: Performed by: STUDENT IN AN ORGANIZED HEALTH CARE EDUCATION/TRAINING PROGRAM

## 2025-07-09 PROCEDURE — 272N000001 HC OR GENERAL SUPPLY STERILE: Performed by: STUDENT IN AN ORGANIZED HEALTH CARE EDUCATION/TRAINING PROGRAM

## 2025-07-09 DEVICE — CATH PORT SMART VORTEX LOW PROFILE 8FR CT80LPPDVI: Type: IMPLANTABLE DEVICE | Site: CHEST | Status: FUNCTIONAL

## 2025-07-09 RX ORDER — ACETAMINOPHEN 325 MG/1
650 TABLET ORAL EVERY 4 HOURS PRN
Qty: 50 TABLET | Refills: 0 | Status: SHIPPED | OUTPATIENT
Start: 2025-07-09

## 2025-07-09 RX ORDER — FENTANYL CITRATE 50 UG/ML
25 INJECTION, SOLUTION INTRAMUSCULAR; INTRAVENOUS EVERY 5 MIN PRN
Refills: 0 | Status: CANCELLED | OUTPATIENT
Start: 2025-07-09

## 2025-07-09 RX ORDER — HYDROMORPHONE HCL IN WATER/PF 6 MG/30 ML
0.2 PATIENT CONTROLLED ANALGESIA SYRINGE INTRAVENOUS EVERY 5 MIN PRN
Refills: 0 | Status: CANCELLED | OUTPATIENT
Start: 2025-07-09

## 2025-07-09 RX ORDER — MAGNESIUM HYDROXIDE 1200 MG/15ML
LIQUID ORAL PRN
Status: DISCONTINUED | OUTPATIENT
Start: 2025-07-09 | End: 2025-07-09 | Stop reason: HOSPADM

## 2025-07-09 RX ORDER — CEFAZOLIN SODIUM/WATER 2 G/20 ML
2 SYRINGE (ML) INTRAVENOUS
Status: COMPLETED | OUTPATIENT
Start: 2025-07-09 | End: 2025-07-09

## 2025-07-09 RX ORDER — GLYCOPYRROLATE 0.2 MG/ML
INJECTION, SOLUTION INTRAMUSCULAR; INTRAVENOUS PRN
Status: DISCONTINUED | OUTPATIENT
Start: 2025-07-09 | End: 2025-07-09

## 2025-07-09 RX ORDER — BUPIVACAINE HYDROCHLORIDE 5 MG/ML
INJECTION, SOLUTION PERINEURAL PRN
Status: DISCONTINUED | OUTPATIENT
Start: 2025-07-09 | End: 2025-07-09 | Stop reason: HOSPADM

## 2025-07-09 RX ORDER — DEXAMETHASONE SODIUM PHOSPHATE 4 MG/ML
INJECTION, SOLUTION INTRA-ARTICULAR; INTRALESIONAL; INTRAMUSCULAR; INTRAVENOUS; SOFT TISSUE PRN
Status: DISCONTINUED | OUTPATIENT
Start: 2025-07-09 | End: 2025-07-09

## 2025-07-09 RX ORDER — HYDROXYZINE HYDROCHLORIDE 10 MG/1
10 TABLET, FILM COATED ORAL EVERY 6 HOURS PRN
Status: CANCELLED | OUTPATIENT
Start: 2025-07-09

## 2025-07-09 RX ORDER — ACETAMINOPHEN 325 MG/1
650 TABLET ORAL
Status: CANCELLED | OUTPATIENT
Start: 2025-07-09

## 2025-07-09 RX ORDER — NALOXONE HYDROCHLORIDE 0.4 MG/ML
0.1 INJECTION, SOLUTION INTRAMUSCULAR; INTRAVENOUS; SUBCUTANEOUS
Status: CANCELLED | OUTPATIENT
Start: 2025-07-09

## 2025-07-09 RX ORDER — FENTANYL CITRATE 50 UG/ML
25 INJECTION, SOLUTION INTRAMUSCULAR; INTRAVENOUS
Refills: 0 | Status: CANCELLED | OUTPATIENT
Start: 2025-07-09

## 2025-07-09 RX ORDER — ONDANSETRON 2 MG/ML
INJECTION INTRAMUSCULAR; INTRAVENOUS PRN
Status: DISCONTINUED | OUTPATIENT
Start: 2025-07-09 | End: 2025-07-09

## 2025-07-09 RX ORDER — SODIUM CHLORIDE, SODIUM LACTATE, POTASSIUM CHLORIDE, CALCIUM CHLORIDE 600; 310; 30; 20 MG/100ML; MG/100ML; MG/100ML; MG/100ML
INJECTION, SOLUTION INTRAVENOUS CONTINUOUS
Status: DISCONTINUED | OUTPATIENT
Start: 2025-07-09 | End: 2025-07-09 | Stop reason: HOSPADM

## 2025-07-09 RX ORDER — FENTANYL CITRATE 50 UG/ML
50 INJECTION, SOLUTION INTRAMUSCULAR; INTRAVENOUS EVERY 5 MIN PRN
Refills: 0 | Status: CANCELLED | OUTPATIENT
Start: 2025-07-09

## 2025-07-09 RX ORDER — FENTANYL CITRATE 50 UG/ML
INJECTION, SOLUTION INTRAMUSCULAR; INTRAVENOUS PRN
Status: DISCONTINUED | OUTPATIENT
Start: 2025-07-09 | End: 2025-07-09

## 2025-07-09 RX ORDER — ALBUTEROL SULFATE 0.83 MG/ML
2.5 SOLUTION RESPIRATORY (INHALATION) EVERY 4 HOURS PRN
Status: CANCELLED | OUTPATIENT
Start: 2025-07-09

## 2025-07-09 RX ORDER — CEFAZOLIN SODIUM/WATER 2 G/20 ML
2 SYRINGE (ML) INTRAVENOUS SEE ADMIN INSTRUCTIONS
Status: DISCONTINUED | OUTPATIENT
Start: 2025-07-09 | End: 2025-07-09 | Stop reason: HOSPADM

## 2025-07-09 RX ORDER — MEPERIDINE HYDROCHLORIDE 25 MG/ML
12.5 INJECTION INTRAMUSCULAR; INTRAVENOUS; SUBCUTANEOUS EVERY 5 MIN PRN
Refills: 0 | Status: CANCELLED | OUTPATIENT
Start: 2025-07-09

## 2025-07-09 RX ORDER — ONDANSETRON 4 MG/1
4 TABLET, ORALLY DISINTEGRATING ORAL EVERY 30 MIN PRN
Status: CANCELLED | OUTPATIENT
Start: 2025-07-09

## 2025-07-09 RX ORDER — ONDANSETRON 2 MG/ML
4 INJECTION INTRAMUSCULAR; INTRAVENOUS EVERY 30 MIN PRN
Status: CANCELLED | OUTPATIENT
Start: 2025-07-09

## 2025-07-09 RX ORDER — HYDROMORPHONE HCL IN WATER/PF 6 MG/30 ML
0.4 PATIENT CONTROLLED ANALGESIA SYRINGE INTRAVENOUS EVERY 5 MIN PRN
Refills: 0 | Status: CANCELLED | OUTPATIENT
Start: 2025-07-09

## 2025-07-09 RX ORDER — ACETAMINOPHEN 325 MG/1
975 TABLET ORAL ONCE
Status: COMPLETED | OUTPATIENT
Start: 2025-07-09 | End: 2025-07-09

## 2025-07-09 RX ORDER — HEPARIN SODIUM 1000 [USP'U]/ML
INJECTION, SOLUTION INTRAVENOUS; SUBCUTANEOUS PRN
Status: DISCONTINUED | OUTPATIENT
Start: 2025-07-09 | End: 2025-07-09 | Stop reason: HOSPADM

## 2025-07-09 RX ORDER — DIMENHYDRINATE 50 MG/ML
25 INJECTION, SOLUTION INTRAMUSCULAR; INTRAVENOUS
Status: CANCELLED | OUTPATIENT
Start: 2025-07-09

## 2025-07-09 RX ORDER — LIDOCAINE HYDROCHLORIDE 20 MG/ML
INJECTION, SOLUTION INFILTRATION; PERINEURAL PRN
Status: DISCONTINUED | OUTPATIENT
Start: 2025-07-09 | End: 2025-07-09

## 2025-07-09 RX ORDER — SODIUM CHLORIDE, SODIUM LACTATE, POTASSIUM CHLORIDE, CALCIUM CHLORIDE 600; 310; 30; 20 MG/100ML; MG/100ML; MG/100ML; MG/100ML
INJECTION, SOLUTION INTRAVENOUS CONTINUOUS
Status: CANCELLED | OUTPATIENT
Start: 2025-07-09

## 2025-07-09 RX ORDER — LIDOCAINE HYDROCHLORIDE AND EPINEPHRINE 10; 10 MG/ML; UG/ML
INJECTION, SOLUTION INFILTRATION; PERINEURAL PRN
Status: DISCONTINUED | OUTPATIENT
Start: 2025-07-09 | End: 2025-07-09 | Stop reason: HOSPADM

## 2025-07-09 RX ORDER — DEXAMETHASONE SODIUM PHOSPHATE 4 MG/ML
4 INJECTION, SOLUTION INTRA-ARTICULAR; INTRALESIONAL; INTRAMUSCULAR; INTRAVENOUS; SOFT TISSUE
Status: CANCELLED | OUTPATIENT
Start: 2025-07-09

## 2025-07-09 RX ORDER — LIDOCAINE 40 MG/G
CREAM TOPICAL
Status: DISCONTINUED | OUTPATIENT
Start: 2025-07-09 | End: 2025-07-09 | Stop reason: HOSPADM

## 2025-07-09 RX ORDER — OXYCODONE HCL 5 MG/5 ML
5 SOLUTION, ORAL ORAL
Refills: 0 | Status: CANCELLED | OUTPATIENT
Start: 2025-07-09

## 2025-07-09 RX ORDER — PROPOFOL 10 MG/ML
INJECTION, EMULSION INTRAVENOUS CONTINUOUS PRN
Status: DISCONTINUED | OUTPATIENT
Start: 2025-07-09 | End: 2025-07-09

## 2025-07-09 RX ORDER — LABETALOL HYDROCHLORIDE 5 MG/ML
10 INJECTION, SOLUTION INTRAVENOUS
Status: CANCELLED | OUTPATIENT
Start: 2025-07-09

## 2025-07-09 RX ADMIN — GLYCOPYRROLATE 0.1 MG: 0.2 INJECTION, SOLUTION INTRAMUSCULAR; INTRAVENOUS at 16:28

## 2025-07-09 RX ADMIN — LIDOCAINE HYDROCHLORIDE 5 ML: 20 INJECTION, SOLUTION INFILTRATION; PERINEURAL at 16:28

## 2025-07-09 RX ADMIN — Medication 2 G: at 16:25

## 2025-07-09 RX ADMIN — ONDANSETRON 4 MG: 2 INJECTION INTRAMUSCULAR; INTRAVENOUS at 16:28

## 2025-07-09 RX ADMIN — GLYCOPYRROLATE 0.1 MG: 0.2 INJECTION, SOLUTION INTRAMUSCULAR; INTRAVENOUS at 16:26

## 2025-07-09 RX ADMIN — DEXMEDETOMIDINE HYDROCHLORIDE 8 MCG: 100 INJECTION, SOLUTION INTRAVENOUS at 16:46

## 2025-07-09 RX ADMIN — FENTANYL CITRATE 100 MCG: 50 INJECTION INTRAMUSCULAR; INTRAVENOUS at 16:28

## 2025-07-09 RX ADMIN — PROPOFOL 50 MCG/KG/MIN: 10 INJECTION, EMULSION INTRAVENOUS at 16:28

## 2025-07-09 RX ADMIN — ACETAMINOPHEN 975 MG: 325 TABLET ORAL at 14:14

## 2025-07-09 RX ADMIN — MIDAZOLAM 2 MG: 1 INJECTION INTRAMUSCULAR; INTRAVENOUS at 16:26

## 2025-07-09 RX ADMIN — SODIUM CHLORIDE, POTASSIUM CHLORIDE, SODIUM LACTATE AND CALCIUM CHLORIDE: 600; 310; 30; 20 INJECTION, SOLUTION INTRAVENOUS at 14:23

## 2025-07-09 RX ADMIN — DEXAMETHASONE SODIUM PHOSPHATE 4 MG: 4 INJECTION, SOLUTION INTRA-ARTICULAR; INTRALESIONAL; INTRAMUSCULAR; INTRAVENOUS; SOFT TISSUE at 16:28

## 2025-07-09 ASSESSMENT — ACTIVITIES OF DAILY LIVING (ADL)
ADLS_ACUITY_SCORE: 19

## 2025-07-09 NOTE — INTERVAL H&P NOTE
"I have reviewed the surgical (or preoperative) H&P that is linked to this encounter, and examined the patient. There are no significant changes    Clinical Conditions Present on Arrival:  Clinically Significant Risk Factors Present on Admission                       # Overweight: Estimated body mass index is 27.38 kg/m  as calculated from the following:    Height as of 7/8/25: 1.473 m (4' 10\").    Weight as of 7/8/25: 59.4 kg (131 lb).       "

## 2025-07-09 NOTE — DISCHARGE INSTRUCTIONS
Same Day Surgery Discharge Instructions  Special Precautions After Surgery - Adult    It is not unusual to feel lightheaded or faint, up to 24 hours after surgery or while taking pain medication.  If you have these symptoms; sit for a few minutes before standing and have someone assist you when getting up.  You should rest and relax for the next 24 hours and must have someone stay with you for at least 24 hours after your discharge.  DO NOT DRIVE any vehicle or operate mechanical equipment for 24 hours following the end of your surgery.  DO NOT DRIVE while taking narcotic pain medications that have been prescribed by your physician.  If you had a limb operated on, you must be able to use it fully to drive.  DO NOT drink alcoholic beverages for 24 hours following surgery or while taking prescription pain medication.  Drink clear liquids (apple juice, ginger ale, broth, 7-Up, etc.).  Progress to your regular diet as you feel able.  Any questions call your physician and do not make important decisions for 24 hours.    Nausea and Vomiting: Nausea and vomiting can occur any time after receiving anesthesia. If you experience nausea and vomiting we encourage you to move to a clear liquid diet and advance your diet as tolerated. If nausea and vomiting do not improve within 12 hours please call the surgeon or present to the Emergency department.     Break-through Bleeding: If your experience bleeding from your surgical site apply pressure and additional dressing per nurse instruction. For simple problems such as a saturated dressing, you may need to reinforce the dressing with more gauze and tape and put slight pressure on the site. If bleeding does not subside contact the surgeon or present to the Emergency Department.    Post-op Infection: If you develop a fever of 100.4 or greater, have pus like drainage, redness, swelling or severe pain at the surgical site not alleviated with pain medications; please  contact the surgeon or present to the Emergency Department.     Medications:  Acetaminophen (Tylenol):  Next dose: anytime after 10:00 PM.  Ibuprofen (Motrin, Advil):  Next dose: anytime.  Follow the instructions on the bottle.  __________________________________________________________________________________________________________________________________  IMPORTANT NUMBERS:    [facility] Main Number:  [phone], [phone]  Pharmacy:  [phone]  Same Day Surgery:  [phone], for general post-op questions call Monday - Thursday until 8:30 p.m., Fridays until 6:00 p.m.   Mental Health Mobile Crisis line: [phone]                                                                      [facility], podiatry:  [phone]  [facility]:  [phone]     [facility]:  [phone]   General Surgery:  [phone]  Urology: [phone]  Specialty Access Center: [phone]

## 2025-07-09 NOTE — ANESTHESIA CARE TRANSFER NOTE
Patient: Nerissa Valentin    Procedure: Procedure(s):  INSERTION, VASCULAR ACCESS PORT right       Diagnosis: Primary lung adenocarcinoma, left (H) [C34.92]  Diagnosis Additional Information: No value filed.    Anesthesia Type:   General     Note:    Oropharynx: oropharynx clear of all foreign objects and spontaneously breathing  Level of Consciousness: awake  Oxygen Supplementation: room air    Independent Airway: airway patency satisfactory and stable  Dentition: dentition unchanged  Vital Signs Stable: post-procedure vital signs reviewed and stable  Report to RN Given: handoff report given  Patient transferred to: Phase II    Handoff Report: Identifed the Patient, Identified the Reponsible Provider, Reviewed the pertinent medical history, Discussed the surgical course, Reviewed Intra-OP anesthesia mangement and issues during anesthesia, Set expectations for post-procedure period and Allowed opportunity for questions and acknowledgement of understanding  Vitals:  Vitals Value Taken Time   BP     Temp     Pulse     Resp     SpO2         Electronically Signed By: RICCI Long CRNA  July 9, 2025  5:21 PM

## 2025-07-09 NOTE — ANESTHESIA POSTPROCEDURE EVALUATION
Patient: Nerissa Valentin    Procedure: Procedure(s):  INSERTION, VASCULAR ACCESS PORT right       Anesthesia Type:  General    Note:  Disposition: Outpatient   Postop Pain Control: Uneventful            Sign Out: Well controlled pain   PONV: No   Neuro/Psych: Uneventful            Sign Out: Acceptable/Baseline neuro status   Airway/Respiratory: Uneventful            Sign Out: Acceptable/Baseline resp. status   CV/Hemodynamics: Uneventful            Sign Out: Acceptable CV status; No obvious hypovolemia; No obvious fluid overload   Other NRE: NONE   DID A NON-ROUTINE EVENT OCCUR? No       Last vitals:  Vitals:    07/09/25 1347   BP: (!) 143/61   Pulse: 85   Temp: 36.9  C (98.4  F)   SpO2: 98%       Electronically Signed By: RICCI Long CRNA  July 9, 2025  5:21 PM

## 2025-07-11 RX ORDER — PROCHLORPERAZINE MALEATE 5 MG/1
5 TABLET ORAL EVERY 6 HOURS PRN
Qty: 30 TABLET | Refills: 2 | Status: SHIPPED | OUTPATIENT
Start: 2025-07-14

## 2025-07-14 ENCOUNTER — VIRTUAL VISIT (OUTPATIENT)
Dept: ONCOLOGY | Facility: CLINIC | Age: 80
End: 2025-07-14
Attending: NURSE PRACTITIONER
Payer: COMMERCIAL

## 2025-07-14 ENCOUNTER — INFUSION THERAPY VISIT (OUTPATIENT)
Dept: INFUSION THERAPY | Facility: CLINIC | Age: 80
End: 2025-07-14
Attending: INTERNAL MEDICINE
Payer: COMMERCIAL

## 2025-07-14 VITALS — HEIGHT: 58 IN | WEIGHT: 131 LBS | BODY MASS INDEX: 27.5 KG/M2

## 2025-07-14 DIAGNOSIS — D50.0 IRON DEFICIENCY ANEMIA DUE TO CHRONIC BLOOD LOSS: ICD-10-CM

## 2025-07-14 DIAGNOSIS — C34.92 PRIMARY LUNG ADENOCARCINOMA, LEFT (H): Primary | ICD-10-CM

## 2025-07-14 DIAGNOSIS — E83.42 HYPOMAGNESEMIA: Primary | ICD-10-CM

## 2025-07-14 DIAGNOSIS — C34.92 PRIMARY LUNG ADENOCARCINOMA, LEFT (H): ICD-10-CM

## 2025-07-14 LAB
ALBUMIN SERPL BCG-MCNC: 3.2 G/DL (ref 3.5–5.2)
ALP SERPL-CCNC: 83 U/L (ref 40–150)
ALT SERPL W P-5'-P-CCNC: <5 U/L (ref 0–50)
ANION GAP SERPL CALCULATED.3IONS-SCNC: 10 MMOL/L (ref 7–15)
AST SERPL W P-5'-P-CCNC: 24 U/L (ref 0–45)
BASOPHILS # BLD AUTO: 0 10E3/UL (ref 0–0.2)
BASOPHILS NFR BLD AUTO: 1 %
BILIRUB SERPL-MCNC: 0.6 MG/DL
BUN SERPL-MCNC: 22.3 MG/DL (ref 8–23)
CALCIUM SERPL-MCNC: 9.2 MG/DL (ref 8.8–10.4)
CHLORIDE SERPL-SCNC: 109 MMOL/L (ref 98–107)
CREAT SERPL-MCNC: 1.35 MG/DL (ref 0.51–0.95)
EGFRCR SERPLBLD CKD-EPI 2021: 40 ML/MIN/1.73M2
EOSINOPHIL # BLD AUTO: 0.2 10E3/UL (ref 0–0.7)
EOSINOPHIL NFR BLD AUTO: 3 %
ERYTHROCYTE [DISTWIDTH] IN BLOOD BY AUTOMATED COUNT: 13.5 % (ref 10–15)
FERRITIN SERPL-MCNC: 67 NG/ML (ref 11–328)
GLUCOSE SERPL-MCNC: 103 MG/DL (ref 70–99)
HCO3 SERPL-SCNC: 22 MMOL/L (ref 22–29)
HCT VFR BLD AUTO: 30.5 % (ref 35–47)
HGB BLD-MCNC: 9.7 G/DL (ref 11.7–15.7)
IMM GRANULOCYTES # BLD: 0 10E3/UL
IMM GRANULOCYTES NFR BLD: 0 %
IRON BINDING CAPACITY (ROCHE): 254 UG/DL (ref 240–430)
IRON SATN MFR SERPL: 45 % (ref 15–46)
IRON SERPL-MCNC: 114 UG/DL (ref 37–145)
LYMPHOCYTES # BLD AUTO: 1.6 10E3/UL (ref 0.8–5.3)
LYMPHOCYTES NFR BLD AUTO: 19 %
MAGNESIUM SERPL-MCNC: 1.5 MG/DL (ref 1.7–2.3)
MCH RBC QN AUTO: 31.3 PG (ref 26.5–33)
MCHC RBC AUTO-ENTMCNC: 31.8 G/DL (ref 31.5–36.5)
MCV RBC AUTO: 98 FL (ref 78–100)
MONOCYTES # BLD AUTO: 0.8 10E3/UL (ref 0–1.3)
MONOCYTES NFR BLD AUTO: 10 %
NEUTROPHILS # BLD AUTO: 5.4 10E3/UL (ref 1.6–8.3)
NEUTROPHILS NFR BLD AUTO: 68 %
NRBC # BLD AUTO: 0 10E3/UL
NRBC BLD AUTO-RTO: 0 /100
PLATELET # BLD AUTO: 92 10E3/UL (ref 150–450)
POTASSIUM SERPL-SCNC: 4.1 MMOL/L (ref 3.4–5.3)
PROT SERPL-MCNC: 6.1 G/DL (ref 6.4–8.3)
RBC # BLD AUTO: 3.1 10E6/UL (ref 3.8–5.2)
SODIUM SERPL-SCNC: 141 MMOL/L (ref 135–145)
WBC # BLD AUTO: 8 10E3/UL (ref 4–11)

## 2025-07-14 PROCEDURE — 83550 IRON BINDING TEST: CPT | Performed by: INTERNAL MEDICINE

## 2025-07-14 PROCEDURE — 250N000011 HC RX IP 250 OP 636: Performed by: INTERNAL MEDICINE

## 2025-07-14 PROCEDURE — 82728 ASSAY OF FERRITIN: CPT | Performed by: INTERNAL MEDICINE

## 2025-07-14 PROCEDURE — 80053 COMPREHEN METABOLIC PANEL: CPT | Performed by: INTERNAL MEDICINE

## 2025-07-14 PROCEDURE — 36415 COLL VENOUS BLD VENIPUNCTURE: CPT

## 2025-07-14 PROCEDURE — 85004 AUTOMATED DIFF WBC COUNT: CPT | Performed by: INTERNAL MEDICINE

## 2025-07-14 PROCEDURE — 83735 ASSAY OF MAGNESIUM: CPT | Performed by: INTERNAL MEDICINE

## 2025-07-14 PROCEDURE — 98001 SYNCH AUDIO-VIDEO NEW LOW 30: CPT | Performed by: NURSE PRACTITIONER

## 2025-07-14 PROCEDURE — 1125F AMNT PAIN NOTED PAIN PRSNT: CPT | Performed by: NURSE PRACTITIONER

## 2025-07-14 RX ORDER — HEPARIN SODIUM (PORCINE) LOCK FLUSH IV SOLN 100 UNIT/ML 100 UNIT/ML
5 SOLUTION INTRAVENOUS
OUTPATIENT
Start: 2025-07-14

## 2025-07-14 RX ORDER — ALBUTEROL SULFATE 90 UG/1
1-2 INHALANT RESPIRATORY (INHALATION)
Status: CANCELLED
Start: 2025-07-15

## 2025-07-14 RX ORDER — LIDOCAINE AND PRILOCAINE 25; 25 MG/G; MG/G
CREAM TOPICAL DAILY PRN
Qty: 5 G | Refills: 1 | Status: SHIPPED | OUTPATIENT
Start: 2025-07-14

## 2025-07-14 RX ORDER — DIPHENHYDRAMINE HCL 25 MG
50 CAPSULE ORAL ONCE
Status: CANCELLED
Start: 2025-07-15

## 2025-07-14 RX ORDER — HEPARIN SODIUM (PORCINE) LOCK FLUSH IV SOLN 100 UNIT/ML 100 UNIT/ML
5 SOLUTION INTRAVENOUS
Status: DISCONTINUED | OUTPATIENT
Start: 2025-07-14 | End: 2025-07-14 | Stop reason: HOSPADM

## 2025-07-14 RX ORDER — DIPHENHYDRAMINE HYDROCHLORIDE 50 MG/ML
25 INJECTION, SOLUTION INTRAMUSCULAR; INTRAVENOUS
Status: CANCELLED
Start: 2025-07-15

## 2025-07-14 RX ORDER — DIPHENHYDRAMINE HYDROCHLORIDE 50 MG/ML
50 INJECTION, SOLUTION INTRAMUSCULAR; INTRAVENOUS
Status: CANCELLED
Start: 2025-07-15

## 2025-07-14 RX ORDER — HEPARIN SODIUM (PORCINE) LOCK FLUSH IV SOLN 100 UNIT/ML 100 UNIT/ML
5 SOLUTION INTRAVENOUS
Status: CANCELLED | OUTPATIENT
Start: 2025-07-15

## 2025-07-14 RX ORDER — EPINEPHRINE 1 MG/ML
0.3 INJECTION, SOLUTION, CONCENTRATE INTRAVENOUS EVERY 5 MIN PRN
Status: CANCELLED | OUTPATIENT
Start: 2025-07-15

## 2025-07-14 RX ORDER — DIPHENHYDRAMINE HYDROCHLORIDE 50 MG/ML
50 INJECTION, SOLUTION INTRAMUSCULAR; INTRAVENOUS ONCE
Status: CANCELLED | OUTPATIENT
Start: 2025-07-15

## 2025-07-14 RX ORDER — HEPARIN SODIUM,PORCINE 10 UNIT/ML
5-20 VIAL (ML) INTRAVENOUS DAILY PRN
Status: CANCELLED | OUTPATIENT
Start: 2025-07-15

## 2025-07-14 RX ORDER — HEPARIN SODIUM,PORCINE 10 UNIT/ML
5-20 VIAL (ML) INTRAVENOUS DAILY PRN
OUTPATIENT
Start: 2025-07-14

## 2025-07-14 RX ORDER — MEPERIDINE HYDROCHLORIDE 25 MG/ML
25 INJECTION INTRAMUSCULAR; INTRAVENOUS; SUBCUTANEOUS
Status: CANCELLED | OUTPATIENT
Start: 2025-07-15

## 2025-07-14 RX ORDER — METHYLPREDNISOLONE SODIUM SUCCINATE 40 MG/ML
40 INJECTION INTRAMUSCULAR; INTRAVENOUS
Status: CANCELLED
Start: 2025-07-15

## 2025-07-14 RX ORDER — MAGNESIUM OXIDE 400 MG/1
400 TABLET ORAL DAILY
Qty: 30 TABLET | Refills: 1 | Status: SHIPPED | OUTPATIENT
Start: 2025-07-14

## 2025-07-14 RX ORDER — LORAZEPAM 2 MG/ML
0.5 INJECTION INTRAMUSCULAR EVERY 4 HOURS PRN
Status: CANCELLED | OUTPATIENT
Start: 2025-07-15

## 2025-07-14 RX ORDER — ALBUTEROL SULFATE 0.83 MG/ML
2.5 SOLUTION RESPIRATORY (INHALATION)
Status: CANCELLED | OUTPATIENT
Start: 2025-07-15

## 2025-07-14 RX ADMIN — HEPARIN 5 ML: 100 SYRINGE at 09:16

## 2025-07-14 ASSESSMENT — PAIN SCALES - GENERAL: PAINLEVEL_OUTOF10: MILD PAIN (2)

## 2025-07-14 NOTE — NURSING NOTE
Patient confirms medications and allergies are accurate via patients echeck in completion, and or denies any changes since last reviewed/verified.     Current patient location: [address] [place] [id number]    Is the patient currently in the state of [place]? YES    Visit mode: VIDEO    If the visit is dropped, the patient can be reconnected by:VIDEO VISIT: Text to cell phone:   Telephone Information:   Mobile [phone]       Will anyone else be joining the visit? NO  (If patient encounters technical issues they should call [phone]    Are changes needed to the allergy or medication list? No    Are refills needed on medications prescribed by this physician? NO    Rooming Documentation:  Questionnaire(s) completed    Reason for visit: RECHECK    Estefany GALVIN

## 2025-07-14 NOTE — PROGRESS NOTES
Virtual Visit Details    Type of service:  Video Visit   Video Start Time: 1448  Video End Time:1458    Originating Location (pt. Location): Home    Distant Location (provider location):  On-site  Platform used for Video Visit: [facility] Hematology and Oncology Outpatient Progress Note    Patient: Nerissa Valentin  MRN: [id number]  Date of Service: Jul 14, 2025          Reason for Visit    Locally advanced lung cancer    Primary Oncologist: Dr. Friedell    Virtual visit      Assessment/Plan  Stage IIIA (cT3-cN2-cM0) JATIN lung adenocarcinoma  Baseline mild thrombocytopenia and iron-def anemia  Mild hypomagnesemia  Will start RT tomorrow.     Weekly concurrent chemo with Carbo/Taxol due to start tomorrow.   Reviewed schedule and potential side effects. She consents to proceed.    Baseline labs: Hgb 9.7, plat 92 (baseline 140s), WBC WNL. Creatinine 1.35 (stable), LFTs WNL. Mag 1.5    Port is in place.     Plan:  -Proceed with D1 carbo-Taxol tomorrow  -Return in 1 week with Clementine/Dr. Friedell ahead of D8, then every other week through remainder of treatment course.   -Check ferritin and iron panel; continue oral iron. Monitor cytopenias. Baseline anemia and thrombocytopenia expected to progress on chemo.   -Start oral mag oxide daily. Monitor mag on treatment, replace IV prn   -May experience RT-esophagitis through treatment, will need pain medication as needed  -Typically, upon completion of 6 weeks of chemoRT, will restage 4-6 weeks later and consider 1 yr maintenance durvalumab.    ADDENDUM:  Ferritin and iron/iron sat WNL. No evident iron deficiency at this time      ______________________________________________________________________________    History of Present Illness/ Interval History    Ms. Nerissa Valentin  is a 79 year old with recently diagnosed stage III left lung cancer. Chemo RT with weekly carbo-Taxol has been recommended, returns ahead of initiation this week.     She reports feeling  pretty good, no acute issues. Lower appetite with 10 pound weigh loss.  Persistent stable cough. No hemoptysis. No fevers. No dyspnea. No chest pain.  She No baseline peripheral neuropathy.    ChemoRT recommended. Starting RT tomorrow. Got port placed and will start weekly carbo/Taxol tomorrow.     She has chronic hx iron def anemia, on oral iron.  No bleeding symptoms.    ECOG Performance    0        Oncology History/Treatment  Diagnosis/Stage:   5/2025: IIIA (oT1-vV5u-jO0) L lung adenocarcinoma  -11/2024: eval for new cough. CXR showed ?JATIN infection, treated with follow-up planned.  -5/3/2025 chest CT: 3.4 cm cavitary posterior JATIN mass  -5/28/2025 EBUS FNA JATIN mass +adenocarcinoma. Mediastinal nodes were negative on biopsy  -6/12/2025 PET: progression of JATIN mass to 5.2 cm extending into LLL and L hilum. Station 5 LN avid and suspicious for met; no sampled in EBUS. No distant mets  -Brain MRI: negative    Treatment:  Not surgical candidate based on reduced DLCO  Met with Rad Onc, definitive chemoRT recommended.     7/1/2025 : initiate concurrent RT with weekly carbo/Taxol      Physical Exam    GENERAL: Alert and oriented to time place and person. Seated comfortably. In no distress.  HEAD: Atraumatic and normocephalic. No alopecia.  EYES: BUTCH, EOMI. No erythema. No icterus.  CHEST: regular respiratory effort.   NEURO: No gross deficit noted.      Lab Results    Recent Results (from the past week)   Comprehensive metabolic panel   Result Value Ref Range    Sodium 142 135 - 145 mmol/L    Potassium 3.9 3.4 - 5.3 mmol/L    Carbon Dioxide (CO2) 22 22 - 29 mmol/L    Anion Gap 15 7 - 15 mmol/L    Urea Nitrogen 14.7 8.0 - 23.0 mg/dL    Creatinine 1.48 (H) 0.51 - 0.95 mg/dL    GFR Estimate 36 (L) >60 mL/min/1.73m2    Calcium 9.3 8.8 - 10.4 mg/dL    Chloride 105 98 - 107 mmol/L    Glucose 93 70 - 99 mg/dL    Alkaline Phosphatase 99 40 - 150 U/L    AST 28 0 - 45 U/L    ALT 7 0 - 50 U/L    Protein Total 6.8 6.4 - 8.3 g/dL     Albumin 3.6 3.5 - 5.2 g/dL    Bilirubin Total 0.8 <=1.2 mg/dL   Magnesium   Result Value Ref Range    Magnesium 1.8 1.7 - 2.3 mg/dL   Magnesium   Result Value Ref Range    Magnesium 1.5 (L) 1.7 - 2.3 mg/dL   Comprehensive metabolic panel   Result Value Ref Range    Sodium 141 135 - 145 mmol/L    Potassium 4.1 3.4 - 5.3 mmol/L    Carbon Dioxide (CO2) 22 22 - 29 mmol/L    Anion Gap 10 7 - 15 mmol/L    Urea Nitrogen 22.3 8.0 - 23.0 mg/dL    Creatinine 1.35 (H) 0.51 - 0.95 mg/dL    GFR Estimate 40 (L) >60 mL/min/1.73m2    Calcium 9.2 8.8 - 10.4 mg/dL    Chloride 109 (H) 98 - 107 mmol/L    Glucose 103 (H) 70 - 99 mg/dL    Alkaline Phosphatase 83 40 - 150 U/L    AST 24 0 - 45 U/L    ALT <5 0 - 50 U/L    Protein Total 6.1 (L) 6.4 - 8.3 g/dL    Albumin 3.2 (L) 3.5 - 5.2 g/dL    Bilirubin Total 0.6 <=1.2 mg/dL   CBC with platelets and differential   Result Value Ref Range    WBC Count 8.0 4.0 - 11.0 10e3/uL    RBC Count 3.10 (L) 3.80 - 5.20 10e6/uL    Hemoglobin 9.7 (L) 11.7 - 15.7 g/dL    Hematocrit 30.5 (L) 35.0 - 47.0 %    MCV 98 78 - 100 fL    MCH 31.3 26.5 - 33.0 pg    MCHC 31.8 31.5 - 36.5 g/dL    RDW 13.5 10.0 - 15.0 %    Platelet Count 92 (L) 150 - 450 10e3/uL    % Neutrophils 68 %    % Lymphocytes 19 %    % Monocytes 10 %    % Eosinophils 3 %    % Basophils 1 %    % Immature Granulocytes 0 %    NRBCs per 100 WBC 0 <1 /100    Absolute Neutrophils 5.4 1.6 - 8.3 10e3/uL    Absolute Lymphocytes 1.6 0.8 - 5.3 10e3/uL    Absolute Monocytes 0.8 0.0 - 1.3 10e3/uL    Absolute Eosinophils 0.2 0.0 - 0.7 10e3/uL    Absolute Basophils 0.0 0.0 - 0.2 10e3/uL    Absolute Immature Granulocytes 0.0 <=0.4 10e3/uL    Absolute NRBCs 0.0 10e3/uL       Imaging    XR Chest Port 1 View  Result Date: 7/9/2025  EXAM: XR CHEST PORT 1 VIEW LOCATION: [facility] DATE: 7/9/2025 INDICATION: assess position of right internal jugular port and evaluate for pneumothorax COMPARISON: 5/28/2025. 6/12/2025.     IMPRESSION:  There is right chest wall Port-A-Cath with the tip over the mid SVC. Cannot assess for a kink in the tube on this single view. No pleural fluid or pneumothorax. No airspace disease or edema. Left lung lesions seen on CT are not appreciated with radiography. Normal size of the heart. Mitral calcification is present. There is aortic calcification.    XR Surgery AYESHA Fluoro Less Than 5 Min  Result Date: 7/9/2025  This exam was marked as non-reportable because it will not be read by a radiologist or a [place] non-radiologist provider.       Billing  Total time 35 minutes, to include face to face visit, review of EMR, ordering, documentation and coordination of care on date of service   complexity modifier for longitudinal care.       Signed by: Clementine Stoddard NP

## 2025-07-14 NOTE — PROGRESS NOTES
PAC labs drawn and sent. Port accessed without difficulty. Good blood return noted. Needle left in place for treatment tomorrow.    EMLA cream order for port numbing.    MWemma RN

## 2025-07-14 NOTE — LETTER
7/14/2025      Nerissa Valentin  [address] [place] [phone]      Dear Colleague,    Thank you for referring your patient, Nerissa Valentin, to the [facility] CANCER [facility]. Please see a copy of my visit note below.    Virtual Visit Details    Type of service:  Video Visit   Video Start Time: 1448  Video End Time:1458    Originating Location (pt. Location): Home    Distant Location (provider location):  On-site  Platform used for Video Visit: [facility] Hematology and Oncology Outpatient Progress Note    Patient: Nerissa Valentin  MRN: [id number]  Date of Service: Jul 14, 2025          Reason for Visit    Locally advanced lung cancer    Primary Oncologist: Dr. Friedell    Virtual visit      Assessment/Plan  Stage IIIA (cT3-cN2-cM0) JATIN lung adenocarcinoma  Baseline mild thrombocytopenia and iron-def anemia  Mild hypomagnesemia  Will start RT tomorrow.     Weekly concurrent chemo with Carbo/Taxol due to start tomorrow.   Reviewed schedule and potential side effects. She consents to proceed.    Baseline labs: Hgb 9.7, plat 92 (baseline 140s), WBC WNL. Creatinine 1.35 (stable), LFTs WNL. Mag 1.5    Port is in place.     Plan:  -Proceed with D1 carbo-Taxol tomorrow  -Return in 1 week with Clementine/Dr. Friedell ahead of D8, then every other week through remainder of treatment course.   -Check ferritin and iron panel; continue oral iron. Monitor cytopenias. Baseline anemia and thrombocytopenia expected to progress on chemo.   -Start oral mag oxide daily. Monitor mag on treatment, replace IV prn   -May experience RT-esophagitis through treatment, will need pain medication as needed  -Typically, upon completion of 6 weeks of chemoRT, will restage 4-6 weeks later and consider 1 yr maintenance durvalumab.      ______________________________________________________________________________    History of Present Illness/ Interval History    Ms. Nerissa Valentin  is a 79 year old with  recently diagnosed stage III left lung cancer. Chemo RT with weekly carbo-Taxol has been recommended, returns ahead of initiation this week.     She reports feeling pretty good, no acute issues. Lower appetite with 10 pound weigh loss.  Persistent stable cough. No hemoptysis. No fevers. No dyspnea. No chest pain.  She No baseline peripheral neuropathy.    ChemoRT recommended. Starting RT tomorrow. Got port placed and will start weekly carbo/Taxol tomorrow.     She has chronic hx iron def anemia, on oral iron.  No bleeding symptoms.    ECOG Performance    0        Oncology History/Treatment  Diagnosis/Stage:   5/2025: IIIA (cN7-yF9g-eS5) L lung adenocarcinoma  -11/2024: eval for new cough. CXR showed ?JATIN infection, treated with follow-up planned.  -5/3/2025 chest CT: 3.4 cm cavitary posterior JATIN mass  -5/28/2025 EBUS FNA JATIN mass +adenocarcinoma. Mediastinal nodes were negative on biopsy  -6/12/2025 PET: progression of JATIN mass to 5.2 cm extending into LLL and L hilum. Station 5 LN avid and suspicious for met; no sampled in EBUS. No distant mets  -Brain MRI: negative    Treatment:  Not surgical candidate based on reduced DLCO  Met with Rad Onc, definitive chemoRT recommended.     7/1/2025 : initiate concurrent RT with weekly carbo/Taxol      Physical Exam    GENERAL: Alert and oriented to time place and person. Seated comfortably. In no distress.  HEAD: Atraumatic and normocephalic. No alopecia.  EYES: BUTCH, EOMI. No erythema. No icterus.  CHEST: regular respiratory effort.   NEURO: No gross deficit noted.      Lab Results    Recent Results (from the past week)   Comprehensive metabolic panel   Result Value Ref Range    Sodium 142 135 - 145 mmol/L    Potassium 3.9 3.4 - 5.3 mmol/L    Carbon Dioxide (CO2) 22 22 - 29 mmol/L    Anion Gap 15 7 - 15 mmol/L    Urea Nitrogen 14.7 8.0 - 23.0 mg/dL    Creatinine 1.48 (H) 0.51 - 0.95 mg/dL    GFR Estimate 36 (L) >60 mL/min/1.73m2    Calcium 9.3 8.8 - 10.4 mg/dL    Chloride  105 98 - 107 mmol/L    Glucose 93 70 - 99 mg/dL    Alkaline Phosphatase 99 40 - 150 U/L    AST 28 0 - 45 U/L    ALT 7 0 - 50 U/L    Protein Total 6.8 6.4 - 8.3 g/dL    Albumin 3.6 3.5 - 5.2 g/dL    Bilirubin Total 0.8 <=1.2 mg/dL   Magnesium   Result Value Ref Range    Magnesium 1.8 1.7 - 2.3 mg/dL   Magnesium   Result Value Ref Range    Magnesium 1.5 (L) 1.7 - 2.3 mg/dL   Comprehensive metabolic panel   Result Value Ref Range    Sodium 141 135 - 145 mmol/L    Potassium 4.1 3.4 - 5.3 mmol/L    Carbon Dioxide (CO2) 22 22 - 29 mmol/L    Anion Gap 10 7 - 15 mmol/L    Urea Nitrogen 22.3 8.0 - 23.0 mg/dL    Creatinine 1.35 (H) 0.51 - 0.95 mg/dL    GFR Estimate 40 (L) >60 mL/min/1.73m2    Calcium 9.2 8.8 - 10.4 mg/dL    Chloride 109 (H) 98 - 107 mmol/L    Glucose 103 (H) 70 - 99 mg/dL    Alkaline Phosphatase 83 40 - 150 U/L    AST 24 0 - 45 U/L    ALT <5 0 - 50 U/L    Protein Total 6.1 (L) 6.4 - 8.3 g/dL    Albumin 3.2 (L) 3.5 - 5.2 g/dL    Bilirubin Total 0.6 <=1.2 mg/dL   CBC with platelets and differential   Result Value Ref Range    WBC Count 8.0 4.0 - 11.0 10e3/uL    RBC Count 3.10 (L) 3.80 - 5.20 10e6/uL    Hemoglobin 9.7 (L) 11.7 - 15.7 g/dL    Hematocrit 30.5 (L) 35.0 - 47.0 %    MCV 98 78 - 100 fL    MCH 31.3 26.5 - 33.0 pg    MCHC 31.8 31.5 - 36.5 g/dL    RDW 13.5 10.0 - 15.0 %    Platelet Count 92 (L) 150 - 450 10e3/uL    % Neutrophils 68 %    % Lymphocytes 19 %    % Monocytes 10 %    % Eosinophils 3 %    % Basophils 1 %    % Immature Granulocytes 0 %    NRBCs per 100 WBC 0 <1 /100    Absolute Neutrophils 5.4 1.6 - 8.3 10e3/uL    Absolute Lymphocytes 1.6 0.8 - 5.3 10e3/uL    Absolute Monocytes 0.8 0.0 - 1.3 10e3/uL    Absolute Eosinophils 0.2 0.0 - 0.7 10e3/uL    Absolute Basophils 0.0 0.0 - 0.2 10e3/uL    Absolute Immature Granulocytes 0.0 <=0.4 10e3/uL    Absolute NRBCs 0.0 10e3/uL       Imaging    XR Chest Port 1 View  Result Date: 7/9/2025  EXAM: XR CHEST PORT 1 VIEW LOCATION: [facility] DATE: 7/9/2025 INDICATION: assess position of right internal jugular port and evaluate for pneumothorax COMPARISON: 5/28/2025. 6/12/2025.     IMPRESSION: There is right chest wall Port-A-Cath with the tip over the mid SVC. Cannot assess for a kink in the tube on this single view. No pleural fluid or pneumothorax. No airspace disease or edema. Left lung lesions seen on CT are not appreciated with radiography. Normal size of the heart. Mitral calcification is present. There is aortic calcification.    XR Surgery AYESHA Fluoro Less Than 5 Min  Result Date: 7/9/2025  This exam was marked as non-reportable because it will not be read by a radiologist or a [place] non-radiologist provider.       Billing  Total time 35 minutes, to include face to face visit, review of EMR, ordering, documentation and coordination of care on date of service   complexity modifier for longitudinal care.       Signed by: Clementine Stoddard NP        Again, thank you for allowing me to participate in the care of your patient.        Sincerely,        Clementine Stoddard NP    Electronically signed

## 2025-07-15 ENCOUNTER — INFUSION THERAPY VISIT (OUTPATIENT)
Dept: INFUSION THERAPY | Facility: CLINIC | Age: 80
End: 2025-07-15
Attending: INTERNAL MEDICINE
Payer: COMMERCIAL

## 2025-07-15 ENCOUNTER — OFFICE VISIT (OUTPATIENT)
Dept: RADIATION THERAPY | Facility: OUTPATIENT CENTER | Age: 80
End: 2025-07-15
Payer: COMMERCIAL

## 2025-07-15 VITALS
WEIGHT: 136.8 LBS | TEMPERATURE: 97.9 F | HEART RATE: 76 BPM | BODY MASS INDEX: 28.59 KG/M2 | SYSTOLIC BLOOD PRESSURE: 169 MMHG | DIASTOLIC BLOOD PRESSURE: 76 MMHG

## 2025-07-15 VITALS
DIASTOLIC BLOOD PRESSURE: 71 MMHG | HEART RATE: 74 BPM | RESPIRATION RATE: 18 BRPM | OXYGEN SATURATION: 95 % | SYSTOLIC BLOOD PRESSURE: 143 MMHG | BODY MASS INDEX: 28.17 KG/M2 | WEIGHT: 134.8 LBS

## 2025-07-15 DIAGNOSIS — C34.12 PRIMARY MALIGNANT NEOPLASM OF LEFT UPPER LOBE OF LUNG (H): Primary | ICD-10-CM

## 2025-07-15 DIAGNOSIS — C34.92 PRIMARY LUNG ADENOCARCINOMA, LEFT (H): Primary | ICD-10-CM

## 2025-07-15 PROCEDURE — 96375 TX/PRO/DX INJ NEW DRUG ADDON: CPT

## 2025-07-15 PROCEDURE — 258N000003 HC RX IP 258 OP 636: Performed by: NURSE PRACTITIONER

## 2025-07-15 PROCEDURE — 96367 TX/PROPH/DG ADDL SEQ IV INF: CPT

## 2025-07-15 PROCEDURE — 250N000011 HC RX IP 250 OP 636: Performed by: NURSE PRACTITIONER

## 2025-07-15 PROCEDURE — 96417 CHEMO IV INFUS EACH ADDL SEQ: CPT

## 2025-07-15 PROCEDURE — 96413 CHEMO IV INFUSION 1 HR: CPT

## 2025-07-15 RX ORDER — DIPHENHYDRAMINE HYDROCHLORIDE 50 MG/ML
50 INJECTION, SOLUTION INTRAMUSCULAR; INTRAVENOUS ONCE
Status: COMPLETED | OUTPATIENT
Start: 2025-07-15 | End: 2025-07-15

## 2025-07-15 RX ORDER — HEPARIN SODIUM (PORCINE) LOCK FLUSH IV SOLN 100 UNIT/ML 100 UNIT/ML
5 SOLUTION INTRAVENOUS
Status: DISCONTINUED | OUTPATIENT
Start: 2025-07-15 | End: 2025-07-15 | Stop reason: HOSPADM

## 2025-07-15 RX ADMIN — CARBOPLATIN 100 MG: 450 INJECTION, SOLUTION INTRAVENOUS at 11:09

## 2025-07-15 RX ADMIN — SODIUM CHLORIDE 250 ML: 0.9 INJECTION, SOLUTION INTRAVENOUS at 09:44

## 2025-07-15 RX ADMIN — DIPHENHYDRAMINE HYDROCHLORIDE 50 MG: 50 INJECTION INTRAMUSCULAR; INTRAVENOUS at 09:36

## 2025-07-15 RX ADMIN — PACLITAXEL 71 MG: 6 INJECTION, SOLUTION INTRAVENOUS at 10:03

## 2025-07-15 RX ADMIN — HEPARIN 5 ML: 100 SYRINGE at 11:52

## 2025-07-15 RX ADMIN — FAMOTIDINE 20 MG: 10 INJECTION, SOLUTION INTRAVENOUS at 09:40

## 2025-07-15 RX ADMIN — DEXAMETHASONE SODIUM PHOSPHATE: 10 INJECTION, SOLUTION INTRAMUSCULAR; INTRAVENOUS at 09:34

## 2025-07-15 ASSESSMENT — PAIN SCALES - GENERAL: PAINLEVEL_OUTOF10: NO PAIN (0)

## 2025-07-15 NOTE — PROGRESS NOTES
WEEKLY MANAGEMENT NOTE  Radiation Oncology  7/15/2025             Patient Name: Nerissa Valentin  MRN: [id number]     Chest   200 / 6000 cGy   1 / 30        DAILY DOSE:     200 cGy/ day,  5 times/week        DISEASE UNDER TREATMENT: [id number], adenocarcinoma of  left upper lobe lung    SYSTEMIC THERAPY: Taxol/Carbo weekly    SUBJECTIVE:  Pain score:  0/10    CTC V5.0 Toxicity Criteria  Fatigue: Grade 0: No toxicity  Skin: Grade 0: No toxicity  Comment:    PULMONARY:  Dyspnea: Grade 0: No toxicity    GI:  Nausea: Grade 0: No toxicity  Mucositis: Grade 0: No toxicity  Comment:        OBJECTIVE:BP (!) 143/71   Pulse 74   Resp 18   Wt 61.1 kg (134 lb 12.8 oz)   SpO2 95%   BMI 28.17 kg/m     Wt Readings from Last 2 Encounters:   07/15/25 61.1 kg (134 lb 12.8 oz)   07/15/25 62.1 kg (136 lb 12.8 oz)          IMPRESSION: The patient is tolerating the treatment.  The patient set up, dose, and cone beam CT images were reviewed.      PLAN: Continue radiotherapy    PAIN MANAGEMENT PLAN: The patient does not require pain management    MARIA TERESA Romero M.D.  Department of Radiation Oncology  [facility]

## 2025-07-15 NOTE — PROGRESS NOTES
Infusion Nursing Note:  Nerissa Valentin presents today for C1D1 Taxol and Carboplatin.    Patient seen by provider today: No, pt saw NP Clementine Stoddard yesterday so assessment deferred   present during visit today: Not Applicable.    Note: N/A.      Intravenous Access:  Implanted Port.    Treatment Conditions:  Lab Results   Component Value Date    HGB 9.7 (L) 07/14/2025    WBC 8.0 07/14/2025    ANEU 5.4 07/14/2025    PLT 92 (L) 07/14/2025        Results reviewed, labs MET treatment parameters, ok to proceed with treatment.      Post Infusion Assessment:  Patient tolerated infusion without incident.  Blood return noted pre and post infusion.  Site patent and intact, free from redness, edema or discomfort.  No evidence of extravasations.  Access discontinued per protocol.       Discharge Plan:   Copy of AVS reviewed with patient and/or family.  Patient will return 7/22/25 for next appointment.  Patient discharged in stable condition accompanied by: self.  Departure Mode: Ambulatory.      SADIE CHACON RN

## 2025-07-15 NOTE — LETTER
7/15/2025      Nerissa Valentin  [address] [place] [phone]      Dear Colleague,    Thank you for referring your patient, Nerissa Valentin, to the [facility] RADIATION THERAPY CLINIC. Please see a copy of my visit note below.    WEEKLY MANAGEMENT NOTE  Radiation Oncology  7/15/2025             Patient Name: Nerissa Valentin  MRN: [id number]     Chest   200 / 6000 cGy   1 / 30        DAILY DOSE:     200 cGy/ day,  5 times/week        DISEASE UNDER TREATMENT: [id number], adenocarcinoma of  left upper lobe lung    SYSTEMIC THERAPY: Taxol/Carbo weekly    SUBJECTIVE:  Pain score:  0/10    CTC V5.0 Toxicity Criteria  Fatigue: Grade 0: No toxicity  Skin: Grade 0: No toxicity  Comment:    PULMONARY:  Dyspnea: Grade 0: No toxicity    GI:  Nausea: Grade 0: No toxicity  Mucositis: Grade 0: No toxicity  Comment:        OBJECTIVE:BP (!) 143/71   Pulse 74   Resp 18   Wt 61.1 kg (134 lb 12.8 oz)   SpO2 95%   BMI 28.17 kg/m     Wt Readings from Last 2 Encounters:   07/15/25 61.1 kg (134 lb 12.8 oz)   07/15/25 62.1 kg (136 lb 12.8 oz)          IMPRESSION: The patient is tolerating the treatment.  The patient set up, dose, and cone beam CT images were reviewed.      PLAN: Continue radiotherapy    PAIN MANAGEMENT PLAN: The patient does not require pain management    MARIA TERESA Romero M.D.  Department of Radiation Oncology  [facility]     Again, thank you for allowing me to participate in the care of your patient.        Sincerely,        Vince Romero MD    Electronically signed

## 2025-07-16 ENCOUNTER — PATIENT OUTREACH (OUTPATIENT)
Dept: CARE COORDINATION | Facility: CLINIC | Age: 80
End: 2025-07-16
Payer: COMMERCIAL

## 2025-07-16 NOTE — PROGRESS NOTES
Social Work - Distress Screen Intervention  [facility]    Identified Concern and Score from Distress Screenin. How concerned are you about your ability to eat? 0     2. How concerned are you about unintended weight loss or your current weight? 0     3. How concerned are you about feeling depressed or very sad?  5     4. How concerned are you about feeling anxious or very scared?  5     5. Do you struggle with the loss of meaning and vasu in your life?  Somewhat     6. How concerned are you about work and home life issues that may be affected by your cancer?  0     7. How concerned are you about knowing what resources are available to help you?  0     8. Do you currently have what you would describe as [employer]al or spiritual struggles? Not at all     9. If you want to be contacted by one of our professionals, I can send a message to them right now.  Oncology Social Worker       Date of Distress Screen: 25  Data: At time of last visit, patient scored positive on distress screening.  outreached to patient today to follow up on elevated distress and introduce psychosocial services and support.  Intervention/Education provided:  contacted patient by phone to discuss distress screening results. Left voicemail message  Follow-up Required:  to remain available for support and await return call from patient    TORSTEN Holbrook, [facility]  Adult Oncology Clinics  [place] (M,W), [place] (T) & [place] ()  *I am off Friday  Office: [phone]

## 2025-07-22 ENCOUNTER — OFFICE VISIT (OUTPATIENT)
Dept: RADIATION THERAPY | Facility: OUTPATIENT CENTER | Age: 80
End: 2025-07-22
Payer: COMMERCIAL

## 2025-07-22 ENCOUNTER — ONCOLOGY VISIT (OUTPATIENT)
Dept: ONCOLOGY | Facility: CLINIC | Age: 80
End: 2025-07-22
Attending: INTERNAL MEDICINE
Payer: COMMERCIAL

## 2025-07-22 ENCOUNTER — INFUSION THERAPY VISIT (OUTPATIENT)
Dept: INFUSION THERAPY | Facility: CLINIC | Age: 80
End: 2025-07-22
Attending: INTERNAL MEDICINE
Payer: COMMERCIAL

## 2025-07-22 VITALS
HEART RATE: 77 BPM | SYSTOLIC BLOOD PRESSURE: 140 MMHG | BODY MASS INDEX: 28.29 KG/M2 | DIASTOLIC BLOOD PRESSURE: 60 MMHG | HEIGHT: 58 IN | RESPIRATION RATE: 16 BRPM | OXYGEN SATURATION: 98 % | WEIGHT: 134.8 LBS | TEMPERATURE: 97.9 F

## 2025-07-22 VITALS — WEIGHT: 135.8 LBS | BODY MASS INDEX: 28.38 KG/M2

## 2025-07-22 VITALS — SYSTOLIC BLOOD PRESSURE: 150 MMHG | DIASTOLIC BLOOD PRESSURE: 74 MMHG | HEART RATE: 73 BPM

## 2025-07-22 DIAGNOSIS — D64.9 ANEMIA, UNSPECIFIED TYPE: ICD-10-CM

## 2025-07-22 DIAGNOSIS — C34.92 PRIMARY LUNG ADENOCARCINOMA, LEFT (H): Primary | ICD-10-CM

## 2025-07-22 DIAGNOSIS — D69.6 THROMBOCYTOPENIA: ICD-10-CM

## 2025-07-22 DIAGNOSIS — C34.12 PRIMARY MALIGNANT NEOPLASM OF LEFT UPPER LOBE OF LUNG (H): Primary | ICD-10-CM

## 2025-07-22 LAB
ALBUMIN SERPL BCG-MCNC: 3.5 G/DL (ref 3.5–5.2)
ALP SERPL-CCNC: 72 U/L (ref 40–150)
ALT SERPL W P-5'-P-CCNC: 7 U/L (ref 0–50)
ANION GAP SERPL CALCULATED.3IONS-SCNC: 12 MMOL/L (ref 7–15)
AST SERPL W P-5'-P-CCNC: 24 U/L (ref 0–45)
BASOPHILS # BLD AUTO: 0.1 10E3/UL (ref 0–0.2)
BASOPHILS NFR BLD AUTO: 1 %
BILIRUB SERPL-MCNC: 0.6 MG/DL
BUN SERPL-MCNC: 17.1 MG/DL (ref 8–23)
CALCIUM SERPL-MCNC: 8.8 MG/DL (ref 8.8–10.4)
CHLORIDE SERPL-SCNC: 107 MMOL/L (ref 98–107)
CREAT SERPL-MCNC: 1.3 MG/DL (ref 0.51–0.95)
EGFRCR SERPLBLD CKD-EPI 2021: 42 ML/MIN/1.73M2
EOSINOPHIL # BLD AUTO: 0.2 10E3/UL (ref 0–0.7)
EOSINOPHIL NFR BLD AUTO: 3 %
ERYTHROCYTE [DISTWIDTH] IN BLOOD BY AUTOMATED COUNT: 13.2 % (ref 10–15)
GLUCOSE SERPL-MCNC: 97 MG/DL (ref 70–99)
HAPTOGLOB SERPL-MCNC: 123 MG/DL (ref 30–200)
HCO3 SERPL-SCNC: 20 MMOL/L (ref 22–29)
HCT VFR BLD AUTO: 27.6 % (ref 35–47)
HGB BLD-MCNC: 8.7 G/DL (ref 11.7–15.7)
IMM GRANULOCYTES # BLD: 0.1 10E3/UL
IMM GRANULOCYTES NFR BLD: 1 %
LDH SERPL L TO P-CCNC: 193 U/L (ref 0–250)
LYMPHOCYTES # BLD AUTO: 1.4 10E3/UL (ref 0.8–5.3)
LYMPHOCYTES NFR BLD AUTO: 26 %
MAGNESIUM SERPL-MCNC: 1.7 MG/DL (ref 1.7–2.3)
MCH RBC QN AUTO: 31.4 PG (ref 26.5–33)
MCHC RBC AUTO-ENTMCNC: 31.5 G/DL (ref 31.5–36.5)
MCV RBC AUTO: 100 FL (ref 78–100)
MONOCYTES # BLD AUTO: 0.2 10E3/UL (ref 0–1.3)
MONOCYTES NFR BLD AUTO: 5 %
NEUTROPHILS # BLD AUTO: 3.3 10E3/UL (ref 1.6–8.3)
NEUTROPHILS NFR BLD AUTO: 64 %
NRBC # BLD AUTO: 0 10E3/UL
NRBC BLD AUTO-RTO: 0 /100
PLATELET # BLD AUTO: 106 10E3/UL (ref 150–450)
POTASSIUM SERPL-SCNC: 3.8 MMOL/L (ref 3.4–5.3)
PROT SERPL-MCNC: 6 G/DL (ref 6.4–8.3)
RBC # BLD AUTO: 2.77 10E6/UL (ref 3.8–5.2)
RETICS # AUTO: 0.01 10E6/UL (ref 0.03–0.1)
RETICS/RBC NFR AUTO: 0.4 % (ref 0.5–2)
SODIUM SERPL-SCNC: 139 MMOL/L (ref 135–145)
TSH SERPL DL<=0.005 MIU/L-ACNC: 2.71 UIU/ML (ref 0.3–4.2)
WBC # BLD AUTO: 5.2 10E3/UL (ref 4–11)

## 2025-07-22 PROCEDURE — 83615 LACTATE (LD) (LDH) ENZYME: CPT | Performed by: INTERNAL MEDICINE

## 2025-07-22 PROCEDURE — 250N000011 HC RX IP 250 OP 636: Performed by: NURSE PRACTITIONER

## 2025-07-22 PROCEDURE — 82310 ASSAY OF CALCIUM: CPT | Performed by: INTERNAL MEDICINE

## 2025-07-22 PROCEDURE — 96417 CHEMO IV INFUS EACH ADDL SEQ: CPT

## 2025-07-22 PROCEDURE — G0463 HOSPITAL OUTPT CLINIC VISIT: HCPCS | Performed by: NURSE PRACTITIONER

## 2025-07-22 PROCEDURE — 84443 ASSAY THYROID STIM HORMONE: CPT | Performed by: INTERNAL MEDICINE

## 2025-07-22 PROCEDURE — 83010 ASSAY OF HAPTOGLOBIN QUANT: CPT | Performed by: INTERNAL MEDICINE

## 2025-07-22 PROCEDURE — G2211 COMPLEX E/M VISIT ADD ON: HCPCS | Performed by: NURSE PRACTITIONER

## 2025-07-22 PROCEDURE — 96375 TX/PRO/DX INJ NEW DRUG ADDON: CPT

## 2025-07-22 PROCEDURE — 96367 TX/PROPH/DG ADDL SEQ IV INF: CPT

## 2025-07-22 PROCEDURE — 250N000013 HC RX MED GY IP 250 OP 250 PS 637: Performed by: NURSE PRACTITIONER

## 2025-07-22 PROCEDURE — 85045 AUTOMATED RETICULOCYTE COUNT: CPT | Performed by: INTERNAL MEDICINE

## 2025-07-22 PROCEDURE — 83735 ASSAY OF MAGNESIUM: CPT | Performed by: INTERNAL MEDICINE

## 2025-07-22 PROCEDURE — 85018 HEMOGLOBIN: CPT | Performed by: INTERNAL MEDICINE

## 2025-07-22 PROCEDURE — 258N000003 HC RX IP 258 OP 636: Performed by: NURSE PRACTITIONER

## 2025-07-22 PROCEDURE — 99214 OFFICE O/P EST MOD 30 MIN: CPT | Performed by: NURSE PRACTITIONER

## 2025-07-22 PROCEDURE — 96413 CHEMO IV INFUSION 1 HR: CPT

## 2025-07-22 RX ORDER — DIPHENHYDRAMINE HYDROCHLORIDE 50 MG/ML
25 INJECTION, SOLUTION INTRAMUSCULAR; INTRAVENOUS ONCE
Status: CANCELLED | OUTPATIENT
Start: 2025-07-22

## 2025-07-22 RX ORDER — HEPARIN SODIUM (PORCINE) LOCK FLUSH IV SOLN 100 UNIT/ML 100 UNIT/ML
5 SOLUTION INTRAVENOUS
Status: CANCELLED | OUTPATIENT
Start: 2025-07-22

## 2025-07-22 RX ORDER — HEPARIN SODIUM,PORCINE 10 UNIT/ML
5-20 VIAL (ML) INTRAVENOUS DAILY PRN
Status: CANCELLED | OUTPATIENT
Start: 2025-07-22

## 2025-07-22 RX ORDER — ALBUTEROL SULFATE 0.83 MG/ML
2.5 SOLUTION RESPIRATORY (INHALATION)
Status: CANCELLED | OUTPATIENT
Start: 2025-07-22

## 2025-07-22 RX ORDER — DIPHENHYDRAMINE HYDROCHLORIDE 50 MG/ML
50 INJECTION, SOLUTION INTRAMUSCULAR; INTRAVENOUS
Status: CANCELLED
Start: 2025-07-22

## 2025-07-22 RX ORDER — ALBUTEROL SULFATE 90 UG/1
1-2 INHALANT RESPIRATORY (INHALATION)
Status: CANCELLED
Start: 2025-07-22

## 2025-07-22 RX ORDER — DIPHENHYDRAMINE HCL 25 MG
25 CAPSULE ORAL ONCE
Status: CANCELLED
Start: 2025-07-22

## 2025-07-22 RX ORDER — METHYLPREDNISOLONE SODIUM SUCCINATE 40 MG/ML
40 INJECTION INTRAMUSCULAR; INTRAVENOUS
Status: CANCELLED
Start: 2025-07-22

## 2025-07-22 RX ORDER — EPINEPHRINE 1 MG/ML
0.3 INJECTION, SOLUTION, CONCENTRATE INTRAVENOUS EVERY 5 MIN PRN
Status: CANCELLED | OUTPATIENT
Start: 2025-07-22

## 2025-07-22 RX ORDER — DIPHENHYDRAMINE HYDROCHLORIDE 50 MG/ML
25 INJECTION, SOLUTION INTRAMUSCULAR; INTRAVENOUS
Status: CANCELLED
Start: 2025-07-22

## 2025-07-22 RX ORDER — LORAZEPAM 2 MG/ML
0.5 INJECTION INTRAMUSCULAR EVERY 4 HOURS PRN
Status: CANCELLED | OUTPATIENT
Start: 2025-07-22

## 2025-07-22 RX ORDER — HEPARIN SODIUM (PORCINE) LOCK FLUSH IV SOLN 100 UNIT/ML 100 UNIT/ML
5 SOLUTION INTRAVENOUS
Status: DISCONTINUED | OUTPATIENT
Start: 2025-07-22 | End: 2025-07-22 | Stop reason: HOSPADM

## 2025-07-22 RX ORDER — DIPHENHYDRAMINE HCL 25 MG
25 CAPSULE ORAL ONCE
Status: COMPLETED | OUTPATIENT
Start: 2025-07-22 | End: 2025-07-22

## 2025-07-22 RX ORDER — MEPERIDINE HYDROCHLORIDE 25 MG/ML
25 INJECTION INTRAMUSCULAR; INTRAVENOUS; SUBCUTANEOUS
Status: CANCELLED | OUTPATIENT
Start: 2025-07-22

## 2025-07-22 RX ADMIN — HEPARIN 5 ML: 100 SYRINGE at 12:20

## 2025-07-22 RX ADMIN — DIPHENHYDRAMINE HYDROCHLORIDE 25 MG: 25 CAPSULE ORAL at 09:56

## 2025-07-22 RX ADMIN — PACLITAXEL 71 MG: 6 INJECTION, SOLUTION INTRAVENOUS at 10:27

## 2025-07-22 RX ADMIN — DEXAMETHASONE SODIUM PHOSPHATE: 10 INJECTION, SOLUTION INTRAMUSCULAR; INTRAVENOUS at 09:58

## 2025-07-22 RX ADMIN — SODIUM CHLORIDE 250 ML: 0.9 INJECTION, SOLUTION INTRAVENOUS at 09:51

## 2025-07-22 RX ADMIN — CARBOPLATIN 100 MG: 450 INJECTION, SOLUTION INTRAVENOUS at 11:40

## 2025-07-22 RX ADMIN — FAMOTIDINE 20 MG: 10 INJECTION, SOLUTION INTRAVENOUS at 09:57

## 2025-07-22 ASSESSMENT — PAIN SCALES - GENERAL: PAINLEVEL_OUTOF10: NO PAIN (0)

## 2025-07-22 NOTE — LETTER
7/22/2025      Nerissa Valentin  [address] [place] [phone]      Dear Colleague,    Thank you for referring your patient, Nerissa Valentin, to the [facility] RADIATION THERAPY CLINIC. Please see a copy of my visit note below.    WEEKLY MANAGEMENT NOTE  Radiation Oncology  7/22/2025             Patient Name: Nerissa Valentin  MRN: [id number]     Chest  1200 / 6000 cGy   6 / 30        DAILY DOSE:     200 cGy/ day,  5 times/week        DISEASE UNDER TREATMENT: [id number], adenocarcinoma of  left upper lobe lung    SYSTEMIC THERAPY: Taxol/Carbo weekly    SUBJECTIVE:  Pain score:  0/10    CTC V5.0 Toxicity Criteria  Fatigue: Grade 0: No toxicity  Skin: Grade 0: No toxicity  Comment:    PULMONARY:  Dyspnea: Grade 0: No toxicity    GI:  Nausea: Grade 0: No toxicity  Mucositis: Grade 0: No toxicity  Comment:        OBJECTIVE:Wt 61.6 kg (135 lb 12.8 oz)   BMI 28.38 kg/m     Wt Readings from Last 2 Encounters:   07/22/25 61.6 kg (135 lb 12.8 oz)   07/22/25 61.1 kg (134 lb 12.8 oz)      CBC RESULTS:   Recent Labs   Lab Test 07/22/25  0828   WBC 5.2   RBC 2.77*   HGB 8.7*   HCT 27.6*      MCH 31.4   MCHC 31.5   RDW 13.2   *      Recent Labs   Lab Test 07/22/25  0828 07/14/25  0911    141   POTASSIUM 3.8 4.1   CHLORIDE 107 109*   CO2 20* 22   ANIONGAP 12 10   GLC 97 103*   BUN 17.1 22.3   CR 1.30* 1.35*   DAMIAN 8.8 9.2        IMPRESSION: The patient is tolerating the treatment.  The patient set up, dose, and cone beam CT images were reviewed.      PLAN: Continue radiotherapy    PAIN MANAGEMENT PLAN: The patient does not require pain management    MARIA TERESA Romero M.D.  Department of Radiation Oncology  [facility]     Again, thank you for allowing me to participate in the care of your patient.        Sincerely,        Vince Romero MD    Electronically signed

## 2025-07-22 NOTE — LETTER
7/22/2025      Nerissa Valentin  [address] [place] [id number]      Dear Colleague,    Thank you for referring your patient, Nerissa Valentin, to the [facility]. Please see a copy of my visit note below.    [facility] Hematology and Oncology Outpatient Progress Note    Patient: Nerissa Valentin  MRN: [id number]  Date of Service: Jul 22, 2025          Reason for Visit    Locally advanced lung cancer    Primary Oncologist: Dr. Friedell    Assessment/Plan  Stage IIIA (cT3-cN2-cM0) JATIN lung adenocarcinoma  Baseline mild thrombocytopenia and iron-def/CKD-related anemia  Mild hypomagnesemia  Initiated chemoRT with weekly carbo/Taxol last week.     Tolerating this well, so far. Mild/brief restless legs following benadryl premed last week. Mild nausea, well-managed with Compazine. No esophagitis yet.     Baseline labs: Hgb 8.7 (baseline ahead of chemo 9.5-11 g/dL),  (borderline high), plat 106 (baseline 140s and ahead of starting chemo 92), WBC WNL. Creatinine 1.3 (stable), LFTs WNL. Mag 1.7.    Ferritin, iron and iron sat WNL. Vit B12 (1/2025) WNL.   6-mth hx anemia likely related to CKD/chronic disease with her cancer but can't exclude other etiology.     On mag oxide daily.     Plan:  -Proceed with D8 carbo-Taxol. Reduce benadryl premed dose to 25 mg  -Continue weekly labs and chemo through day 36, as tolerated  -Return every other week with Clementine/Dr. Friedell through remainder of treatment course.   -Continue oral mag oxide daily. Monitor mag on treatment, replace IV prn   -May experience RT-esophagitis through treatment, will need pain medication as needed  -Typically, upon completion of 6 weeks of chemoRT, will restage 4-6 weeks later and consider 1 yr maintenance durvalumab if responding.  -For progressive anemia + thrombocytopenia, pre-dating start of chemo, will get retic count, peripheral smear, haptoglobin, LDH, TSH to rule out other etiology. If no findings,  likely related to CKD and cancer/chronic disease with acute chemo effect.     ______________________________________________________________________________    History of Present Illness/ Interval History    Ms. Nerissa Valentin  is a 79 year old with recently diagnosed stage III left lung cancer. Chemo RT with weekly carbo-Taxol was initiated last week. Returns ahead of day 8.     Tolerating well, so far.   Had brief episode of restless legs at completion of infusion, likely related to the benadryl premed but was not long-lasting. No neuropathy.   Mild nausea, alleviated with compazine x 1.  Fairly good appetite with stable weight so far.   No painful swallowing.     Slight increase in presenting cough x 2 days, non-productive. No hemoptysis. No fevers. No dyspnea. No chest pain.      She has chronic hx iron def anemia, on oral iron.  No bleeding symptoms.       ECOG Performance    0        Oncology History/Treatment  Diagnosis/Stage:   5/2025: IIIA ([id number]) L lung adenocarcinoma  -11/2024: eval for new cough. CXR showed ?JATIN infection, treated with follow-up planned.  -5/3/2025 chest CT: 3.4 cm cavitary posterior JATIN mass  -5/28/2025 EBUS FNA JATIN mass +adenocarcinoma. Mediastinal nodes were negative on biopsy  -6/12/2025 PET: progression of JATIN mass to 5.2 cm extending into LLL and L hilum. Station 5 LN avid and suspicious for met; no sampled in EBUS. No distant mets  -Brain MRI: negative    Treatment:  Not surgical candidate based on reduced DLCO  Met with Rad Onc, definitive chemoRT recommended.     7/1/2025 : initiate concurrent RT with weekly carbo/Taxol      Physical Exam    GENERAL: Alert and oriented to time place and person. Seated comfortably. In no distress. Alone.   HEAD: Atraumatic and normocephalic. No alopecia.  EYES: BUTCH, EOMI. No erythema. No icterus.  LYMPH: No palpable cervical/supraclav nodes  HEART: RRR  LUNGS: clear bilaterally  ABD: Soft, non-tender, non-distended  EXTREMITIES: No  edema  NEURO: No gross deficit noted.      Lab Results    Recent Results (from the past week)   Comprehensive metabolic panel   Result Value Ref Range    Sodium 139 135 - 145 mmol/L    Potassium 3.8 3.4 - 5.3 mmol/L    Carbon Dioxide (CO2) 20 (L) 22 - 29 mmol/L    Anion Gap 12 7 - 15 mmol/L    Urea Nitrogen 17.1 8.0 - 23.0 mg/dL    Creatinine 1.30 (H) 0.51 - 0.95 mg/dL    GFR Estimate 42 (L) >60 mL/min/1.73m2    Calcium 8.8 8.8 - 10.4 mg/dL    Chloride 107 98 - 107 mmol/L    Glucose 97 70 - 99 mg/dL    Alkaline Phosphatase 72 40 - 150 U/L    AST 24 0 - 45 U/L    ALT 7 0 - 50 U/L    Protein Total 6.0 (L) 6.4 - 8.3 g/dL    Albumin 3.5 3.5 - 5.2 g/dL    Bilirubin Total 0.6 <=1.2 mg/dL   Magnesium   Result Value Ref Range    Magnesium 1.7 1.7 - 2.3 mg/dL   CBC with platelets and differential   Result Value Ref Range    WBC Count 5.2 4.0 - 11.0 10e3/uL    RBC Count 2.77 (L) 3.80 - 5.20 10e6/uL    Hemoglobin 8.7 (L) 11.7 - 15.7 g/dL    Hematocrit 27.6 (L) 35.0 - 47.0 %     78 - 100 fL    MCH 31.4 26.5 - 33.0 pg    MCHC 31.5 31.5 - 36.5 g/dL    RDW 13.2 10.0 - 15.0 %    Platelet Count 106 (L) 150 - 450 10e3/uL    % Neutrophils 64 %    % Lymphocytes 26 %    % Monocytes 5 %    % Eosinophils 3 %    % Basophils 1 %    % Immature Granulocytes 1 %    NRBCs per 100 WBC 0 <1 /100    Absolute Neutrophils 3.3 1.6 - 8.3 10e3/uL    Absolute Lymphocytes 1.4 0.8 - 5.3 10e3/uL    Absolute Monocytes 0.2 0.0 - 1.3 10e3/uL    Absolute Eosinophils 0.2 0.0 - 0.7 10e3/uL    Absolute Basophils 0.1 0.0 - 0.2 10e3/uL    Absolute Immature Granulocytes 0.1 <=0.4 10e3/uL    Absolute NRBCs 0.0 10e3/uL         Imaging    XR Chest Port 1 View  Result Date: 7/9/2025  EXAM: XR CHEST PORT 1 VIEW LOCATION: [facility] DATE: 7/9/2025 INDICATION: assess position of right internal jugular port and evaluate for pneumothorax COMPARISON: 5/28/2025. 6/12/2025.     IMPRESSION: There is right chest wall Port-A-Cath with the tip  "over the mid SVC. Cannot assess for a kink in the tube on this single view. No pleural fluid or pneumothorax. No airspace disease or edema. Left lung lesions seen on CT are not appreciated with radiography. Normal size of the heart. Mitral calcification is present. There is aortic calcification.    XR Surgery AYESHA Fluoro Less Than 5 Min  Result Date: 7/9/2025  This exam was marked as non-reportable because it will not be read by a radiologist or a [place] non-radiologist provider.       Billing  Total time 30 minutes, to include face to face visit, review of EMR, ordering, documentation and coordination of care on date of service   complexity modifier for longitudinal care.       Signed by: Clementine Stoddard NP        Oncology Rooming Note    July 22, 2025 8:53 AM   Nerissa Valentin is a 79 year old female who presents for:    Chief Complaint   Patient presents with     Oncology Clinic Visit     Primary lung adenocarcinoma - labs, provider, infusion     Initial Vitals: BP (!) 140/60 (BP Location: Left arm, Patient Position: Sitting, Cuff Size: Adult Regular)   Pulse 77   Temp 97.9  F (36.6  C) (Tympanic)   Resp 16   Ht 1.473 m (4' 10\")   Wt 61.1 kg (134 lb 12.8 oz)   SpO2 98%   BMI 28.17 kg/m   Estimated body mass index is 28.17 kg/m  as calculated from the following:    Height as of this encounter: 1.473 m (4' 10\").    Weight as of this encounter: 61.1 kg (134 lb 12.8 oz). Body surface area is 1.58 meters squared.  No Pain (0) Comment: Data Unavailable   No LMP recorded. Patient has had a hysterectomy.  Allergies reviewed: Yes  Medications reviewed: Yes    Medications: Medication refills not needed today.  Pharmacy name entered into [employer]: [facility] [place], [place] - [address]    PHQ9:  Did this patient require a PHQ9?: No      Clinical concerns: None today.        Jayde Tomlin MA                Again, thank you for allowing me to participate in the care of your patient.  "       Sincerely,        Clementine Stoddard NP    Electronically signed

## 2025-07-22 NOTE — PROGRESS NOTES
"Oncology Rooming Note    July 22, 2025 8:53 AM   Nerissa Valentin is a 79 year old female who presents for:    Chief Complaint   Patient presents with    Oncology Clinic Visit     Primary lung adenocarcinoma - labs, provider, infusion     Initial Vitals: BP (!) 140/60 (BP Location: Left arm, Patient Position: Sitting, Cuff Size: Adult Regular)   Pulse 77   Temp 97.9  F (36.6  C) (Tympanic)   Resp 16   Ht 1.473 m (4' 10\")   Wt 61.1 kg (134 lb 12.8 oz)   SpO2 98%   BMI 28.17 kg/m   Estimated body mass index is 28.17 kg/m  as calculated from the following:    Height as of this encounter: 1.473 m (4' 10\").    Weight as of this encounter: 61.1 kg (134 lb 12.8 oz). Body surface area is 1.58 meters squared.  No Pain (0) Comment: Data Unavailable   No LMP recorded. Patient has had a hysterectomy.  Allergies reviewed: Yes  Medications reviewed: Yes    Medications: Medication refills not needed today.  Pharmacy name entered into [facility]: [facility] [place], [place] - [address] [facility]    PHQ9:  Did this patient require a PHQ9?: No      Clinical concerns: None today.        Jayde Tomlin MA              "

## 2025-07-22 NOTE — PROGRESS NOTES
[facility] Hematology and Oncology Outpatient Progress Note    Patient: Nerissa Valentin  MRN: [id number]  Date of Service: Jul 22, 2025          Reason for Visit    Locally advanced lung cancer    Primary Oncologist: Dr. Friedell    Assessment/Plan  Stage IIIA (cT3-cN2-cM0) JATIN lung adenocarcinoma  Baseline mild thrombocytopenia and iron-def/CKD-related anemia  Mild hypomagnesemia  Initiated chemoRT with weekly carbo/Taxol last week.     Tolerating this well, so far. Mild/brief restless legs following benadryl premed last week. Mild nausea, well-managed with Compazine. No esophagitis yet.     Baseline labs: Hgb 8.7 (baseline ahead of chemo 9.5-11 g/dL),  (borderline high), plat 106 (baseline 140s and ahead of starting chemo 92), WBC WNL. Creatinine 1.3 (stable), LFTs WNL. Mag 1.7.    Ferritin, iron and iron sat WNL. Vit B12 (1/2025) WNL.   6-mth hx anemia likely related to CKD/chronic disease with her cancer but can't exclude other etiology.     On mag oxide daily.     Plan:  -Proceed with D8 carbo-Taxol. Reduce benadryl premed dose to 25 mg  -Continue weekly labs and chemo through day 36, as tolerated  -Return every other week with Clementine/Dr. Friedell through remainder of treatment course.   -Continue oral mag oxide daily. Monitor mag on treatment, replace IV prn   -May experience RT-esophagitis through treatment, will need pain medication as needed  -Typically, upon completion of 6 weeks of chemoRT, will restage 4-6 weeks later and consider 1 yr maintenance durvalumab if responding.  -For progressive anemia + thrombocytopenia, pre-dating start of chemo, will get retic count, peripheral smear, haptoglobin, LDH, TSH to rule out other etiology. If no findings, likely related to CKD and cancer/chronic disease with acute chemo effect.     ______________________________________________________________________________    History of Present Illness/ Interval History    Ms. Nerissa Valentin  is a 79 year old  with recently diagnosed stage III left lung cancer. Chemo RT with weekly carbo-Taxol was initiated last week. Returns ahead of day 8.     Tolerating well, so far.   Had brief episode of restless legs at completion of infusion, likely related to the benadryl premed but was not long-lasting. No neuropathy.   Mild nausea, alleviated with compazine x 1.  Fairly good appetite with stable weight so far.   No painful swallowing.     Slight increase in presenting cough x 2 days, non-productive. No hemoptysis. No fevers. No dyspnea. No chest pain.      She has chronic hx iron def anemia, on oral iron.  No bleeding symptoms.       ECOG Performance    0        Oncology History/Treatment  Diagnosis/Stage:   5/2025: IIIA ([id number]) L lung adenocarcinoma  -11/2024: eval for new cough. CXR showed ?JATIN infection, treated with follow-up planned.  -5/3/2025 chest CT: 3.4 cm cavitary posterior JATIN mass  -5/28/2025 EBUS FNA JATIN mass +adenocarcinoma. Mediastinal nodes were negative on biopsy  -6/12/2025 PET: progression of JATIN mass to 5.2 cm extending into LLL and L hilum. Station 5 LN avid and suspicious for met; no sampled in EBUS. No distant mets  -Brain MRI: negative    Treatment:  Not surgical candidate based on reduced DLCO  Met with Rad Onc, definitive chemoRT recommended.     7/1/2025 : initiate concurrent RT with weekly carbo/Taxol      Physical Exam    GENERAL: Alert and oriented to time place and person. Seated comfortably. In no distress. Alone.   HEAD: Atraumatic and normocephalic. No alopecia.  EYES: BUTCH, EOMI. No erythema. No icterus.  LYMPH: No palpable cervical/supraclav nodes  HEART: RRR  LUNGS: clear bilaterally  ABD: Soft, non-tender, non-distended  EXTREMITIES: No edema  NEURO: No gross deficit noted.      Lab Results    Recent Results (from the past week)   Comprehensive metabolic panel   Result Value Ref Range    Sodium 139 135 - 145 mmol/L    Potassium 3.8 3.4 - 5.3 mmol/L    Carbon Dioxide (CO2) 20 (L) 22  - 29 mmol/L    Anion Gap 12 7 - 15 mmol/L    Urea Nitrogen 17.1 8.0 - 23.0 mg/dL    Creatinine 1.30 (H) 0.51 - 0.95 mg/dL    GFR Estimate 42 (L) >60 mL/min/1.73m2    Calcium 8.8 8.8 - 10.4 mg/dL    Chloride 107 98 - 107 mmol/L    Glucose 97 70 - 99 mg/dL    Alkaline Phosphatase 72 40 - 150 U/L    AST 24 0 - 45 U/L    ALT 7 0 - 50 U/L    Protein Total 6.0 (L) 6.4 - 8.3 g/dL    Albumin 3.5 3.5 - 5.2 g/dL    Bilirubin Total 0.6 <=1.2 mg/dL   Magnesium   Result Value Ref Range    Magnesium 1.7 1.7 - 2.3 mg/dL   CBC with platelets and differential   Result Value Ref Range    WBC Count 5.2 4.0 - 11.0 10e3/uL    RBC Count 2.77 (L) 3.80 - 5.20 10e6/uL    Hemoglobin 8.7 (L) 11.7 - 15.7 g/dL    Hematocrit 27.6 (L) 35.0 - 47.0 %     78 - 100 fL    MCH 31.4 26.5 - 33.0 pg    MCHC 31.5 31.5 - 36.5 g/dL    RDW 13.2 10.0 - 15.0 %    Platelet Count 106 (L) 150 - 450 10e3/uL    % Neutrophils 64 %    % Lymphocytes 26 %    % Monocytes 5 %    % Eosinophils 3 %    % Basophils 1 %    % Immature Granulocytes 1 %    NRBCs per 100 WBC 0 <1 /100    Absolute Neutrophils 3.3 1.6 - 8.3 10e3/uL    Absolute Lymphocytes 1.4 0.8 - 5.3 10e3/uL    Absolute Monocytes 0.2 0.0 - 1.3 10e3/uL    Absolute Eosinophils 0.2 0.0 - 0.7 10e3/uL    Absolute Basophils 0.1 0.0 - 0.2 10e3/uL    Absolute Immature Granulocytes 0.1 <=0.4 10e3/uL    Absolute NRBCs 0.0 10e3/uL         Imaging    XR Chest Port 1 View  Result Date: 7/9/2025  EXAM: XR CHEST PORT 1 VIEW LOCATION: [facility] DATE: 7/9/2025 INDICATION: assess position of right internal jugular port and evaluate for pneumothorax COMPARISON: 5/28/2025. 6/12/2025.     IMPRESSION: There is right chest wall Port-A-Cath with the tip over the mid SVC. Cannot assess for a kink in the tube on this single view. No pleural fluid or pneumothorax. No airspace disease or edema. Left lung lesions seen on CT are not appreciated with radiography. Normal size of the heart. Mitral calcification  is present. There is aortic calcification.    XR Surgery AYESHA Fluoro Less Than 5 Min  Result Date: 7/9/2025  This exam was marked as non-reportable because it will not be read by a radiologist or a [place] non-radiologist provider.       Billing  Total time 30 minutes, to include face to face visit, review of EMR, ordering, documentation and coordination of care on date of service   complexity modifier for longitudinal care.       Signed by: Clementine Stoddard NP

## 2025-07-22 NOTE — PROGRESS NOTES
WEEKLY MANAGEMENT NOTE  Radiation Oncology  7/22/2025             Patient Name: Nerissa Valentin  MRN: [id number]     Chest  1200 / 6000 cGy   6 / 30        DAILY DOSE:     200 cGy/ day,  5 times/week        DISEASE UNDER TREATMENT: [id number], adenocarcinoma of  left upper lobe lung    SYSTEMIC THERAPY: Taxol/Carbo weekly    SUBJECTIVE:  Pain score:  0/10    CTC V5.0 Toxicity Criteria  Fatigue: Grade 0: No toxicity  Skin: Grade 0: No toxicity  Comment:    PULMONARY:  Dyspnea: Grade 0: No toxicity    GI:  Nausea: Grade 0: No toxicity  Mucositis: Grade 0: No toxicity  Comment:        OBJECTIVE:Wt 61.6 kg (135 lb 12.8 oz)   BMI 28.38 kg/m     Wt Readings from Last 2 Encounters:   07/22/25 61.6 kg (135 lb 12.8 oz)   07/22/25 61.1 kg (134 lb 12.8 oz)      CBC RESULTS:   Recent Labs   Lab Test 07/22/25  0828   WBC 5.2   RBC 2.77*   HGB 8.7*   HCT 27.6*      MCH 31.4   MCHC 31.5   RDW 13.2   *      Recent Labs   Lab Test 07/22/25  0828 07/14/25  0911    141   POTASSIUM 3.8 4.1   CHLORIDE 107 109*   CO2 20* 22   ANIONGAP 12 10   GLC 97 103*   BUN 17.1 22.3   CR 1.30* 1.35*   DAMIAN 8.8 9.2        IMPRESSION: The patient is tolerating the treatment.  The patient set up, dose, and cone beam CT images were reviewed.      PLAN: Continue radiotherapy    PAIN MANAGEMENT PLAN: The patient does not require pain management    MARIA TERESA Romero M.D.  Department of Radiation Oncology  [facility]

## 2025-07-22 NOTE — PROGRESS NOTES
Infusion Nursing Note:  Nerissa Valentin presents today for Taxol and carbo.    Patient seen by provider today: Yes: Clementine Stoddard NP   present during visit today: Not Applicable.    Note: N/A.      Intravenous Access:  Implanted Port.    Treatment Conditions:  Results reviewed, labs MET treatment parameters, ok to proceed with treatment.      Post Infusion Assessment:  Patient tolerated infusion without incident.  Blood return noted pre and post infusion.  No evidence of extravasations.  Access discontinued per protocol.       Discharge Plan:   AVS to patient via MYCHART.  Patient will return 7/29 for next appointment.   Patient discharged in stable condition accompanied by: self.  Departure Mode: Ambulatory.      Jayde Dickinson RN

## 2025-07-23 ENCOUNTER — PATIENT OUTREACH (OUTPATIENT)
Dept: ONCOLOGY | Facility: CLINIC | Age: 80
End: 2025-07-23
Payer: COMMERCIAL

## 2025-07-23 NOTE — PROGRESS NOTES
[facility]: Cancer Care Follow-Up Note                                    Discussion with Patient:                                                      C1D1 status check-ChemoRT with wkly Carbo/Taxol last week.     Antiemetics: Compazine        Dates of Treatment:                                                      Infusion given in last 28 days       Administered MAR Action Medication Dose Rate Visit    07/15/2025 10:03 New Bag PACLitaxel (TAXOL) 71 mg in sodium chloride 0.9% in non-PVC container 211.83 mL infusion 71 mg 211.8 mL/hr Infusion Therapy Visit on 07/15/2025 in [facility]    07/15/2025 11:09 New Bag CARBOplatin 100 mg in sodium chloride 0.9 % 120 mL infusion 100 mg 240 mL/hr Infusion Therapy Visit on 07/15/2025 in [facility]    07/22/2025 10:27 New Bag PACLitaxel (TAXOL) 71 mg in sodium chloride 0.9% in non-PVC container 211.83 mL infusion 71 mg 211.8 mL/hr Infusion Therapy Visit on 07/22/2025 in [facility]    07/22/2025 11:40 New Bag CARBOplatin 100 mg in sodium chloride 0.9 % 120 mL infusion 100 mg 240 mL/hr Infusion Therapy Visit on 07/22/2025 in [facility]            Assessment:                                                      Pt reports feeling well and tolerating txt so far.     Mild restless leg, nausea, but managed with compazine.    Intervention/Education provided during outreach:                                                       Continue wkly labs and chemo.    8/5 follow up with [facility] follow up after next PET scan per provider.    Signature:  Livia Holguin, RN

## 2025-07-29 ENCOUNTER — INFUSION THERAPY VISIT (OUTPATIENT)
Dept: INFUSION THERAPY | Facility: CLINIC | Age: 80
End: 2025-07-29
Attending: INTERNAL MEDICINE
Payer: COMMERCIAL

## 2025-07-29 ENCOUNTER — OFFICE VISIT (OUTPATIENT)
Dept: RADIATION THERAPY | Facility: OUTPATIENT CENTER | Age: 80
End: 2025-07-29
Payer: COMMERCIAL

## 2025-07-29 VITALS
HEART RATE: 70 BPM | WEIGHT: 130.6 LBS | RESPIRATION RATE: 18 BRPM | OXYGEN SATURATION: 99 % | DIASTOLIC BLOOD PRESSURE: 65 MMHG | BODY MASS INDEX: 27.3 KG/M2 | SYSTOLIC BLOOD PRESSURE: 154 MMHG

## 2025-07-29 DIAGNOSIS — C34.92 PRIMARY LUNG ADENOCARCINOMA, LEFT (H): Primary | ICD-10-CM

## 2025-07-29 LAB
ALBUMIN SERPL BCG-MCNC: 3.2 G/DL (ref 3.5–5.2)
ALP SERPL-CCNC: 65 U/L (ref 40–150)
ALT SERPL W P-5'-P-CCNC: <5 U/L (ref 0–50)
ANION GAP SERPL CALCULATED.3IONS-SCNC: 12 MMOL/L (ref 7–15)
AST SERPL W P-5'-P-CCNC: 16 U/L (ref 0–45)
BASOPHILS # BLD AUTO: 0 10E3/UL (ref 0–0.2)
BASOPHILS NFR BLD AUTO: 1 %
BILIRUB SERPL-MCNC: 0.8 MG/DL
BUN SERPL-MCNC: 20.1 MG/DL (ref 8–23)
CALCIUM SERPL-MCNC: 8.5 MG/DL (ref 8.8–10.4)
CHLORIDE SERPL-SCNC: 106 MMOL/L (ref 98–107)
CREAT SERPL-MCNC: 1.16 MG/DL (ref 0.51–0.95)
EGFRCR SERPLBLD CKD-EPI 2021: 48 ML/MIN/1.73M2
EOSINOPHIL # BLD AUTO: 0 10E3/UL (ref 0–0.7)
EOSINOPHIL NFR BLD AUTO: 1 %
ERYTHROCYTE [DISTWIDTH] IN BLOOD BY AUTOMATED COUNT: 13.2 % (ref 10–15)
GLUCOSE SERPL-MCNC: 110 MG/DL (ref 70–99)
HCO3 SERPL-SCNC: 20 MMOL/L (ref 22–29)
HCT VFR BLD AUTO: 23.3 % (ref 35–47)
HGB BLD-MCNC: 7.6 G/DL (ref 11.7–15.7)
IMM GRANULOCYTES # BLD: 0 10E3/UL
IMM GRANULOCYTES NFR BLD: 1 %
LYMPHOCYTES # BLD AUTO: 0.6 10E3/UL (ref 0.8–5.3)
LYMPHOCYTES NFR BLD AUTO: 25 %
MAGNESIUM SERPL-MCNC: 1.7 MG/DL (ref 1.7–2.3)
MCH RBC QN AUTO: 31.7 PG (ref 26.5–33)
MCHC RBC AUTO-ENTMCNC: 32.6 G/DL (ref 31.5–36.5)
MCV RBC AUTO: 97 FL (ref 78–100)
MONOCYTES # BLD AUTO: 0.4 10E3/UL (ref 0–1.3)
MONOCYTES NFR BLD AUTO: 19 %
NEUTROPHILS # BLD AUTO: 1.2 10E3/UL (ref 1.6–8.3)
NEUTROPHILS NFR BLD AUTO: 53 %
NRBC # BLD AUTO: 0 10E3/UL
NRBC BLD AUTO-RTO: 1 /100
PLAT MORPH BLD: NORMAL
PLATELET # BLD AUTO: 118 10E3/UL (ref 150–450)
POTASSIUM SERPL-SCNC: 3.6 MMOL/L (ref 3.4–5.3)
PROT SERPL-MCNC: 6 G/DL (ref 6.4–8.3)
RBC # BLD AUTO: 2.4 10E6/UL (ref 3.8–5.2)
RBC MORPH BLD: NORMAL
SODIUM SERPL-SCNC: 138 MMOL/L (ref 135–145)
WBC # BLD AUTO: 2.2 10E3/UL (ref 4–11)

## 2025-07-29 PROCEDURE — 96360 HYDRATION IV INFUSION INIT: CPT

## 2025-07-29 PROCEDURE — 250N000011 HC RX IP 250 OP 636: Performed by: INTERNAL MEDICINE

## 2025-07-29 PROCEDURE — 82040 ASSAY OF SERUM ALBUMIN: CPT | Performed by: INTERNAL MEDICINE

## 2025-07-29 PROCEDURE — 85004 AUTOMATED DIFF WBC COUNT: CPT | Performed by: INTERNAL MEDICINE

## 2025-07-29 PROCEDURE — 83735 ASSAY OF MAGNESIUM: CPT | Performed by: INTERNAL MEDICINE

## 2025-07-29 PROCEDURE — 85041 AUTOMATED RBC COUNT: CPT | Performed by: INTERNAL MEDICINE

## 2025-07-29 PROCEDURE — 258N000003 HC RX IP 258 OP 636: Performed by: INTERNAL MEDICINE

## 2025-07-29 PROCEDURE — 82247 BILIRUBIN TOTAL: CPT | Performed by: INTERNAL MEDICINE

## 2025-07-29 PROCEDURE — 36591 DRAW BLOOD OFF VENOUS DEVICE: CPT

## 2025-07-29 RX ORDER — LIDOCAINE HYDROCHLORIDE 20 MG/ML
SOLUTION OROPHARYNGEAL
Qty: 300 ML | Refills: 3 | Status: SHIPPED | OUTPATIENT
Start: 2025-07-29

## 2025-07-29 RX ORDER — HEPARIN SODIUM,PORCINE 10 UNIT/ML
5-20 VIAL (ML) INTRAVENOUS DAILY PRN
OUTPATIENT
Start: 2025-08-05

## 2025-07-29 RX ORDER — HEPARIN SODIUM (PORCINE) LOCK FLUSH IV SOLN 100 UNIT/ML 100 UNIT/ML
5 SOLUTION INTRAVENOUS
OUTPATIENT
Start: 2025-07-29

## 2025-07-29 RX ORDER — DIPHENHYDRAMINE HYDROCHLORIDE 50 MG/ML
25 INJECTION, SOLUTION INTRAMUSCULAR; INTRAVENOUS
Start: 2025-08-05

## 2025-07-29 RX ORDER — EPINEPHRINE 1 MG/ML
0.3 INJECTION, SOLUTION, CONCENTRATE INTRAVENOUS EVERY 5 MIN PRN
OUTPATIENT
Start: 2025-08-05

## 2025-07-29 RX ORDER — DIPHENHYDRAMINE HYDROCHLORIDE 50 MG/ML
25 INJECTION, SOLUTION INTRAMUSCULAR; INTRAVENOUS ONCE
OUTPATIENT
Start: 2025-08-05

## 2025-07-29 RX ORDER — METHYLPREDNISOLONE SODIUM SUCCINATE 40 MG/ML
40 INJECTION INTRAMUSCULAR; INTRAVENOUS
Start: 2025-08-05

## 2025-07-29 RX ORDER — ALBUTEROL SULFATE 0.83 MG/ML
2.5 SOLUTION RESPIRATORY (INHALATION)
OUTPATIENT
Start: 2025-08-05

## 2025-07-29 RX ORDER — HEPARIN SODIUM (PORCINE) LOCK FLUSH IV SOLN 100 UNIT/ML 100 UNIT/ML
5 SOLUTION INTRAVENOUS
OUTPATIENT
Start: 2025-08-05

## 2025-07-29 RX ORDER — MEPERIDINE HYDROCHLORIDE 25 MG/ML
25 INJECTION INTRAMUSCULAR; INTRAVENOUS; SUBCUTANEOUS
OUTPATIENT
Start: 2025-08-05

## 2025-07-29 RX ORDER — ALBUTEROL SULFATE 90 UG/1
1-2 INHALANT RESPIRATORY (INHALATION)
Start: 2025-08-05

## 2025-07-29 RX ORDER — DIPHENHYDRAMINE HCL 25 MG
25 CAPSULE ORAL ONCE
Start: 2025-08-05

## 2025-07-29 RX ORDER — LORAZEPAM 2 MG/ML
0.5 INJECTION INTRAMUSCULAR EVERY 4 HOURS PRN
OUTPATIENT
Start: 2025-08-05

## 2025-07-29 RX ORDER — HEPARIN SODIUM,PORCINE 10 UNIT/ML
5-20 VIAL (ML) INTRAVENOUS DAILY PRN
OUTPATIENT
Start: 2025-07-29

## 2025-07-29 RX ORDER — DIPHENHYDRAMINE HYDROCHLORIDE 50 MG/ML
50 INJECTION, SOLUTION INTRAMUSCULAR; INTRAVENOUS
Start: 2025-08-05

## 2025-07-29 RX ORDER — HEPARIN SODIUM (PORCINE) LOCK FLUSH IV SOLN 100 UNIT/ML 100 UNIT/ML
5 SOLUTION INTRAVENOUS
Status: DISCONTINUED | OUTPATIENT
Start: 2025-07-29 | End: 2025-07-29 | Stop reason: HOSPADM

## 2025-07-29 RX ADMIN — SODIUM CHLORIDE 1000 ML: 0.9 INJECTION, SOLUTION INTRAVENOUS at 09:51

## 2025-07-29 RX ADMIN — HEPARIN 5 ML: 100 SYRINGE at 10:53

## 2025-07-29 NOTE — PROGRESS NOTES
Infusion Nursing Note:  Nerissa Valentin presents today for Taxol & Carboplatin C1D15 DEFERRED.    Patient seen by provider today: Yes: Dr. Romero   present during visit today: Not Applicable.    Note: ANC 1.2, per Dr. Friedell defer chemo for 1 week. Give 1 liter of fluids. Recheck labs CBC & CMP on Friday with fluids and possible mag. Pt has been having 4 loose stools per day. Pt advised to start Imodium. Stop taking oral Magnesium until further advised. Pt in agreement with plan.     Intravenous Access:  Implanted Port.    Treatment Conditions:  Lab Results   Component Value Date    HGB 7.6 (L) 07/29/2025    WBC 2.2 (L) 07/29/2025    ANEU 1.2 (L) 07/29/2025     (L) 07/29/2025        Results reviewed, labs did NOT meet treatment parameters: ANC 1.2.    Post Infusion Assessment:  Patient tolerated infusion without incident.  Blood return noted pre and post infusion.  Site patent and intact, free from redness, edema or discomfort.  No evidence of extravasations.  Access discontinued per protocol.     Discharge Plan:   Patient and/or family verbalized understanding of discharge instructions and all questions answered.  Patient discharged in stable condition accompanied by: self.  Departure Mode: Ambulatory.    Mohsen Garcia RN

## 2025-07-29 NOTE — PATIENT INSTRUCTIONS
Ike Multani-     Your treatment was held due to low blood counts.      Dr. Friedell would like you to hold the Magnesium Oxide due to diarrhea. Please come back Friday for a lab recheck and fluids.      For your diarrhea, Imodium over the counter should help. Follow instructions on the box. If your diarrhea subsides you may stop the Imodium.

## 2025-07-29 NOTE — LETTER
7/29/2025      Nerissa Valentin  [address] [place] [phone]      Dear Colleague,    Thank you for referring your patient, Nerissa Valentin, to the [facility]. Please see a copy of my visit note below.    WEEKLY MANAGEMENT NOTE  Radiation Oncology  7/29/2025             Patient Name: Nerissa Valentin  MRN: [id number]     Chest  1200 / 6000 cGy   6 / 30        DAILY DOSE:     200 cGy/ day,  5 times/week        DISEASE UNDER TREATMENT: [id number], adenocarcinoma of  left upper lobe lung    SYSTEMIC THERAPY: Taxol/Carbo weekly    SUBJECTIVE:  Pain score:  0/10    CTC V5.0 Toxicity Criteria  Fatigue: Grade 1: Fatigue relieved by rest  Skin: Grade 1: Faint erythema or dry desquamation  Comment:    PULMONARY:  Dyspnea: Grade 0: No toxicity    GI:  Nausea: Grade 0: No toxicity  Mucositis: Grade 2: Moderate pain; not interfering with oral intake; modified diet indicated  Comment:        OBJECTIVE:BP (!) 154/65   Pulse 70   Resp 18   Wt 59.2 kg (130 lb 9.6 oz)   SpO2 99%   BMI 27.30 kg/m     Wt Readings from Last 2 Encounters:   07/29/25 59.2 kg (130 lb 9.6 oz)   07/22/25 61.6 kg (135 lb 12.8 oz)          IMPRESSION: The patient is tolerating the treatment.  The patient set up, dose, and cone beam CT images were reviewed.  Diet modification recommended. Lidocaine elixir2% Rx given      PLAN: Continue radiotherapy    PAIN MANAGEMENT PLAN: The patient does not require pain management    MARIA TERESA Romero M.D.  Department of Radiation Oncology  [facility]     Again, thank you for allowing me to participate in the care of your patient.        Sincerely,        Vince Romero MD    Electronically signed

## 2025-07-29 NOTE — PROGRESS NOTES
WEEKLY MANAGEMENT NOTE  Radiation Oncology  7/29/2025             Patient Name: Nerissa Valentin  MRN: [id number]     Chest  2200 / 6000 cGy   11 / 30        DAILY DOSE:     200 cGy/ day,  5 times/week        DISEASE UNDER TREATMENT: [id number], adenocarcinoma of  left upper lobe lung    SYSTEMIC THERAPY: Taxol/Carbo weekly    SUBJECTIVE:  Pain score:  0/10    CTC V5.0 Toxicity Criteria  Fatigue: Grade 1: Fatigue relieved by rest  Skin: Grade 1: Faint erythema or dry desquamation  Comment:    PULMONARY:  Dyspnea: Grade 0: No toxicity    GI:  Nausea: Grade 0: No toxicity  Mucositis: Grade 2: Moderate pain; not interfering with oral intake; modified diet indicated  Comment:        OBJECTIVE:BP (!) 154/65   Pulse 70   Resp 18   Wt 59.2 kg (130 lb 9.6 oz)   SpO2 99%   BMI 27.30 kg/m     Wt Readings from Last 2 Encounters:   07/29/25 59.2 kg (130 lb 9.6 oz)   07/22/25 61.6 kg (135 lb 12.8 oz)          IMPRESSION: The patient is tolerating the treatment.  The patient set up, dose, and cone beam CT images were reviewed.  Diet modification recommended. Lidocaine elixir2% Rx given      PLAN: Continue radiotherapy    PAIN MANAGEMENT PLAN: The patient does not require pain management    MARIA TERESA Romero M.D.  Department of Radiation Oncology  [facility]

## 2025-08-01 PROCEDURE — 83735 ASSAY OF MAGNESIUM: CPT | Performed by: INTERNAL MEDICINE

## 2025-08-01 PROCEDURE — 85025 COMPLETE CBC W/AUTO DIFF WBC: CPT | Performed by: INTERNAL MEDICINE

## 2025-08-01 PROCEDURE — 82040 ASSAY OF SERUM ALBUMIN: CPT | Performed by: INTERNAL MEDICINE

## 2025-08-05 ENCOUNTER — ONCOLOGY VISIT (OUTPATIENT)
Dept: ONCOLOGY | Facility: CLINIC | Age: 80
End: 2025-08-05
Attending: NURSE PRACTITIONER
Payer: COMMERCIAL

## 2025-08-05 ENCOUNTER — OFFICE VISIT (OUTPATIENT)
Dept: RADIATION THERAPY | Facility: OUTPATIENT CENTER | Age: 80
End: 2025-08-05
Payer: COMMERCIAL

## 2025-08-05 ENCOUNTER — INFUSION THERAPY VISIT (OUTPATIENT)
Dept: INFUSION THERAPY | Facility: CLINIC | Age: 80
End: 2025-08-05
Attending: INTERNAL MEDICINE
Payer: COMMERCIAL

## 2025-08-05 VITALS
RESPIRATION RATE: 16 BRPM | SYSTOLIC BLOOD PRESSURE: 145 MMHG | OXYGEN SATURATION: 96 % | TEMPERATURE: 98.3 F | BODY MASS INDEX: 27.42 KG/M2 | WEIGHT: 136 LBS | HEART RATE: 65 BPM | HEIGHT: 59 IN | DIASTOLIC BLOOD PRESSURE: 67 MMHG

## 2025-08-05 VITALS — SYSTOLIC BLOOD PRESSURE: 160 MMHG | HEART RATE: 66 BPM | DIASTOLIC BLOOD PRESSURE: 74 MMHG

## 2025-08-05 VITALS
WEIGHT: 136.8 LBS | HEART RATE: 65 BPM | BODY MASS INDEX: 28.59 KG/M2 | SYSTOLIC BLOOD PRESSURE: 145 MMHG | DIASTOLIC BLOOD PRESSURE: 67 MMHG

## 2025-08-05 DIAGNOSIS — C34.92 PRIMARY LUNG ADENOCARCINOMA, LEFT (H): Primary | ICD-10-CM

## 2025-08-05 DIAGNOSIS — D64.9 ANEMIA, UNSPECIFIED TYPE: ICD-10-CM

## 2025-08-05 LAB
ALBUMIN SERPL BCG-MCNC: 3 G/DL (ref 3.5–5.2)
ALP SERPL-CCNC: 83 U/L (ref 40–150)
ALT SERPL W P-5'-P-CCNC: 7 U/L (ref 0–50)
ANION GAP SERPL CALCULATED.3IONS-SCNC: 10 MMOL/L (ref 7–15)
AST SERPL W P-5'-P-CCNC: 18 U/L (ref 0–45)
BASOPHILS # BLD AUTO: 0 10E3/UL (ref 0–0.2)
BASOPHILS # BLD AUTO: 0.1 10E3/UL (ref 0–0.2)
BASOPHILS NFR BLD AUTO: 1 %
BASOPHILS NFR BLD AUTO: 1 %
BILIRUB SERPL-MCNC: 0.7 MG/DL
BUN SERPL-MCNC: 17.1 MG/DL (ref 8–23)
CALCIUM SERPL-MCNC: 8.8 MG/DL (ref 8.8–10.4)
CHLORIDE SERPL-SCNC: 104 MMOL/L (ref 98–107)
CREAT SERPL-MCNC: 1.39 MG/DL (ref 0.51–0.95)
EGFRCR SERPLBLD CKD-EPI 2021: 39 ML/MIN/1.73M2
EOSINOPHIL # BLD AUTO: 0 10E3/UL (ref 0–0.7)
EOSINOPHIL # BLD AUTO: 0.1 10E3/UL (ref 0–0.7)
EOSINOPHIL NFR BLD AUTO: 0 %
EOSINOPHIL NFR BLD AUTO: 1 %
ERYTHROCYTE [DISTWIDTH] IN BLOOD BY AUTOMATED COUNT: 14.8 % (ref 10–15)
ERYTHROCYTE [DISTWIDTH] IN BLOOD BY AUTOMATED COUNT: 15 % (ref 10–15)
GLUCOSE SERPL-MCNC: 103 MG/DL (ref 70–99)
HCO3 SERPL-SCNC: 22 MMOL/L (ref 22–29)
HCT VFR BLD AUTO: 24.4 % (ref 35–47)
HCT VFR BLD AUTO: 24.6 % (ref 35–47)
HGB BLD-MCNC: 7.8 G/DL (ref 11.7–15.7)
HGB BLD-MCNC: 7.8 G/DL (ref 11.7–15.7)
IMM GRANULOCYTES # BLD: 0.1 10E3/UL
IMM GRANULOCYTES # BLD: 0.1 10E3/UL
IMM GRANULOCYTES NFR BLD: 1 %
IMM GRANULOCYTES NFR BLD: 1 %
LYMPHOCYTES # BLD AUTO: 0.6 10E3/UL (ref 0.8–5.3)
LYMPHOCYTES # BLD AUTO: 0.7 10E3/UL (ref 0.8–5.3)
LYMPHOCYTES NFR BLD AUTO: 8 %
LYMPHOCYTES NFR BLD AUTO: 9 %
MAGNESIUM SERPL-MCNC: 1.6 MG/DL (ref 1.7–2.3)
MCH RBC QN AUTO: 31.3 PG (ref 26.5–33)
MCH RBC QN AUTO: 31.5 PG (ref 26.5–33)
MCHC RBC AUTO-ENTMCNC: 31.7 G/DL (ref 31.5–36.5)
MCHC RBC AUTO-ENTMCNC: 32 G/DL (ref 31.5–36.5)
MCV RBC AUTO: 98 FL (ref 78–100)
MCV RBC AUTO: 99 FL (ref 78–100)
MONOCYTES # BLD AUTO: 0.8 10E3/UL (ref 0–1.3)
MONOCYTES # BLD AUTO: 0.9 10E3/UL (ref 0–1.3)
MONOCYTES NFR BLD AUTO: 11 %
MONOCYTES NFR BLD AUTO: 11 %
NEUTROPHILS # BLD AUTO: 5.8 10E3/UL (ref 1.6–8.3)
NEUTROPHILS # BLD AUTO: 5.9 10E3/UL (ref 1.6–8.3)
NEUTROPHILS NFR BLD AUTO: 77 %
NEUTROPHILS NFR BLD AUTO: 78 %
NRBC # BLD AUTO: 0 10E3/UL
NRBC # BLD AUTO: 0 10E3/UL
NRBC BLD AUTO-RTO: 0 /100
NRBC BLD AUTO-RTO: 0 /100
PLATELET # BLD AUTO: 105 10E3/UL (ref 150–450)
PLATELET # BLD AUTO: 99 10E3/UL (ref 150–450)
POTASSIUM SERPL-SCNC: 3.8 MMOL/L (ref 3.4–5.3)
PROT SERPL-MCNC: 5.5 G/DL (ref 6.4–8.3)
RBC # BLD AUTO: 2.48 10E6/UL (ref 3.8–5.2)
RBC # BLD AUTO: 2.49 10E6/UL (ref 3.8–5.2)
RETICS # AUTO: 0.12 10E6/UL (ref 0.03–0.1)
RETICS/RBC NFR AUTO: 4.8 % (ref 0.5–2)
SODIUM SERPL-SCNC: 136 MMOL/L (ref 135–145)
WBC # BLD AUTO: 7.5 10E3/UL (ref 4–11)
WBC # BLD AUTO: 7.6 10E3/UL (ref 4–11)

## 2025-08-05 PROCEDURE — 258N000003 HC RX IP 258 OP 636: Performed by: INTERNAL MEDICINE

## 2025-08-05 PROCEDURE — 96375 TX/PRO/DX INJ NEW DRUG ADDON: CPT

## 2025-08-05 PROCEDURE — 36591 DRAW BLOOD OFF VENOUS DEVICE: CPT | Performed by: INTERNAL MEDICINE

## 2025-08-05 PROCEDURE — 250N000011 HC RX IP 250 OP 636: Performed by: INTERNAL MEDICINE

## 2025-08-05 PROCEDURE — 82310 ASSAY OF CALCIUM: CPT | Performed by: INTERNAL MEDICINE

## 2025-08-05 PROCEDURE — G2211 COMPLEX E/M VISIT ADD ON: HCPCS | Performed by: NURSE PRACTITIONER

## 2025-08-05 PROCEDURE — 96417 CHEMO IV INFUS EACH ADDL SEQ: CPT

## 2025-08-05 PROCEDURE — 96367 TX/PROPH/DG ADDL SEQ IV INF: CPT

## 2025-08-05 PROCEDURE — 85045 AUTOMATED RETICULOCYTE COUNT: CPT | Performed by: NURSE PRACTITIONER

## 2025-08-05 PROCEDURE — 96413 CHEMO IV INFUSION 1 HR: CPT

## 2025-08-05 PROCEDURE — 85004 AUTOMATED DIFF WBC COUNT: CPT | Performed by: INTERNAL MEDICINE

## 2025-08-05 PROCEDURE — 99214 OFFICE O/P EST MOD 30 MIN: CPT | Performed by: NURSE PRACTITIONER

## 2025-08-05 PROCEDURE — 36591 DRAW BLOOD OFF VENOUS DEVICE: CPT

## 2025-08-05 PROCEDURE — 250N000011 HC RX IP 250 OP 636: Performed by: NURSE PRACTITIONER

## 2025-08-05 PROCEDURE — 83735 ASSAY OF MAGNESIUM: CPT | Performed by: INTERNAL MEDICINE

## 2025-08-05 PROCEDURE — 250N000013 HC RX MED GY IP 250 OP 250 PS 637: Performed by: INTERNAL MEDICINE

## 2025-08-05 PROCEDURE — 258N000003 HC RX IP 258 OP 636: Performed by: NURSE PRACTITIONER

## 2025-08-05 PROCEDURE — G0463 HOSPITAL OUTPT CLINIC VISIT: HCPCS | Mod: 25 | Performed by: NURSE PRACTITIONER

## 2025-08-05 PROCEDURE — 85004 AUTOMATED DIFF WBC COUNT: CPT | Performed by: NURSE PRACTITIONER

## 2025-08-05 RX ORDER — DIPHENHYDRAMINE HCL 25 MG
25 CAPSULE ORAL ONCE
Status: COMPLETED | OUTPATIENT
Start: 2025-08-05 | End: 2025-08-05

## 2025-08-05 RX ORDER — DIPHENHYDRAMINE HYDROCHLORIDE 50 MG/ML
25 INJECTION, SOLUTION INTRAMUSCULAR; INTRAVENOUS
Start: 2025-08-12

## 2025-08-05 RX ORDER — DIPHENHYDRAMINE HCL 25 MG
25 CAPSULE ORAL ONCE
Start: 2025-08-12

## 2025-08-05 RX ORDER — DIPHENHYDRAMINE HYDROCHLORIDE 50 MG/ML
50 INJECTION, SOLUTION INTRAMUSCULAR; INTRAVENOUS
Start: 2025-08-12

## 2025-08-05 RX ORDER — MEPERIDINE HYDROCHLORIDE 25 MG/ML
25 INJECTION INTRAMUSCULAR; INTRAVENOUS; SUBCUTANEOUS
OUTPATIENT
Start: 2025-08-12

## 2025-08-05 RX ORDER — LORAZEPAM 2 MG/ML
0.5 INJECTION INTRAMUSCULAR EVERY 4 HOURS PRN
OUTPATIENT
Start: 2025-08-12

## 2025-08-05 RX ORDER — DIPHENHYDRAMINE HYDROCHLORIDE 50 MG/ML
25 INJECTION, SOLUTION INTRAMUSCULAR; INTRAVENOUS ONCE
OUTPATIENT
Start: 2025-08-12

## 2025-08-05 RX ORDER — HEPARIN SODIUM (PORCINE) LOCK FLUSH IV SOLN 100 UNIT/ML 100 UNIT/ML
5 SOLUTION INTRAVENOUS
Status: DISCONTINUED | OUTPATIENT
Start: 2025-08-05 | End: 2025-08-05 | Stop reason: HOSPADM

## 2025-08-05 RX ORDER — ALBUTEROL SULFATE 90 UG/1
1-2 INHALANT RESPIRATORY (INHALATION)
Start: 2025-08-12

## 2025-08-05 RX ORDER — MAGNESIUM SULFATE HEPTAHYDRATE 40 MG/ML
2 INJECTION, SOLUTION INTRAVENOUS ONCE
Status: COMPLETED | OUTPATIENT
Start: 2025-08-05 | End: 2025-08-05

## 2025-08-05 RX ORDER — ALBUTEROL SULFATE 0.83 MG/ML
2.5 SOLUTION RESPIRATORY (INHALATION)
OUTPATIENT
Start: 2025-08-12

## 2025-08-05 RX ORDER — HEPARIN SODIUM,PORCINE 10 UNIT/ML
5-20 VIAL (ML) INTRAVENOUS DAILY PRN
OUTPATIENT
Start: 2025-08-12

## 2025-08-05 RX ORDER — EPINEPHRINE 1 MG/ML
0.3 INJECTION, SOLUTION, CONCENTRATE INTRAVENOUS EVERY 5 MIN PRN
OUTPATIENT
Start: 2025-08-12

## 2025-08-05 RX ORDER — METHYLPREDNISOLONE SODIUM SUCCINATE 40 MG/ML
40 INJECTION INTRAMUSCULAR; INTRAVENOUS
Start: 2025-08-12

## 2025-08-05 RX ORDER — HEPARIN SODIUM (PORCINE) LOCK FLUSH IV SOLN 100 UNIT/ML 100 UNIT/ML
5 SOLUTION INTRAVENOUS
OUTPATIENT
Start: 2025-08-12

## 2025-08-05 RX ADMIN — CARBOPLATIN 100 MG: 10 INJECTION, SOLUTION INTRAVENOUS at 11:57

## 2025-08-05 RX ADMIN — HEPARIN 5 ML: 100 SYRINGE at 12:34

## 2025-08-05 RX ADMIN — FAMOTIDINE 20 MG: 10 INJECTION, SOLUTION INTRAVENOUS at 10:31

## 2025-08-05 RX ADMIN — PACLITAXEL 71 MG: 6 INJECTION, SOLUTION INTRAVENOUS at 10:51

## 2025-08-05 RX ADMIN — DEXAMETHASONE SODIUM PHOSPHATE: 10 INJECTION, SOLUTION INTRAMUSCULAR; INTRAVENOUS at 10:30

## 2025-08-05 RX ADMIN — SODIUM CHLORIDE 1000 ML: 0.9 INJECTION, SOLUTION INTRAVENOUS at 09:37

## 2025-08-05 RX ADMIN — DIPHENHYDRAMINE HYDROCHLORIDE 25 MG: 25 CAPSULE ORAL at 10:31

## 2025-08-05 RX ADMIN — MAGNESIUM SULFATE HEPTAHYDRATE 2 G: 40 INJECTION, SOLUTION INTRAVENOUS at 09:37

## 2025-08-05 ASSESSMENT — PAIN SCALES - GENERAL: PAINLEVEL_OUTOF10: NO PAIN (0)

## 2025-08-06 LAB
PATH REPORT.COMMENTS IMP SPEC: NORMAL
PATH REPORT.FINAL DX SPEC: NORMAL
PATH REPORT.MICROSCOPIC SPEC OTHER STN: NORMAL
PATH REPORT.MICROSCOPIC SPEC OTHER STN: NORMAL
PATH REPORT.RELEVANT HX SPEC: NORMAL

## 2025-08-06 PROCEDURE — 99207 BLOOD MORPHOLOGY PATHOLOGIST REVIEW: CPT | Performed by: PATHOLOGY

## 2025-08-07 ENCOUNTER — HOSPITAL ENCOUNTER (EMERGENCY)
Facility: CLINIC | Age: 80
DRG: 064 | End: 2025-08-07
Attending: STUDENT IN AN ORGANIZED HEALTH CARE EDUCATION/TRAINING PROGRAM
Payer: COMMERCIAL

## 2025-08-07 ENCOUNTER — APPOINTMENT (OUTPATIENT)
Dept: CT IMAGING | Facility: CLINIC | Age: 80
DRG: 064 | End: 2025-08-07
Attending: STUDENT IN AN ORGANIZED HEALTH CARE EDUCATION/TRAINING PROGRAM
Payer: COMMERCIAL

## 2025-08-07 VITALS
TEMPERATURE: 98.5 F | HEART RATE: 86 BPM | BODY MASS INDEX: 27.47 KG/M2 | WEIGHT: 136 LBS | RESPIRATION RATE: 21 BRPM | OXYGEN SATURATION: 91 % | DIASTOLIC BLOOD PRESSURE: 62 MMHG | SYSTOLIC BLOOD PRESSURE: 154 MMHG

## 2025-08-07 DIAGNOSIS — C34.92 PRIMARY LUNG ADENOCARCINOMA, LEFT (H): ICD-10-CM

## 2025-08-07 DIAGNOSIS — D69.6 THROMBOCYTOPENIA: ICD-10-CM

## 2025-08-07 DIAGNOSIS — D64.9 ANEMIA, UNSPECIFIED TYPE: ICD-10-CM

## 2025-08-07 DIAGNOSIS — S06.5XAA SDH (SUBDURAL HEMATOMA) (H): Primary | ICD-10-CM

## 2025-08-07 LAB
ALBUMIN SERPL BCG-MCNC: 2.9 G/DL (ref 3.5–5.2)
ALBUMIN UR-MCNC: 10 MG/DL
ALP SERPL-CCNC: 70 U/L (ref 40–150)
ALT SERPL W P-5'-P-CCNC: 7 U/L (ref 0–50)
ANION GAP SERPL CALCULATED.3IONS-SCNC: 12 MMOL/L (ref 7–15)
APPEARANCE UR: ABNORMAL
AST SERPL W P-5'-P-CCNC: 23 U/L (ref 0–45)
ATRIAL RATE - MUSE: 84 BPM
BASE EXCESS BLDV CALC-SCNC: -0.4 MMOL/L (ref -3–3)
BASOPHILS # BLD AUTO: 0 10E3/UL (ref 0–0.2)
BASOPHILS NFR BLD AUTO: 0 %
BILIRUB SERPL-MCNC: 1.2 MG/DL
BILIRUB UR QL STRIP: NEGATIVE
BUN SERPL-MCNC: 18.7 MG/DL (ref 8–23)
CALCIUM SERPL-MCNC: 8.2 MG/DL (ref 8.8–10.4)
CHLORIDE SERPL-SCNC: 99 MMOL/L (ref 98–107)
COLOR UR AUTO: YELLOW
CREAT SERPL-MCNC: 1.34 MG/DL (ref 0.51–0.95)
DIASTOLIC BLOOD PRESSURE - MUSE: NORMAL MMHG
EGFRCR SERPLBLD CKD-EPI 2021: 40 ML/MIN/1.73M2
EOSINOPHIL # BLD AUTO: 0 10E3/UL (ref 0–0.7)
EOSINOPHIL NFR BLD AUTO: 0 %
ERYTHROCYTE [DISTWIDTH] IN BLOOD BY AUTOMATED COUNT: 15 % (ref 10–15)
GLUCOSE SERPL-MCNC: 117 MG/DL (ref 70–99)
GLUCOSE UR STRIP-MCNC: NEGATIVE MG/DL
HCO3 BLDV-SCNC: 24 MMOL/L (ref 21–28)
HCO3 SERPL-SCNC: 20 MMOL/L (ref 22–29)
HCT VFR BLD AUTO: 22.4 % (ref 35–47)
HGB BLD-MCNC: 7.3 G/DL (ref 11.7–15.7)
HGB UR QL STRIP: ABNORMAL
IMM GRANULOCYTES # BLD: 0 10E3/UL
IMM GRANULOCYTES NFR BLD: 1 %
INTERPRETATION ECG - MUSE: NORMAL
KETONES UR STRIP-MCNC: NEGATIVE MG/DL
LEUKOCYTE ESTERASE UR QL STRIP: ABNORMAL
LYMPHOCYTES # BLD AUTO: 0.2 10E3/UL (ref 0.8–5.3)
LYMPHOCYTES NFR BLD AUTO: 5 %
MAGNESIUM SERPL-MCNC: 1.6 MG/DL (ref 1.7–2.3)
MCH RBC QN AUTO: 31.9 PG (ref 26.5–33)
MCHC RBC AUTO-ENTMCNC: 32.6 G/DL (ref 31.5–36.5)
MCV RBC AUTO: 98 FL (ref 78–100)
MONOCYTES # BLD AUTO: 0.3 10E3/UL (ref 0–1.3)
MONOCYTES NFR BLD AUTO: 6 %
NEUTROPHILS # BLD AUTO: 4.1 10E3/UL (ref 1.6–8.3)
NEUTROPHILS NFR BLD AUTO: 89 %
NITRATE UR QL: NEGATIVE
NRBC # BLD AUTO: 0 10E3/UL
NRBC BLD AUTO-RTO: 0 /100
O2/TOTAL GAS SETTING VFR VENT: 21 %
OXYHGB MFR BLDV: 64 % (ref 70–75)
P AXIS - MUSE: 78 DEGREES
PCO2 BLDV: 34 MM HG (ref 40–50)
PH BLDV: 7.45 [PH] (ref 7.32–7.43)
PH UR STRIP: 6.5 [PH] (ref 5–7)
PLATELET # BLD AUTO: 55 10E3/UL (ref 150–450)
PO2 BLDV: 36 MM HG (ref 25–47)
POTASSIUM SERPL-SCNC: 4 MMOL/L (ref 3.4–5.3)
PR INTERVAL - MUSE: 214 MS
PROT SERPL-MCNC: 5.6 G/DL (ref 6.4–8.3)
QRS DURATION - MUSE: 84 MS
QT - MUSE: 396 MS
QTC - MUSE: 467 MS
R AXIS - MUSE: 33 DEGREES
RADIOLOGIST FLAGS: ABNORMAL
RBC # BLD AUTO: 2.29 10E6/UL (ref 3.8–5.2)
RBC URINE: 2 /HPF
SAO2 % BLDV: 65.5 % (ref 70–75)
SODIUM SERPL-SCNC: 131 MMOL/L (ref 135–145)
SP GR UR STRIP: 1.01 (ref 1–1.03)
SYSTOLIC BLOOD PRESSURE - MUSE: NORMAL MMHG
T AXIS - MUSE: 45 DEGREES
TROPONIN T SERPL HS-MCNC: 35 NG/L
TSH SERPL DL<=0.005 MIU/L-ACNC: 1.43 UIU/ML (ref 0.3–4.2)
UROBILINOGEN UR STRIP-MCNC: 2 MG/DL
VENTRICULAR RATE- MUSE: 84 BPM
WBC # BLD AUTO: 4.7 10E3/UL (ref 4–11)
WBC URINE: 26 /HPF

## 2025-08-07 PROCEDURE — 80053 COMPREHEN METABOLIC PANEL: CPT | Performed by: STUDENT IN AN ORGANIZED HEALTH CARE EDUCATION/TRAINING PROGRAM

## 2025-08-07 PROCEDURE — 96365 THER/PROPH/DIAG IV INF INIT: CPT | Performed by: STUDENT IN AN ORGANIZED HEALTH CARE EDUCATION/TRAINING PROGRAM

## 2025-08-07 PROCEDURE — 84443 ASSAY THYROID STIM HORMONE: CPT | Performed by: STUDENT IN AN ORGANIZED HEALTH CARE EDUCATION/TRAINING PROGRAM

## 2025-08-07 PROCEDURE — 84484 ASSAY OF TROPONIN QUANT: CPT | Performed by: STUDENT IN AN ORGANIZED HEALTH CARE EDUCATION/TRAINING PROGRAM

## 2025-08-07 PROCEDURE — 81001 URINALYSIS AUTO W/SCOPE: CPT | Performed by: STUDENT IN AN ORGANIZED HEALTH CARE EDUCATION/TRAINING PROGRAM

## 2025-08-07 PROCEDURE — 87186 SC STD MICRODIL/AGAR DIL: CPT | Performed by: STUDENT IN AN ORGANIZED HEALTH CARE EDUCATION/TRAINING PROGRAM

## 2025-08-07 PROCEDURE — 85025 COMPLETE CBC W/AUTO DIFF WBC: CPT | Performed by: STUDENT IN AN ORGANIZED HEALTH CARE EDUCATION/TRAINING PROGRAM

## 2025-08-07 PROCEDURE — 36415 COLL VENOUS BLD VENIPUNCTURE: CPT | Performed by: STUDENT IN AN ORGANIZED HEALTH CARE EDUCATION/TRAINING PROGRAM

## 2025-08-07 PROCEDURE — 99291 CRITICAL CARE FIRST HOUR: CPT | Mod: 25 | Performed by: STUDENT IN AN ORGANIZED HEALTH CARE EDUCATION/TRAINING PROGRAM

## 2025-08-07 PROCEDURE — 99285 EMERGENCY DEPT VISIT HI MDM: CPT | Performed by: STUDENT IN AN ORGANIZED HEALTH CARE EDUCATION/TRAINING PROGRAM

## 2025-08-07 PROCEDURE — 250N000011 HC RX IP 250 OP 636: Performed by: STUDENT IN AN ORGANIZED HEALTH CARE EDUCATION/TRAINING PROGRAM

## 2025-08-07 PROCEDURE — 70450 CT HEAD/BRAIN W/O DYE: CPT

## 2025-08-07 PROCEDURE — 96361 HYDRATE IV INFUSION ADD-ON: CPT | Performed by: STUDENT IN AN ORGANIZED HEALTH CARE EDUCATION/TRAINING PROGRAM

## 2025-08-07 PROCEDURE — 83735 ASSAY OF MAGNESIUM: CPT | Performed by: STUDENT IN AN ORGANIZED HEALTH CARE EDUCATION/TRAINING PROGRAM

## 2025-08-07 PROCEDURE — 93010 ELECTROCARDIOGRAM REPORT: CPT | Performed by: STUDENT IN AN ORGANIZED HEALTH CARE EDUCATION/TRAINING PROGRAM

## 2025-08-07 PROCEDURE — 93005 ELECTROCARDIOGRAM TRACING: CPT | Performed by: STUDENT IN AN ORGANIZED HEALTH CARE EDUCATION/TRAINING PROGRAM

## 2025-08-07 PROCEDURE — 258N000003 HC RX IP 258 OP 636: Performed by: STUDENT IN AN ORGANIZED HEALTH CARE EDUCATION/TRAINING PROGRAM

## 2025-08-07 PROCEDURE — 82805 BLOOD GASES W/O2 SATURATION: CPT | Performed by: STUDENT IN AN ORGANIZED HEALTH CARE EDUCATION/TRAINING PROGRAM

## 2025-08-07 RX ORDER — MAGNESIUM SULFATE HEPTAHYDRATE 40 MG/ML
2 INJECTION, SOLUTION INTRAVENOUS ONCE
Status: COMPLETED | OUTPATIENT
Start: 2025-08-07 | End: 2025-08-08

## 2025-08-07 RX ADMIN — SODIUM CHLORIDE 1000 ML: 0.9 INJECTION, SOLUTION INTRAVENOUS at 22:23

## 2025-08-07 RX ADMIN — MAGNESIUM SULFATE HEPTAHYDRATE 2 G: 40 INJECTION, SOLUTION INTRAVENOUS at 23:42

## 2025-08-07 ASSESSMENT — COLUMBIA-SUICIDE SEVERITY RATING SCALE - C-SSRS
6. HAVE YOU EVER DONE ANYTHING, STARTED TO DO ANYTHING, OR PREPARED TO DO ANYTHING TO END YOUR LIFE?: NO
2. HAVE YOU ACTUALLY HAD ANY THOUGHTS OF KILLING YOURSELF IN THE PAST MONTH?: NO
1. IN THE PAST MONTH, HAVE YOU WISHED YOU WERE DEAD OR WISHED YOU COULD GO TO SLEEP AND NOT WAKE UP?: NO

## 2025-08-07 ASSESSMENT — ACTIVITIES OF DAILY LIVING (ADL)
ADLS_ACUITY_SCORE: 42
ADLS_ACUITY_SCORE: 42

## 2025-08-08 ENCOUNTER — APPOINTMENT (OUTPATIENT)
Dept: CT IMAGING | Facility: CLINIC | Age: 80
DRG: 064 | End: 2025-08-08
Attending: STUDENT IN AN ORGANIZED HEALTH CARE EDUCATION/TRAINING PROGRAM
Payer: COMMERCIAL

## 2025-08-08 ENCOUNTER — APPOINTMENT (OUTPATIENT)
Dept: CT IMAGING | Facility: CLINIC | Age: 80
DRG: 064 | End: 2025-08-08
Attending: PHYSICIAN ASSISTANT
Payer: COMMERCIAL

## 2025-08-08 ENCOUNTER — APPOINTMENT (OUTPATIENT)
Dept: OCCUPATIONAL THERAPY | Facility: CLINIC | Age: 80
DRG: 064 | End: 2025-08-08
Attending: PHYSICIAN ASSISTANT
Payer: COMMERCIAL

## 2025-08-08 ENCOUNTER — APPOINTMENT (OUTPATIENT)
Dept: GENERAL RADIOLOGY | Facility: CLINIC | Age: 80
DRG: 064 | End: 2025-08-08
Attending: PHYSICIAN ASSISTANT
Payer: COMMERCIAL

## 2025-08-08 ENCOUNTER — HOSPITAL ENCOUNTER (INPATIENT)
Facility: CLINIC | Age: 80
LOS: 4 days | Discharge: HOME | DRG: 064 | End: 2025-08-12
Attending: HOSPITALIST | Admitting: HOSPITALIST
Payer: COMMERCIAL

## 2025-08-08 VITALS
WEIGHT: 136 LBS | SYSTOLIC BLOOD PRESSURE: 155 MMHG | HEART RATE: 72 BPM | RESPIRATION RATE: 20 BRPM | BODY MASS INDEX: 27.47 KG/M2 | OXYGEN SATURATION: 97 % | TEMPERATURE: 98.7 F | DIASTOLIC BLOOD PRESSURE: 59 MMHG

## 2025-08-08 DIAGNOSIS — N30.00 ACUTE CYSTITIS WITHOUT HEMATURIA: ICD-10-CM

## 2025-08-08 DIAGNOSIS — E83.39 HYPOPHOSPHATEMIA: ICD-10-CM

## 2025-08-08 DIAGNOSIS — S06.5XAA SDH (SUBDURAL HEMATOMA) (H): Primary | ICD-10-CM

## 2025-08-08 DIAGNOSIS — I10 PRIMARY HYPERTENSION: ICD-10-CM

## 2025-08-08 DIAGNOSIS — K21.00 GASTROESOPHAGEAL REFLUX DISEASE WITH ESOPHAGITIS WITHOUT HEMORRHAGE: ICD-10-CM

## 2025-08-08 DIAGNOSIS — Z71.89 OTHER SPECIFIED COUNSELING: Chronic | ICD-10-CM

## 2025-08-08 LAB
ABO + RH BLD: NORMAL
ALBUMIN SERPL BCG-MCNC: 2.7 G/DL (ref 3.5–5.2)
ALBUMIN UR-MCNC: NEGATIVE MG/DL
ALP SERPL-CCNC: 65 U/L (ref 40–150)
ALT SERPL W P-5'-P-CCNC: 8 U/L (ref 0–50)
ANION GAP SERPL CALCULATED.3IONS-SCNC: 8 MMOL/L (ref 7–15)
APPEARANCE UR: ABNORMAL
AST SERPL W P-5'-P-CCNC: 20 U/L (ref 0–45)
BASOPHILS # BLD AUTO: 0 10E3/UL (ref 0–0.2)
BASOPHILS NFR BLD AUTO: 1 %
BILIRUB SERPL-MCNC: 1.3 MG/DL
BILIRUB UR QL STRIP: NEGATIVE
BLD GP AB SCN SERPL QL: NEGATIVE
BLD PROD TYP BPU: NORMAL
BLOOD COMPONENT TYPE: NORMAL
BUN SERPL-MCNC: 15.1 MG/DL (ref 8–23)
CALCIUM SERPL-MCNC: 8 MG/DL (ref 8.8–10.4)
CHLORIDE SERPL-SCNC: 105 MMOL/L (ref 98–107)
CO COMPONENTS: NORMAL
CODING SYSTEM: NORMAL
COLOR UR AUTO: YELLOW
CREAT SERPL-MCNC: 1.18 MG/DL (ref 0.51–0.95)
CROSSMATCH: NORMAL
CROSSMATCH: NORMAL
EGFRCR SERPLBLD CKD-EPI 2021: 47 ML/MIN/1.73M2
EOSINOPHIL # BLD AUTO: 0 10E3/UL (ref 0–0.7)
EOSINOPHIL NFR BLD AUTO: 0 %
ERYTHROCYTE [DISTWIDTH] IN BLOOD BY AUTOMATED COUNT: 14.7 % (ref 10–15)
ERYTHROCYTE [DISTWIDTH] IN BLOOD BY AUTOMATED COUNT: 14.9 % (ref 10–15)
ERYTHROCYTE [DISTWIDTH] IN BLOOD BY AUTOMATED COUNT: 15.2 % (ref 10–15)
ERYTHROCYTE [DISTWIDTH] IN BLOOD BY AUTOMATED COUNT: 15.4 % (ref 10–15)
FLUAV RNA SPEC QL NAA+PROBE: NEGATIVE
FLUBV RNA RESP QL NAA+PROBE: NEGATIVE
GLUCOSE SERPL-MCNC: 93 MG/DL (ref 70–99)
GLUCOSE UR STRIP-MCNC: NEGATIVE MG/DL
GRAM/CULTURE INDICATED?: YES
HCO3 SERPL-SCNC: 22 MMOL/L (ref 22–29)
HCT VFR BLD AUTO: 20.5 % (ref 35–47)
HCT VFR BLD AUTO: 22.1 % (ref 35–47)
HCT VFR BLD AUTO: 24.6 % (ref 35–47)
HCT VFR BLD AUTO: 25.1 % (ref 35–47)
HGB BLD-MCNC: 6.3 G/DL (ref 11.7–15.7)
HGB BLD-MCNC: 7.1 G/DL (ref 11.7–15.7)
HGB BLD-MCNC: 7.8 G/DL (ref 11.7–15.7)
HGB BLD-MCNC: 8 G/DL (ref 11.7–15.7)
HGB UR QL STRIP: NEGATIVE
HOLD SPECIMEN: NORMAL
IMM GRANULOCYTES # BLD: 0 10E3/UL
IMM GRANULOCYTES NFR BLD: 1 %
ISSUE DATE AND TIME: NORMAL
KETONES UR STRIP-MCNC: ABNORMAL MG/DL
LACTATE SERPL-SCNC: 0.8 MMOL/L (ref 0.7–2)
LACTATE SERPL-SCNC: 0.9 MMOL/L (ref 0.7–2)
LEUKOCYTE ESTERASE UR QL STRIP: ABNORMAL
LYMPHOCYTES # BLD AUTO: 0.3 10E3/UL (ref 0.8–5.3)
LYMPHOCYTES NFR BLD AUTO: 7 %
MAGNESIUM SERPL-MCNC: 2.1 MG/DL (ref 1.7–2.3)
MCH RBC QN AUTO: 30.4 PG (ref 26.5–33)
MCH RBC QN AUTO: 31 PG (ref 26.5–33)
MCH RBC QN AUTO: 31.2 PG (ref 26.5–33)
MCH RBC QN AUTO: 31.3 PG (ref 26.5–33)
MCHC RBC AUTO-ENTMCNC: 30.7 G/DL (ref 31.5–36.5)
MCHC RBC AUTO-ENTMCNC: 31.7 G/DL (ref 31.5–36.5)
MCHC RBC AUTO-ENTMCNC: 31.9 G/DL (ref 31.5–36.5)
MCHC RBC AUTO-ENTMCNC: 32.1 G/DL (ref 31.5–36.5)
MCV RBC AUTO: 97 FL (ref 78–100)
MCV RBC AUTO: 97 FL (ref 78–100)
MCV RBC AUTO: 98 FL (ref 78–100)
MCV RBC AUTO: 99 FL (ref 78–100)
MONOCYTES # BLD AUTO: 0.2 10E3/UL (ref 0–1.3)
MONOCYTES NFR BLD AUTO: 5 %
MUCOUS THREADS #/AREA URNS LPF: PRESENT /LPF
NEUTROPHILS # BLD AUTO: 3.4 10E3/UL (ref 1.6–8.3)
NEUTROPHILS NFR BLD AUTO: 87 %
NITRATE UR QL: NEGATIVE
NRBC # BLD AUTO: 0 10E3/UL
NRBC BLD AUTO-RTO: 0 /100
OTHER UNITS TRANSFUSED: NORMAL
PH UR STRIP: 5.5 [PH] (ref 5–7)
PHOSPHATE SERPL-MCNC: 2.5 MG/DL (ref 2.5–4.5)
PLATELET # BLD AUTO: 53 10E3/UL (ref 150–450)
PLATELET # BLD AUTO: 58 10E3/UL (ref 150–450)
PLATELET # BLD AUTO: 65 10E3/UL (ref 150–450)
PLATELET # BLD AUTO: 66 10E3/UL (ref 150–450)
POST RXN CLERICAL CHECK: NORMAL
POST SPECIMEN APPEARANCE: NORMAL
POST-RXN ABO/RH: NORMAL
POST-RXN POLY DAT: NORMAL
POTASSIUM SERPL-SCNC: 4.4 MMOL/L (ref 3.4–5.3)
PROCALCITONIN SERPL IA-MCNC: 0.68 NG/ML
PRODUCT CODE: NORMAL
PROT SERPL-MCNC: 5.1 G/DL (ref 6.4–8.3)
RBC # BLD AUTO: 2.07 10E6/UL (ref 3.8–5.2)
RBC # BLD AUTO: 2.27 10E6/UL (ref 3.8–5.2)
RBC # BLD AUTO: 2.5 10E6/UL (ref 3.8–5.2)
RBC # BLD AUTO: 2.58 10E6/UL (ref 3.8–5.2)
RBC URINE: 0 /HPF
RSV RNA SPEC NAA+PROBE: NEGATIVE
SARS-COV-2 RNA RESP QL NAA+PROBE: NEGATIVE
SODIUM SERPL-SCNC: 135 MMOL/L (ref 135–145)
SP GR UR STRIP: 1.01 (ref 1–1.03)
SPECIMEN EXP DATE BLD: NORMAL
SPECIMEN EXP DATE BLD: NORMAL
SQUAMOUS EPITHELIAL: <1 /HPF
TROPONIN T SERPL HS-MCNC: 33 NG/L
UNIT ABO/RH: NORMAL
UNIT BLOOD TYPE TRANSFUSION REACTION: NORMAL
UNIT NUMBER: NORMAL
UNIT STATUS: NORMAL
UNIT TYPE ISBT: 5100
UNIT TYPE ISBT: 6200
UNIT TYPE ISBT: 9500
UROBILINOGEN UR STRIP-MCNC: 2 MG/DL
WBC # BLD AUTO: 3.9 10E3/UL (ref 4–11)
WBC # BLD AUTO: 4 10E3/UL (ref 4–11)
WBC # BLD AUTO: 4.7 10E3/UL (ref 4–11)
WBC # BLD AUTO: 5.9 10E3/UL (ref 4–11)
WBC URINE: 20 /HPF

## 2025-08-08 PROCEDURE — 86923 COMPATIBILITY TEST ELECTRIC: CPT | Performed by: PHYSICIAN ASSISTANT

## 2025-08-08 PROCEDURE — 97530 THERAPEUTIC ACTIVITIES: CPT | Mod: GO

## 2025-08-08 PROCEDURE — 36415 COLL VENOUS BLD VENIPUNCTURE: CPT | Performed by: PHYSICIAN ASSISTANT

## 2025-08-08 PROCEDURE — P9035 PLATELET PHERES LEUKOREDUCED: HCPCS | Performed by: STUDENT IN AN ORGANIZED HEALTH CARE EDUCATION/TRAINING PROGRAM

## 2025-08-08 PROCEDURE — 87040 BLOOD CULTURE FOR BACTERIA: CPT | Performed by: PHYSICIAN ASSISTANT

## 2025-08-08 PROCEDURE — 86901 BLOOD TYPING SEROLOGIC RH(D): CPT | Performed by: STUDENT IN AN ORGANIZED HEALTH CARE EDUCATION/TRAINING PROGRAM

## 2025-08-08 PROCEDURE — 84145 PROCALCITONIN (PCT): CPT | Performed by: PHYSICIAN ASSISTANT

## 2025-08-08 PROCEDURE — P9037 PLATE PHERES LEUKOREDU IRRAD: HCPCS | Performed by: PHYSICIAN ASSISTANT

## 2025-08-08 PROCEDURE — 70450 CT HEAD/BRAIN W/O DYE: CPT

## 2025-08-08 PROCEDURE — 120N000001 HC R&B MED SURG/OB

## 2025-08-08 PROCEDURE — 80053 COMPREHEN METABOLIC PANEL: CPT | Performed by: PHYSICIAN ASSISTANT

## 2025-08-08 PROCEDURE — P9016 RBC LEUKOCYTES REDUCED: HCPCS | Performed by: EMERGENCY MEDICINE

## 2025-08-08 PROCEDURE — 84484 ASSAY OF TROPONIN QUANT: CPT | Performed by: STUDENT IN AN ORGANIZED HEALTH CARE EDUCATION/TRAINING PROGRAM

## 2025-08-08 PROCEDURE — P9035 PLATELET PHERES LEUKOREDUCED: HCPCS | Performed by: PHYSICIAN ASSISTANT

## 2025-08-08 PROCEDURE — 250N000011 HC RX IP 250 OP 636: Performed by: PHYSICIAN ASSISTANT

## 2025-08-08 PROCEDURE — 86880 COOMBS TEST DIRECT: CPT | Performed by: STUDENT IN AN ORGANIZED HEALTH CARE EDUCATION/TRAINING PROGRAM

## 2025-08-08 PROCEDURE — 83605 ASSAY OF LACTIC ACID: CPT | Performed by: PHYSICIAN ASSISTANT

## 2025-08-08 PROCEDURE — 81001 URINALYSIS AUTO W/SCOPE: CPT | Performed by: PHYSICIAN ASSISTANT

## 2025-08-08 PROCEDURE — 84100 ASSAY OF PHOSPHORUS: CPT | Performed by: PHYSICIAN ASSISTANT

## 2025-08-08 PROCEDURE — 87181 SC STD AGAR DILUTION PER AGT: CPT | Performed by: PHYSICIAN ASSISTANT

## 2025-08-08 PROCEDURE — 71046 X-RAY EXAM CHEST 2 VIEWS: CPT

## 2025-08-08 PROCEDURE — 99223 1ST HOSP IP/OBS HIGH 75: CPT | Performed by: INTERNAL MEDICINE

## 2025-08-08 PROCEDURE — 70450 CT HEAD/BRAIN W/O DYE: CPT | Mod: XE

## 2025-08-08 PROCEDURE — 97535 SELF CARE MNGMENT TRAINING: CPT | Mod: GO

## 2025-08-08 PROCEDURE — 97165 OT EVAL LOW COMPLEX 30 MIN: CPT | Mod: GO

## 2025-08-08 PROCEDURE — 85004 AUTOMATED DIFF WBC COUNT: CPT | Performed by: STUDENT IN AN ORGANIZED HEALTH CARE EDUCATION/TRAINING PROGRAM

## 2025-08-08 PROCEDURE — 83735 ASSAY OF MAGNESIUM: CPT | Performed by: PHYSICIAN ASSISTANT

## 2025-08-08 PROCEDURE — 36415 COLL VENOUS BLD VENIPUNCTURE: CPT | Performed by: STUDENT IN AN ORGANIZED HEALTH CARE EDUCATION/TRAINING PROGRAM

## 2025-08-08 PROCEDURE — 87637 SARSCOV2&INF A&B&RSV AMP PRB: CPT | Performed by: PHYSICIAN ASSISTANT

## 2025-08-08 PROCEDURE — 99223 1ST HOSP IP/OBS HIGH 75: CPT | Performed by: PHYSICIAN ASSISTANT

## 2025-08-08 PROCEDURE — 99222 1ST HOSP IP/OBS MODERATE 55: CPT | Performed by: NURSE PRACTITIONER

## 2025-08-08 PROCEDURE — 87186 SC STD MICRODIL/AGAR DIL: CPT | Performed by: PHYSICIAN ASSISTANT

## 2025-08-08 PROCEDURE — 86923 COMPATIBILITY TEST ELECTRIC: CPT | Performed by: EMERGENCY MEDICINE

## 2025-08-08 PROCEDURE — 85027 COMPLETE CBC AUTOMATED: CPT | Performed by: HOSPITALIST

## 2025-08-08 PROCEDURE — 250N000013 HC RX MED GY IP 250 OP 250 PS 637: Performed by: PHYSICIAN ASSISTANT

## 2025-08-08 PROCEDURE — 85049 AUTOMATED PLATELET COUNT: CPT | Performed by: PHYSICIAN ASSISTANT

## 2025-08-08 PROCEDURE — 96361 HYDRATE IV INFUSION ADD-ON: CPT | Performed by: STUDENT IN AN ORGANIZED HEALTH CARE EDUCATION/TRAINING PROGRAM

## 2025-08-08 RX ORDER — AMOXICILLIN 250 MG
2 CAPSULE ORAL 2 TIMES DAILY
Status: DISCONTINUED | OUTPATIENT
Start: 2025-08-08 | End: 2025-08-12 | Stop reason: HOSPADM

## 2025-08-08 RX ORDER — ACETAMINOPHEN 325 MG/1
650 TABLET ORAL EVERY 4 HOURS PRN
Status: DISCONTINUED | OUTPATIENT
Start: 2025-08-08 | End: 2025-08-12 | Stop reason: HOSPADM

## 2025-08-08 RX ORDER — BISACODYL 5 MG
10 TABLET, DELAYED RELEASE (ENTERIC COATED) ORAL DAILY PRN
Status: DISCONTINUED | OUTPATIENT
Start: 2025-08-08 | End: 2025-08-12 | Stop reason: HOSPADM

## 2025-08-08 RX ORDER — AMOXICILLIN 250 MG
1 CAPSULE ORAL 2 TIMES DAILY
Status: DISCONTINUED | OUTPATIENT
Start: 2025-08-08 | End: 2025-08-12 | Stop reason: HOSPADM

## 2025-08-08 RX ORDER — ACETAMINOPHEN 325 MG/10.15ML
650 LIQUID ORAL EVERY 4 HOURS PRN
Status: DISCONTINUED | OUTPATIENT
Start: 2025-08-08 | End: 2025-08-12 | Stop reason: HOSPADM

## 2025-08-08 RX ORDER — HYDROMORPHONE HCL IN WATER/PF 6 MG/30 ML
0.2 PATIENT CONTROLLED ANALGESIA SYRINGE INTRAVENOUS
Refills: 0 | Status: DISCONTINUED | OUTPATIENT
Start: 2025-08-08 | End: 2025-08-12 | Stop reason: HOSPADM

## 2025-08-08 RX ORDER — OXYCODONE HYDROCHLORIDE 5 MG/1
5 TABLET ORAL EVERY 4 HOURS PRN
Refills: 0 | Status: DISCONTINUED | OUTPATIENT
Start: 2025-08-08 | End: 2025-08-12 | Stop reason: HOSPADM

## 2025-08-08 RX ORDER — NALOXONE HYDROCHLORIDE 0.4 MG/ML
0.2 INJECTION, SOLUTION INTRAMUSCULAR; INTRAVENOUS; SUBCUTANEOUS
Status: DISCONTINUED | OUTPATIENT
Start: 2025-08-08 | End: 2025-08-12 | Stop reason: HOSPADM

## 2025-08-08 RX ORDER — LISINOPRIL 40 MG/1
40 TABLET ORAL DAILY
Status: DISCONTINUED | OUTPATIENT
Start: 2025-08-08 | End: 2025-08-12 | Stop reason: HOSPADM

## 2025-08-08 RX ORDER — NALOXONE HYDROCHLORIDE 0.4 MG/ML
0.4 INJECTION, SOLUTION INTRAMUSCULAR; INTRAVENOUS; SUBCUTANEOUS
Status: DISCONTINUED | OUTPATIENT
Start: 2025-08-08 | End: 2025-08-12 | Stop reason: HOSPADM

## 2025-08-08 RX ORDER — PROCHLORPERAZINE MALEATE 5 MG/1
5 TABLET ORAL EVERY 6 HOURS PRN
Status: DISCONTINUED | OUTPATIENT
Start: 2025-08-08 | End: 2025-08-12 | Stop reason: HOSPADM

## 2025-08-08 RX ORDER — ONDANSETRON 2 MG/ML
4 INJECTION INTRAMUSCULAR; INTRAVENOUS EVERY 6 HOURS PRN
Status: DISCONTINUED | OUTPATIENT
Start: 2025-08-08 | End: 2025-08-12 | Stop reason: HOSPADM

## 2025-08-08 RX ORDER — BISACODYL 5 MG
5 TABLET, DELAYED RELEASE (ENTERIC COATED) ORAL DAILY PRN
Status: DISCONTINUED | OUTPATIENT
Start: 2025-08-08 | End: 2025-08-12 | Stop reason: HOSPADM

## 2025-08-08 RX ORDER — HEPARIN SODIUM,PORCINE 10 UNIT/ML
5-10 VIAL (ML) INTRAVENOUS EVERY 24 HOURS
Status: DISCONTINUED | OUTPATIENT
Start: 2025-08-08 | End: 2025-08-12 | Stop reason: HOSPADM

## 2025-08-08 RX ORDER — BISACODYL 5 MG
15 TABLET, DELAYED RELEASE (ENTERIC COATED) ORAL DAILY PRN
Status: DISCONTINUED | OUTPATIENT
Start: 2025-08-08 | End: 2025-08-12 | Stop reason: HOSPADM

## 2025-08-08 RX ORDER — POLYETHYLENE GLYCOL 3350 17 G/17G
17 POWDER, FOR SOLUTION ORAL DAILY PRN
Status: DISCONTINUED | OUTPATIENT
Start: 2025-08-08 | End: 2025-08-12 | Stop reason: HOSPADM

## 2025-08-08 RX ORDER — ROSUVASTATIN CALCIUM 10 MG/1
10 TABLET, COATED ORAL DAILY
Status: DISCONTINUED | OUTPATIENT
Start: 2025-08-08 | End: 2025-08-12 | Stop reason: HOSPADM

## 2025-08-08 RX ORDER — HEPARIN SODIUM (PORCINE) LOCK FLUSH IV SOLN 100 UNIT/ML 100 UNIT/ML
5-10 SOLUTION INTRAVENOUS
Status: DISCONTINUED | OUTPATIENT
Start: 2025-08-08 | End: 2025-08-12 | Stop reason: HOSPADM

## 2025-08-08 RX ORDER — HEPARIN SODIUM,PORCINE 10 UNIT/ML
5-10 VIAL (ML) INTRAVENOUS
Status: DISCONTINUED | OUTPATIENT
Start: 2025-08-08 | End: 2025-08-12 | Stop reason: HOSPADM

## 2025-08-08 RX ORDER — LABETALOL HYDROCHLORIDE 5 MG/ML
5 INJECTION, SOLUTION INTRAVENOUS EVERY 4 HOURS PRN
Status: DISCONTINUED | OUTPATIENT
Start: 2025-08-08 | End: 2025-08-12 | Stop reason: HOSPADM

## 2025-08-08 RX ORDER — LIDOCAINE 40 MG/G
CREAM TOPICAL
Status: DISCONTINUED | OUTPATIENT
Start: 2025-08-08 | End: 2025-08-12 | Stop reason: HOSPADM

## 2025-08-08 RX ORDER — BISACODYL 10 MG
10 SUPPOSITORY, RECTAL RECTAL DAILY PRN
Status: DISCONTINUED | OUTPATIENT
Start: 2025-08-08 | End: 2025-08-12 | Stop reason: HOSPADM

## 2025-08-08 RX ORDER — ACETAMINOPHEN 650 MG/1
650 SUPPOSITORY RECTAL EVERY 4 HOURS PRN
Status: DISCONTINUED | OUTPATIENT
Start: 2025-08-08 | End: 2025-08-12 | Stop reason: HOSPADM

## 2025-08-08 RX ORDER — CALCIUM CARBONATE 500 MG/1
1000 TABLET, CHEWABLE ORAL 4 TIMES DAILY PRN
Status: DISCONTINUED | OUTPATIENT
Start: 2025-08-08 | End: 2025-08-12 | Stop reason: HOSPADM

## 2025-08-08 RX ORDER — SERTRALINE HYDROCHLORIDE 100 MG/1
100 TABLET, FILM COATED ORAL DAILY
Status: DISCONTINUED | OUTPATIENT
Start: 2025-08-08 | End: 2025-08-12 | Stop reason: HOSPADM

## 2025-08-08 RX ORDER — ONDANSETRON 4 MG/1
4 TABLET, ORALLY DISINTEGRATING ORAL EVERY 6 HOURS PRN
Status: DISCONTINUED | OUTPATIENT
Start: 2025-08-08 | End: 2025-08-12 | Stop reason: HOSPADM

## 2025-08-08 RX ADMIN — Medication 5 ML: at 16:32

## 2025-08-08 RX ADMIN — Medication 5 ML: at 11:53

## 2025-08-08 RX ADMIN — SERTRALINE HYDROCHLORIDE 100 MG: 100 TABLET ORAL at 14:46

## 2025-08-08 RX ADMIN — Medication 5 ML: at 19:05

## 2025-08-08 RX ADMIN — Medication 5 ML: at 20:09

## 2025-08-08 RX ADMIN — ACETAMINOPHEN 650 MG: 325 TABLET ORAL at 17:29

## 2025-08-08 RX ADMIN — Medication 5 ML: at 18:31

## 2025-08-08 RX ADMIN — ROSUVASTATIN CALCIUM 10 MG: 10 TABLET, FILM COATED ORAL at 14:46

## 2025-08-08 RX ADMIN — ONDANSETRON 4 MG: 4 TABLET, ORALLY DISINTEGRATING ORAL at 16:42

## 2025-08-08 RX ADMIN — LISINOPRIL 40 MG: 40 TABLET ORAL at 14:46

## 2025-08-08 ASSESSMENT — ACTIVITIES OF DAILY LIVING (ADL)
ADLS_ACUITY_SCORE: 42
PREVIOUS_RESPONSIBILITIES: MEAL PREP;HOUSEKEEPING;LAUNDRY;SHOPPING;MEDICATION MANAGEMENT;DRIVING;FINANCES
CONCENTRATING,_REMEMBERING_OR_MAKING_DECISIONS_DIFFICULTY: NO
ADLS_ACUITY_SCORE: 42
ADLS_ACUITY_SCORE: 25
DIFFICULTY_EATING/SWALLOWING: NO
ADLS_ACUITY_SCORE: 42
WALKING_OR_CLIMBING_STAIRS: AMBULATION DIFFICULTY, REQUIRES EQUIPMENT;STAIR CLIMBING DIFFICULTY, REQUIRES EQUIPMENT
ADLS_ACUITY_SCORE: 42
ADLS_ACUITY_SCORE: 44
WALKING_OR_CLIMBING_STAIRS_DIFFICULTY: YES
DRESSING/BATHING_DIFFICULTY: NO
ADLS_ACUITY_SCORE: 25
ADLS_ACUITY_SCORE: 34
ADLS_ACUITY_SCORE: 27
ADLS_ACUITY_SCORE: 42
ADLS_ACUITY_SCORE: 42
ADLS_ACUITY_SCORE: 25
ADLS_ACUITY_SCORE: 29
ADLS_ACUITY_SCORE: 42
CHANGE_IN_FUNCTIONAL_STATUS_SINCE_ONSET_OF_CURRENT_ILLNESS/INJURY: YES
ADLS_ACUITY_SCORE: 29
FALL_HISTORY_WITHIN_LAST_SIX_MONTHS: YES
NUMBER_OF_TIMES_PATIENT_HAS_FALLEN_WITHIN_LAST_SIX_MONTHS: 1
ADLS_ACUITY_SCORE: 44
ADLS_ACUITY_SCORE: 22
DOING_ERRANDS_INDEPENDENTLY_DIFFICULTY: NO
ADLS_ACUITY_SCORE: 25
ADLS_ACUITY_SCORE: 27
ADLS_ACUITY_SCORE: 27
WEAR_GLASSES_OR_BLIND: YES
EQUIPMENT_CURRENTLY_USED_AT_HOME: CANE, STRAIGHT
TOILETING_ISSUES: NO

## 2025-08-08 ASSESSMENT — COLUMBIA-SUICIDE SEVERITY RATING SCALE - C-SSRS
2. HAVE YOU ACTUALLY HAD ANY THOUGHTS OF KILLING YOURSELF IN THE PAST MONTH?: NO
1. IN THE PAST MONTH, HAVE YOU WISHED YOU WERE DEAD OR WISHED YOU COULD GO TO SLEEP AND NOT WAKE UP?: NO
6. HAVE YOU EVER DONE ANYTHING, STARTED TO DO ANYTHING, OR PREPARED TO DO ANYTHING TO END YOUR LIFE?: NO

## 2025-08-09 ENCOUNTER — APPOINTMENT (OUTPATIENT)
Dept: OCCUPATIONAL THERAPY | Facility: CLINIC | Age: 80
DRG: 064 | End: 2025-08-09
Attending: HOSPITALIST
Payer: COMMERCIAL

## 2025-08-09 LAB
ALBUMIN SERPL BCG-MCNC: 2.6 G/DL (ref 3.5–5.2)
ALP SERPL-CCNC: 62 U/L (ref 40–150)
ALT SERPL W P-5'-P-CCNC: 9 U/L (ref 0–50)
ANION GAP SERPL CALCULATED.3IONS-SCNC: 5 MMOL/L (ref 7–15)
AST SERPL W P-5'-P-CCNC: 21 U/L (ref 0–45)
ATRIAL RATE - MUSE: 84 BPM
BACTERIA UR CULT: ABNORMAL
BACTERIA UR CULT: ABNORMAL
BASOPHILS # BLD AUTO: 0 10E3/UL (ref 0–0.2)
BASOPHILS NFR BLD AUTO: 1 %
BILIRUB SERPL-MCNC: 0.8 MG/DL
BLD GP AB SCN SERPL QL: NEGATIVE
BLD PROD TYP BPU: NORMAL
BLD PROD TYP BPU: NORMAL
BLOOD COMPONENT TYPE: NORMAL
BLOOD COMPONENT TYPE: NORMAL
BUN SERPL-MCNC: 17 MG/DL (ref 8–23)
CALCIUM SERPL-MCNC: 8.2 MG/DL (ref 8.8–10.4)
CHLORIDE SERPL-SCNC: 106 MMOL/L (ref 98–107)
CODING SYSTEM: NORMAL
CODING SYSTEM: NORMAL
CREAT SERPL-MCNC: 1.09 MG/DL (ref 0.51–0.95)
DAT POLY: NORMAL
DIASTOLIC BLOOD PRESSURE - MUSE: NORMAL MMHG
EGFRCR SERPLBLD CKD-EPI 2021: 52 ML/MIN/1.73M2
EOSINOPHIL # BLD AUTO: 0 10E3/UL (ref 0–0.7)
EOSINOPHIL NFR BLD AUTO: 0 %
ERYTHROCYTE [DISTWIDTH] IN BLOOD BY AUTOMATED COUNT: 15.2 % (ref 10–15)
ERYTHROCYTE [DISTWIDTH] IN BLOOD BY AUTOMATED COUNT: 15.3 % (ref 10–15)
GLUCOSE SERPL-MCNC: 92 MG/DL (ref 70–99)
HCO3 SERPL-SCNC: 23 MMOL/L (ref 22–29)
HCT VFR BLD AUTO: 21.8 % (ref 35–47)
HCT VFR BLD AUTO: 22 % (ref 35–47)
HGB BLD-MCNC: 7.1 G/DL (ref 11.7–15.7)
HGB BLD-MCNC: 7.1 G/DL (ref 11.7–15.7)
IMM GRANULOCYTES # BLD: 0 10E3/UL
IMM GRANULOCYTES NFR BLD: 1 %
INTERPRETATION ECG - MUSE: NORMAL
ISSUE DATE AND TIME: NORMAL
ISSUE DATE AND TIME: NORMAL
LYMPHOCYTES # BLD AUTO: 0.3 10E3/UL (ref 0.8–5.3)
LYMPHOCYTES NFR BLD AUTO: 8 %
MAGNESIUM SERPL-MCNC: 1.8 MG/DL (ref 1.7–2.3)
MCH RBC QN AUTO: 31.3 PG (ref 26.5–33)
MCH RBC QN AUTO: 31.7 PG (ref 26.5–33)
MCHC RBC AUTO-ENTMCNC: 32.3 G/DL (ref 31.5–36.5)
MCHC RBC AUTO-ENTMCNC: 32.6 G/DL (ref 31.5–36.5)
MCV RBC AUTO: 97 FL (ref 78–100)
MONOCYTES # BLD AUTO: 0.2 10E3/UL (ref 0–1.3)
MONOCYTES NFR BLD AUTO: 6 %
NEUTROPHILS # BLD AUTO: 2.7 10E3/UL (ref 1.6–8.3)
NEUTROPHILS NFR BLD AUTO: 85 %
NRBC # BLD AUTO: 0 10E3/UL
NRBC BLD AUTO-RTO: 0 /100
P AXIS - MUSE: 78 DEGREES
PHOSPHATE SERPL-MCNC: 2.6 MG/DL (ref 2.5–4.5)
PLATELET # BLD AUTO: 118 10E3/UL (ref 150–450)
PLATELET # BLD AUTO: 66 10E3/UL (ref 150–450)
PLATELET # BLD AUTO: 73 10E3/UL (ref 150–450)
PLATELET # BLD AUTO: 80 10E3/UL (ref 150–450)
POTASSIUM SERPL-SCNC: 3.6 MMOL/L (ref 3.4–5.3)
PR INTERVAL - MUSE: 214 MS
PROT SERPL-MCNC: 4.9 G/DL (ref 6.4–8.3)
QRS DURATION - MUSE: 84 MS
QT - MUSE: 396 MS
QTC - MUSE: 467 MS
R AXIS - MUSE: 33 DEGREES
RBC # BLD AUTO: 2.24 10E6/UL (ref 3.8–5.2)
RBC # BLD AUTO: 2.27 10E6/UL (ref 3.8–5.2)
SODIUM SERPL-SCNC: 134 MMOL/L (ref 135–145)
SPECIMEN EXP DATE BLD: NORMAL
SPECIMEN EXP DATE BLD: NORMAL
SYSTOLIC BLOOD PRESSURE - MUSE: NORMAL MMHG
T AXIS - MUSE: 45 DEGREES
UNIT ABO/RH: NORMAL
UNIT ABO/RH: NORMAL
UNIT NUMBER: NORMAL
UNIT NUMBER: NORMAL
UNIT STATUS: NORMAL
UNIT STATUS: NORMAL
UNIT TYPE ISBT: 5100
UNIT TYPE ISBT: 5100
VENTRICULAR RATE- MUSE: 84 BPM
WBC # BLD AUTO: 3 10E3/UL (ref 4–11)
WBC # BLD AUTO: 3.2 10E3/UL (ref 4–11)

## 2025-08-09 PROCEDURE — 97530 THERAPEUTIC ACTIVITIES: CPT | Mod: GO

## 2025-08-09 PROCEDURE — 97535 SELF CARE MNGMENT TRAINING: CPT | Mod: GO

## 2025-08-09 PROCEDURE — 36415 COLL VENOUS BLD VENIPUNCTURE: CPT | Performed by: HOSPITALIST

## 2025-08-09 PROCEDURE — 80053 COMPREHEN METABOLIC PANEL: CPT | Performed by: HOSPITALIST

## 2025-08-09 PROCEDURE — 85049 AUTOMATED PLATELET COUNT: CPT | Performed by: HOSPITALIST

## 2025-08-09 PROCEDURE — 120N000001 HC R&B MED SURG/OB

## 2025-08-09 PROCEDURE — 84100 ASSAY OF PHOSPHORUS: CPT | Performed by: HOSPITALIST

## 2025-08-09 PROCEDURE — P9035 PLATELET PHERES LEUKOREDUCED: HCPCS | Performed by: PHYSICIAN ASSISTANT

## 2025-08-09 PROCEDURE — 250N000013 HC RX MED GY IP 250 OP 250 PS 637: Performed by: PHYSICIAN ASSISTANT

## 2025-08-09 PROCEDURE — 250N000011 HC RX IP 250 OP 636: Performed by: PHYSICIAN ASSISTANT

## 2025-08-09 PROCEDURE — 250N000009 HC RX 250: Performed by: HOSPITALIST

## 2025-08-09 PROCEDURE — 250N000011 HC RX IP 250 OP 636: Performed by: HOSPITALIST

## 2025-08-09 PROCEDURE — 250N000011 HC RX IP 250 OP 636: Mod: JZ | Performed by: HOSPITALIST

## 2025-08-09 PROCEDURE — 999N000147 HC STATISTIC PT IP EVAL DEFER

## 2025-08-09 PROCEDURE — 86870 RBC ANTIBODY IDENTIFICATION: CPT | Performed by: PATHOLOGY

## 2025-08-09 PROCEDURE — 86880 COOMBS TEST DIRECT: CPT | Performed by: PATHOLOGY

## 2025-08-09 PROCEDURE — 86901 BLOOD TYPING SEROLOGIC RH(D): CPT | Performed by: PATHOLOGY

## 2025-08-09 PROCEDURE — 99233 SBSQ HOSP IP/OBS HIGH 50: CPT | Performed by: HOSPITALIST

## 2025-08-09 PROCEDURE — 86860 RBC ANTIBODY ELUTION: CPT | Performed by: PATHOLOGY

## 2025-08-09 PROCEDURE — 85027 COMPLETE CBC AUTOMATED: CPT | Performed by: HOSPITALIST

## 2025-08-09 PROCEDURE — 250N000013 HC RX MED GY IP 250 OP 250 PS 637: Performed by: HOSPITALIST

## 2025-08-09 PROCEDURE — 86900 BLOOD TYPING SEROLOGIC ABO: CPT | Performed by: PATHOLOGY

## 2025-08-09 PROCEDURE — 83735 ASSAY OF MAGNESIUM: CPT | Performed by: HOSPITALIST

## 2025-08-09 PROCEDURE — 36600 WITHDRAWAL OF ARTERIAL BLOOD: CPT

## 2025-08-09 RX ORDER — PANTOPRAZOLE SODIUM 40 MG/1
40 TABLET, DELAYED RELEASE ORAL
Status: DISCONTINUED | OUTPATIENT
Start: 2025-08-09 | End: 2025-08-12 | Stop reason: HOSPADM

## 2025-08-09 RX ORDER — CEFTRIAXONE 1 G/1
1 INJECTION, POWDER, FOR SOLUTION INTRAMUSCULAR; INTRAVENOUS EVERY 24 HOURS
Status: DISCONTINUED | OUTPATIENT
Start: 2025-08-09 | End: 2025-08-11

## 2025-08-09 RX ORDER — WATER 10 ML/10ML
INJECTION INTRAMUSCULAR; INTRAVENOUS; SUBCUTANEOUS
Status: COMPLETED
Start: 2025-08-09 | End: 2025-08-09

## 2025-08-09 RX ORDER — DOXYCYCLINE 100 MG/1
100 CAPSULE ORAL EVERY 12 HOURS SCHEDULED
Status: DISCONTINUED | OUTPATIENT
Start: 2025-08-09 | End: 2025-08-11

## 2025-08-09 RX ADMIN — ACETAMINOPHEN 650 MG: 325 TABLET ORAL at 12:19

## 2025-08-09 RX ADMIN — ROSUVASTATIN CALCIUM 10 MG: 10 TABLET, FILM COATED ORAL at 07:56

## 2025-08-09 RX ADMIN — Medication 5 ML: at 15:00

## 2025-08-09 RX ADMIN — Medication 5 ML: at 18:04

## 2025-08-09 RX ADMIN — ONDANSETRON 4 MG: 2 INJECTION, SOLUTION INTRAMUSCULAR; INTRAVENOUS at 10:24

## 2025-08-09 RX ADMIN — Medication 5 ML: at 01:23

## 2025-08-09 RX ADMIN — Medication 5 ML: at 07:46

## 2025-08-09 RX ADMIN — Medication 5 ML: at 05:54

## 2025-08-09 RX ADMIN — LISINOPRIL 40 MG: 40 TABLET ORAL at 07:56

## 2025-08-09 RX ADMIN — PANTOPRAZOLE SODIUM 40 MG: 40 TABLET, DELAYED RELEASE ORAL at 09:53

## 2025-08-09 RX ADMIN — DOXYCYCLINE HYCLATE 100 MG: 100 CAPSULE ORAL at 19:45

## 2025-08-09 RX ADMIN — ALTEPLASE 2 MG: 2.2 INJECTION, POWDER, LYOPHILIZED, FOR SOLUTION INTRAVENOUS at 06:55

## 2025-08-09 RX ADMIN — CEFTRIAXONE SODIUM 1 G: 1 INJECTION, POWDER, FOR SOLUTION INTRAMUSCULAR; INTRAVENOUS at 11:00

## 2025-08-09 RX ADMIN — WATER 10 ML: 1 INJECTION INTRAMUSCULAR; INTRAVENOUS; SUBCUTANEOUS at 06:55

## 2025-08-09 RX ADMIN — Medication 5 ML: at 12:13

## 2025-08-09 RX ADMIN — SERTRALINE HYDROCHLORIDE 100 MG: 100 TABLET ORAL at 07:56

## 2025-08-09 RX ADMIN — DOXYCYCLINE HYCLATE 100 MG: 100 CAPSULE ORAL at 09:53

## 2025-08-09 ASSESSMENT — ACTIVITIES OF DAILY LIVING (ADL)
ADLS_ACUITY_SCORE: 33
ADLS_ACUITY_SCORE: 33
ADLS_ACUITY_SCORE: 29
ADLS_ACUITY_SCORE: 33
ADLS_ACUITY_SCORE: 33
ADLS_ACUITY_SCORE: 29
ADLS_ACUITY_SCORE: 33
ADLS_ACUITY_SCORE: 29
ADLS_ACUITY_SCORE: 33
ADLS_ACUITY_SCORE: 29
ADLS_ACUITY_SCORE: 33
ADLS_ACUITY_SCORE: 33
ADLS_ACUITY_SCORE: 29
ADLS_ACUITY_SCORE: 33
ADLS_ACUITY_SCORE: 29
ADLS_ACUITY_SCORE: 33

## 2025-08-10 LAB
ABO + RH BLD: NORMAL
ANION GAP SERPL CALCULATED.3IONS-SCNC: 6 MMOL/L (ref 7–15)
BLD GP AB SCN SERPL QL: NEGATIVE
BLD PROD TYP BPU: NORMAL
BLOOD COMPONENT TYPE: NORMAL
BUN SERPL-MCNC: 14.6 MG/DL (ref 8–23)
CALCIUM SERPL-MCNC: 8.2 MG/DL (ref 8.8–10.4)
CHLORIDE SERPL-SCNC: 108 MMOL/L (ref 98–107)
CODING SYSTEM: NORMAL
CREAT SERPL-MCNC: 1.09 MG/DL (ref 0.51–0.95)
CROSSMATCH: NORMAL
EGFRCR SERPLBLD CKD-EPI 2021: 52 ML/MIN/1.73M2
ERYTHROCYTE [DISTWIDTH] IN BLOOD BY AUTOMATED COUNT: 14.8 % (ref 10–15)
ERYTHROCYTE [DISTWIDTH] IN BLOOD BY AUTOMATED COUNT: 14.8 % (ref 10–15)
GLUCOSE SERPL-MCNC: 90 MG/DL (ref 70–99)
HCO3 SERPL-SCNC: 24 MMOL/L (ref 22–29)
HCT VFR BLD AUTO: 19.9 % (ref 35–47)
HCT VFR BLD AUTO: 21.1 % (ref 35–47)
HGB BLD-MCNC: 6.5 G/DL (ref 11.7–15.7)
HGB BLD-MCNC: 6.8 G/DL (ref 11.7–15.7)
ISSUE DATE AND TIME: NORMAL
MAGNESIUM SERPL-MCNC: 1.6 MG/DL (ref 1.7–2.3)
MCH RBC QN AUTO: 31.1 PG (ref 26.5–33)
MCH RBC QN AUTO: 31.7 PG (ref 26.5–33)
MCHC RBC AUTO-ENTMCNC: 32.2 G/DL (ref 31.5–36.5)
MCHC RBC AUTO-ENTMCNC: 32.7 G/DL (ref 31.5–36.5)
MCV RBC AUTO: 96 FL (ref 78–100)
MCV RBC AUTO: 97 FL (ref 78–100)
PHOSPHATE SERPL-MCNC: 2.5 MG/DL (ref 2.5–4.5)
PLATELET # BLD AUTO: 100 10E3/UL (ref 150–450)
PLATELET # BLD AUTO: 95 10E3/UL (ref 150–450)
PLATELET # BLD AUTO: 98 10E3/UL (ref 150–450)
PLATELET # BLD AUTO: 98 10E3/UL (ref 150–450)
POTASSIUM SERPL-SCNC: 3.5 MMOL/L (ref 3.4–5.3)
RBC # BLD AUTO: 2.05 10E6/UL (ref 3.8–5.2)
RBC # BLD AUTO: 2.19 10E6/UL (ref 3.8–5.2)
SODIUM SERPL-SCNC: 138 MMOL/L (ref 135–145)
SPECIMEN EXP DATE BLD: NORMAL
UNIT ABO/RH: NORMAL
UNIT NUMBER: NORMAL
UNIT STATUS: NORMAL
UNIT TYPE ISBT: 5100
UNIT TYPE ISBT: 5100
UNIT TYPE ISBT: 6200
UNIT TYPE ISBT: 9500
WBC # BLD AUTO: 2.2 10E3/UL (ref 4–11)
WBC # BLD AUTO: 3.1 10E3/UL (ref 4–11)

## 2025-08-10 PROCEDURE — 250N000011 HC RX IP 250 OP 636: Performed by: INTERNAL MEDICINE

## 2025-08-10 PROCEDURE — 250N000011 HC RX IP 250 OP 636: Performed by: PHYSICIAN ASSISTANT

## 2025-08-10 PROCEDURE — P9035 PLATELET PHERES LEUKOREDUCED: HCPCS | Performed by: PHYSICIAN ASSISTANT

## 2025-08-10 PROCEDURE — P9016 RBC LEUKOCYTES REDUCED: HCPCS | Performed by: INTERNAL MEDICINE

## 2025-08-10 PROCEDURE — 83735 ASSAY OF MAGNESIUM: CPT | Performed by: INTERNAL MEDICINE

## 2025-08-10 PROCEDURE — 86923 COMPATIBILITY TEST ELECTRIC: CPT | Performed by: INTERNAL MEDICINE

## 2025-08-10 PROCEDURE — 250N000013 HC RX MED GY IP 250 OP 250 PS 637: Performed by: INTERNAL MEDICINE

## 2025-08-10 PROCEDURE — 84100 ASSAY OF PHOSPHORUS: CPT | Performed by: INTERNAL MEDICINE

## 2025-08-10 PROCEDURE — 250N000011 HC RX IP 250 OP 636: Performed by: HOSPITALIST

## 2025-08-10 PROCEDURE — P9016 RBC LEUKOCYTES REDUCED: HCPCS | Performed by: PHYSICIAN ASSISTANT

## 2025-08-10 PROCEDURE — 85049 AUTOMATED PLATELET COUNT: CPT | Performed by: INTERNAL MEDICINE

## 2025-08-10 PROCEDURE — 85027 COMPLETE CBC AUTOMATED: CPT | Performed by: HOSPITALIST

## 2025-08-10 PROCEDURE — 86922 COMPATIBILITY TEST ANTIGLOB: CPT | Performed by: INTERNAL MEDICINE

## 2025-08-10 PROCEDURE — 86901 BLOOD TYPING SEROLOGIC RH(D): CPT | Performed by: INTERNAL MEDICINE

## 2025-08-10 PROCEDURE — 250N000013 HC RX MED GY IP 250 OP 250 PS 637: Performed by: PHYSICIAN ASSISTANT

## 2025-08-10 PROCEDURE — 250N000013 HC RX MED GY IP 250 OP 250 PS 637: Performed by: HOSPITALIST

## 2025-08-10 PROCEDURE — 80048 BASIC METABOLIC PNL TOTAL CA: CPT | Performed by: HOSPITALIST

## 2025-08-10 PROCEDURE — 99233 SBSQ HOSP IP/OBS HIGH 50: CPT | Performed by: INTERNAL MEDICINE

## 2025-08-10 PROCEDURE — 86922 COMPATIBILITY TEST ANTIGLOB: CPT | Performed by: PHYSICIAN ASSISTANT

## 2025-08-10 PROCEDURE — 120N000001 HC R&B MED SURG/OB

## 2025-08-10 PROCEDURE — P9035 PLATELET PHERES LEUKOREDUCED: HCPCS

## 2025-08-10 RX ORDER — AMLODIPINE BESYLATE 5 MG/1
5 TABLET ORAL DAILY
Status: DISCONTINUED | OUTPATIENT
Start: 2025-08-10 | End: 2025-08-11

## 2025-08-10 RX ORDER — HYDRALAZINE HYDROCHLORIDE 20 MG/ML
10 INJECTION INTRAMUSCULAR; INTRAVENOUS EVERY 6 HOURS PRN
Status: DISCONTINUED | OUTPATIENT
Start: 2025-08-10 | End: 2025-08-12 | Stop reason: HOSPADM

## 2025-08-10 RX ADMIN — CEFTRIAXONE SODIUM 1 G: 1 INJECTION, POWDER, FOR SOLUTION INTRAMUSCULAR; INTRAVENOUS at 11:00

## 2025-08-10 RX ADMIN — ROSUVASTATIN CALCIUM 10 MG: 10 TABLET, FILM COATED ORAL at 08:25

## 2025-08-10 RX ADMIN — AMLODIPINE BESYLATE 5 MG: 5 TABLET ORAL at 14:16

## 2025-08-10 RX ADMIN — Medication 5 ML: at 05:15

## 2025-08-10 RX ADMIN — ACETAMINOPHEN 650 MG: 325 TABLET ORAL at 22:55

## 2025-08-10 RX ADMIN — SERTRALINE HYDROCHLORIDE 100 MG: 100 TABLET ORAL at 08:25

## 2025-08-10 RX ADMIN — DOXYCYCLINE HYCLATE 100 MG: 100 CAPSULE ORAL at 08:37

## 2025-08-10 RX ADMIN — Medication 5 ML: at 11:33

## 2025-08-10 RX ADMIN — LABETALOL HYDROCHLORIDE 5 MG: 5 INJECTION, SOLUTION INTRAVENOUS at 11:03

## 2025-08-10 RX ADMIN — LISINOPRIL 40 MG: 40 TABLET ORAL at 08:25

## 2025-08-10 RX ADMIN — HYDRALAZINE HYDROCHLORIDE 10 MG: 20 INJECTION INTRAMUSCULAR; INTRAVENOUS at 15:00

## 2025-08-10 RX ADMIN — PANTOPRAZOLE SODIUM 40 MG: 40 TABLET, DELAYED RELEASE ORAL at 06:46

## 2025-08-10 RX ADMIN — DOXYCYCLINE HYCLATE 100 MG: 100 CAPSULE ORAL at 20:36

## 2025-08-10 RX ADMIN — ACETAMINOPHEN 650 MG: 325 TABLET ORAL at 00:24

## 2025-08-10 RX ADMIN — Medication 5 ML: at 06:45

## 2025-08-10 RX ADMIN — Medication 5 ML: at 15:02

## 2025-08-10 RX ADMIN — Medication 5 ML: at 00:25

## 2025-08-10 ASSESSMENT — ACTIVITIES OF DAILY LIVING (ADL)
ADLS_ACUITY_SCORE: 33
ADLS_ACUITY_SCORE: 33
ADLS_ACUITY_SCORE: 37
ADLS_ACUITY_SCORE: 33
DEPENDENT_IADLS:: INDEPENDENT
ADLS_ACUITY_SCORE: 33
ADLS_ACUITY_SCORE: 33
ADLS_ACUITY_SCORE: 37
ADLS_ACUITY_SCORE: 37
ADLS_ACUITY_SCORE: 33
ADLS_ACUITY_SCORE: 37
ADLS_ACUITY_SCORE: 33
ADLS_ACUITY_SCORE: 37

## 2025-08-11 ENCOUNTER — APPOINTMENT (OUTPATIENT)
Dept: OCCUPATIONAL THERAPY | Facility: CLINIC | Age: 80
DRG: 064 | End: 2025-08-11
Attending: HOSPITALIST
Payer: COMMERCIAL

## 2025-08-11 LAB
BKR LAB AP TR RXN INTERPRETATION: NORMAL
BKR LAB AP TRAN RXN RECOMMENDATION: NORMAL
BKR LAB AP TRANS SIGNS AND SX: NORMAL
BKR LAB AP TRANSFUSION REACTION: NORMAL
ERYTHROCYTE [DISTWIDTH] IN BLOOD BY AUTOMATED COUNT: 15.1 % (ref 10–15)
ERYTHROCYTE [DISTWIDTH] IN BLOOD BY AUTOMATED COUNT: 15.1 % (ref 10–15)
HCT VFR BLD AUTO: 25.9 % (ref 35–47)
HCT VFR BLD AUTO: 28 % (ref 35–47)
HGB BLD-MCNC: 8.9 G/DL (ref 11.7–15.7)
HGB BLD-MCNC: 9.5 G/DL (ref 11.7–15.7)
MAGNESIUM SERPL-MCNC: 1.5 MG/DL (ref 1.7–2.3)
MAGNESIUM SERPL-MCNC: 2.5 MG/DL (ref 1.7–2.3)
MCH RBC QN AUTO: 31.7 PG (ref 26.5–33)
MCH RBC QN AUTO: 32 PG (ref 26.5–33)
MCHC RBC AUTO-ENTMCNC: 33.9 G/DL (ref 31.5–36.5)
MCHC RBC AUTO-ENTMCNC: 34.4 G/DL (ref 31.5–36.5)
MCV RBC AUTO: 93 FL (ref 78–100)
MCV RBC AUTO: 93 FL (ref 78–100)
PATH REPORT.COMMENTS IMP SPEC: NORMAL
PATH REPORT.COMMENTS IMP SPEC: NORMAL
PHOSPHATE SERPL-MCNC: 2.2 MG/DL (ref 2.5–4.5)
PLATELET # BLD AUTO: 127 10E3/UL (ref 150–450)
PLATELET # BLD AUTO: 134 10E3/UL (ref 150–450)
POTASSIUM SERPL-SCNC: 3.7 MMOL/L (ref 3.4–5.3)
RBC # BLD AUTO: 2.78 10E6/UL (ref 3.8–5.2)
RBC # BLD AUTO: 3 10E6/UL (ref 3.8–5.2)
WBC # BLD AUTO: 3.3 10E3/UL (ref 4–11)
WBC # BLD AUTO: 4.6 10E3/UL (ref 4–11)

## 2025-08-11 PROCEDURE — 250N000013 HC RX MED GY IP 250 OP 250 PS 637: Performed by: HOSPITALIST

## 2025-08-11 PROCEDURE — 86078 PHYS BLOOD BANK SERV REACTJ: CPT | Performed by: PATHOLOGY

## 2025-08-11 PROCEDURE — 250N000013 HC RX MED GY IP 250 OP 250 PS 637: Performed by: INTERNAL MEDICINE

## 2025-08-11 PROCEDURE — 250N000011 HC RX IP 250 OP 636: Performed by: PHYSICIAN ASSISTANT

## 2025-08-11 PROCEDURE — 250N000011 HC RX IP 250 OP 636: Performed by: HOSPITALIST

## 2025-08-11 PROCEDURE — 250N000011 HC RX IP 250 OP 636: Performed by: INTERNAL MEDICINE

## 2025-08-11 PROCEDURE — 120N000001 HC R&B MED SURG/OB

## 2025-08-11 PROCEDURE — 99232 SBSQ HOSP IP/OBS MODERATE 35: CPT | Performed by: INTERNAL MEDICINE

## 2025-08-11 PROCEDURE — 97535 SELF CARE MNGMENT TRAINING: CPT | Mod: GO

## 2025-08-11 PROCEDURE — P9035 PLATELET PHERES LEUKOREDUCED: HCPCS

## 2025-08-11 PROCEDURE — 83735 ASSAY OF MAGNESIUM: CPT | Performed by: INTERNAL MEDICINE

## 2025-08-11 PROCEDURE — 250N000013 HC RX MED GY IP 250 OP 250 PS 637: Performed by: PHYSICIAN ASSISTANT

## 2025-08-11 PROCEDURE — 84132 ASSAY OF SERUM POTASSIUM: CPT | Performed by: INTERNAL MEDICINE

## 2025-08-11 PROCEDURE — 85018 HEMOGLOBIN: CPT | Performed by: INTERNAL MEDICINE

## 2025-08-11 PROCEDURE — 84100 ASSAY OF PHOSPHORUS: CPT | Performed by: INTERNAL MEDICINE

## 2025-08-11 RX ORDER — AMLODIPINE BESYLATE 5 MG/1
5 TABLET ORAL ONCE
Status: COMPLETED | OUTPATIENT
Start: 2025-08-11 | End: 2025-08-11

## 2025-08-11 RX ORDER — AMLODIPINE BESYLATE 10 MG/1
10 TABLET ORAL DAILY
Status: DISCONTINUED | OUTPATIENT
Start: 2025-08-12 | End: 2025-08-12 | Stop reason: HOSPADM

## 2025-08-11 RX ORDER — MAGNESIUM SULFATE HEPTAHYDRATE 40 MG/ML
4 INJECTION, SOLUTION INTRAVENOUS ONCE
Status: COMPLETED | OUTPATIENT
Start: 2025-08-11 | End: 2025-08-11

## 2025-08-11 RX ADMIN — Medication 5 ML: at 16:45

## 2025-08-11 RX ADMIN — ROSUVASTATIN CALCIUM 10 MG: 10 TABLET, FILM COATED ORAL at 10:16

## 2025-08-11 RX ADMIN — POTASSIUM & SODIUM PHOSPHATES POWDER PACK 280-160-250 MG 1 PACKET: 280-160-250 PACK at 16:44

## 2025-08-11 RX ADMIN — MAGNESIUM SULFATE HEPTAHYDRATE 4 G: 40 INJECTION, SOLUTION INTRAVENOUS at 10:14

## 2025-08-11 RX ADMIN — ACETAMINOPHEN 650 MG: 325 TABLET ORAL at 21:11

## 2025-08-11 RX ADMIN — SERTRALINE HYDROCHLORIDE 100 MG: 100 TABLET ORAL at 10:15

## 2025-08-11 RX ADMIN — PANTOPRAZOLE SODIUM 40 MG: 40 TABLET, DELAYED RELEASE ORAL at 06:30

## 2025-08-11 RX ADMIN — POTASSIUM & SODIUM PHOSPHATES POWDER PACK 280-160-250 MG 1 PACKET: 280-160-250 PACK at 12:54

## 2025-08-11 RX ADMIN — SENNOSIDES, DOCUSATE SODIUM 1 TABLET: 50; 8.6 TABLET, FILM COATED ORAL at 10:16

## 2025-08-11 RX ADMIN — Medication 5 ML: at 01:28

## 2025-08-11 RX ADMIN — CEFTRIAXONE SODIUM 1 G: 1 INJECTION, POWDER, FOR SOLUTION INTRAMUSCULAR; INTRAVENOUS at 10:14

## 2025-08-11 RX ADMIN — DOXYCYCLINE HYCLATE 100 MG: 100 CAPSULE ORAL at 10:15

## 2025-08-11 RX ADMIN — Medication 5 ML: at 12:54

## 2025-08-11 RX ADMIN — POTASSIUM & SODIUM PHOSPHATES POWDER PACK 280-160-250 MG 1 PACKET: 280-160-250 PACK at 10:14

## 2025-08-11 RX ADMIN — LISINOPRIL 40 MG: 40 TABLET ORAL at 10:15

## 2025-08-11 RX ADMIN — AMLODIPINE BESYLATE 5 MG: 5 TABLET ORAL at 10:16

## 2025-08-11 RX ADMIN — Medication 5 ML: at 06:29

## 2025-08-11 RX ADMIN — LEVOFLOXACIN 750 MG: 500 TABLET, FILM COATED ORAL at 14:56

## 2025-08-11 RX ADMIN — AMLODIPINE BESYLATE 5 MG: 5 TABLET ORAL at 12:54

## 2025-08-11 ASSESSMENT — ACTIVITIES OF DAILY LIVING (ADL)
ADLS_ACUITY_SCORE: 37

## 2025-08-12 VITALS
HEART RATE: 82 BPM | DIASTOLIC BLOOD PRESSURE: 69 MMHG | RESPIRATION RATE: 16 BRPM | TEMPERATURE: 97.3 F | SYSTOLIC BLOOD PRESSURE: 156 MMHG | OXYGEN SATURATION: 92 %

## 2025-08-12 LAB
BACTERIA BPU CULT: ABNORMAL
BACTERIA BPU CULT: ABNORMAL
CREAT SERPL-MCNC: 1.01 MG/DL (ref 0.51–0.95)
EGFRCR SERPLBLD CKD-EPI 2021: 57 ML/MIN/1.73M2
ERYTHROCYTE [DISTWIDTH] IN BLOOD BY AUTOMATED COUNT: 14.9 % (ref 10–15)
GRAM STAIN RESULT: ABNORMAL
HCT VFR BLD AUTO: 30.7 % (ref 35–47)
HGB BLD-MCNC: 10.3 G/DL (ref 11.7–15.7)
MCH RBC QN AUTO: 30.8 PG (ref 26.5–33)
MCHC RBC AUTO-ENTMCNC: 33.6 G/DL (ref 31.5–36.5)
MCV RBC AUTO: 92 FL (ref 78–100)
PHOSPHATE SERPL-MCNC: 2.2 MG/DL (ref 2.5–4.5)
PLATELET # BLD AUTO: 138 10E3/UL (ref 150–450)
POTASSIUM SERPL-SCNC: 3.6 MMOL/L (ref 3.4–5.3)
RBC # BLD AUTO: 3.34 10E6/UL (ref 3.8–5.2)
WBC # BLD AUTO: 5.7 10E3/UL (ref 4–11)

## 2025-08-12 PROCEDURE — 85018 HEMOGLOBIN: CPT | Performed by: INTERNAL MEDICINE

## 2025-08-12 PROCEDURE — 250N000013 HC RX MED GY IP 250 OP 250 PS 637: Performed by: INTERNAL MEDICINE

## 2025-08-12 PROCEDURE — 250N000013 HC RX MED GY IP 250 OP 250 PS 637: Performed by: PHYSICIAN ASSISTANT

## 2025-08-12 PROCEDURE — 99239 HOSP IP/OBS DSCHRG MGMT >30: CPT | Performed by: INTERNAL MEDICINE

## 2025-08-12 PROCEDURE — 250N000011 HC RX IP 250 OP 636: Performed by: PHYSICIAN ASSISTANT

## 2025-08-12 PROCEDURE — 84132 ASSAY OF SERUM POTASSIUM: CPT | Performed by: INTERNAL MEDICINE

## 2025-08-12 PROCEDURE — 250N000013 HC RX MED GY IP 250 OP 250 PS 637: Performed by: HOSPITALIST

## 2025-08-12 PROCEDURE — 82565 ASSAY OF CREATININE: CPT | Performed by: HOSPITALIST

## 2025-08-12 PROCEDURE — 250N000011 HC RX IP 250 OP 636: Performed by: INTERNAL MEDICINE

## 2025-08-12 PROCEDURE — 84100 ASSAY OF PHOSPHORUS: CPT | Performed by: INTERNAL MEDICINE

## 2025-08-12 RX ORDER — LEVOFLOXACIN 500 MG/1
500 TABLET, FILM COATED ORAL DAILY
Qty: 4 TABLET | Refills: 0 | Status: SHIPPED | OUTPATIENT
Start: 2025-08-12 | End: 2025-08-16

## 2025-08-12 RX ORDER — PANTOPRAZOLE SODIUM 40 MG/1
40 TABLET, DELAYED RELEASE ORAL
Qty: 30 TABLET | Refills: 0 | Status: SHIPPED | OUTPATIENT
Start: 2025-08-13 | End: 2025-08-12

## 2025-08-12 RX ORDER — AMLODIPINE BESYLATE 10 MG/1
10 TABLET ORAL DAILY
Qty: 30 TABLET | Refills: 0 | Status: SHIPPED | OUTPATIENT
Start: 2025-08-13

## 2025-08-12 RX ADMIN — Medication 5 ML: at 10:42

## 2025-08-12 RX ADMIN — PANTOPRAZOLE SODIUM 40 MG: 40 TABLET, DELAYED RELEASE ORAL at 06:33

## 2025-08-12 RX ADMIN — HYDRALAZINE HYDROCHLORIDE 10 MG: 20 INJECTION INTRAMUSCULAR; INTRAVENOUS at 00:46

## 2025-08-12 RX ADMIN — Medication 5 ML: at 06:28

## 2025-08-12 RX ADMIN — ROSUVASTATIN CALCIUM 10 MG: 10 TABLET, FILM COATED ORAL at 08:29

## 2025-08-12 RX ADMIN — AMLODIPINE BESYLATE 10 MG: 10 TABLET ORAL at 08:29

## 2025-08-12 RX ADMIN — SERTRALINE HYDROCHLORIDE 100 MG: 100 TABLET ORAL at 08:29

## 2025-08-12 RX ADMIN — LISINOPRIL 40 MG: 40 TABLET ORAL at 08:30

## 2025-08-12 RX ADMIN — Medication 5 ML: at 00:46

## 2025-08-12 ASSESSMENT — ACTIVITIES OF DAILY LIVING (ADL)
ADLS_ACUITY_SCORE: 41

## 2025-08-13 ENCOUNTER — PATIENT OUTREACH (OUTPATIENT)
Dept: CARE COORDINATION | Facility: CLINIC | Age: 80
End: 2025-08-13
Payer: COMMERCIAL

## 2025-08-13 LAB
BACTERIA SPEC CULT: NO GROWTH
BACTERIA SPEC CULT: NO GROWTH
SCANNED LAB RESULT: NORMAL

## 2025-08-13 ASSESSMENT — ACTIVITIES OF DAILY LIVING (ADL): DEPENDENT_IADLS:: INDEPENDENT

## 2025-08-14 DIAGNOSIS — S06.5XAA SDH (SUBDURAL HEMATOMA) (H): ICD-10-CM

## 2025-08-14 DIAGNOSIS — D69.6 THROMBOCYTOPENIA: Primary | ICD-10-CM

## 2025-08-14 RX ORDER — HEPARIN SODIUM (PORCINE) LOCK FLUSH IV SOLN 100 UNIT/ML 100 UNIT/ML
5 SOLUTION INTRAVENOUS
OUTPATIENT
Start: 2025-08-18

## 2025-08-14 RX ORDER — DIPHENHYDRAMINE HYDROCHLORIDE 50 MG/ML
50 INJECTION, SOLUTION INTRAMUSCULAR; INTRAVENOUS
Start: 2025-08-18

## 2025-08-14 RX ORDER — EPINEPHRINE 1 MG/ML
0.3 INJECTION, SOLUTION, CONCENTRATE INTRAVENOUS EVERY 5 MIN PRN
OUTPATIENT
Start: 2025-08-18

## 2025-08-14 RX ORDER — HEPARIN SODIUM,PORCINE 10 UNIT/ML
5-20 VIAL (ML) INTRAVENOUS DAILY PRN
OUTPATIENT
Start: 2025-08-18

## 2025-08-18 ENCOUNTER — VIRTUAL VISIT (OUTPATIENT)
Dept: ONCOLOGY | Facility: CLINIC | Age: 80
End: 2025-08-18
Attending: NURSE PRACTITIONER
Payer: COMMERCIAL

## 2025-08-18 DIAGNOSIS — D69.6 THROMBOCYTOPENIA: ICD-10-CM

## 2025-08-18 DIAGNOSIS — C34.92 PRIMARY LUNG ADENOCARCINOMA, LEFT (H): Primary | ICD-10-CM

## 2025-08-18 DIAGNOSIS — S06.5XAA SDH (SUBDURAL HEMATOMA) (H): ICD-10-CM

## 2025-08-18 PROCEDURE — 98007 SYNCH AUDIO-VIDEO EST HI 40: CPT | Performed by: NURSE PRACTITIONER

## 2025-08-18 RX ORDER — METHYLPREDNISOLONE SODIUM SUCCINATE 40 MG/ML
40 INJECTION INTRAMUSCULAR; INTRAVENOUS
Status: CANCELLED
Start: 2025-08-19

## 2025-08-18 RX ORDER — ALBUTEROL SULFATE 0.83 MG/ML
2.5 SOLUTION RESPIRATORY (INHALATION)
Status: CANCELLED | OUTPATIENT
Start: 2025-08-19

## 2025-08-18 RX ORDER — ALBUTEROL SULFATE 90 UG/1
1-2 INHALANT RESPIRATORY (INHALATION)
Status: CANCELLED
Start: 2025-08-19

## 2025-08-18 RX ORDER — DIPHENHYDRAMINE HYDROCHLORIDE 50 MG/ML
25 INJECTION, SOLUTION INTRAMUSCULAR; INTRAVENOUS
Status: CANCELLED
Start: 2025-08-19

## 2025-08-18 RX ORDER — DIPHENHYDRAMINE HCL 25 MG
25 CAPSULE ORAL ONCE
Status: CANCELLED
Start: 2025-08-19

## 2025-08-18 RX ORDER — HEPARIN SODIUM (PORCINE) LOCK FLUSH IV SOLN 100 UNIT/ML 100 UNIT/ML
5 SOLUTION INTRAVENOUS
Status: CANCELLED | OUTPATIENT
Start: 2025-08-19

## 2025-08-18 RX ORDER — DIPHENHYDRAMINE HYDROCHLORIDE 50 MG/ML
50 INJECTION, SOLUTION INTRAMUSCULAR; INTRAVENOUS
Status: CANCELLED
Start: 2025-08-19

## 2025-08-18 RX ORDER — LORAZEPAM 2 MG/ML
0.5 INJECTION INTRAMUSCULAR EVERY 4 HOURS PRN
Status: CANCELLED | OUTPATIENT
Start: 2025-08-19

## 2025-08-18 RX ORDER — MEPERIDINE HYDROCHLORIDE 25 MG/ML
25 INJECTION INTRAMUSCULAR; INTRAVENOUS; SUBCUTANEOUS
Status: CANCELLED | OUTPATIENT
Start: 2025-08-19

## 2025-08-18 RX ORDER — DIPHENHYDRAMINE HYDROCHLORIDE 50 MG/ML
25 INJECTION, SOLUTION INTRAMUSCULAR; INTRAVENOUS ONCE
Status: CANCELLED | OUTPATIENT
Start: 2025-08-19

## 2025-08-18 RX ORDER — EPINEPHRINE 1 MG/ML
0.3 INJECTION, SOLUTION, CONCENTRATE INTRAVENOUS EVERY 5 MIN PRN
Status: CANCELLED | OUTPATIENT
Start: 2025-08-19

## 2025-08-18 RX ORDER — HEPARIN SODIUM,PORCINE 10 UNIT/ML
5-20 VIAL (ML) INTRAVENOUS DAILY PRN
Status: CANCELLED | OUTPATIENT
Start: 2025-08-19

## 2025-08-19 ENCOUNTER — INFUSION THERAPY VISIT (OUTPATIENT)
Dept: INFUSION THERAPY | Facility: CLINIC | Age: 80
End: 2025-08-19
Attending: INTERNAL MEDICINE
Payer: COMMERCIAL

## 2025-08-19 ENCOUNTER — OFFICE VISIT (OUTPATIENT)
Dept: RADIATION THERAPY | Facility: OUTPATIENT CENTER | Age: 80
End: 2025-08-19
Payer: COMMERCIAL

## 2025-08-19 VITALS
DIASTOLIC BLOOD PRESSURE: 74 MMHG | RESPIRATION RATE: 16 BRPM | SYSTOLIC BLOOD PRESSURE: 149 MMHG | OXYGEN SATURATION: 98 % | HEART RATE: 76 BPM | TEMPERATURE: 98.9 F

## 2025-08-19 VITALS
TEMPERATURE: 98.6 F | DIASTOLIC BLOOD PRESSURE: 74 MMHG | HEART RATE: 73 BPM | RESPIRATION RATE: 16 BRPM | SYSTOLIC BLOOD PRESSURE: 146 MMHG | OXYGEN SATURATION: 97 %

## 2025-08-19 VITALS — SYSTOLIC BLOOD PRESSURE: 160 MMHG | DIASTOLIC BLOOD PRESSURE: 54 MMHG | HEART RATE: 69 BPM

## 2025-08-19 DIAGNOSIS — C34.12 PRIMARY MALIGNANT NEOPLASM OF LEFT UPPER LOBE OF LUNG (H): Primary | ICD-10-CM

## 2025-08-19 DIAGNOSIS — C34.92 PRIMARY LUNG ADENOCARCINOMA, LEFT (H): Primary | ICD-10-CM

## 2025-08-19 DIAGNOSIS — S06.5XAA SDH (SUBDURAL HEMATOMA) (H): ICD-10-CM

## 2025-08-19 DIAGNOSIS — C34.92 PRIMARY LUNG ADENOCARCINOMA, LEFT (H): ICD-10-CM

## 2025-08-19 DIAGNOSIS — D69.6 THROMBOCYTOPENIA: ICD-10-CM

## 2025-08-19 LAB
ALBUMIN SERPL BCG-MCNC: 3.4 G/DL (ref 3.5–5.2)
ALP SERPL-CCNC: 81 U/L (ref 40–150)
ALT SERPL W P-5'-P-CCNC: 7 U/L (ref 0–50)
ANION GAP SERPL CALCULATED.3IONS-SCNC: 12 MMOL/L (ref 7–15)
AST SERPL W P-5'-P-CCNC: 23 U/L (ref 0–45)
BASOPHILS # BLD AUTO: <0.03 10E3/UL (ref 0–0.2)
BASOPHILS NFR BLD AUTO: 0.7 %
BILIRUB SERPL-MCNC: 0.9 MG/DL
BLD PROD TYP BPU: NORMAL
BLOOD COMPONENT TYPE: NORMAL
BUN SERPL-MCNC: 22.6 MG/DL (ref 8–23)
CALCIUM SERPL-MCNC: 9.1 MG/DL (ref 8.8–10.4)
CHLORIDE SERPL-SCNC: 103 MMOL/L (ref 98–107)
CODING SYSTEM: NORMAL
CREAT SERPL-MCNC: 1.05 MG/DL (ref 0.51–0.95)
EGFRCR SERPLBLD CKD-EPI 2021: 54 ML/MIN/1.73M2
EOSINOPHIL # BLD AUTO: 0.08 10E3/UL (ref 0–0.7)
EOSINOPHIL NFR BLD AUTO: 2.7 %
ERYTHROCYTE [DISTWIDTH] IN BLOOD BY AUTOMATED COUNT: 14.9 % (ref 10–15)
GLUCOSE SERPL-MCNC: 128 MG/DL (ref 70–99)
HCO3 SERPL-SCNC: 24 MMOL/L (ref 22–29)
HCT VFR BLD AUTO: 31.9 % (ref 35–47)
HGB BLD-MCNC: 10.2 G/DL (ref 11.7–15.7)
IMM GRANULOCYTES # BLD: <0.03 10E3/UL
IMM GRANULOCYTES NFR BLD: 0.7 %
ISSUE DATE AND TIME: NORMAL
LYMPHOCYTES # BLD AUTO: 0.6 10E3/UL (ref 0.8–5.3)
LYMPHOCYTES NFR BLD AUTO: 20.5 %
MAGNESIUM SERPL-MCNC: 1.7 MG/DL (ref 1.7–2.3)
MCH RBC QN AUTO: 31.2 PG (ref 26.5–33)
MCHC RBC AUTO-ENTMCNC: 32 G/DL (ref 31.5–36.5)
MCV RBC AUTO: 97.6 FL (ref 78–100)
MONOCYTES # BLD AUTO: 0.55 10E3/UL (ref 0–1.3)
MONOCYTES NFR BLD AUTO: 18.8 %
NEUTROPHILS # BLD AUTO: 1.66 10E3/UL (ref 1.6–8.3)
NEUTROPHILS NFR BLD AUTO: 56.6 %
NRBC # BLD AUTO: <0.03 10E3/UL
NRBC BLD AUTO-RTO: 0 /100
PLATELET # BLD AUTO: 57 10E3/UL (ref 150–450)
POTASSIUM SERPL-SCNC: 3.6 MMOL/L (ref 3.4–5.3)
PROT SERPL-MCNC: 6.2 G/DL (ref 6.4–8.3)
RBC # BLD AUTO: 3.27 10E6/UL (ref 3.8–5.2)
SODIUM SERPL-SCNC: 139 MMOL/L (ref 135–145)
UNIT ABO/RH: NORMAL
UNIT NUMBER: NORMAL
UNIT STATUS: NORMAL
UNIT TYPE ISBT: 9500
WBC # BLD AUTO: 2.93 10E3/UL (ref 4–11)

## 2025-08-19 PROCEDURE — P9035 PLATELET PHERES LEUKOREDUCED: HCPCS | Performed by: NURSE PRACTITIONER

## 2025-08-19 PROCEDURE — 36591 DRAW BLOOD OFF VENOUS DEVICE: CPT | Performed by: NURSE PRACTITIONER

## 2025-08-19 PROCEDURE — 85004 AUTOMATED DIFF WBC COUNT: CPT | Performed by: NURSE PRACTITIONER

## 2025-08-19 PROCEDURE — 82310 ASSAY OF CALCIUM: CPT | Performed by: NURSE PRACTITIONER

## 2025-08-19 PROCEDURE — 36430 TRANSFUSION BLD/BLD COMPNT: CPT

## 2025-08-19 PROCEDURE — 83735 ASSAY OF MAGNESIUM: CPT | Performed by: NURSE PRACTITIONER

## 2025-08-19 PROCEDURE — 250N000011 HC RX IP 250 OP 636: Performed by: NURSE PRACTITIONER

## 2025-08-19 RX ORDER — HEPARIN SODIUM (PORCINE) LOCK FLUSH IV SOLN 100 UNIT/ML 100 UNIT/ML
5 SOLUTION INTRAVENOUS
Status: DISCONTINUED | OUTPATIENT
Start: 2025-08-19 | End: 2025-08-19 | Stop reason: HOSPADM

## 2025-08-19 RX ORDER — HEPARIN SODIUM (PORCINE) LOCK FLUSH IV SOLN 100 UNIT/ML 100 UNIT/ML
5 SOLUTION INTRAVENOUS
OUTPATIENT
Start: 2025-08-19

## 2025-08-19 RX ORDER — DIPHENHYDRAMINE HYDROCHLORIDE 50 MG/ML
50 INJECTION, SOLUTION INTRAMUSCULAR; INTRAVENOUS
Start: 2025-08-19

## 2025-08-19 RX ORDER — EPINEPHRINE 1 MG/ML
0.3 INJECTION, SOLUTION, CONCENTRATE INTRAVENOUS EVERY 5 MIN PRN
OUTPATIENT
Start: 2025-08-19

## 2025-08-19 RX ORDER — HEPARIN SODIUM,PORCINE 10 UNIT/ML
5-20 VIAL (ML) INTRAVENOUS DAILY PRN
OUTPATIENT
Start: 2025-08-19

## 2025-08-19 RX ADMIN — HEPARIN 5 ML: 100 SYRINGE at 13:55

## 2025-08-25 ENCOUNTER — INFUSION THERAPY VISIT (OUTPATIENT)
Dept: INFUSION THERAPY | Facility: CLINIC | Age: 80
End: 2025-08-25
Attending: INTERNAL MEDICINE
Payer: COMMERCIAL

## 2025-08-25 ENCOUNTER — ONCOLOGY VISIT (OUTPATIENT)
Dept: ONCOLOGY | Facility: CLINIC | Age: 80
End: 2025-08-25
Attending: INTERNAL MEDICINE
Payer: COMMERCIAL

## 2025-08-25 VITALS
HEART RATE: 78 BPM | WEIGHT: 131 LBS | HEIGHT: 59 IN | BODY MASS INDEX: 26.41 KG/M2 | RESPIRATION RATE: 16 BRPM | OXYGEN SATURATION: 98 % | DIASTOLIC BLOOD PRESSURE: 51 MMHG | TEMPERATURE: 97.7 F | SYSTOLIC BLOOD PRESSURE: 146 MMHG

## 2025-08-25 VITALS — BODY MASS INDEX: 26.5 KG/M2 | WEIGHT: 131.2 LBS

## 2025-08-25 DIAGNOSIS — C34.92 PRIMARY LUNG ADENOCARCINOMA, LEFT (H): Primary | ICD-10-CM

## 2025-08-25 LAB
ALBUMIN SERPL BCG-MCNC: 3.4 G/DL (ref 3.5–5.2)
ALP SERPL-CCNC: 92 U/L (ref 40–150)
ALT SERPL W P-5'-P-CCNC: 7 U/L (ref 0–50)
ANION GAP SERPL CALCULATED.3IONS-SCNC: 8 MMOL/L (ref 7–15)
AST SERPL W P-5'-P-CCNC: 21 U/L (ref 0–45)
BASOPHILS # BLD AUTO: <0.03 10E3/UL (ref 0–0.2)
BASOPHILS NFR BLD AUTO: 0.4 %
BILIRUB SERPL-MCNC: 0.8 MG/DL
BLD PROD TYP BPU: NORMAL
BLOOD COMPONENT TYPE: NORMAL
BUN SERPL-MCNC: 18.5 MG/DL (ref 8–23)
CALCIUM SERPL-MCNC: 9.1 MG/DL (ref 8.8–10.4)
CHLORIDE SERPL-SCNC: 107 MMOL/L (ref 98–107)
CODING SYSTEM: NORMAL
CREAT SERPL-MCNC: 1.05 MG/DL (ref 0.51–0.95)
EGFRCR SERPLBLD CKD-EPI 2021: 54 ML/MIN/1.73M2
EOSINOPHIL # BLD AUTO: 0.1 10E3/UL (ref 0–0.7)
EOSINOPHIL NFR BLD AUTO: 2 %
ERYTHROCYTE [DISTWIDTH] IN BLOOD BY AUTOMATED COUNT: 15.8 % (ref 10–15)
GLUCOSE SERPL-MCNC: 102 MG/DL (ref 70–99)
HCO3 SERPL-SCNC: 25 MMOL/L (ref 22–29)
HCT VFR BLD AUTO: 30.7 % (ref 35–47)
HGB BLD-MCNC: 9.9 G/DL (ref 11.7–15.7)
IMM GRANULOCYTES # BLD: <0.03 10E3/UL
IMM GRANULOCYTES NFR BLD: 0.4 %
ISSUE DATE AND TIME: NORMAL
LYMPHOCYTES # BLD AUTO: 0.62 10E3/UL (ref 0.8–5.3)
LYMPHOCYTES NFR BLD AUTO: 12.3 %
MAGNESIUM SERPL-MCNC: 1.6 MG/DL (ref 1.7–2.3)
MCH RBC QN AUTO: 31.9 PG (ref 26.5–33)
MCHC RBC AUTO-ENTMCNC: 32.2 G/DL (ref 31.5–36.5)
MCV RBC AUTO: 99 FL (ref 78–100)
MONOCYTES # BLD AUTO: 0.61 10E3/UL (ref 0–1.3)
MONOCYTES NFR BLD AUTO: 12.1 %
NEUTROPHILS # BLD AUTO: 3.69 10E3/UL (ref 1.6–8.3)
NEUTROPHILS NFR BLD AUTO: 72.8 %
NRBC # BLD AUTO: <0.03 10E3/UL
NRBC BLD AUTO-RTO: 0 /100
PLATELET # BLD AUTO: 82 10E3/UL (ref 150–450)
POTASSIUM SERPL-SCNC: 3.3 MMOL/L (ref 3.4–5.3)
PROT SERPL-MCNC: 6.4 G/DL (ref 6.4–8.3)
RBC # BLD AUTO: 3.1 10E6/UL (ref 3.8–5.2)
SODIUM SERPL-SCNC: 140 MMOL/L (ref 135–145)
UNIT ABO/RH: NORMAL
UNIT NUMBER: NORMAL
UNIT STATUS: NORMAL
UNIT TYPE ISBT: 5100
WBC # BLD AUTO: 5.06 10E3/UL (ref 4–11)

## 2025-08-25 PROCEDURE — 99215 OFFICE O/P EST HI 40 MIN: CPT | Performed by: INTERNAL MEDICINE

## 2025-08-25 PROCEDURE — G2211 COMPLEX E/M VISIT ADD ON: HCPCS | Performed by: INTERNAL MEDICINE

## 2025-08-25 PROCEDURE — 82310 ASSAY OF CALCIUM: CPT | Performed by: INTERNAL MEDICINE

## 2025-08-25 PROCEDURE — 36591 DRAW BLOOD OFF VENOUS DEVICE: CPT | Performed by: INTERNAL MEDICINE

## 2025-08-25 PROCEDURE — 83735 ASSAY OF MAGNESIUM: CPT | Performed by: INTERNAL MEDICINE

## 2025-08-25 PROCEDURE — 85004 AUTOMATED DIFF WBC COUNT: CPT | Performed by: INTERNAL MEDICINE

## 2025-08-25 PROCEDURE — G0463 HOSPITAL OUTPT CLINIC VISIT: HCPCS | Performed by: INTERNAL MEDICINE

## 2025-08-25 RX ORDER — HEPARIN SODIUM (PORCINE) LOCK FLUSH IV SOLN 100 UNIT/ML 100 UNIT/ML
5 SOLUTION INTRAVENOUS
OUTPATIENT
Start: 2025-08-25

## 2025-08-25 RX ORDER — HEPARIN SODIUM,PORCINE 10 UNIT/ML
5-20 VIAL (ML) INTRAVENOUS DAILY PRN
OUTPATIENT
Start: 2025-08-26

## 2025-08-25 RX ORDER — MEPERIDINE HYDROCHLORIDE 25 MG/ML
25 INJECTION INTRAMUSCULAR; INTRAVENOUS; SUBCUTANEOUS
OUTPATIENT
Start: 2025-08-26

## 2025-08-25 RX ORDER — EPINEPHRINE 1 MG/ML
0.3 INJECTION, SOLUTION, CONCENTRATE INTRAVENOUS EVERY 5 MIN PRN
OUTPATIENT
Start: 2025-08-25

## 2025-08-25 RX ORDER — METHYLPREDNISOLONE SODIUM SUCCINATE 40 MG/ML
40 INJECTION INTRAMUSCULAR; INTRAVENOUS
Start: 2025-08-26

## 2025-08-25 RX ORDER — DIPHENHYDRAMINE HYDROCHLORIDE 50 MG/ML
50 INJECTION, SOLUTION INTRAMUSCULAR; INTRAVENOUS
Start: 2025-08-26

## 2025-08-25 RX ORDER — HEPARIN SODIUM,PORCINE 10 UNIT/ML
5-20 VIAL (ML) INTRAVENOUS DAILY PRN
OUTPATIENT
Start: 2025-08-25

## 2025-08-25 RX ORDER — HEPARIN SODIUM,PORCINE 10 UNIT/ML
5-20 VIAL (ML) INTRAVENOUS DAILY PRN
Status: CANCELLED | OUTPATIENT
Start: 2025-08-25

## 2025-08-25 RX ORDER — LORAZEPAM 2 MG/ML
0.5 INJECTION INTRAMUSCULAR EVERY 4 HOURS PRN
OUTPATIENT
Start: 2025-08-26

## 2025-08-25 RX ORDER — HEPARIN SODIUM (PORCINE) LOCK FLUSH IV SOLN 100 UNIT/ML 100 UNIT/ML
5 SOLUTION INTRAVENOUS
Status: CANCELLED | OUTPATIENT
Start: 2025-08-26

## 2025-08-25 RX ORDER — ALBUTEROL SULFATE 0.83 MG/ML
2.5 SOLUTION RESPIRATORY (INHALATION)
OUTPATIENT
Start: 2025-08-26

## 2025-08-25 RX ORDER — DIPHENHYDRAMINE HCL 25 MG
25 CAPSULE ORAL ONCE
Status: CANCELLED
Start: 2025-08-26

## 2025-08-25 RX ORDER — ALBUTEROL SULFATE 90 UG/1
1-2 INHALANT RESPIRATORY (INHALATION)
Start: 2025-08-26

## 2025-08-25 RX ORDER — DIPHENHYDRAMINE HYDROCHLORIDE 50 MG/ML
25 INJECTION, SOLUTION INTRAMUSCULAR; INTRAVENOUS ONCE
OUTPATIENT
Start: 2025-08-26

## 2025-08-25 RX ORDER — DIPHENHYDRAMINE HYDROCHLORIDE 50 MG/ML
50 INJECTION, SOLUTION INTRAMUSCULAR; INTRAVENOUS
Start: 2025-08-25

## 2025-08-25 RX ORDER — DIPHENHYDRAMINE HYDROCHLORIDE 50 MG/ML
25 INJECTION, SOLUTION INTRAMUSCULAR; INTRAVENOUS
Start: 2025-08-26

## 2025-08-25 RX ORDER — HEPARIN SODIUM (PORCINE) LOCK FLUSH IV SOLN 100 UNIT/ML 100 UNIT/ML
5 SOLUTION INTRAVENOUS
Status: CANCELLED | OUTPATIENT
Start: 2025-08-25

## 2025-08-25 RX ORDER — EPINEPHRINE 1 MG/ML
0.3 INJECTION, SOLUTION, CONCENTRATE INTRAVENOUS EVERY 5 MIN PRN
OUTPATIENT
Start: 2025-08-26

## 2025-08-25 RX ORDER — HEPARIN SODIUM (PORCINE) LOCK FLUSH IV SOLN 100 UNIT/ML 100 UNIT/ML
5 SOLUTION INTRAVENOUS
Status: DISCONTINUED | OUTPATIENT
Start: 2025-08-25 | End: 2025-08-25 | Stop reason: HOSPADM

## 2025-08-25 ASSESSMENT — PAIN SCALES - GENERAL: PAINLEVEL_OUTOF10: NO PAIN (0)

## 2025-08-26 ENCOUNTER — INFUSION THERAPY VISIT (OUTPATIENT)
Dept: INFUSION THERAPY | Facility: CLINIC | Age: 80
End: 2025-08-26
Attending: INTERNAL MEDICINE
Payer: COMMERCIAL

## 2025-08-26 ENCOUNTER — OFFICE VISIT (OUTPATIENT)
Dept: RADIATION THERAPY | Facility: OUTPATIENT CENTER | Age: 80
End: 2025-08-26
Payer: COMMERCIAL

## 2025-08-26 VITALS
TEMPERATURE: 98.7 F | DIASTOLIC BLOOD PRESSURE: 77 MMHG | HEART RATE: 60 BPM | RESPIRATION RATE: 16 BRPM | SYSTOLIC BLOOD PRESSURE: 146 MMHG

## 2025-08-26 VITALS
OXYGEN SATURATION: 99 % | DIASTOLIC BLOOD PRESSURE: 66 MMHG | SYSTOLIC BLOOD PRESSURE: 153 MMHG | BODY MASS INDEX: 26.66 KG/M2 | RESPIRATION RATE: 18 BRPM | HEART RATE: 65 BPM | WEIGHT: 132 LBS

## 2025-08-26 DIAGNOSIS — C34.12 PRIMARY MALIGNANT NEOPLASM OF LEFT UPPER LOBE OF LUNG (H): Primary | ICD-10-CM

## 2025-08-26 DIAGNOSIS — D69.6 THROMBOCYTOPENIA: Primary | ICD-10-CM

## 2025-08-26 DIAGNOSIS — C34.92 PRIMARY LUNG ADENOCARCINOMA, LEFT (H): ICD-10-CM

## 2025-08-26 DIAGNOSIS — S06.5XAA SDH (SUBDURAL HEMATOMA) (H): ICD-10-CM

## 2025-08-26 PROCEDURE — 258N000003 HC RX IP 258 OP 636: Performed by: INTERNAL MEDICINE

## 2025-08-26 PROCEDURE — 96417 CHEMO IV INFUS EACH ADDL SEQ: CPT

## 2025-08-26 PROCEDURE — P9035 PLATELET PHERES LEUKOREDUCED: HCPCS | Performed by: NURSE PRACTITIONER

## 2025-08-26 PROCEDURE — 96413 CHEMO IV INFUSION 1 HR: CPT

## 2025-08-26 PROCEDURE — 250N000011 HC RX IP 250 OP 636: Performed by: INTERNAL MEDICINE

## 2025-08-26 PROCEDURE — 250N000013 HC RX MED GY IP 250 OP 250 PS 637: Performed by: INTERNAL MEDICINE

## 2025-08-26 PROCEDURE — 96375 TX/PRO/DX INJ NEW DRUG ADDON: CPT

## 2025-08-26 PROCEDURE — 36430 TRANSFUSION BLD/BLD COMPNT: CPT

## 2025-08-26 RX ORDER — HEPARIN SODIUM (PORCINE) LOCK FLUSH IV SOLN 100 UNIT/ML 100 UNIT/ML
5 SOLUTION INTRAVENOUS
Status: DISCONTINUED | OUTPATIENT
Start: 2025-08-26 | End: 2025-08-26 | Stop reason: HOSPADM

## 2025-08-26 RX ORDER — DIPHENHYDRAMINE HCL 25 MG
25 CAPSULE ORAL ONCE
Status: COMPLETED | OUTPATIENT
Start: 2025-08-26 | End: 2025-08-26

## 2025-08-26 RX ADMIN — DIPHENHYDRAMINE HYDROCHLORIDE 25 MG: 25 CAPSULE ORAL at 10:06

## 2025-08-26 RX ADMIN — FAMOTIDINE 20 MG: 10 INJECTION, SOLUTION INTRAVENOUS at 10:06

## 2025-08-26 RX ADMIN — PACLITAXEL 71 MG: 6 INJECTION, SOLUTION INTRAVENOUS at 10:52

## 2025-08-26 RX ADMIN — SODIUM CHLORIDE 1000 ML: 0.9 INJECTION, SOLUTION INTRAVENOUS at 10:21

## 2025-08-26 RX ADMIN — CARBOPLATIN 120 MG: 10 INJECTION, SOLUTION INTRAVENOUS at 12:03

## 2025-08-26 RX ADMIN — HEPARIN 5 ML: 100 SYRINGE at 12:39

## 2025-08-26 RX ADMIN — DEXAMETHASONE SODIUM PHOSPHATE: 10 INJECTION, SOLUTION INTRAMUSCULAR; INTRAVENOUS at 10:07

## 2025-08-26 ASSESSMENT — PAIN SCALES - GENERAL: PAINLEVEL_OUTOF10: NO PAIN (0)

## 2025-08-28 ENCOUNTER — INFUSION THERAPY VISIT (OUTPATIENT)
Dept: INFUSION THERAPY | Facility: CLINIC | Age: 80
End: 2025-08-28
Attending: NURSE PRACTITIONER
Payer: COMMERCIAL

## 2025-08-28 ENCOUNTER — INFUSION THERAPY VISIT (OUTPATIENT)
Dept: INFUSION THERAPY | Facility: CLINIC | Age: 80
End: 2025-08-28
Attending: INTERNAL MEDICINE
Payer: COMMERCIAL

## 2025-08-28 VITALS
HEART RATE: 76 BPM | TEMPERATURE: 97.9 F | RESPIRATION RATE: 16 BRPM | OXYGEN SATURATION: 97 % | DIASTOLIC BLOOD PRESSURE: 75 MMHG | SYSTOLIC BLOOD PRESSURE: 147 MMHG

## 2025-08-28 VITALS
DIASTOLIC BLOOD PRESSURE: 69 MMHG | OXYGEN SATURATION: 93 % | RESPIRATION RATE: 16 BRPM | SYSTOLIC BLOOD PRESSURE: 143 MMHG | HEART RATE: 85 BPM | TEMPERATURE: 97.8 F

## 2025-08-28 DIAGNOSIS — D69.6 THROMBOCYTOPENIA: Primary | ICD-10-CM

## 2025-08-28 DIAGNOSIS — C34.92 PRIMARY LUNG ADENOCARCINOMA, LEFT (H): ICD-10-CM

## 2025-08-28 DIAGNOSIS — D69.6 THROMBOCYTOPENIA: ICD-10-CM

## 2025-08-28 DIAGNOSIS — C34.92 PRIMARY LUNG ADENOCARCINOMA, LEFT (H): Primary | ICD-10-CM

## 2025-08-28 DIAGNOSIS — S06.5XAA SDH (SUBDURAL HEMATOMA) (H): ICD-10-CM

## 2025-08-28 LAB
BASOPHILS # BLD AUTO: <0.03 10E3/UL (ref 0–0.2)
BASOPHILS NFR BLD AUTO: 0.4 %
BLD PROD TYP BPU: NORMAL
BLOOD COMPONENT TYPE: NORMAL
CODING SYSTEM: NORMAL
EOSINOPHIL # BLD AUTO: 0.06 10E3/UL (ref 0–0.7)
EOSINOPHIL NFR BLD AUTO: 1.2 %
ERYTHROCYTE [DISTWIDTH] IN BLOOD BY AUTOMATED COUNT: 15.7 % (ref 10–15)
HCT VFR BLD AUTO: 31.1 % (ref 35–47)
HGB BLD-MCNC: 10 G/DL (ref 11.7–15.7)
IMM GRANULOCYTES # BLD: <0.03 10E3/UL
IMM GRANULOCYTES NFR BLD: 0.4 %
ISSUE DATE AND TIME: NORMAL
LYMPHOCYTES # BLD AUTO: 0.54 10E3/UL (ref 0.8–5.3)
LYMPHOCYTES NFR BLD AUTO: 10.6 %
MAGNESIUM SERPL-MCNC: 1.6 MG/DL (ref 1.7–2.3)
MCH RBC QN AUTO: 31.3 PG (ref 26.5–33)
MCHC RBC AUTO-ENTMCNC: 32.2 G/DL (ref 31.5–36.5)
MCV RBC AUTO: 97.5 FL (ref 78–100)
MONOCYTES # BLD AUTO: 0.18 10E3/UL (ref 0–1.3)
MONOCYTES NFR BLD AUTO: 3.5 %
NEUTROPHILS # BLD AUTO: 4.27 10E3/UL (ref 1.6–8.3)
NEUTROPHILS NFR BLD AUTO: 83.9 %
NRBC # BLD AUTO: <0.03 10E3/UL
NRBC BLD AUTO-RTO: 0 /100
PLATELET # BLD AUTO: 96 10E3/UL (ref 150–450)
RBC # BLD AUTO: 3.19 10E6/UL (ref 3.8–5.2)
UNIT ABO/RH: NORMAL
UNIT NUMBER: NORMAL
UNIT STATUS: NORMAL
UNIT TYPE ISBT: 9500
WBC # BLD AUTO: 5.09 10E3/UL (ref 4–11)

## 2025-08-28 PROCEDURE — 250N000011 HC RX IP 250 OP 636: Performed by: INTERNAL MEDICINE

## 2025-08-28 PROCEDURE — 36415 COLL VENOUS BLD VENIPUNCTURE: CPT

## 2025-08-28 PROCEDURE — P9035 PLATELET PHERES LEUKOREDUCED: HCPCS | Performed by: NURSE PRACTITIONER

## 2025-08-28 PROCEDURE — 83735 ASSAY OF MAGNESIUM: CPT | Performed by: INTERNAL MEDICINE

## 2025-08-28 RX ORDER — HEPARIN SODIUM,PORCINE 10 UNIT/ML
5-20 VIAL (ML) INTRAVENOUS DAILY PRN
OUTPATIENT
Start: 2025-08-28

## 2025-08-28 RX ORDER — MAGNESIUM SULFATE HEPTAHYDRATE 40 MG/ML
2 INJECTION, SOLUTION INTRAVENOUS ONCE
Status: COMPLETED | OUTPATIENT
Start: 2025-08-28 | End: 2025-08-28

## 2025-08-28 RX ORDER — HEPARIN SODIUM (PORCINE) LOCK FLUSH IV SOLN 100 UNIT/ML 100 UNIT/ML
5 SOLUTION INTRAVENOUS
OUTPATIENT
Start: 2025-08-28

## 2025-08-28 RX ORDER — EPINEPHRINE 1 MG/ML
0.3 INJECTION, SOLUTION, CONCENTRATE INTRAVENOUS EVERY 5 MIN PRN
OUTPATIENT
Start: 2025-08-28

## 2025-08-28 RX ORDER — DIPHENHYDRAMINE HYDROCHLORIDE 50 MG/ML
50 INJECTION, SOLUTION INTRAMUSCULAR; INTRAVENOUS
Start: 2025-08-28

## 2025-08-28 RX ORDER — HEPARIN SODIUM (PORCINE) LOCK FLUSH IV SOLN 100 UNIT/ML 100 UNIT/ML
5 SOLUTION INTRAVENOUS
Status: DISCONTINUED | OUTPATIENT
Start: 2025-08-28 | End: 2025-08-28 | Stop reason: HOSPADM

## 2025-08-28 RX ADMIN — MAGNESIUM SULFATE HEPTAHYDRATE 2 G: 40 INJECTION, SOLUTION INTRAVENOUS at 13:22

## 2025-08-28 RX ADMIN — HEPARIN 5 ML: 100 SYRINGE at 14:15

## 2025-09-02 ENCOUNTER — OFFICE VISIT (OUTPATIENT)
Dept: RADIATION THERAPY | Facility: OUTPATIENT CENTER | Age: 80
End: 2025-09-02
Payer: COMMERCIAL

## 2025-09-02 VITALS
WEIGHT: 126 LBS | SYSTOLIC BLOOD PRESSURE: 164 MMHG | OXYGEN SATURATION: 99 % | DIASTOLIC BLOOD PRESSURE: 66 MMHG | HEART RATE: 66 BPM | RESPIRATION RATE: 18 BRPM | BODY MASS INDEX: 25.45 KG/M2

## 2025-09-02 DIAGNOSIS — C34.12 PRIMARY MALIGNANT NEOPLASM OF LEFT UPPER LOBE OF LUNG (H): Primary | ICD-10-CM

## 2025-09-02 ASSESSMENT — PAIN SCALES - GENERAL: PAINLEVEL_OUTOF10: NO PAIN (0)

## 2025-09-03 ENCOUNTER — ONCOLOGY VISIT (OUTPATIENT)
Dept: ONCOLOGY | Facility: CLINIC | Age: 80
End: 2025-09-03
Attending: INTERNAL MEDICINE
Payer: COMMERCIAL

## 2025-09-03 ENCOUNTER — INFUSION THERAPY VISIT (OUTPATIENT)
Dept: INFUSION THERAPY | Facility: CLINIC | Age: 80
End: 2025-09-03
Attending: INTERNAL MEDICINE
Payer: COMMERCIAL

## 2025-09-03 VITALS
HEIGHT: 59 IN | BODY MASS INDEX: 25.4 KG/M2 | SYSTOLIC BLOOD PRESSURE: 158 MMHG | RESPIRATION RATE: 16 BRPM | TEMPERATURE: 98.7 F | WEIGHT: 126 LBS | HEART RATE: 88 BPM | OXYGEN SATURATION: 98 % | DIASTOLIC BLOOD PRESSURE: 60 MMHG

## 2025-09-03 VITALS
HEART RATE: 84 BPM | OXYGEN SATURATION: 94 % | RESPIRATION RATE: 16 BRPM | TEMPERATURE: 98.7 F | SYSTOLIC BLOOD PRESSURE: 140 MMHG | DIASTOLIC BLOOD PRESSURE: 55 MMHG

## 2025-09-03 DIAGNOSIS — C34.92 PRIMARY LUNG ADENOCARCINOMA, LEFT (H): Primary | ICD-10-CM

## 2025-09-03 DIAGNOSIS — D69.6 THROMBOCYTOPENIA: Primary | ICD-10-CM

## 2025-09-03 DIAGNOSIS — S06.5XAA SDH (SUBDURAL HEMATOMA) (H): ICD-10-CM

## 2025-09-03 DIAGNOSIS — D69.6 THROMBOCYTOPENIA: ICD-10-CM

## 2025-09-03 DIAGNOSIS — C34.92 PRIMARY LUNG ADENOCARCINOMA, LEFT (H): ICD-10-CM

## 2025-09-03 DIAGNOSIS — E83.42 HYPOMAGNESEMIA: ICD-10-CM

## 2025-09-03 LAB
ALBUMIN SERPL BCG-MCNC: 3.7 G/DL (ref 3.5–5.2)
ALP SERPL-CCNC: 97 U/L (ref 40–150)
ALT SERPL W P-5'-P-CCNC: 7 U/L (ref 0–50)
ANION GAP SERPL CALCULATED.3IONS-SCNC: 13 MMOL/L (ref 7–15)
AST SERPL W P-5'-P-CCNC: 21 U/L (ref 0–45)
BASOPHILS # BLD AUTO: <0.03 10E3/UL (ref 0–0.2)
BASOPHILS NFR BLD AUTO: 0.6 %
BILIRUB SERPL-MCNC: 0.6 MG/DL
BLD PROD TYP BPU: NORMAL
BLOOD COMPONENT TYPE: NORMAL
BUN SERPL-MCNC: 17 MG/DL (ref 8–23)
CALCIUM SERPL-MCNC: 9.2 MG/DL (ref 8.8–10.4)
CHLORIDE SERPL-SCNC: 105 MMOL/L (ref 98–107)
CODING SYSTEM: NORMAL
CREAT SERPL-MCNC: 1.22 MG/DL (ref 0.51–0.95)
EGFRCR SERPLBLD CKD-EPI 2021: 45 ML/MIN/1.73M2
EOSINOPHIL # BLD AUTO: 0.1 10E3/UL (ref 0–0.7)
EOSINOPHIL NFR BLD AUTO: 3.2 %
ERYTHROCYTE [DISTWIDTH] IN BLOOD BY AUTOMATED COUNT: 15.3 % (ref 10–15)
GLUCOSE SERPL-MCNC: 124 MG/DL (ref 70–99)
HCO3 SERPL-SCNC: 21 MMOL/L (ref 22–29)
HCT VFR BLD AUTO: 30.3 % (ref 35–47)
HGB BLD-MCNC: 9.8 G/DL (ref 11.7–15.7)
IMM GRANULOCYTES # BLD: <0.03 10E3/UL
IMM GRANULOCYTES NFR BLD: 0.3 %
ISSUE DATE AND TIME: NORMAL
LYMPHOCYTES # BLD AUTO: 0.4 10E3/UL (ref 0.8–5.3)
LYMPHOCYTES NFR BLD AUTO: 12.6 %
MAGNESIUM SERPL-MCNC: 1.6 MG/DL (ref 1.7–2.3)
MCH RBC QN AUTO: 31.6 PG (ref 26.5–33)
MCHC RBC AUTO-ENTMCNC: 32.3 G/DL (ref 31.5–36.5)
MCV RBC AUTO: 97.7 FL (ref 78–100)
MONOCYTES # BLD AUTO: 0.36 10E3/UL (ref 0–1.3)
MONOCYTES NFR BLD AUTO: 11.4 %
NEUTROPHILS # BLD AUTO: 2.28 10E3/UL (ref 1.6–8.3)
NEUTROPHILS NFR BLD AUTO: 71.9 %
NRBC # BLD AUTO: <0.03 10E3/UL
NRBC BLD AUTO-RTO: 0 /100
PLATELET # BLD AUTO: 84 10E3/UL (ref 150–450)
POTASSIUM SERPL-SCNC: 3.5 MMOL/L (ref 3.4–5.3)
PROT SERPL-MCNC: 6.6 G/DL (ref 6.4–8.3)
RBC # BLD AUTO: 3.1 10E6/UL (ref 3.8–5.2)
SODIUM SERPL-SCNC: 139 MMOL/L (ref 135–145)
UNIT ABO/RH: NORMAL
UNIT NUMBER: NORMAL
UNIT STATUS: NORMAL
UNIT TYPE ISBT: 9500
WBC # BLD AUTO: 3.17 10E3/UL (ref 4–11)

## 2025-09-03 PROCEDURE — P9035 PLATELET PHERES LEUKOREDUCED: HCPCS | Performed by: NURSE PRACTITIONER

## 2025-09-03 PROCEDURE — 96367 TX/PROPH/DG ADDL SEQ IV INF: CPT

## 2025-09-03 PROCEDURE — G0463 HOSPITAL OUTPT CLINIC VISIT: HCPCS | Mod: 25 | Performed by: NURSE PRACTITIONER

## 2025-09-03 PROCEDURE — 250N000011 HC RX IP 250 OP 636: Performed by: NURSE PRACTITIONER

## 2025-09-03 PROCEDURE — 96417 CHEMO IV INFUS EACH ADDL SEQ: CPT

## 2025-09-03 PROCEDURE — 82565 ASSAY OF CREATININE: CPT | Performed by: INTERNAL MEDICINE

## 2025-09-03 PROCEDURE — 250N000011 HC RX IP 250 OP 636: Performed by: INTERNAL MEDICINE

## 2025-09-03 PROCEDURE — 36430 TRANSFUSION BLD/BLD COMPNT: CPT

## 2025-09-03 PROCEDURE — 83735 ASSAY OF MAGNESIUM: CPT | Performed by: INTERNAL MEDICINE

## 2025-09-03 PROCEDURE — G2211 COMPLEX E/M VISIT ADD ON: HCPCS | Performed by: NURSE PRACTITIONER

## 2025-09-03 PROCEDURE — 85025 COMPLETE CBC W/AUTO DIFF WBC: CPT | Performed by: INTERNAL MEDICINE

## 2025-09-03 PROCEDURE — 250N000013 HC RX MED GY IP 250 OP 250 PS 637: Performed by: NURSE PRACTITIONER

## 2025-09-03 PROCEDURE — 36415 COLL VENOUS BLD VENIPUNCTURE: CPT | Performed by: INTERNAL MEDICINE

## 2025-09-03 PROCEDURE — 96413 CHEMO IV INFUSION 1 HR: CPT

## 2025-09-03 PROCEDURE — 99214 OFFICE O/P EST MOD 30 MIN: CPT | Performed by: NURSE PRACTITIONER

## 2025-09-03 PROCEDURE — 96375 TX/PRO/DX INJ NEW DRUG ADDON: CPT

## 2025-09-03 PROCEDURE — 258N000003 HC RX IP 258 OP 636: Performed by: NURSE PRACTITIONER

## 2025-09-03 RX ORDER — DIPHENHYDRAMINE HCL 25 MG
25 CAPSULE ORAL ONCE
Status: COMPLETED | OUTPATIENT
Start: 2025-09-03 | End: 2025-09-03

## 2025-09-03 RX ORDER — DIPHENHYDRAMINE HYDROCHLORIDE 50 MG/ML
50 INJECTION INTRAMUSCULAR; INTRAVENOUS
Status: CANCELLED
Start: 2025-09-03

## 2025-09-03 RX ORDER — ALBUTEROL SULFATE 90 UG/1
1-2 INHALANT RESPIRATORY (INHALATION)
Status: CANCELLED
Start: 2025-09-03

## 2025-09-03 RX ORDER — HEPARIN SODIUM,PORCINE 10 UNIT/ML
5-20 VIAL (ML) INTRAVENOUS DAILY PRN
OUTPATIENT
Start: 2025-09-03

## 2025-09-03 RX ORDER — HEPARIN SODIUM,PORCINE 10 UNIT/ML
5-20 VIAL (ML) INTRAVENOUS DAILY PRN
Status: CANCELLED | OUTPATIENT
Start: 2025-09-03

## 2025-09-03 RX ORDER — ALBUTEROL SULFATE 0.83 MG/ML
2.5 SOLUTION RESPIRATORY (INHALATION)
Status: CANCELLED | OUTPATIENT
Start: 2025-09-03

## 2025-09-03 RX ORDER — HEPARIN SODIUM (PORCINE) LOCK FLUSH IV SOLN 100 UNIT/ML 100 UNIT/ML
5 SOLUTION INTRAVENOUS
Status: CANCELLED | OUTPATIENT
Start: 2025-09-03

## 2025-09-03 RX ORDER — DIPHENHYDRAMINE HYDROCHLORIDE 50 MG/ML
25 INJECTION INTRAMUSCULAR; INTRAVENOUS ONCE
Status: CANCELLED | OUTPATIENT
Start: 2025-09-03

## 2025-09-03 RX ORDER — DIPHENHYDRAMINE HCL 25 MG
25 CAPSULE ORAL ONCE
Status: CANCELLED
Start: 2025-09-03

## 2025-09-03 RX ORDER — LORAZEPAM 2 MG/ML
0.5 INJECTION INTRAMUSCULAR EVERY 4 HOURS PRN
Status: CANCELLED | OUTPATIENT
Start: 2025-09-03

## 2025-09-03 RX ORDER — DIPHENHYDRAMINE HYDROCHLORIDE 50 MG/ML
25 INJECTION INTRAMUSCULAR; INTRAVENOUS
Status: CANCELLED
Start: 2025-09-03

## 2025-09-03 RX ORDER — EPINEPHRINE 1 MG/ML
0.3 INJECTION, SOLUTION, CONCENTRATE INTRAVENOUS EVERY 5 MIN PRN
OUTPATIENT
Start: 2025-09-03

## 2025-09-03 RX ORDER — METHYLPREDNISOLONE SODIUM SUCCINATE 40 MG/ML
40 INJECTION INTRAMUSCULAR; INTRAVENOUS
Status: CANCELLED
Start: 2025-09-03

## 2025-09-03 RX ORDER — MAGNESIUM SULFATE HEPTAHYDRATE 40 MG/ML
2 INJECTION, SOLUTION INTRAVENOUS ONCE
Status: COMPLETED | OUTPATIENT
Start: 2025-09-03 | End: 2025-09-03

## 2025-09-03 RX ORDER — EPINEPHRINE 1 MG/ML
0.3 INJECTION, SOLUTION, CONCENTRATE INTRAVENOUS EVERY 5 MIN PRN
Status: CANCELLED | OUTPATIENT
Start: 2025-09-03

## 2025-09-03 RX ORDER — DIPHENHYDRAMINE HYDROCHLORIDE 50 MG/ML
50 INJECTION INTRAMUSCULAR; INTRAVENOUS
Start: 2025-09-03

## 2025-09-03 RX ORDER — HEPARIN SODIUM (PORCINE) LOCK FLUSH IV SOLN 100 UNIT/ML 100 UNIT/ML
5 SOLUTION INTRAVENOUS
OUTPATIENT
Start: 2025-09-03

## 2025-09-03 RX ORDER — MEPERIDINE HYDROCHLORIDE 25 MG/ML
25 INJECTION INTRAMUSCULAR; INTRAVENOUS; SUBCUTANEOUS
Status: CANCELLED | OUTPATIENT
Start: 2025-09-03

## 2025-09-03 RX ORDER — HEPARIN SODIUM (PORCINE) LOCK FLUSH IV SOLN 100 UNIT/ML 100 UNIT/ML
5 SOLUTION INTRAVENOUS
Status: DISCONTINUED | OUTPATIENT
Start: 2025-09-03 | End: 2025-09-03 | Stop reason: HOSPADM

## 2025-09-03 RX ADMIN — PACLITAXEL 71 MG: 6 INJECTION, SOLUTION INTRAVENOUS at 11:17

## 2025-09-03 RX ADMIN — HEPARIN 5 ML: 100 SYRINGE at 14:18

## 2025-09-03 RX ADMIN — DIPHENHYDRAMINE HYDROCHLORIDE 25 MG: 25 CAPSULE ORAL at 10:39

## 2025-09-03 RX ADMIN — DEXAMETHASONE SODIUM PHOSPHATE: 10 INJECTION, SOLUTION INTRAMUSCULAR; INTRAVENOUS at 10:58

## 2025-09-03 RX ADMIN — FAMOTIDINE 20 MG: 10 INJECTION, SOLUTION INTRAVENOUS at 10:56

## 2025-09-03 RX ADMIN — MAGNESIUM SULFATE HEPTAHYDRATE 2 G: 40 INJECTION, SOLUTION INTRAVENOUS at 09:54

## 2025-09-03 RX ADMIN — SODIUM CHLORIDE 1000 ML: 0.9 INJECTION, SOLUTION INTRAVENOUS at 09:54

## 2025-09-03 RX ADMIN — CARBOPLATIN 100 MG: 10 INJECTION, SOLUTION INTRAVENOUS at 12:23

## 2025-09-03 ASSESSMENT — PAIN SCALES - GENERAL: PAINLEVEL_OUTOF10: NO PAIN (0)

## (undated) DEVICE — LABEL MEDICATION SYSTEM  3304

## (undated) DEVICE — SOL WATER IRRIG 1000ML BOTTLE 07139-09

## (undated) DEVICE — SYR 30ML SLIP TIP W/O NDL 302833

## (undated) DEVICE — SOL NACL 0.9% IRRIG 1000ML BOTTLE 2F7124

## (undated) DEVICE — NDL ASPIRATION EBUS-VIZISHOT 22G NA-U401SX-4022-A

## (undated) DEVICE — SU DERMABOND ADVANCED .7ML DNX12

## (undated) DEVICE — ESU PENCIL SMOKE EVAC W/ROCKER SWITCH 0703-047-000

## (undated) DEVICE — ENDO FORCEP ENDOJAW BIOPSY 2.0MMX155CM FB-221K

## (undated) DEVICE — SOL NACL 0.9% IRRIG 1000ML BOTTLE 07138-09

## (undated) DEVICE — SYR 10ML FINGER CONTROL W/O NDL 309695

## (undated) DEVICE — TUBING SUCTION 10'X3/16" N510

## (undated) DEVICE — PACK LAP TRANSVERSE STD

## (undated) DEVICE — BLADE KNIFE SURG 11 371111

## (undated) DEVICE — SUCTION MANIFOLD NEPTUNE 2 SYS 1 PORT 702-025-000

## (undated) DEVICE — DECANTER BAG 2002S

## (undated) DEVICE — ADAPTER PROBE/SUCTION VISION ION 490101

## (undated) DEVICE — STRAP POSITIONING 60X31" BODY KNEE KBS 01

## (undated) DEVICE — ENDO CATHETER ION 490105

## (undated) DEVICE — PREP CHLORAPREP 26ML TINTED ORANGE  260815

## (undated) DEVICE — STOCKING SLEEVE COMPRESSION CALF LG

## (undated) DEVICE — KIT ENDO FIRST STEP DISINFECTANT 200ML W/POUCH EP-4

## (undated) DEVICE — DRSG TELFA 3X8" 1238

## (undated) DEVICE — CONNECTOR SWIVEL 7.0MM ION 490108

## (undated) DEVICE — PROBE PERIPHERAL VISION ION 490206

## (undated) DEVICE — GOWN XLG DISP 9545

## (undated) DEVICE — LINEN TOWEL PACK X5 5464

## (undated) DEVICE — LIGHT HANDLE X2

## (undated) DEVICE — BASIN SET MINOR DISP

## (undated) DEVICE — COVER NEOPROBE SOFTFLEX 5X96" W/BANDS 20-PC596

## (undated) DEVICE — NDL COUNTER 20CT 31142493

## (undated) DEVICE — ENDO VALVE BX EVIS MAJ-210

## (undated) DEVICE — ENDO VALVE SUCTION BRONCH EVIS MAJ-209

## (undated) DEVICE — DRAPE C-ARM 60X42" 1013

## (undated) DEVICE — DECANTER VIAL 2006S

## (undated) DEVICE — SU PROLENE 2-0 SHDA 36" 8523H

## (undated) DEVICE — NDL 25GA 1.5" 305127

## (undated) DEVICE — PITCHER STERILE 1000ML  SSK9004A

## (undated) DEVICE — SYR 10ML SLIP TIP W/O NDL 303134

## (undated) DEVICE — SU MONOCRYL 4-0 PS-2 18" UND Y496G

## (undated) DEVICE — GLOVE BIOGEL PI MICRO SZ 7.0 48570

## (undated) DEVICE — GLOVE BIOGEL PI MICRO INDICATOR UNDERGLOVE SZ 7.5 48975

## (undated) DEVICE — SYR 10ML LL W/O NDL

## (undated) DEVICE — GLOVE PROTEXIS POWDER FREE ORANGE 7.0  2D72PT70X

## (undated) DEVICE — BAG INSTRUMENT IV VISION ION 490127

## (undated) DEVICE — LABEL MEDICATION SYSTEM 3303-P

## (undated) DEVICE — SPONGE RAY-TEC 4X8" 7318

## (undated) DEVICE — NDL BIOPSY 23G FLEXISION ION 490102

## (undated) DEVICE — BLADE KNIFE SURG 15 371115

## (undated) DEVICE — DRAPE SHEET REV FOLD 3/4 9349

## (undated) DEVICE — SU VICRYL+ 3-0 27IN SH UND VCP416H

## (undated) RX ORDER — PROPOFOL 10 MG/ML
INJECTION, EMULSION INTRAVENOUS
Status: DISPENSED
Start: 2021-03-08

## (undated) RX ORDER — ACETAMINOPHEN 325 MG/1
TABLET ORAL
Status: DISPENSED
Start: 2025-07-09

## (undated) RX ORDER — ALBUTEROL SULFATE 0.83 MG/ML
SOLUTION RESPIRATORY (INHALATION)
Status: DISPENSED
Start: 2025-05-28

## (undated) RX ORDER — LIDOCAINE HYDROCHLORIDE AND EPINEPHRINE 10; 10 MG/ML; UG/ML
INJECTION, SOLUTION INFILTRATION; PERINEURAL
Status: DISPENSED
Start: 2025-07-09

## (undated) RX ORDER — BUPIVACAINE HYDROCHLORIDE 5 MG/ML
INJECTION, SOLUTION EPIDURAL; INTRACAUDAL; PERINEURAL
Status: DISPENSED
Start: 2025-07-09

## (undated) RX ORDER — CEFAZOLIN SODIUM/WATER 2 G/20 ML
SYRINGE (ML) INTRAVENOUS
Status: DISPENSED
Start: 2025-07-09

## (undated) RX ORDER — LIDOCAINE HYDROCHLORIDE 10 MG/ML
INJECTION, SOLUTION EPIDURAL; INFILTRATION; INTRACAUDAL; PERINEURAL
Status: DISPENSED
Start: 2021-03-08

## (undated) RX ORDER — FENTANYL CITRATE 50 UG/ML
INJECTION, SOLUTION INTRAMUSCULAR; INTRAVENOUS
Status: DISPENSED
Start: 2025-07-09

## (undated) RX ORDER — FENTANYL CITRATE 50 UG/ML
INJECTION, SOLUTION INTRAMUSCULAR; INTRAVENOUS
Status: DISPENSED
Start: 2025-05-28

## (undated) RX ORDER — GLYCOPYRROLATE 0.2 MG/ML
INJECTION, SOLUTION INTRAMUSCULAR; INTRAVENOUS
Status: DISPENSED
Start: 2021-03-08